# Patient Record
Sex: MALE | Race: WHITE | NOT HISPANIC OR LATINO | Employment: FULL TIME | URBAN - METROPOLITAN AREA
[De-identification: names, ages, dates, MRNs, and addresses within clinical notes are randomized per-mention and may not be internally consistent; named-entity substitution may affect disease eponyms.]

---

## 2021-07-18 ENCOUNTER — APPOINTMENT (OUTPATIENT)
Dept: CT IMAGING | Facility: HOSPITAL | Age: 46
End: 2021-07-18

## 2021-07-18 ENCOUNTER — HOSPITAL ENCOUNTER (INPATIENT)
Facility: HOSPITAL | Age: 46
LOS: 2 days | Discharge: HOME | End: 2021-07-20
Attending: PSYCHIATRY & NEUROLOGY | Admitting: PSYCHIATRY & NEUROLOGY

## 2021-07-18 ENCOUNTER — HOSPITAL ENCOUNTER (EMERGENCY)
Facility: HOSPITAL | Age: 46
Discharge: PSYCHIATRIC OR SUBSTANCE USE FACILITY | End: 2021-07-18
Attending: EMERGENCY MEDICINE | Admitting: EMERGENCY MEDICINE

## 2021-07-18 VITALS
SYSTOLIC BLOOD PRESSURE: 154 MMHG | RESPIRATION RATE: 20 BRPM | WEIGHT: 245.59 LBS | TEMPERATURE: 100.1 F | BODY MASS INDEX: 32.55 KG/M2 | OXYGEN SATURATION: 98 % | HEIGHT: 73 IN | DIASTOLIC BLOOD PRESSURE: 98 MMHG | HEART RATE: 103 BPM

## 2021-07-18 DIAGNOSIS — F29 PSYCHOSIS, UNSPECIFIED PSYCHOSIS TYPE (HCC): Primary | ICD-10-CM

## 2021-07-18 LAB
ALBUMIN SERPL-MCNC: 4.7 G/DL (ref 3.5–5.2)
ALBUMIN/GLOB SERPL: 1.4 G/DL
ALP SERPL-CCNC: 136 U/L (ref 39–117)
ALT SERPL W P-5'-P-CCNC: 97 U/L (ref 1–41)
AMPHET+METHAMPHET UR QL: NEGATIVE
ANION GAP SERPL CALCULATED.3IONS-SCNC: 17.8 MMOL/L (ref 5–15)
APAP SERPL-MCNC: <5 MCG/ML (ref 0–30)
AST SERPL-CCNC: 177 U/L (ref 1–40)
BARBITURATES UR QL SCN: NEGATIVE
BASOPHILS # BLD AUTO: 0.04 10*3/MM3 (ref 0–0.2)
BASOPHILS NFR BLD AUTO: 0.7 % (ref 0–1.5)
BENZODIAZ UR QL SCN: NEGATIVE
BILIRUB SERPL-MCNC: 0.8 MG/DL (ref 0–1.2)
BILIRUB UR QL STRIP: NEGATIVE
BUN SERPL-MCNC: 6 MG/DL (ref 6–20)
BUN/CREAT SERPL: 5.4 (ref 7–25)
CALCIUM SPEC-SCNC: 9.8 MG/DL (ref 8.6–10.5)
CANNABINOIDS SERPL QL: NEGATIVE
CHLORIDE SERPL-SCNC: 90 MMOL/L (ref 98–107)
CLARITY UR: CLEAR
CO2 SERPL-SCNC: 22.2 MMOL/L (ref 22–29)
COCAINE UR QL: NEGATIVE
COLOR UR: YELLOW
CREAT SERPL-MCNC: 1.11 MG/DL (ref 0.76–1.27)
DEPRECATED RDW RBC AUTO: 41.9 FL (ref 37–54)
EOSINOPHIL # BLD AUTO: 0.03 10*3/MM3 (ref 0–0.4)
EOSINOPHIL NFR BLD AUTO: 0.5 % (ref 0.3–6.2)
ERYTHROCYTE [DISTWIDTH] IN BLOOD BY AUTOMATED COUNT: 12.2 % (ref 12.3–15.4)
ETHANOL BLD-MCNC: 66 MG/DL (ref 0–10)
ETHANOL UR QL: 0.07 %
GFR SERPL CREATININE-BSD FRML MDRD: 71 ML/MIN/1.73
GLOBULIN UR ELPH-MCNC: 3.4 GM/DL
GLUCOSE SERPL-MCNC: 207 MG/DL (ref 65–99)
GLUCOSE UR STRIP-MCNC: NEGATIVE MG/DL
HCT VFR BLD AUTO: 38.7 % (ref 37.5–51)
HGB BLD-MCNC: 13.6 G/DL (ref 13–17.7)
HGB UR QL STRIP.AUTO: NEGATIVE
IMM GRANULOCYTES # BLD AUTO: 0.03 10*3/MM3 (ref 0–0.05)
IMM GRANULOCYTES NFR BLD AUTO: 0.5 % (ref 0–0.5)
KETONES UR QL STRIP: NEGATIVE
LEUKOCYTE ESTERASE UR QL STRIP.AUTO: NEGATIVE
LYMPHOCYTES # BLD AUTO: 0.67 10*3/MM3 (ref 0.7–3.1)
LYMPHOCYTES NFR BLD AUTO: 12.2 % (ref 19.6–45.3)
MAGNESIUM SERPL-MCNC: 1.1 MG/DL (ref 1.6–2.6)
MCH RBC QN AUTO: 32.7 PG (ref 26.6–33)
MCHC RBC AUTO-ENTMCNC: 35.1 G/DL (ref 31.5–35.7)
MCV RBC AUTO: 93 FL (ref 79–97)
METHADONE UR QL SCN: NEGATIVE
MONOCYTES # BLD AUTO: 0.46 10*3/MM3 (ref 0.1–0.9)
MONOCYTES NFR BLD AUTO: 8.4 % (ref 5–12)
NEUTROPHILS NFR BLD AUTO: 4.25 10*3/MM3 (ref 1.7–7)
NEUTROPHILS NFR BLD AUTO: 77.7 % (ref 42.7–76)
NITRITE UR QL STRIP: NEGATIVE
NRBC BLD AUTO-RTO: 0 /100 WBC (ref 0–0.2)
OPIATES UR QL: NEGATIVE
OXYCODONE UR QL SCN: NEGATIVE
PH UR STRIP.AUTO: 6.5 [PH] (ref 5–8)
PLATELET # BLD AUTO: 148 10*3/MM3 (ref 140–450)
PMV BLD AUTO: 9.5 FL (ref 6–12)
POTASSIUM SERPL-SCNC: 3.1 MMOL/L (ref 3.5–5.2)
PROT SERPL-MCNC: 8.1 G/DL (ref 6–8.5)
PROT UR QL STRIP: NEGATIVE
RBC # BLD AUTO: 4.16 10*6/MM3 (ref 4.14–5.8)
SALICYLATES SERPL-MCNC: <0.3 MG/DL
SARS-COV-2 RNA RESP QL NAA+PROBE: NOT DETECTED
SODIUM SERPL-SCNC: 130 MMOL/L (ref 136–145)
SP GR UR STRIP: <=1.005 (ref 1–1.03)
T4 FREE SERPL-MCNC: 1.02 NG/DL (ref 0.93–1.7)
TSH SERPL DL<=0.05 MIU/L-ACNC: 46.06 UIU/ML (ref 0.27–4.2)
UROBILINOGEN UR QL STRIP: NORMAL
WBC # BLD AUTO: 5.48 10*3/MM3 (ref 3.4–10.8)

## 2021-07-18 PROCEDURE — U0003 INFECTIOUS AGENT DETECTION BY NUCLEIC ACID (DNA OR RNA); SEVERE ACUTE RESPIRATORY SYNDROME CORONAVIRUS 2 (SARS-COV-2) (CORONAVIRUS DISEASE [COVID-19]), AMPLIFIED PROBE TECHNIQUE, MAKING USE OF HIGH THROUGHPUT TECHNOLOGIES AS DESCRIBED BY CMS-2020-01-R: HCPCS | Performed by: EMERGENCY MEDICINE

## 2021-07-18 PROCEDURE — 81003 URINALYSIS AUTO W/O SCOPE: CPT | Performed by: EMERGENCY MEDICINE

## 2021-07-18 PROCEDURE — 80307 DRUG TEST PRSMV CHEM ANLYZR: CPT | Performed by: EMERGENCY MEDICINE

## 2021-07-18 PROCEDURE — 99285 EMERGENCY DEPT VISIT HI MDM: CPT

## 2021-07-18 PROCEDURE — 36415 COLL VENOUS BLD VENIPUNCTURE: CPT

## 2021-07-18 PROCEDURE — 80179 DRUG ASSAY SALICYLATE: CPT | Performed by: EMERGENCY MEDICINE

## 2021-07-18 PROCEDURE — 80053 COMPREHEN METABOLIC PANEL: CPT | Performed by: EMERGENCY MEDICINE

## 2021-07-18 PROCEDURE — 80143 DRUG ASSAY ACETAMINOPHEN: CPT | Performed by: EMERGENCY MEDICINE

## 2021-07-18 PROCEDURE — 70450 CT HEAD/BRAIN W/O DYE: CPT

## 2021-07-18 PROCEDURE — 84443 ASSAY THYROID STIM HORMONE: CPT | Performed by: EMERGENCY MEDICINE

## 2021-07-18 PROCEDURE — 82077 ASSAY SPEC XCP UR&BREATH IA: CPT | Performed by: EMERGENCY MEDICINE

## 2021-07-18 PROCEDURE — 85025 COMPLETE CBC W/AUTO DIFF WBC: CPT | Performed by: EMERGENCY MEDICINE

## 2021-07-18 PROCEDURE — 83735 ASSAY OF MAGNESIUM: CPT | Performed by: EMERGENCY MEDICINE

## 2021-07-18 PROCEDURE — 84439 ASSAY OF FREE THYROXINE: CPT | Performed by: EMERGENCY MEDICINE

## 2021-07-18 PROCEDURE — 82962 GLUCOSE BLOOD TEST: CPT

## 2021-07-18 PROCEDURE — C9803 HOPD COVID-19 SPEC COLLECT: HCPCS | Performed by: EMERGENCY MEDICINE

## 2021-07-18 PROCEDURE — 63710000001 INSULIN LISPRO (HUMAN) PER 5 UNITS: Performed by: PSYCHIATRY & NEUROLOGY

## 2021-07-18 RX ORDER — LEVOTHYROXINE SODIUM 112 UG/1
224 TABLET ORAL DAILY
COMMUNITY
Start: 2021-05-06 | End: 2022-01-23

## 2021-07-18 RX ORDER — NICOTINE POLACRILEX 4 MG
15 LOZENGE BUCCAL
Status: DISCONTINUED | OUTPATIENT
Start: 2021-07-18 | End: 2021-07-20 | Stop reason: HOSPADM

## 2021-07-18 RX ORDER — AMLODIPINE BESYLATE 5 MG/1
TABLET ORAL
COMMUNITY
Start: 2021-05-20 | End: 2022-01-24 | Stop reason: HOSPADM

## 2021-07-18 RX ORDER — TRAZODONE HYDROCHLORIDE 50 MG/1
100 TABLET ORAL NIGHTLY PRN
Status: DISCONTINUED | OUTPATIENT
Start: 2021-07-18 | End: 2021-07-20 | Stop reason: HOSPADM

## 2021-07-18 RX ORDER — LORAZEPAM 2 MG/ML
2 INJECTION INTRAMUSCULAR EVERY 4 HOURS PRN
Status: DISCONTINUED | OUTPATIENT
Start: 2021-07-18 | End: 2021-07-20 | Stop reason: HOSPADM

## 2021-07-18 RX ORDER — HYDROXYZINE HYDROCHLORIDE 25 MG/1
50 TABLET, FILM COATED ORAL EVERY 6 HOURS PRN
Status: DISCONTINUED | OUTPATIENT
Start: 2021-07-18 | End: 2021-07-20 | Stop reason: HOSPADM

## 2021-07-18 RX ORDER — ESCITALOPRAM OXALATE 10 MG/1
TABLET ORAL
COMMUNITY
Start: 2021-05-10 | End: 2021-07-20 | Stop reason: HOSPADM

## 2021-07-18 RX ORDER — METOPROLOL TARTRATE 50 MG/1
50 TABLET, FILM COATED ORAL 2 TIMES DAILY
Status: DISCONTINUED | OUTPATIENT
Start: 2021-07-18 | End: 2021-07-20 | Stop reason: HOSPADM

## 2021-07-18 RX ORDER — HALOPERIDOL 5 MG/1
5 TABLET ORAL EVERY 4 HOURS PRN
Status: DISCONTINUED | OUTPATIENT
Start: 2021-07-18 | End: 2021-07-20 | Stop reason: HOSPADM

## 2021-07-18 RX ORDER — HALOPERIDOL 5 MG/ML
5 INJECTION INTRAMUSCULAR EVERY 4 HOURS PRN
Status: DISCONTINUED | OUTPATIENT
Start: 2021-07-18 | End: 2021-07-20 | Stop reason: HOSPADM

## 2021-07-18 RX ORDER — LORAZEPAM 2 MG/1
2 TABLET ORAL EVERY 4 HOURS PRN
Status: DISCONTINUED | OUTPATIENT
Start: 2021-07-18 | End: 2021-07-20 | Stop reason: HOSPADM

## 2021-07-18 RX ORDER — ALUMINA, MAGNESIA, AND SIMETHICONE 2400; 2400; 240 MG/30ML; MG/30ML; MG/30ML
15 SUSPENSION ORAL EVERY 6 HOURS PRN
Status: DISCONTINUED | OUTPATIENT
Start: 2021-07-18 | End: 2021-07-20 | Stop reason: HOSPADM

## 2021-07-18 RX ORDER — DEXTROSE MONOHYDRATE 100 MG/ML
25 INJECTION, SOLUTION INTRAVENOUS
Status: DISCONTINUED | OUTPATIENT
Start: 2021-07-18 | End: 2021-07-20 | Stop reason: HOSPADM

## 2021-07-18 RX ORDER — DIPHENHYDRAMINE HCL 50 MG
50 CAPSULE ORAL EVERY 4 HOURS PRN
Status: DISCONTINUED | OUTPATIENT
Start: 2021-07-18 | End: 2021-07-20 | Stop reason: HOSPADM

## 2021-07-18 RX ORDER — LORATADINE AND PSEUDOEPHEDRINE SULFATE 5; 120 MG/1; MG/1
TABLET, EXTENDED RELEASE ORAL
COMMUNITY
Start: 2021-05-20 | End: 2022-01-23

## 2021-07-18 RX ORDER — METOPROLOL TARTRATE 50 MG/1
TABLET, FILM COATED ORAL
COMMUNITY
Start: 2021-05-20 | End: 2022-01-24 | Stop reason: HOSPADM

## 2021-07-18 RX ORDER — ATORVASTATIN CALCIUM 40 MG/1
TABLET, FILM COATED ORAL
COMMUNITY
Start: 2021-05-17 | End: 2022-07-15 | Stop reason: SDUPTHER

## 2021-07-18 RX ORDER — GLYCOPYRROLATE 2 MG/1
TABLET ORAL
COMMUNITY
Start: 2021-05-17 | End: 2021-07-20 | Stop reason: HOSPADM

## 2021-07-18 RX ORDER — NICOTINE 21 MG/24HR
1 PATCH, TRANSDERMAL 24 HOURS TRANSDERMAL
Status: DISCONTINUED | OUTPATIENT
Start: 2021-07-18 | End: 2021-07-20 | Stop reason: HOSPADM

## 2021-07-18 RX ORDER — RISPERIDONE 1 MG/1
1 TABLET ORAL 2 TIMES DAILY
Status: DISCONTINUED | OUTPATIENT
Start: 2021-07-18 | End: 2021-07-19

## 2021-07-18 RX ORDER — DIPHENHYDRAMINE HYDROCHLORIDE 50 MG/ML
50 INJECTION INTRAMUSCULAR; INTRAVENOUS EVERY 4 HOURS PRN
Status: DISCONTINUED | OUTPATIENT
Start: 2021-07-18 | End: 2021-07-20 | Stop reason: HOSPADM

## 2021-07-18 RX ORDER — ACETAMINOPHEN 325 MG/1
650 TABLET ORAL EVERY 6 HOURS PRN
Status: DISCONTINUED | OUTPATIENT
Start: 2021-07-18 | End: 2021-07-20 | Stop reason: HOSPADM

## 2021-07-18 RX ORDER — AMLODIPINE BESYLATE 5 MG/1
5 TABLET ORAL
Status: DISCONTINUED | OUTPATIENT
Start: 2021-07-19 | End: 2021-07-20 | Stop reason: HOSPADM

## 2021-07-18 RX ORDER — INSULIN GLARGINE 300 U/ML
18 INJECTION, SOLUTION SUBCUTANEOUS DAILY
COMMUNITY
Start: 2021-06-22 | End: 2022-01-23

## 2021-07-18 RX ADMIN — METOPROLOL TARTRATE 50 MG: 50 TABLET, FILM COATED ORAL at 21:13

## 2021-07-18 RX ADMIN — RISPERIDONE 1 MG: 1 TABLET, FILM COATED ORAL at 21:15

## 2021-07-18 RX ADMIN — INSULIN LISPRO 6 UNITS: 100 INJECTION, SOLUTION INTRAVENOUS; SUBCUTANEOUS at 16:47

## 2021-07-18 RX ADMIN — TRAZODONE HYDROCHLORIDE 100 MG: 50 TABLET ORAL at 23:21
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
## 2021-07-19 PROBLEM — K21.9 GERD (GASTROESOPHAGEAL REFLUX DISEASE): Status: ACTIVE | Noted: 2021-07-19

## 2021-07-19 PROBLEM — C73 PAPILLARY THYROID CARCINOMA: Status: ACTIVE | Noted: 2020-04-01

## 2021-07-19 PROBLEM — E89.0 POSTOPERATIVE HYPOTHYROIDISM: Status: ACTIVE | Noted: 2021-07-19

## 2021-07-19 PROBLEM — I10 ESSENTIAL HYPERTENSION: Status: ACTIVE | Noted: 2021-07-19

## 2021-07-19 PROBLEM — E78.5 HYPERLIPIDEMIA: Status: ACTIVE | Noted: 2018-11-14

## 2021-07-19 PROBLEM — E11.9 DM (DIABETES MELLITUS): Status: ACTIVE | Noted: 2018-11-14

## 2021-07-19 LAB
GLUCOSE BLDC GLUCOMTR-MCNC: 161 MG/DL (ref 70–130)
GLUCOSE BLDC GLUCOMTR-MCNC: 249 MG/DL (ref 70–130)
GLUCOSE BLDC GLUCOMTR-MCNC: 262 MG/DL (ref 70–130)
GLUCOSE BLDC GLUCOMTR-MCNC: 265 MG/DL (ref 70–130)
GLUCOSE BLDC GLUCOMTR-MCNC: 318 MG/DL (ref 70–130)
GLUCOSE BLDC GLUCOMTR-MCNC: 92 MG/DL (ref 70–130)

## 2021-07-19 PROCEDURE — 82962 GLUCOSE BLOOD TEST: CPT

## 2021-07-19 PROCEDURE — 63710000001 INSULIN LISPRO (HUMAN) PER 5 UNITS: Performed by: PSYCHIATRY & NEUROLOGY

## 2021-07-19 RX ORDER — ATORVASTATIN CALCIUM 40 MG/1
40 TABLET, FILM COATED ORAL NIGHTLY
Status: DISCONTINUED | OUTPATIENT
Start: 2021-07-19 | End: 2021-07-20 | Stop reason: HOSPADM

## 2021-07-19 RX ORDER — LEVOTHYROXINE SODIUM 112 UG/1
224 TABLET ORAL DAILY
Status: DISCONTINUED | OUTPATIENT
Start: 2021-07-19 | End: 2021-07-20 | Stop reason: HOSPADM

## 2021-07-19 RX ORDER — AMLODIPINE BESYLATE 5 MG/1
5 TABLET ORAL DAILY
Status: DISCONTINUED | OUTPATIENT
Start: 2021-07-19 | End: 2021-07-19

## 2021-07-19 RX ORDER — RISPERIDONE 2 MG/1
2 TABLET ORAL 2 TIMES DAILY
Status: DISCONTINUED | OUTPATIENT
Start: 2021-07-19 | End: 2021-07-20 | Stop reason: HOSPADM

## 2021-07-19 RX ADMIN — ATORVASTATIN CALCIUM 40 MG: 40 TABLET, FILM COATED ORAL at 21:17

## 2021-07-19 RX ADMIN — METOPROLOL TARTRATE 50 MG: 50 TABLET, FILM COATED ORAL at 21:18

## 2021-07-19 RX ADMIN — LEVOTHYROXINE SODIUM 224 MCG: 112 TABLET ORAL at 11:14

## 2021-07-19 RX ADMIN — TRAZODONE HYDROCHLORIDE 100 MG: 50 TABLET ORAL at 22:45

## 2021-07-19 RX ADMIN — METOPROLOL TARTRATE 50 MG: 50 TABLET, FILM COATED ORAL at 08:14

## 2021-07-19 RX ADMIN — RISPERIDONE 2 MG: 2 TABLET, FILM COATED ORAL at 21:18

## 2021-07-19 RX ADMIN — INSULIN LISPRO 6 UNITS: 100 INJECTION, SOLUTION INTRAVENOUS; SUBCUTANEOUS at 16:42

## 2021-07-19 RX ADMIN — INSULIN LISPRO 7 UNITS: 100 INJECTION, SOLUTION INTRAVENOUS; SUBCUTANEOUS at 21:23

## 2021-07-19 RX ADMIN — RISPERIDONE 1 MG: 1 TABLET, FILM COATED ORAL at 08:15

## 2021-07-19 RX ADMIN — AMLODIPINE BESYLATE 5 MG: 5 TABLET ORAL at 08:15

## 2021-07-19 RX ADMIN — INSULIN LISPRO 2 UNITS: 100 INJECTION, SOLUTION INTRAVENOUS; SUBCUTANEOUS at 07:42

## 2021-07-19 RX ADMIN — INSULIN LISPRO 4 UNITS: 100 INJECTION, SOLUTION INTRAVENOUS; SUBCUTANEOUS at 11:37
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
## 2021-07-19 NOTE — PAYOR COMM NOTE
"ADMITTED 7/18  REF # [id number]  [facility]  NPI [id number] [address] [place]  [id number]  DX F29  DR SHAE HASSAN NPI [id number]  [facility]  [phone]  [phone]        Wayne Guan (45 y.o. Male)     Date of Birth Social Security Number Address Home Phone MRN    1975  [address] [place] [place] [address] [phone] [id number]    Mormon Marital Status          None Single       Admission Date Admission Type Admitting Provider Attending Provider Department, Room/Bed    7/18/21 Urgent Suad Bob MD Lydon, Eric, MD [facility], 272/2    Discharge Date Discharge Disposition Discharge Destination                       Attending Provider: Shae Hassan MD    Allergies: No Known Allergies    Isolation: None   Infection: None   Code Status: CPR    Ht: 185.4 cm (73\")   Wt: 111 kg (245 lb 9.5 oz)    Admission Cmt: None   Principal Problem: None                Active Insurance as of 7/18/2021     Primary Coverage     Payor Plan Insurance Group Employer/Plan Group    HUMANA HUMANA [id number]     Payor Plan Address Payor Plan Phone Number Payor Plan Fax Number Effective Dates    [address] [address] [phone]  1/1/2021 - None Entered    [facility] [id number]       Subscriber Name Subscriber Birth Date Member ID       WAYNE GUAN 1975 [id number]                 Emergency Contacts      (Rel.) Home Phone Work Phone Mobile Phone    ARNOLDO THOMAS (Father) [phone] -- [phone]    MARTINE DYE (Significant Other) [phone] -- [phone]              "

## 2021-07-19 NOTE — PLAN OF CARE
Goal Outcome Evaluation:  Plan of Care Reviewed With: patient  Patient Agreement with Plan of Care: agrees      Pt. Has been calm and cooperative today, reporting he was feeling better and hoped to be released soon. Pt. Denied any si/hi/avh and contracted for safety. Pt. Is making his needs, will con't to monitor and provide a safe environment.

## 2021-07-19 NOTE — CONSULTS
Nutrition Services    Patient Name:  Wayne Guan  YOB: 1975  MRN: [id number]  Admit Date:  7/18/2021    No nutrition intervention indicated at this time. Will continue to monitor intake and follow per protocol.    Electronically signed by:  Joann Cedeño RD  07/19/21 10:10 EDT

## 2021-07-19 NOTE — PLAN OF CARE
"Goal Outcome Evaluation:  Plan of Care Reviewed With: patient  Patient Agreement with Plan of Care: agrees     Progress: improving  Pt has been up in the dayroom this afternoon. Pt describes his mood as \"good\". Denies suicidal, homicidal ideations and denies hallucinations.Pt did state he feels like his mind is racing related to currently in the middle of buying a house and trying to sell his girlfriends house. When ask reason for admission, Pt stated \"dirty \"continued to state \"they falsified reports\".Will continue to monitor. J.W/R.N  "

## 2021-07-20 VITALS
SYSTOLIC BLOOD PRESSURE: 112 MMHG | WEIGHT: 245.59 LBS | TEMPERATURE: 97.3 F | OXYGEN SATURATION: 97 % | BODY MASS INDEX: 32.55 KG/M2 | HEART RATE: 118 BPM | DIASTOLIC BLOOD PRESSURE: 79 MMHG | RESPIRATION RATE: 16 BRPM | HEIGHT: 73 IN

## 2021-07-20 PROBLEM — F31.81 BIPOLAR 2 DISORDER: Status: ACTIVE | Noted: 2021-07-20

## 2021-07-20 LAB
GLUCOSE BLDC GLUCOMTR-MCNC: 226 MG/DL (ref 70–130)
GLUCOSE BLDC GLUCOMTR-MCNC: 269 MG/DL (ref 70–130)

## 2021-07-20 PROCEDURE — 63710000001 INSULIN LISPRO (HUMAN) PER 5 UNITS: Performed by: PSYCHIATRY & NEUROLOGY

## 2021-07-20 PROCEDURE — 63710000001 INSULIN DETEMIR PER 5 UNITS: Performed by: PSYCHIATRY & NEUROLOGY

## 2021-07-20 PROCEDURE — 82962 GLUCOSE BLOOD TEST: CPT

## 2021-07-20 RX ORDER — RISPERIDONE 2 MG/1
2 TABLET ORAL 2 TIMES DAILY
Qty: 30 TABLET | Refills: 2 | Status: SHIPPED | OUTPATIENT
Start: 2021-07-20 | End: 2022-01-23

## 2021-07-20 RX ORDER — TRAZODONE HYDROCHLORIDE 100 MG/1
100 TABLET ORAL NIGHTLY PRN
Qty: 30 TABLET | Refills: 2 | Status: SHIPPED | OUTPATIENT
Start: 2021-07-20 | End: 2022-01-23

## 2021-07-20 RX ADMIN — INSULIN LISPRO 6 UNITS: 100 INJECTION, SOLUTION INTRAVENOUS; SUBCUTANEOUS at 07:08

## 2021-07-20 RX ADMIN — LEVOTHYROXINE SODIUM 224 MCG: 112 TABLET ORAL at 08:57

## 2021-07-20 RX ADMIN — METOPROLOL TARTRATE 50 MG: 50 TABLET, FILM COATED ORAL at 08:57

## 2021-07-20 RX ADMIN — AMLODIPINE BESYLATE 5 MG: 5 TABLET ORAL at 08:55

## 2021-07-20 RX ADMIN — INSULIN DETEMIR 15 UNITS: 100 INJECTION, SOLUTION SUBCUTANEOUS at 08:56

## 2021-07-20 RX ADMIN — INSULIN LISPRO 4 UNITS: 100 INJECTION, SOLUTION INTRAVENOUS; SUBCUTANEOUS at 11:21

## 2021-07-20 RX ADMIN — RISPERIDONE 2 MG: 2 TABLET, FILM COATED ORAL at 08:57

## 2021-07-20 NOTE — PLAN OF CARE
Goal Outcome Evaluation:  Plan of Care Reviewed With: patient  Patient Agreement with Plan of Care: agrees     Progress: improving  Patient smiles upon approach from staff. Denies s/I,h/I and a/v hallucinations.States he hopes to go  home tomorrow.Pt verbalized having difficulty falling to sleep and received trazodone  as ordered for sleep. Will continue to monitor and provide a safe environment.

## 2021-07-20 NOTE — PLAN OF CARE
Goal Outcome Evaluation:  Plan of Care Reviewed With: patient  Patient Agreement with Plan of Care: agrees     Progress: improving   Pt continues to be pleasant and does not appear to be reacting to any internal stimuli. Pt interacts well with peers and staff, and expresses the desire to go home today.   Pt denies SI/HI and A/V hallucinations and also denies feeling depressed, anxious, or hopeless. Pt remains compliant with medication regimen at this time.   Safe environment maintained. Q15 minute checks to be continued until pt discharge.

## 2021-07-20 NOTE — DISCHARGE SUMMARY
[facility]         DISCHARGE SUMMARY    Patient Name: Wayne Guan  : 1975  MRN: [id number]    Date of Admission: 2021  Date of Discharge: 2021  Primary Care Physician: Myron Jimenez MD    Consults     Date and Time Order Name Status Description    2021 11:11 AM Inpatient Psychiatrist Consult Completed           Presenting Problem:   Psychosis (CMS/HCC) [F29]    Active and Resolved Hospital Problems:  Active Hospital Problems    Diagnosis POA   • Bipolar 2 disorder (CMS/HCC) [F31.81] Yes   • Essential hypertension [I10] Yes   • Postoperative hypothyroidism [E89.0] Yes   • GERD (gastroesophageal reflux disease) [K21.9] Yes   • Psychosis (CMS/HCC) [F29] Yes   • DM (diabetes mellitus) (CMS/HCC) [E11.9] Yes   • Hyperlipidemia [E78.5] Yes      Resolved Hospital Problems   No resolved problems to display.         Hospital Course     Hospital Course:  Wayne Guan is a 45 y.o. male was admitted on a 72-hour hold after police brought him to the hospital for bizarre behavior with psychosis.  Patient called police to his home thinking someone had broken in and there is no evidence of any intruders.  Patient was noted to be somewhat agitated.  He did not require any extra medications for acute agitation.  Patient reportedly had not been sleeping well at home and has some sleep deprivation.  He also reported some manic type symptoms with push speech, but attempting to do multiple tasks, and may have had some spending issues.  However no acute kristen identified and no history of kristen.  Did have some racing thoughts with psychosis and bizarre behavior.    Patient was started on a regimen of Risperdal 2 mg twice daily for her psychosis.  This medication worked well and the patient was calm and cooperative throughout his stay.  His psychosis abated and he had no further reported hallucinations during his admission.  Patient was also started on trazodone and his sleep was  significantly improved.  Patient reports that his sleep was probably one of the biggest factors of him coming into the hospital.  Trazodone is been very effective and he sleeping much better and is slept through the night comfortably for 2 nights.     Initially on presentation the patient was calm and cooperative and quite engaging.  He remained this way throughout his stay.  He is not had any agitation or combativeness.  He is never had any restlessness or overt anxiety.  He has been calm and engaging.  Patient is very pleasant.  Staff did talk to the patient's girlfriend he reports that his lack of sleep and recent medication changes may have contributed to his bizarre behavior.  She is talked to him on telephone reports that he sounds much better.  She also describes some poor sleep and some bizarre behavior which could be construed as kristen such as wanting to adopt a child, which they are not going to do, being less focused recently.  Patient does have numerous stressors but they are nothing out of the ordinary.  Patient this point is calm and cooperative shows significant improvement.  Patient is requesting discharge home today.  This point there is no criteria for involuntary hold and 72-hour hold will be dropped and the patient will be discharged home        DISCHARGE Follow Up Recommendations for labs and diagnostics: Lipid and glucose monitoring      Day of Discharge     Vital Signs:  Temp:  [97.3 °F (36.3 °C)-97.7 °F (36.5 °C)] 97.3 °F (36.3 °C)  Heart Rate:  [118] 118  Resp:  [16] 16  BP: (112-129)/(79-94) 112/79      Pertinent  and/or Most Recent Results     LAB RESULTS:      Lab 07/18/21  0652   WBC 5.48   HEMOGLOBIN 13.6   HEMATOCRIT 38.7   PLATELETS 148   NEUTROS ABS 4.25   IMMATURE GRANS (ABS) 0.03   LYMPHS ABS 0.67*   MONOS ABS 0.46   EOS ABS 0.03   MCV 93.0         Lab 07/18/21  0652   SODIUM 130*   POTASSIUM 3.1*   CHLORIDE 90*   CO2 22.2   ANION GAP 17.8*   BUN 6   CREATININE 1.11   GLUCOSE 207*    CALCIUM 9.8   MAGNESIUM 1.1*   TSH 46.060*         Lab 07/18/21  0652   TOTAL PROTEIN 8.1   ALBUMIN 4.70   GLOBULIN 3.4   ALT (SGPT) 97*   AST (SGOT) 177*   BILIRUBIN 0.8   ALK PHOS 136*                     Brief Urine Lab Results  (Last result in the past 365 days)      Color   Clarity   Blood   Leuk Est   Nitrite   Protein   CREAT   Urine HCG        07/18/21 1026 Yellow Clear Negative Negative Negative Negative             Microbiology Results (last 10 days)     Procedure Component Value - Date/Time    COVID PRE-OP / PRE-PROCEDURE SCREENING ORDER (NO ISOLATION) - Swab, Nasopharynx [[id number]]  (Normal) Collected: 07/18/21 0653    Lab Status: Final result Specimen: Swab from Nasopharynx Updated: 07/18/21 1053    Narrative:      The following orders were created for panel order COVID PRE-OP / PRE-PROCEDURE SCREENING ORDER (NO ISOLATION) - Swab, Nasopharynx.  Procedure                               Abnormality         Status                     ---------                               -----------         ------                     COVID-19,CEPHEID,COR/DONTE...[[id number]]  Normal              Final result                 Please view results for these tests on the individual orders.    COVID-19,CEPHEID,COR/DONTE/PAD/TYE IN-HOUSE(OR EMERGENT/ADD-ON),NP SWAB IN TRANSPORT MEDIA 3-4 HR TAT, RT-PCR - Swab, Nasopharynx [[id number]]  (Normal) Collected: 07/18/21 0653    Lab Status: Final result Specimen: Swab from Nasopharynx Updated: 07/18/21 1053     COVID19 Not Detected    Narrative:      Fact sheet for providers: https://www.fda.gov/media/320937/download     Fact sheet for patients: https://www.fda.gov/media/864150/download  Fact sheet for providers: https://www.fda.gov/media/557491/download     Fact sheet for patients: https://www.fda.gov/media/511330/download                           Imaging Results (Last 7 Days)     ** No results found for the last 168 hours. **           Labs Pending at Discharge:        Discharge  Details        Discharge Medications      New Medications      Instructions Start Date   risperiDONE 2 MG tablet  Commonly known as: risperDAL   2 mg, Oral, 2 Times Daily      traZODone 100 MG tablet  Commonly known as: DESYREL   100 mg, Oral, Nightly PRN         Continue These Medications      Instructions Start Date   amLODIPine 5 MG tablet  Commonly known as: NORVASC   TAKE ONE TABLET BY MOUTH DAILY      atorvastatin 40 MG tablet  Commonly known as: LIPITOR   TAKE 1 TABLET EVERY NIGHT      Claritin-D 12 Hour 5-120 MG per 12 hr tablet  Generic drug: loratadine-pseudoephedrine   TAKE ONE TABLET BY MOUTH DAILY      metoprolol tartrate 50 MG tablet  Commonly known as: LOPRESSOR   TAKE ONE TABLET BY MOUTH TWICE A DAY      Synthroid 112 MCG tablet  Generic drug: levothyroxine   224 mcg, Oral, Daily      Toujeo SoloStar 300 UNIT/ML solution pen-injector injection  Generic drug: Insulin Glargine (1 Unit Dial)   18 Units, Subcutaneous, Daily         Stop These Medications    escitalopram 10 MG tablet  Commonly known as: LEXAPRO     glycopyrrolate 2 MG tablet  Commonly known as: ROBINUL            No Known Allergies      Discharge Disposition:  Home or Self Care    Diet:  Hospital:  Diet Order   Procedures   • Diet Regular; Consistent Carbohydrate         Discharge Activity:   Activity Instructions     Activity as Tolerated              CODE STATUS:  Code Status and Medical Interventions:   Ordered at: 07/19/21 1008     Code Status:    CPR     Medical Interventions (Level of Support Prior to Arrest):    Full         No future appointments.    Additional Instructions for the Follow-ups that You Need to Schedule     Discharge Follow-up with PCP   As directed       Currently Documented PCP:    Myron Jimenez MD    PCP Phone Number:    [phone]     Follow Up Details: PCP PRN         Discharge Follow-up with Specified Provider: Provider in network, per SW note; 2 Weeks   As directed      To: Provider in network, per  SW note    Follow Up: 2 Weeks               Time spent on Discharge including face to face service: 30 minutes    Electronically signed by Timo Hassan MD, 07/20/21, 10:56 AM EDT.

## 2021-07-20 NOTE — PAYOR COMM NOTE
"EXPEDITED APPEALS  REF # [id number]  CLINICALS FAXED 7/19 @ 4900   [phone] HAVE CONFIRMATION  CASE DENIED DUE TO NO CLINICALS RECEIVED?         Wayne Guan (45 y.o. Male)     Date of Birth Social Security Number Address Home Phone MRN    1975  [address] [place] [address] [phone] [id number]    Hoahaoism Marital Status          None Single       Admission Date Admission Type Admitting Provider Attending Provider Department, Room/Bed    7/18/21 Urgent Suad Bob MD Lydon, Eric, MD [facility], 272/2    Discharge Date Discharge Disposition Discharge Destination         Home or Self Care              Attending Provider: Timo Hassan MD    Allergies: No Known Allergies    Isolation: None   Infection: None   Code Status: CPR    Ht: 185.4 cm (73\")   Wt: 111 kg (245 lb 9.5 oz)    Admission Cmt: None   Principal Problem: None                Active Insurance as of 7/18/2021     Primary Coverage     Payor Plan Insurance Group Employer/Plan Group    HUMANA HUMANA [id number]     Payor Plan Address Payor Plan Phone Number Payor Plan Fax Number Effective Dates    [address] [address] [phone]  1/1/2021 - None Entered    [facility] [address]       Subscriber Name Subscriber Birth Date Member ID       WAYNE GUAN 1975 [id number]                 Emergency Contacts      (Rel.) Home Phone Work Phone Mobile Phone    WILLIAMARNOLDO (Father) [phone] -- [phone]    MARTINE DYE (Significant Other) [phone] -- [phone]              "
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
## 2021-09-27 ENCOUNTER — INPATIENT HOSPITAL (OUTPATIENT)
Dept: URBAN - METROPOLITAN AREA HOSPITAL 107 | Facility: HOSPITAL | Age: 46
End: 2021-09-27

## 2021-09-27 DIAGNOSIS — F10.10 ALCOHOL ABUSE, UNCOMPLICATED: ICD-10-CM

## 2021-09-27 DIAGNOSIS — R13.10 DYSPHAGIA, UNSPECIFIED: ICD-10-CM

## 2021-09-27 DIAGNOSIS — R07.89 OTHER CHEST PAIN: ICD-10-CM

## 2021-09-27 DIAGNOSIS — R12 HEARTBURN: ICD-10-CM

## 2021-09-27 PROCEDURE — 99253 IP/OBS CNSLTJ NEW/EST LOW 45: CPT | Performed by: INTERNAL MEDICINE

## 2021-09-28 ENCOUNTER — INPATIENT HOSPITAL (OUTPATIENT)
Dept: URBAN - METROPOLITAN AREA HOSPITAL 107 | Facility: HOSPITAL | Age: 46
End: 2021-09-28

## 2021-09-28 DIAGNOSIS — K21.9 GASTRO-ESOPHAGEAL REFLUX DISEASE WITHOUT ESOPHAGITIS: ICD-10-CM

## 2021-09-28 DIAGNOSIS — K76.0 FATTY (CHANGE OF) LIVER, NOT ELSEWHERE CLASSIFIED: ICD-10-CM

## 2021-09-28 DIAGNOSIS — F10.10 ALCOHOL ABUSE, UNCOMPLICATED: ICD-10-CM

## 2021-09-28 DIAGNOSIS — Z86.010 PERSONAL HISTORY OF COLONIC POLYPS: ICD-10-CM

## 2021-09-28 DIAGNOSIS — R13.10 DYSPHAGIA, UNSPECIFIED: ICD-10-CM

## 2021-09-28 PROCEDURE — 99232 SBSQ HOSP IP/OBS MODERATE 35: CPT | Performed by: PHYSICIAN ASSISTANT

## 2021-09-29 ENCOUNTER — INPATIENT HOSPITAL (OUTPATIENT)
Dept: URBAN - METROPOLITAN AREA HOSPITAL 107 | Facility: HOSPITAL | Age: 46
End: 2021-09-29

## 2021-09-29 DIAGNOSIS — K21.00 GASTRO-ESOPHAGEAL REFLUX DISEASE WITH ESOPHAGITIS, WITHOUT B: ICD-10-CM

## 2021-09-29 DIAGNOSIS — K76.0 FATTY (CHANGE OF) LIVER, NOT ELSEWHERE CLASSIFIED: ICD-10-CM

## 2021-09-29 DIAGNOSIS — R13.10 DYSPHAGIA, UNSPECIFIED: ICD-10-CM

## 2021-09-29 DIAGNOSIS — F10.10 ALCOHOL ABUSE, UNCOMPLICATED: ICD-10-CM

## 2021-09-29 DIAGNOSIS — Z86.010 PERSONAL HISTORY OF COLONIC POLYPS: ICD-10-CM

## 2021-09-29 PROCEDURE — 99231 SBSQ HOSP IP/OBS SF/LOW 25: CPT | Performed by: PHYSICIAN ASSISTANT

## 2022-01-08 ENCOUNTER — APPOINTMENT (OUTPATIENT)
Dept: CT IMAGING | Facility: HOSPITAL | Age: 47
End: 2022-01-08

## 2022-01-08 ENCOUNTER — HOSPITAL ENCOUNTER (EMERGENCY)
Facility: HOSPITAL | Age: 47
Discharge: HOME | End: 2022-01-08
Attending: EMERGENCY MEDICINE | Admitting: EMERGENCY MEDICINE

## 2022-01-08 ENCOUNTER — APPOINTMENT (OUTPATIENT)
Dept: GENERAL RADIOLOGY | Facility: HOSPITAL | Age: 47
End: 2022-01-08

## 2022-01-08 VITALS
HEART RATE: 130 BPM | SYSTOLIC BLOOD PRESSURE: 135 MMHG | DIASTOLIC BLOOD PRESSURE: 101 MMHG | OXYGEN SATURATION: 97 % | HEIGHT: 73 IN | TEMPERATURE: 97.3 F | BODY MASS INDEX: 30.15 KG/M2 | RESPIRATION RATE: 22 BRPM | WEIGHT: 227.51 LBS

## 2022-01-08 DIAGNOSIS — R00.0 SINUS TACHYCARDIA: ICD-10-CM

## 2022-01-08 DIAGNOSIS — R11.14 BILIOUS VOMITING WITH NAUSEA: Primary | ICD-10-CM

## 2022-01-08 DIAGNOSIS — E11.65 TYPE 2 DIABETES MELLITUS WITH HYPERGLYCEMIA, UNSPECIFIED WHETHER LONG TERM INSULIN USE: ICD-10-CM

## 2022-01-08 LAB
ACETONE BLD QL: NEGATIVE
ALBUMIN SERPL-MCNC: 5 G/DL (ref 3.5–5.2)
ALBUMIN/GLOB SERPL: 1.4 G/DL
ALP SERPL-CCNC: 152 U/L (ref 39–117)
ALT SERPL W P-5'-P-CCNC: 33 U/L (ref 1–41)
AMPHET+METHAMPHET UR QL: NEGATIVE
ANION GAP SERPL CALCULATED.3IONS-SCNC: 19.5 MMOL/L (ref 5–15)
AST SERPL-CCNC: 44 U/L (ref 1–40)
BACTERIA UR QL AUTO: ABNORMAL /HPF
BARBITURATES UR QL SCN: NEGATIVE
BASOPHILS # BLD AUTO: 0.04 10*3/MM3 (ref 0–0.2)
BASOPHILS NFR BLD AUTO: 0.3 % (ref 0–1.5)
BENZODIAZ UR QL SCN: NEGATIVE
BILIRUB SERPL-MCNC: 1.2 MG/DL (ref 0–1.2)
BILIRUB UR QL STRIP: NEGATIVE
BUN SERPL-MCNC: 13 MG/DL (ref 6–20)
BUN/CREAT SERPL: 13.5 (ref 7–25)
CALCIUM SPEC-SCNC: 9.9 MG/DL (ref 8.6–10.5)
CANNABINOIDS SERPL QL: NEGATIVE
CHLORIDE SERPL-SCNC: 91 MMOL/L (ref 98–107)
CLARITY UR: CLEAR
CO2 SERPL-SCNC: 22.5 MMOL/L (ref 22–29)
COCAINE UR QL: NEGATIVE
COLOR UR: YELLOW
CREAT SERPL-MCNC: 0.96 MG/DL (ref 0.76–1.27)
DEPRECATED RDW RBC AUTO: 38.5 FL (ref 37–54)
EOSINOPHIL # BLD AUTO: 0.03 10*3/MM3 (ref 0–0.4)
EOSINOPHIL NFR BLD AUTO: 0.2 % (ref 0.3–6.2)
ERYTHROCYTE [DISTWIDTH] IN BLOOD BY AUTOMATED COUNT: 12.1 % (ref 12.3–15.4)
ETHANOL BLD-MCNC: <10 MG/DL (ref 0–10)
ETHANOL UR QL: <0.01 %
GFR SERPL CREATININE-BSD FRML MDRD: 84 ML/MIN/1.73
GLOBULIN UR ELPH-MCNC: 3.6 GM/DL
GLUCOSE BLDC GLUCOMTR-MCNC: 178 MG/DL (ref 70–99)
GLUCOSE BLDC GLUCOMTR-MCNC: 313 MG/DL (ref 70–99)
GLUCOSE SERPL-MCNC: 327 MG/DL (ref 65–99)
GLUCOSE UR STRIP-MCNC: ABNORMAL MG/DL
HCT VFR BLD AUTO: 43.1 % (ref 37.5–51)
HGB BLD-MCNC: 15.9 G/DL (ref 13–17.7)
HGB UR QL STRIP.AUTO: NEGATIVE
HOLD SPECIMEN: NORMAL
HOLD SPECIMEN: NORMAL
HYALINE CASTS UR QL AUTO: ABNORMAL /LPF
IMM GRANULOCYTES # BLD AUTO: 0.03 10*3/MM3 (ref 0–0.05)
IMM GRANULOCYTES NFR BLD AUTO: 0.2 % (ref 0–0.5)
KETONES UR QL STRIP: ABNORMAL
LEUKOCYTE ESTERASE UR QL STRIP.AUTO: NEGATIVE
LIPASE SERPL-CCNC: 19 U/L (ref 13–60)
LYMPHOCYTES # BLD AUTO: 1.25 10*3/MM3 (ref 0.7–3.1)
LYMPHOCYTES NFR BLD AUTO: 10.3 % (ref 19.6–45.3)
MAGNESIUM SERPL-MCNC: 1.5 MG/DL (ref 1.6–2.6)
MCH RBC QN AUTO: 32.2 PG (ref 26.6–33)
MCHC RBC AUTO-ENTMCNC: 36.9 G/DL (ref 31.5–35.7)
MCV RBC AUTO: 87.2 FL (ref 79–97)
METHADONE UR QL SCN: NEGATIVE
MONOCYTES # BLD AUTO: 0.81 10*3/MM3 (ref 0.1–0.9)
MONOCYTES NFR BLD AUTO: 6.7 % (ref 5–12)
NEUTROPHILS NFR BLD AUTO: 10.02 10*3/MM3 (ref 1.7–7)
NEUTROPHILS NFR BLD AUTO: 82.3 % (ref 42.7–76)
NITRITE UR QL STRIP: NEGATIVE
NRBC BLD AUTO-RTO: 0 /100 WBC (ref 0–0.2)
OPIATES UR QL: NEGATIVE
OXYCODONE UR QL SCN: NEGATIVE
PH BLDV: 7.4 PH UNITS (ref 7.31–7.41)
PH UR STRIP.AUTO: <=5 [PH] (ref 5–8)
PLATELET # BLD AUTO: 334 10*3/MM3 (ref 140–450)
PMV BLD AUTO: 8.7 FL (ref 6–12)
POTASSIUM SERPL-SCNC: 4.1 MMOL/L (ref 3.5–5.2)
PROT SERPL-MCNC: 8.6 G/DL (ref 6–8.5)
PROT UR QL STRIP: ABNORMAL
QT INTERVAL: 304 MS
RBC # BLD AUTO: 4.94 10*6/MM3 (ref 4.14–5.8)
RBC # UR STRIP: ABNORMAL /HPF
REF LAB TEST METHOD: ABNORMAL
SODIUM SERPL-SCNC: 133 MMOL/L (ref 136–145)
SP GR UR STRIP: >1.03 (ref 1–1.03)
SQUAMOUS #/AREA URNS HPF: ABNORMAL /HPF
T4 FREE SERPL-MCNC: 2.24 NG/DL (ref 0.93–1.7)
TROPONIN T SERPL-MCNC: <0.01 NG/ML (ref 0–0.03)
TSH SERPL DL<=0.05 MIU/L-ACNC: 0.09 UIU/ML (ref 0.27–4.2)
UROBILINOGEN UR QL STRIP: ABNORMAL
WBC # UR STRIP: ABNORMAL /HPF
WBC NRBC COR # BLD: 12.18 10*3/MM3 (ref 3.4–10.8)
WHOLE BLOOD HOLD SPECIMEN: NORMAL
WHOLE BLOOD HOLD SPECIMEN: NORMAL

## 2022-01-08 PROCEDURE — 80307 DRUG TEST PRSMV CHEM ANLYZR: CPT | Performed by: NURSE PRACTITIONER

## 2022-01-08 PROCEDURE — 25010000002 ONDANSETRON PER 1 MG: Performed by: EMERGENCY MEDICINE

## 2022-01-08 PROCEDURE — 82962 GLUCOSE BLOOD TEST: CPT

## 2022-01-08 PROCEDURE — 93010 ELECTROCARDIOGRAM REPORT: CPT | Performed by: INTERNAL MEDICINE

## 2022-01-08 PROCEDURE — 93005 ELECTROCARDIOGRAM TRACING: CPT | Performed by: EMERGENCY MEDICINE

## 2022-01-08 PROCEDURE — 99284 EMERGENCY DEPT VISIT MOD MDM: CPT

## 2022-01-08 PROCEDURE — 74177 CT ABD & PELVIS W/CONTRAST: CPT

## 2022-01-08 PROCEDURE — 96374 THER/PROPH/DIAG INJ IV PUSH: CPT

## 2022-01-08 PROCEDURE — 85025 COMPLETE CBC W/AUTO DIFF WBC: CPT | Performed by: EMERGENCY MEDICINE

## 2022-01-08 PROCEDURE — 80053 COMPREHEN METABOLIC PANEL: CPT | Performed by: EMERGENCY MEDICINE

## 2022-01-08 PROCEDURE — 84439 ASSAY OF FREE THYROXINE: CPT | Performed by: NURSE PRACTITIONER

## 2022-01-08 PROCEDURE — 84484 ASSAY OF TROPONIN QUANT: CPT | Performed by: EMERGENCY MEDICINE

## 2022-01-08 PROCEDURE — 25010000002 METOCLOPRAMIDE PER 10 MG: Performed by: NURSE PRACTITIONER

## 2022-01-08 PROCEDURE — 81001 URINALYSIS AUTO W/SCOPE: CPT | Performed by: EMERGENCY MEDICINE

## 2022-01-08 PROCEDURE — 96375 TX/PRO/DX INJ NEW DRUG ADDON: CPT

## 2022-01-08 PROCEDURE — 25010000002 DIPHENHYDRAMINE PER 50 MG: Performed by: NURSE PRACTITIONER

## 2022-01-08 PROCEDURE — 83735 ASSAY OF MAGNESIUM: CPT | Performed by: EMERGENCY MEDICINE

## 2022-01-08 PROCEDURE — 82009 KETONE BODYS QUAL: CPT | Performed by: NURSE PRACTITIONER

## 2022-01-08 PROCEDURE — 0 IOPAMIDOL PER 1 ML: Performed by: EMERGENCY MEDICINE

## 2022-01-08 PROCEDURE — 84443 ASSAY THYROID STIM HORMONE: CPT | Performed by: NURSE PRACTITIONER

## 2022-01-08 PROCEDURE — 83690 ASSAY OF LIPASE: CPT | Performed by: NURSE PRACTITIONER

## 2022-01-08 PROCEDURE — 82077 ASSAY SPEC XCP UR&BREATH IA: CPT | Performed by: NURSE PRACTITIONER

## 2022-01-08 PROCEDURE — 82800 BLOOD PH: CPT | Performed by: NURSE PRACTITIONER

## 2022-01-08 PROCEDURE — 71045 X-RAY EXAM CHEST 1 VIEW: CPT

## 2022-01-08 PROCEDURE — 25010000002 LORAZEPAM PER 2 MG: Performed by: EMERGENCY MEDICINE

## 2022-01-08 PROCEDURE — 36415 COLL VENOUS BLD VENIPUNCTURE: CPT | Performed by: EMERGENCY MEDICINE

## 2022-01-08 RX ORDER — METOCLOPRAMIDE HYDROCHLORIDE 5 MG/ML
10 INJECTION INTRAMUSCULAR; INTRAVENOUS ONCE
Status: DISCONTINUED | OUTPATIENT
Start: 2022-01-08 | End: 2022-01-08 | Stop reason: HOSPADM

## 2022-01-08 RX ORDER — METOCLOPRAMIDE HYDROCHLORIDE 5 MG/ML
10 INJECTION INTRAMUSCULAR; INTRAVENOUS ONCE
Status: COMPLETED | OUTPATIENT
Start: 2022-01-08 | End: 2022-01-08

## 2022-01-08 RX ORDER — DIPHENHYDRAMINE HYDROCHLORIDE 50 MG/ML
25 INJECTION INTRAMUSCULAR; INTRAVENOUS ONCE
Status: COMPLETED | OUTPATIENT
Start: 2022-01-08 | End: 2022-01-08

## 2022-01-08 RX ORDER — ONDANSETRON 4 MG/1
4 TABLET, ORALLY DISINTEGRATING ORAL EVERY 8 HOURS PRN
Qty: 15 TABLET | Refills: 0 | Status: SHIPPED | OUTPATIENT
Start: 2022-01-08 | End: 2022-01-23

## 2022-01-08 RX ORDER — ONDANSETRON 2 MG/ML
4 INJECTION INTRAMUSCULAR; INTRAVENOUS ONCE
Status: COMPLETED | OUTPATIENT
Start: 2022-01-08 | End: 2022-01-08

## 2022-01-08 RX ORDER — DIPHENHYDRAMINE HYDROCHLORIDE 50 MG/ML
25 INJECTION INTRAMUSCULAR; INTRAVENOUS ONCE
Status: DISCONTINUED | OUTPATIENT
Start: 2022-01-08 | End: 2022-01-08 | Stop reason: HOSPADM

## 2022-01-08 RX ORDER — LORAZEPAM 2 MG/ML
1 INJECTION INTRAMUSCULAR ONCE
Status: COMPLETED | OUTPATIENT
Start: 2022-01-08 | End: 2022-01-08

## 2022-01-08 RX ORDER — SODIUM CHLORIDE 0.9 % (FLUSH) 0.9 %
10 SYRINGE (ML) INJECTION AS NEEDED
Status: DISCONTINUED | OUTPATIENT
Start: 2022-01-08 | End: 2022-01-08 | Stop reason: HOSPADM

## 2022-01-08 RX ORDER — DICYCLOMINE HCL 20 MG
20 TABLET ORAL EVERY 6 HOURS
Qty: 20 TABLET | Refills: 0 | Status: SHIPPED | OUTPATIENT
Start: 2022-01-08 | End: 2022-01-23

## 2022-01-08 RX ADMIN — DIPHENHYDRAMINE HYDROCHLORIDE 25 MG: 50 INJECTION, SOLUTION INTRAMUSCULAR; INTRAVENOUS at 14:17

## 2022-01-08 RX ADMIN — LORAZEPAM 1 MG: 2 INJECTION INTRAMUSCULAR; INTRAVENOUS at 11:18

## 2022-01-08 RX ADMIN — ONDANSETRON 4 MG: 2 INJECTION INTRAMUSCULAR; INTRAVENOUS at 11:16

## 2022-01-08 RX ADMIN — SODIUM CHLORIDE 1000 ML: 9 INJECTION, SOLUTION INTRAVENOUS at 12:41

## 2022-01-08 RX ADMIN — SODIUM CHLORIDE 1000 ML: 9 INJECTION, SOLUTION INTRAVENOUS at 11:15

## 2022-01-08 RX ADMIN — METOCLOPRAMIDE HYDROCHLORIDE 10 MG: 5 INJECTION INTRAMUSCULAR; INTRAVENOUS at 14:18

## 2022-01-08 RX ADMIN — IOPAMIDOL 100 ML: 755 INJECTION, SOLUTION INTRAVENOUS at 11:42

## 2022-01-08 NOTE — ED PROVIDER NOTES
"Subjective   Patient states that he \"drank a little too much over the holidays\" and since then he has had some midsternal to abdominal discomfort with nausea and vomiting worsening yesterday. Patient dates today he has not been able to tolerate p.o. fluids without vomiting. Patient states he does have a history of alcohol and substance abuse. However, he has been doing well for some time with the exception of drinking a little too much over the holiday. Patient denies any additional symptoms and/or concerns at this time.      History provided by:  Patient and spouse   used: No    Vomiting  The primary symptoms include abdominal pain, nausea and vomiting. Primary symptoms do not include fever, weight loss, fatigue, diarrhea, melena, hematemesis, jaundice, hematochezia, dysuria, myalgias, arthralgias or rash. The illness began 2 days ago. The onset was gradual. The problem has been gradually worsening.   Nausea began 2 days ago. The nausea is associated with eating.   The illness does not include chills, anorexia, dysphagia, odynophagia, bloating, constipation, tenesmus or back pain. Significant associated medical issues include alcohol abuse.   Nausea  The primary symptoms include abdominal pain, nausea and vomiting. Primary symptoms do not include fever, weight loss, fatigue, diarrhea, melena, hematemesis, jaundice, hematochezia, dysuria, myalgias, arthralgias or rash.   The illness does not include chills, anorexia, dysphagia, odynophagia, bloating, constipation, tenesmus or back pain. Significant associated medical issues include alcohol abuse.   Palpitations  Associated symptoms: nausea and vomiting    Associated symptoms: no back pain, no chest pain, no cough and no shortness of breath    Pancreatitis  Associated symptoms: abdominal pain, nausea and vomiting    Associated symptoms: no chest pain, no congestion, no cough, no diarrhea, no ear pain, no fatigue, no fever, no headaches, no " myalgias, no rash, no shortness of breath and no sore throat        Review of Systems   Constitutional: Negative for chills, fatigue, fever and weight loss.   HENT: Negative for congestion, ear pain and sore throat.    Eyes: Negative for pain.   Respiratory: Negative for cough, chest tightness and shortness of breath.    Cardiovascular: Positive for palpitations. Negative for chest pain.   Gastrointestinal: Positive for abdominal pain, nausea and vomiting. Negative for anorexia, bloating, constipation, diarrhea, dysphagia, hematemesis, hematochezia, jaundice and melena.   Genitourinary: Negative for dysuria, flank pain and hematuria.   Musculoskeletal: Negative for arthralgias, back pain, joint swelling and myalgias.   Skin: Negative for pallor and rash.   Neurological: Negative for seizures and headaches.   Psychiatric/Behavioral: Positive for agitation.   All other systems reviewed and are negative.      History reviewed. No pertinent past medical history.    No Known Allergies    History reviewed. No pertinent surgical history.    History reviewed. No pertinent family history.    Social History     Socioeconomic History   • Marital status: Single           Objective   Physical Exam  Vitals and nursing note reviewed.   Constitutional:       General: He is not in acute distress.     Appearance: Normal appearance. He is not ill-appearing, toxic-appearing or diaphoretic.   HENT:      Head: Normocephalic and atraumatic.      Mouth/Throat:      Mouth: Mucous membranes are moist.   Eyes:      General: No scleral icterus.  Cardiovascular:      Rate and Rhythm: Normal rate and regular rhythm.      Pulses: Normal pulses.      Heart sounds: Normal heart sounds.   Pulmonary:      Effort: Pulmonary effort is normal. No respiratory distress.      Breath sounds: Normal breath sounds.   Abdominal:      General: Abdomen is flat. There is no distension.      Palpations: Abdomen is soft. There is no mass.      Tenderness: There is  no abdominal tenderness. There is no right CVA tenderness or left CVA tenderness.   Musculoskeletal:         General: Normal range of motion.      Cervical back: Normal range of motion and neck supple.   Skin:     General: Skin is warm and dry.   Neurological:      Mental Status: He is alert and oriented to person, place, and time. Mental status is at baseline.   Psychiatric:         Mood and Affect: Mood normal.         Behavior: Behavior normal.         Thought Content: Thought content normal.         Judgment: Judgment normal.         Procedures           ED Course                                             Patient states feels much improved and ready to go home at this point.  Patient states that he does have chronic sinus tachycardia.  Has been placed on a beta-blocker to try to help compensated.  Patient is also diagnosed hypothyroidism.  Discussed CT findings with patient, patient states he does have a referral to a gastroenterologist that has been confirmed.  Patient dates he will follow-up with gastroenterology.  Also, patient will follow-up with his PCP for reevaluation of thyroid and glucose.  Patient will return the emergency department if symptoms return.    MDM  Number of Diagnoses or Management Options  Bilious vomiting with nausea  Sinus tachycardia  Type 2 diabetes mellitus with hyperglycemia, unspecified whether long term insulin use (HCC)  Diagnosis management comments: The patient is resting comfortably and feels better, is alert and in no distress. Repeat examination is unremarkable and benign; in particular, there's no discomfort at McBurney's point and there is no pulsatile mass. The history, exam, diagnostic testing, and current condition does not suggest acute appendicitis, bowel obstruction, acute cholecystitis, bowel perforation, major gastrointestinal bleeding, severe diverticulitis, abdominal aortic aneurysm, mesenteric ischemia, volvulus, sepsis, or other significant pathology that  warrants further testing, continued ED treatment, admission, for surgical evaluation at this point. The vital signs have been stable. The patient does not have uncontrollable pain, intractable vomiting, or other significant symptoms. The patient's condition is stable and appropriate for discharge from the emergency department.       Amount and/or Complexity of Data Reviewed  Clinical lab tests: reviewed  Tests in the radiology section of CPT®: reviewed  Tests in the medicine section of CPT®: reviewed        Final diagnoses:   Bilious vomiting with nausea   Sinus tachycardia   Type 2 diabetes mellitus with hyperglycemia, unspecified whether long term insulin use (HCC)       ED Disposition  ED Disposition     ED Disposition Condition Comment    Discharge Stable           Provider, No Known  [facility] [id number]    In 3 days           Medication List      New Prescriptions    dicyclomine 20 MG tablet  Commonly known as: BENTYL  Take 1 tablet by mouth Every 6 (Six) Hours.     ondansetron ODT 4 MG disintegrating tablet  Commonly known as: ZOFRAN-ODT  Place 1 tablet on the tongue Every 8 (Eight) Hours As Needed for Nausea or Vomiting.           Where to Get Your Medications      [facility] [address] [address] - [phone]  - [phone] [address] [address]    Phone: [phone]   · dicyclomine 20 MG tablet  · ondansetron ODT 4 MG disintegrating tablet          Jin Linton, DELL  01/08/22 3735

## 2022-01-08 NOTE — DISCHARGE INSTRUCTIONS
Follow-up with as scheduled with your gastroenterologist for chronic pancreatitis and reevaluation of CAT scan.    Follow-up with your PCP.  Call for next available appointment.  Also, have them reevaluate your hyperthyroidism and potential need for medication adjustment.

## 2022-01-11 LAB — QT INTERVAL: 335 MS

## 2022-01-23 ENCOUNTER — APPOINTMENT (OUTPATIENT)
Dept: CT IMAGING | Facility: HOSPITAL | Age: 47
End: 2022-01-23

## 2022-01-23 ENCOUNTER — APPOINTMENT (OUTPATIENT)
Dept: GENERAL RADIOLOGY | Facility: HOSPITAL | Age: 47
End: 2022-01-23

## 2022-01-23 ENCOUNTER — APPOINTMENT (OUTPATIENT)
Dept: CARDIOLOGY | Facility: HOSPITAL | Age: 47
End: 2022-01-23

## 2022-01-23 ENCOUNTER — HOSPITAL ENCOUNTER (OUTPATIENT)
Facility: HOSPITAL | Age: 47
Discharge: HOME | End: 2022-01-24
Attending: EMERGENCY MEDICINE | Admitting: INTERNAL MEDICINE

## 2022-01-23 DIAGNOSIS — I47.1 SVT (SUPRAVENTRICULAR TACHYCARDIA): Primary | ICD-10-CM

## 2022-01-23 DIAGNOSIS — R07.9 CHEST PAIN, UNSPECIFIED TYPE: ICD-10-CM

## 2022-01-23 DIAGNOSIS — R00.0 SINUS TACHYCARDIA: ICD-10-CM

## 2022-01-23 DIAGNOSIS — I71.20 THORACIC AORTIC ANEURYSM WITHOUT RUPTURE: ICD-10-CM

## 2022-01-23 PROBLEM — I47.10 SVT (SUPRAVENTRICULAR TACHYCARDIA): Status: ACTIVE | Noted: 2022-01-23

## 2022-01-23 LAB
ALBUMIN SERPL-MCNC: 4.8 G/DL (ref 3.5–5.2)
ALBUMIN/GLOB SERPL: 1.5 G/DL
ALP SERPL-CCNC: 129 U/L (ref 39–117)
ALT SERPL W P-5'-P-CCNC: 24 U/L (ref 1–41)
ANION GAP SERPL CALCULATED.3IONS-SCNC: 23.1 MMOL/L (ref 5–15)
ANION GAP SERPL CALCULATED.3IONS-SCNC: 31 MMOL/L (ref 5–15)
AST SERPL-CCNC: 29 U/L (ref 1–40)
BASOPHILS # BLD AUTO: 0.03 10*3/MM3 (ref 0–0.2)
BASOPHILS # BLD AUTO: 0.06 10*3/MM3 (ref 0–0.2)
BASOPHILS NFR BLD AUTO: 0.2 % (ref 0–1.5)
BASOPHILS NFR BLD AUTO: 0.6 % (ref 0–1.5)
BH CV ECHO MEAS - AO ROOT DIAM: 3.7 CM
BH CV ECHO MEAS - EDV(MOD-SP2): 73 ML
BH CV ECHO MEAS - EDV(MOD-SP4): 73 ML
BH CV ECHO MEAS - EF(MOD-BP): 63 %
BH CV ECHO MEAS - ESV(MOD-SP2): 29 ML
BH CV ECHO MEAS - ESV(MOD-SP4): 27 ML
BH CV ECHO MEAS - IVSD: 1 CM
BH CV ECHO MEAS - LA DIMENSION(2D): 3.2 CM
BH CV ECHO MEAS - LVIDD: 4.4 CM
BH CV ECHO MEAS - LVIDS: 3 CM
BH CV ECHO MEAS - LVOT DIAM: 2 CM
BH CV ECHO MEAS - LVPWD: 1 CM
BH CV ECHO MEAS - MV A MAX VEL: 75 CM/SEC
BH CV ECHO MEAS - MV DEC TIME: 151 MSEC
BH CV ECHO MEAS - MV E MAX VEL: 80 CM/SEC
BH CV ECHO MEAS - MV E/A: 1.1
BH CV ECHO MEAS - RVDD: 2.5 CM
BH CV ECHO MEAS - TR MAX PG: 20 MMHG
BILIRUB SERPL-MCNC: 0.7 MG/DL (ref 0–1.2)
BUN SERPL-MCNC: 11 MG/DL (ref 6–20)
BUN SERPL-MCNC: 13 MG/DL (ref 6–20)
BUN/CREAT SERPL: 13.4 (ref 7–25)
BUN/CREAT SERPL: 9.7 (ref 7–25)
CALCIUM SPEC-SCNC: 9.5 MG/DL (ref 8.6–10.5)
CALCIUM SPEC-SCNC: 9.7 MG/DL (ref 8.6–10.5)
CHLORIDE SERPL-SCNC: 91 MMOL/L (ref 98–107)
CHLORIDE SERPL-SCNC: 94 MMOL/L (ref 98–107)
CO2 SERPL-SCNC: 15 MMOL/L (ref 22–29)
CO2 SERPL-SCNC: 16.9 MMOL/L (ref 22–29)
CREAT SERPL-MCNC: 0.97 MG/DL (ref 0.76–1.27)
CREAT SERPL-MCNC: 1.13 MG/DL (ref 0.76–1.27)
DEPRECATED RDW RBC AUTO: 40.1 FL (ref 37–54)
DEPRECATED RDW RBC AUTO: 40.9 FL (ref 37–54)
EOSINOPHIL # BLD AUTO: 0 10*3/MM3 (ref 0–0.4)
EOSINOPHIL # BLD AUTO: 0 10*3/MM3 (ref 0–0.4)
EOSINOPHIL NFR BLD AUTO: 0 % (ref 0.3–6.2)
EOSINOPHIL NFR BLD AUTO: 0 % (ref 0.3–6.2)
ERYTHROCYTE [DISTWIDTH] IN BLOOD BY AUTOMATED COUNT: 12.6 % (ref 12.3–15.4)
ERYTHROCYTE [DISTWIDTH] IN BLOOD BY AUTOMATED COUNT: 12.6 % (ref 12.3–15.4)
GFR SERPL CREATININE-BSD FRML MDRD: 69 ML/MIN/1.73
GFR SERPL CREATININE-BSD FRML MDRD: 83 ML/MIN/1.73
GLOBULIN UR ELPH-MCNC: 3.2 GM/DL
GLUCOSE BLDC GLUCOMTR-MCNC: 224 MG/DL (ref 70–99)
GLUCOSE BLDC GLUCOMTR-MCNC: 259 MG/DL (ref 70–99)
GLUCOSE SERPL-MCNC: 194 MG/DL (ref 65–99)
GLUCOSE SERPL-MCNC: 203 MG/DL (ref 65–99)
HCT VFR BLD AUTO: 39.5 % (ref 37.5–51)
HCT VFR BLD AUTO: 42.5 % (ref 37.5–51)
HGB BLD-MCNC: 14.3 G/DL (ref 13–17.7)
HGB BLD-MCNC: 15.6 G/DL (ref 13–17.7)
HOLD SPECIMEN: NORMAL
HOLD SPECIMEN: NORMAL
IMM GRANULOCYTES # BLD AUTO: 0.03 10*3/MM3 (ref 0–0.05)
IMM GRANULOCYTES # BLD AUTO: 0.04 10*3/MM3 (ref 0–0.05)
IMM GRANULOCYTES NFR BLD AUTO: 0.2 % (ref 0–0.5)
IMM GRANULOCYTES NFR BLD AUTO: 0.4 % (ref 0–0.5)
INR PPP: 1 (ref 2–3)
IVRT: 66 MSEC
LEFT ATRIUM VOLUME INDEX: 10.9 ML/M2
LYMPHOCYTES # BLD AUTO: 0.89 10*3/MM3 (ref 0.7–3.1)
LYMPHOCYTES # BLD AUTO: 1.51 10*3/MM3 (ref 0.7–3.1)
LYMPHOCYTES NFR BLD AUTO: 14.9 % (ref 19.6–45.3)
LYMPHOCYTES NFR BLD AUTO: 6.3 % (ref 19.6–45.3)
MAGNESIUM SERPL-MCNC: 1.5 MG/DL (ref 1.6–2.6)
MAXIMAL PREDICTED HEART RATE: 172 BPM
MCH RBC QN AUTO: 31.8 PG (ref 26.6–33)
MCH RBC QN AUTO: 32 PG (ref 26.6–33)
MCHC RBC AUTO-ENTMCNC: 36.2 G/DL (ref 31.5–35.7)
MCHC RBC AUTO-ENTMCNC: 36.7 G/DL (ref 31.5–35.7)
MCV RBC AUTO: 87.3 FL (ref 79–97)
MCV RBC AUTO: 87.8 FL (ref 79–97)
MONOCYTES # BLD AUTO: 0.45 10*3/MM3 (ref 0.1–0.9)
MONOCYTES # BLD AUTO: 0.76 10*3/MM3 (ref 0.1–0.9)
MONOCYTES NFR BLD AUTO: 4.4 % (ref 5–12)
MONOCYTES NFR BLD AUTO: 5.4 % (ref 5–12)
NEUTROPHILS NFR BLD AUTO: 12.36 10*3/MM3 (ref 1.7–7)
NEUTROPHILS NFR BLD AUTO: 79.7 % (ref 42.7–76)
NEUTROPHILS NFR BLD AUTO: 8.07 10*3/MM3 (ref 1.7–7)
NEUTROPHILS NFR BLD AUTO: 87.9 % (ref 42.7–76)
NRBC BLD AUTO-RTO: 0 /100 WBC (ref 0–0.2)
NRBC BLD AUTO-RTO: 0 /100 WBC (ref 0–0.2)
NT-PROBNP SERPL-MCNC: 26.5 PG/ML (ref 0–450)
PHOSPHATE SERPL-MCNC: 3.7 MG/DL (ref 2.5–4.5)
PLATELET # BLD AUTO: 425 10*3/MM3 (ref 140–450)
PLATELET # BLD AUTO: 475 10*3/MM3 (ref 140–450)
PMV BLD AUTO: 8 FL (ref 6–12)
PMV BLD AUTO: 8.2 FL (ref 6–12)
POTASSIUM SERPL-SCNC: 3.8 MMOL/L (ref 3.5–5.2)
POTASSIUM SERPL-SCNC: 4.3 MMOL/L (ref 3.5–5.2)
PROT SERPL-MCNC: 8 G/DL (ref 6–8.5)
PROTHROMBIN TIME: 10.9 SECONDS (ref 9.4–12)
QT INTERVAL: 252 MS
QT INTERVAL: 300 MS
RBC # BLD AUTO: 4.5 10*6/MM3 (ref 4.14–5.8)
RBC # BLD AUTO: 4.87 10*6/MM3 (ref 4.14–5.8)
SODIUM SERPL-SCNC: 134 MMOL/L (ref 136–145)
SODIUM SERPL-SCNC: 137 MMOL/L (ref 136–145)
STRESS TARGET HR: 146 BPM
TROPONIN T SERPL-MCNC: <0.01 NG/ML (ref 0–0.03)
TROPONIN T SERPL-MCNC: <0.01 NG/ML (ref 0–0.03)
TSH SERPL DL<=0.05 MIU/L-ACNC: 1.14 UIU/ML (ref 0.27–4.2)
WBC NRBC COR # BLD: 10.13 10*3/MM3 (ref 3.4–10.8)
WBC NRBC COR # BLD: 14.07 10*3/MM3 (ref 3.4–10.8)
WHOLE BLOOD HOLD SPECIMEN: NORMAL
WHOLE BLOOD HOLD SPECIMEN: NORMAL

## 2022-01-23 PROCEDURE — 80053 COMPREHEN METABOLIC PANEL: CPT | Performed by: EMERGENCY MEDICINE

## 2022-01-23 PROCEDURE — 25010000002 HYDROMORPHONE 1 MG/ML SOLUTION: Performed by: EMERGENCY MEDICINE

## 2022-01-23 PROCEDURE — 25010000002 ENOXAPARIN PER 10 MG: Performed by: HOSPITALIST

## 2022-01-23 PROCEDURE — 25010000002 ADENOSINE PER 6 MG: Performed by: EMERGENCY MEDICINE

## 2022-01-23 PROCEDURE — 63710000001 INSULIN LISPRO (HUMAN) PER 5 UNITS: Performed by: HOSPITALIST

## 2022-01-23 PROCEDURE — 82962 GLUCOSE BLOOD TEST: CPT

## 2022-01-23 PROCEDURE — 85610 PROTHROMBIN TIME: CPT | Performed by: HOSPITALIST

## 2022-01-23 PROCEDURE — 0 IOPAMIDOL PER 1 ML: Performed by: EMERGENCY MEDICINE

## 2022-01-23 PROCEDURE — 83735 ASSAY OF MAGNESIUM: CPT | Performed by: HOSPITALIST

## 2022-01-23 PROCEDURE — 25010000002 ONDANSETRON PER 1 MG: Performed by: EMERGENCY MEDICINE

## 2022-01-23 PROCEDURE — 93321 DOPPLER ECHO F-UP/LMTD STD: CPT

## 2022-01-23 PROCEDURE — 25010000002 MAGNESIUM SULFATE IN D5W 1G/100ML (PREMIX) 1-5 GM/100ML-% SOLUTION: Performed by: INTERNAL MEDICINE

## 2022-01-23 PROCEDURE — 96365 THER/PROPH/DIAG IV INF INIT: CPT

## 2022-01-23 PROCEDURE — 25010000002 LORAZEPAM PER 2 MG: Performed by: INTERNAL MEDICINE

## 2022-01-23 PROCEDURE — 96366 THER/PROPH/DIAG IV INF ADDON: CPT

## 2022-01-23 PROCEDURE — 96375 TX/PRO/DX INJ NEW DRUG ADDON: CPT

## 2022-01-23 PROCEDURE — 93005 ELECTROCARDIOGRAM TRACING: CPT | Performed by: EMERGENCY MEDICINE

## 2022-01-23 PROCEDURE — U0004 COV-19 TEST NON-CDC HGH THRU: HCPCS | Performed by: INTERNAL MEDICINE

## 2022-01-23 PROCEDURE — 25010000002 KETOROLAC TROMETHAMINE PER 15 MG: Performed by: EMERGENCY MEDICINE

## 2022-01-23 PROCEDURE — 84443 ASSAY THYROID STIM HORMONE: CPT | Performed by: INTERNAL MEDICINE

## 2022-01-23 PROCEDURE — 85025 COMPLETE CBC W/AUTO DIFF WBC: CPT | Performed by: HOSPITALIST

## 2022-01-23 PROCEDURE — 25010000002 ADENOSINE PER 6 MG

## 2022-01-23 PROCEDURE — 25010000002 THIAMINE PER 100 MG: Performed by: INTERNAL MEDICINE

## 2022-01-23 PROCEDURE — G0378 HOSPITAL OBSERVATION PER HR: HCPCS

## 2022-01-23 PROCEDURE — 96361 HYDRATE IV INFUSION ADD-ON: CPT

## 2022-01-23 PROCEDURE — 93325 DOPPLER ECHO COLOR FLOW MAPG: CPT | Performed by: INTERNAL MEDICINE

## 2022-01-23 PROCEDURE — 96367 TX/PROPH/DG ADDL SEQ IV INF: CPT

## 2022-01-23 PROCEDURE — 93321 DOPPLER ECHO F-UP/LMTD STD: CPT | Performed by: INTERNAL MEDICINE

## 2022-01-23 PROCEDURE — 93325 DOPPLER ECHO COLOR FLOW MAPG: CPT

## 2022-01-23 PROCEDURE — 85025 COMPLETE CBC W/AUTO DIFF WBC: CPT | Performed by: EMERGENCY MEDICINE

## 2022-01-23 PROCEDURE — 71275 CT ANGIOGRAPHY CHEST: CPT

## 2022-01-23 PROCEDURE — 96372 THER/PROPH/DIAG INJ SC/IM: CPT

## 2022-01-23 PROCEDURE — 99254 IP/OBS CNSLTJ NEW/EST MOD 60: CPT | Performed by: INTERNAL MEDICINE

## 2022-01-23 PROCEDURE — 83880 ASSAY OF NATRIURETIC PEPTIDE: CPT | Performed by: EMERGENCY MEDICINE

## 2022-01-23 PROCEDURE — 96376 TX/PRO/DX INJ SAME DRUG ADON: CPT

## 2022-01-23 PROCEDURE — 93308 TTE F-UP OR LMTD: CPT

## 2022-01-23 PROCEDURE — 93005 ELECTROCARDIOGRAM TRACING: CPT

## 2022-01-23 PROCEDURE — 25010000002 PROCHLORPERAZINE 10 MG/2ML SOLUTION: Performed by: INTERNAL MEDICINE

## 2022-01-23 PROCEDURE — 71045 X-RAY EXAM CHEST 1 VIEW: CPT

## 2022-01-23 PROCEDURE — 93308 TTE F-UP OR LMTD: CPT | Performed by: INTERNAL MEDICINE

## 2022-01-23 PROCEDURE — 99285 EMERGENCY DEPT VISIT HI MDM: CPT

## 2022-01-23 PROCEDURE — 99223 1ST HOSP IP/OBS HIGH 75: CPT | Performed by: INTERNAL MEDICINE

## 2022-01-23 PROCEDURE — 84484 ASSAY OF TROPONIN QUANT: CPT | Performed by: HOSPITALIST

## 2022-01-23 PROCEDURE — 84100 ASSAY OF PHOSPHORUS: CPT | Performed by: HOSPITALIST

## 2022-01-23 PROCEDURE — 25010000002 MORPHINE PER 10 MG: Performed by: INTERNAL MEDICINE

## 2022-01-23 PROCEDURE — 84484 ASSAY OF TROPONIN QUANT: CPT | Performed by: EMERGENCY MEDICINE

## 2022-01-23 RX ORDER — METHION/INOS/CHOL BT/B COM/LIV 110MG-86MG
100 CAPSULE ORAL ONCE
Status: DISCONTINUED | OUTPATIENT
Start: 2022-01-23 | End: 2022-01-23 | Stop reason: SDUPTHER

## 2022-01-23 RX ORDER — GLYCOPYRROLATE 2 MG/1
2 TABLET ORAL DAILY
COMMUNITY
Start: 2021-08-27 | End: 2022-07-15 | Stop reason: SDUPTHER

## 2022-01-23 RX ORDER — SODIUM CHLORIDE 0.9 % (FLUSH) 0.9 %
10 SYRINGE (ML) INJECTION AS NEEDED
Status: DISCONTINUED | OUTPATIENT
Start: 2022-01-23 | End: 2022-01-24 | Stop reason: HOSPADM

## 2022-01-23 RX ORDER — LORAZEPAM 2 MG/1
2 TABLET ORAL
Status: DISCONTINUED | OUTPATIENT
Start: 2022-01-23 | End: 2022-01-24 | Stop reason: HOSPADM

## 2022-01-23 RX ORDER — METOPROLOL SUCCINATE 50 MG/1
50 TABLET, EXTENDED RELEASE ORAL
Status: DISCONTINUED | OUTPATIENT
Start: 2022-01-23 | End: 2022-01-24 | Stop reason: HOSPADM

## 2022-01-23 RX ORDER — ASPIRIN 81 MG/1
81 TABLET ORAL DAILY
Status: DISCONTINUED | OUTPATIENT
Start: 2022-01-23 | End: 2022-01-24 | Stop reason: HOSPADM

## 2022-01-23 RX ORDER — NICOTINE POLACRILEX 4 MG
15 LOZENGE BUCCAL
Status: DISCONTINUED | OUTPATIENT
Start: 2022-01-23 | End: 2022-01-24 | Stop reason: HOSPADM

## 2022-01-23 RX ORDER — AMOXICILLIN 250 MG
2 CAPSULE ORAL 2 TIMES DAILY
Status: DISCONTINUED | OUTPATIENT
Start: 2022-01-23 | End: 2022-01-24 | Stop reason: HOSPADM

## 2022-01-23 RX ORDER — DILTIAZEM HCL IN NACL,ISO-OSM 125 MG/125
5-15 PLASTIC BAG, INJECTION (ML) INTRAVENOUS
Status: DISCONTINUED | OUTPATIENT
Start: 2022-01-23 | End: 2022-01-23

## 2022-01-23 RX ORDER — BISACODYL 10 MG
10 SUPPOSITORY, RECTAL RECTAL DAILY PRN
Status: DISCONTINUED | OUTPATIENT
Start: 2022-01-23 | End: 2022-01-24 | Stop reason: HOSPADM

## 2022-01-23 RX ORDER — FOLIC ACID 1 MG/1
1 TABLET ORAL DAILY
Status: DISCONTINUED | OUTPATIENT
Start: 2022-01-24 | End: 2022-01-24 | Stop reason: HOSPADM

## 2022-01-23 RX ORDER — LORAZEPAM 2 MG/ML
2 INJECTION INTRAMUSCULAR
Status: DISCONTINUED | OUTPATIENT
Start: 2022-01-23 | End: 2022-01-24 | Stop reason: HOSPADM

## 2022-01-23 RX ORDER — MORPHINE SULFATE 2 MG/ML
2 INJECTION, SOLUTION INTRAMUSCULAR; INTRAVENOUS
Status: DISCONTINUED | OUTPATIENT
Start: 2022-01-23 | End: 2022-01-24 | Stop reason: HOSPADM

## 2022-01-23 RX ORDER — LORAZEPAM 0.5 MG/1
1 TABLET ORAL
Status: DISCONTINUED | OUTPATIENT
Start: 2022-01-23 | End: 2022-01-24 | Stop reason: HOSPADM

## 2022-01-23 RX ORDER — SODIUM CHLORIDE 9 MG/ML
150 INJECTION, SOLUTION INTRAVENOUS CONTINUOUS
Status: DISCONTINUED | OUTPATIENT
Start: 2022-01-23 | End: 2022-01-24 | Stop reason: HOSPADM

## 2022-01-23 RX ORDER — DILTIAZEM HYDROCHLORIDE 120 MG/1
120 CAPSULE, COATED, EXTENDED RELEASE ORAL
Status: DISCONTINUED | OUTPATIENT
Start: 2022-01-23 | End: 2022-01-24 | Stop reason: HOSPADM

## 2022-01-23 RX ORDER — SODIUM CHLORIDE 0.9 % (FLUSH) 0.9 %
10 SYRINGE (ML) INJECTION EVERY 12 HOURS SCHEDULED
Status: DISCONTINUED | OUTPATIENT
Start: 2022-01-23 | End: 2022-01-24 | Stop reason: HOSPADM

## 2022-01-23 RX ORDER — MAGNESIUM SULFATE 1 G/100ML
1 INJECTION INTRAVENOUS
Status: DISPENSED | OUTPATIENT
Start: 2022-01-23 | End: 2022-01-23

## 2022-01-23 RX ORDER — THIAMINE HYDROCHLORIDE 100 MG/ML
100 INJECTION, SOLUTION INTRAMUSCULAR; INTRAVENOUS ONCE
Status: COMPLETED | OUTPATIENT
Start: 2022-01-23 | End: 2022-01-23

## 2022-01-23 RX ORDER — ESCITALOPRAM OXALATE 10 MG/1
10 TABLET ORAL DAILY
COMMUNITY
Start: 2021-12-30 | End: 2022-07-15 | Stop reason: SDUPTHER

## 2022-01-23 RX ORDER — ACETAMINOPHEN 650 MG/1
650 SUPPOSITORY RECTAL EVERY 4 HOURS PRN
Status: DISCONTINUED | OUTPATIENT
Start: 2022-01-23 | End: 2022-01-23

## 2022-01-23 RX ORDER — MULTIPLE VITAMINS W/ MINERALS TAB 9MG-400MCG
1 TAB ORAL DAILY
Status: DISCONTINUED | OUTPATIENT
Start: 2022-01-24 | End: 2022-01-24 | Stop reason: HOSPADM

## 2022-01-23 RX ORDER — FAMOTIDINE 10 MG/ML
20 INJECTION, SOLUTION INTRAVENOUS ONCE
Status: COMPLETED | OUTPATIENT
Start: 2022-01-23 | End: 2022-01-23

## 2022-01-23 RX ORDER — DILTIAZEM HYDROCHLORIDE 5 MG/ML
INJECTION INTRAVENOUS
Status: COMPLETED
Start: 2022-01-23 | End: 2022-01-23

## 2022-01-23 RX ORDER — DILTIAZEM HYDROCHLORIDE 5 MG/ML
30 INJECTION INTRAVENOUS ONCE
Status: COMPLETED | OUTPATIENT
Start: 2022-01-23 | End: 2022-01-23

## 2022-01-23 RX ORDER — ONDANSETRON 4 MG/1
4 TABLET, FILM COATED ORAL EVERY 6 HOURS PRN
Status: DISCONTINUED | OUTPATIENT
Start: 2022-01-23 | End: 2022-01-24 | Stop reason: HOSPADM

## 2022-01-23 RX ORDER — KETOROLAC TROMETHAMINE 30 MG/ML
30 INJECTION, SOLUTION INTRAMUSCULAR; INTRAVENOUS ONCE
Status: COMPLETED | OUTPATIENT
Start: 2022-01-23 | End: 2022-01-23

## 2022-01-23 RX ORDER — CHOLECALCIFEROL (VITAMIN D3) 125 MCG
5 CAPSULE ORAL NIGHTLY PRN
Status: DISCONTINUED | OUTPATIENT
Start: 2022-01-23 | End: 2022-01-24 | Stop reason: HOSPADM

## 2022-01-23 RX ORDER — ONDANSETRON 2 MG/ML
4 INJECTION INTRAMUSCULAR; INTRAVENOUS ONCE
Status: COMPLETED | OUTPATIENT
Start: 2022-01-23 | End: 2022-01-23

## 2022-01-23 RX ORDER — ATORVASTATIN CALCIUM 40 MG/1
40 TABLET, FILM COATED ORAL NIGHTLY
Status: DISCONTINUED | OUTPATIENT
Start: 2022-01-23 | End: 2022-01-24 | Stop reason: HOSPADM

## 2022-01-23 RX ORDER — POLYETHYLENE GLYCOL 3350 17 G/17G
17 POWDER, FOR SOLUTION ORAL DAILY PRN
Status: DISCONTINUED | OUTPATIENT
Start: 2022-01-23 | End: 2022-01-24 | Stop reason: HOSPADM

## 2022-01-23 RX ORDER — ADENOSINE 3 MG/ML
INJECTION, SOLUTION INTRAVENOUS
Status: COMPLETED
Start: 2022-01-23 | End: 2022-01-23

## 2022-01-23 RX ORDER — ACETAMINOPHEN 160 MG/5ML
650 SOLUTION ORAL EVERY 4 HOURS PRN
Status: DISCONTINUED | OUTPATIENT
Start: 2022-01-23 | End: 2022-01-23

## 2022-01-23 RX ORDER — ACETAMINOPHEN 325 MG/1
650 TABLET ORAL EVERY 4 HOURS PRN
Status: DISCONTINUED | OUTPATIENT
Start: 2022-01-23 | End: 2022-01-24 | Stop reason: HOSPADM

## 2022-01-23 RX ORDER — NITROGLYCERIN 0.4 MG/1
0.4 TABLET SUBLINGUAL
Status: DISCONTINUED | OUTPATIENT
Start: 2022-01-23 | End: 2022-01-24 | Stop reason: HOSPADM

## 2022-01-23 RX ORDER — ONDANSETRON 2 MG/ML
4 INJECTION INTRAMUSCULAR; INTRAVENOUS EVERY 6 HOURS PRN
Status: DISCONTINUED | OUTPATIENT
Start: 2022-01-23 | End: 2022-01-24 | Stop reason: HOSPADM

## 2022-01-23 RX ORDER — LEVOTHYROXINE SODIUM 125 UG/1
250 CAPSULE ORAL DAILY
COMMUNITY
Start: 2021-12-29 | End: 2022-03-29

## 2022-01-23 RX ORDER — ADENOSINE 3 MG/ML
6 INJECTION, SOLUTION INTRAVENOUS ONCE
Status: COMPLETED | OUTPATIENT
Start: 2022-01-23 | End: 2022-01-23

## 2022-01-23 RX ORDER — LOSARTAN POTASSIUM 100 MG/1
100 TABLET ORAL DAILY
COMMUNITY
End: 2022-03-29

## 2022-01-23 RX ORDER — ADENOSINE 3 MG/ML
INJECTION, SOLUTION INTRAVENOUS
Status: DISPENSED
Start: 2022-01-23 | End: 2022-01-23

## 2022-01-23 RX ORDER — MAGNESIUM SULFATE HEPTAHYDRATE 500 MG/ML
2 INJECTION, SOLUTION INTRAMUSCULAR; INTRAVENOUS ONCE
Status: DISCONTINUED | OUTPATIENT
Start: 2022-01-23 | End: 2022-01-23

## 2022-01-23 RX ORDER — LORAZEPAM 2 MG/ML
1 INJECTION INTRAMUSCULAR
Status: DISCONTINUED | OUTPATIENT
Start: 2022-01-23 | End: 2022-01-24 | Stop reason: HOSPADM

## 2022-01-23 RX ORDER — BISACODYL 5 MG/1
5 TABLET, DELAYED RELEASE ORAL DAILY PRN
Status: DISCONTINUED | OUTPATIENT
Start: 2022-01-23 | End: 2022-01-24 | Stop reason: HOSPADM

## 2022-01-23 RX ORDER — PROCHLORPERAZINE EDISYLATE 5 MG/ML
5 INJECTION INTRAMUSCULAR; INTRAVENOUS EVERY 6 HOURS PRN
Status: DISCONTINUED | OUTPATIENT
Start: 2022-01-23 | End: 2022-01-24 | Stop reason: HOSPADM

## 2022-01-23 RX ORDER — DEXTROSE MONOHYDRATE 100 MG/ML
25 INJECTION, SOLUTION INTRAVENOUS
Status: DISCONTINUED | OUTPATIENT
Start: 2022-01-23 | End: 2022-01-24 | Stop reason: HOSPADM

## 2022-01-23 RX ADMIN — ATORVASTATIN CALCIUM 40 MG: 40 TABLET, FILM COATED ORAL at 21:25

## 2022-01-23 RX ADMIN — Medication 5 MG/HR: at 05:37

## 2022-01-23 RX ADMIN — ENOXAPARIN SODIUM 40 MG: 40 INJECTION SUBCUTANEOUS at 09:05

## 2022-01-23 RX ADMIN — SODIUM CHLORIDE, PRESERVATIVE FREE 10 ML: 5 INJECTION INTRAVENOUS at 08:03

## 2022-01-23 RX ADMIN — PROCHLORPERAZINE EDISYLATE 5 MG: 5 INJECTION INTRAMUSCULAR; INTRAVENOUS at 08:23

## 2022-01-23 RX ADMIN — DILTIAZEM HYDROCHLORIDE 30 MG: 5 INJECTION INTRAVENOUS at 05:41

## 2022-01-23 RX ADMIN — ONDANSETRON 4 MG: 2 INJECTION INTRAMUSCULAR; INTRAVENOUS at 06:26

## 2022-01-23 RX ADMIN — MAGNESIUM SULFATE 1 G: 1 INJECTION INTRAVENOUS at 10:29

## 2022-01-23 RX ADMIN — SODIUM CHLORIDE 1000 ML: 9 INJECTION, SOLUTION INTRAVENOUS at 02:08

## 2022-01-23 RX ADMIN — HYDROMORPHONE HYDROCHLORIDE 0.5 MG: 1 INJECTION, SOLUTION INTRAMUSCULAR; INTRAVENOUS; SUBCUTANEOUS at 05:48

## 2022-01-23 RX ADMIN — THIAMINE HYDROCHLORIDE 100 MG: 100 INJECTION, SOLUTION INTRAMUSCULAR; INTRAVENOUS at 12:35

## 2022-01-23 RX ADMIN — KETOROLAC TROMETHAMINE 30 MG: 30 INJECTION, SOLUTION INTRAMUSCULAR; INTRAVENOUS at 02:11

## 2022-01-23 RX ADMIN — DILTIAZEM HYDROCHLORIDE 120 MG: 120 CAPSULE, COATED, EXTENDED RELEASE ORAL at 14:14

## 2022-01-23 RX ADMIN — Medication 15 MG/HR: at 12:37

## 2022-01-23 RX ADMIN — MORPHINE SULFATE 2 MG: 2 INJECTION, SOLUTION INTRAMUSCULAR; INTRAVENOUS at 08:23

## 2022-01-23 RX ADMIN — INSULIN LISPRO 4 UNITS: 100 INJECTION, SOLUTION INTRAVENOUS; SUBCUTANEOUS at 22:54

## 2022-01-23 RX ADMIN — ADENOSINE 12 MG: 3 INJECTION INTRAVENOUS at 05:24

## 2022-01-23 RX ADMIN — FAMOTIDINE 20 MG: 10 INJECTION INTRAVENOUS at 05:47

## 2022-01-23 RX ADMIN — SODIUM CHLORIDE 1000 ML: 9 INJECTION, SOLUTION INTRAVENOUS at 06:25

## 2022-01-23 RX ADMIN — MAGNESIUM SULFATE 1 G: 1 INJECTION INTRAVENOUS at 14:14

## 2022-01-23 RX ADMIN — SODIUM CHLORIDE, PRESERVATIVE FREE 10 ML: 5 INJECTION INTRAVENOUS at 22:22

## 2022-01-23 RX ADMIN — ONDANSETRON 4 MG: 2 INJECTION INTRAMUSCULAR; INTRAVENOUS at 02:08

## 2022-01-23 RX ADMIN — ONDANSETRON 4 MG: 2 INJECTION INTRAMUSCULAR; INTRAVENOUS at 07:56

## 2022-01-23 RX ADMIN — SODIUM CHLORIDE 150 ML/HR: 9 INJECTION, SOLUTION INTRAVENOUS at 09:06

## 2022-01-23 RX ADMIN — LORAZEPAM 2 MG: 2 INJECTION INTRAMUSCULAR; INTRAVENOUS at 10:28

## 2022-01-23 RX ADMIN — METOPROLOL SUCCINATE 50 MG: 50 TABLET, EXTENDED RELEASE ORAL at 14:14

## 2022-01-23 RX ADMIN — ASPIRIN 81 MG: 81 TABLET, COATED ORAL at 14:14

## 2022-01-23 RX ADMIN — IOPAMIDOL 65 ML: 755 INJECTION, SOLUTION INTRAVENOUS at 02:10

## 2022-01-23 RX ADMIN — SODIUM CHLORIDE 150 ML/HR: 9 INJECTION, SOLUTION INTRAVENOUS at 21:25

## 2022-01-23 RX ADMIN — ADENOSINE 6 MG: 3 INJECTION INTRAVENOUS at 05:20

## 2022-01-24 ENCOUNTER — APPOINTMENT (OUTPATIENT)
Dept: NUCLEAR MEDICINE | Facility: HOSPITAL | Age: 47
End: 2022-01-24

## 2022-01-24 ENCOUNTER — READMISSION MANAGEMENT (OUTPATIENT)
Dept: CALL CENTER | Facility: HOSPITAL | Age: 47
End: 2022-01-24

## 2022-01-24 VITALS
OXYGEN SATURATION: 99 % | WEIGHT: 229.94 LBS | HEIGHT: 73 IN | DIASTOLIC BLOOD PRESSURE: 95 MMHG | TEMPERATURE: 97.9 F | BODY MASS INDEX: 30.47 KG/M2 | SYSTOLIC BLOOD PRESSURE: 148 MMHG | HEART RATE: 76 BPM | RESPIRATION RATE: 18 BRPM

## 2022-01-24 LAB
ANION GAP SERPL CALCULATED.3IONS-SCNC: 12.6 MMOL/L (ref 5–15)
BASOPHILS # BLD AUTO: 0.05 10*3/MM3 (ref 0–0.2)
BASOPHILS NFR BLD AUTO: 0.7 % (ref 0–1.5)
BH CV IMMEDIATE POST TECH DATA BLOOD PRESSURE: NORMAL MMHG
BH CV IMMEDIATE POST TECH DATA HEART RATE: 93 BPM
BH CV IMMEDIATE POST TECH DATA OXYGEN SATS: 98 %
BH CV REST NUCLEAR ISOTOPE DOSE: 9.9 MCI
BH CV SIX MINUTE RECOVERY TECH DATA BLOOD PRESSURE: NORMAL
BH CV SIX MINUTE RECOVERY TECH DATA HEART RATE: 91 BPM
BH CV SIX MINUTE RECOVERY TECH DATA OXYGEN SATURATION: 98 %
BH CV SIX MINUTE RECOVERY TECH DATA SYMPTOMS: NORMAL
BH CV STRESS BP STAGE 1: NORMAL
BH CV STRESS COMMENTS STAGE 1: NORMAL
BH CV STRESS DOSE REGADENOSON STAGE 1: 0.4
BH CV STRESS DURATION MIN STAGE 1: 0
BH CV STRESS DURATION SEC STAGE 1: 10
BH CV STRESS HR STAGE 1: 78
BH CV STRESS NUCLEAR ISOTOPE DOSE: 35.6 MCI
BH CV STRESS O2 STAGE 1: 97
BH CV STRESS PROTOCOL 1: NORMAL
BH CV STRESS RECOVERY BP: NORMAL MMHG
BH CV STRESS RECOVERY HR: 91 BPM
BH CV STRESS RECOVERY O2: 98 %
BH CV STRESS STAGE 1: 1
BH CV THREE MINUTE POST TECH DATA BLOOD PRESSURE: NORMAL MMHG
BH CV THREE MINUTE POST TECH DATA HEART RATE: 83 BPM
BH CV THREE MINUTE POST TECH DATA OXYGEN SATURATION: 98 %
BUN SERPL-MCNC: 7 MG/DL (ref 6–20)
BUN/CREAT SERPL: 6.9 (ref 7–25)
CALCIUM SPEC-SCNC: 9.2 MG/DL (ref 8.6–10.5)
CHLORIDE SERPL-SCNC: 100 MMOL/L (ref 98–107)
CO2 SERPL-SCNC: 23.4 MMOL/L (ref 22–29)
CREAT SERPL-MCNC: 1.02 MG/DL (ref 0.76–1.27)
DEPRECATED RDW RBC AUTO: 41.2 FL (ref 37–54)
EOSINOPHIL # BLD AUTO: 0.09 10*3/MM3 (ref 0–0.4)
EOSINOPHIL NFR BLD AUTO: 1.3 % (ref 0.3–6.2)
ERYTHROCYTE [DISTWIDTH] IN BLOOD BY AUTOMATED COUNT: 12.5 % (ref 12.3–15.4)
GFR SERPL CREATININE-BSD FRML MDRD: 78 ML/MIN/1.73
GLUCOSE BLDC GLUCOMTR-MCNC: 161 MG/DL (ref 70–99)
GLUCOSE BLDC GLUCOMTR-MCNC: 162 MG/DL (ref 70–99)
GLUCOSE SERPL-MCNC: 214 MG/DL (ref 65–99)
HCT VFR BLD AUTO: 39.3 % (ref 37.5–51)
HGB BLD-MCNC: 13.9 G/DL (ref 13–17.7)
IMM GRANULOCYTES # BLD AUTO: 0.02 10*3/MM3 (ref 0–0.05)
IMM GRANULOCYTES NFR BLD AUTO: 0.3 % (ref 0–0.5)
LV EF NUC BP: 57 %
LYMPHOCYTES # BLD AUTO: 1.12 10*3/MM3 (ref 0.7–3.1)
LYMPHOCYTES NFR BLD AUTO: 15.9 % (ref 19.6–45.3)
MAGNESIUM SERPL-MCNC: 1.8 MG/DL (ref 1.6–2.6)
MAXIMAL PREDICTED HEART RATE: 172 BPM
MCH RBC QN AUTO: 31.8 PG (ref 26.6–33)
MCHC RBC AUTO-ENTMCNC: 35.4 G/DL (ref 31.5–35.7)
MCV RBC AUTO: 89.9 FL (ref 79–97)
MONOCYTES # BLD AUTO: 0.62 10*3/MM3 (ref 0.1–0.9)
MONOCYTES NFR BLD AUTO: 8.8 % (ref 5–12)
NEUTROPHILS NFR BLD AUTO: 5.15 10*3/MM3 (ref 1.7–7)
NEUTROPHILS NFR BLD AUTO: 73 % (ref 42.7–76)
NRBC BLD AUTO-RTO: 0 /100 WBC (ref 0–0.2)
PERCENT MAX PREDICTED HR: 57.56 %
PHOSPHATE SERPL-MCNC: 2 MG/DL (ref 2.5–4.5)
PLATELET # BLD AUTO: 349 10*3/MM3 (ref 140–450)
PMV BLD AUTO: 8.2 FL (ref 6–12)
POTASSIUM SERPL-SCNC: 3.7 MMOL/L (ref 3.5–5.2)
RBC # BLD AUTO: 4.37 10*6/MM3 (ref 4.14–5.8)
SARS-COV-2 RNA PNL SPEC NAA+PROBE: NOT DETECTED
SODIUM SERPL-SCNC: 136 MMOL/L (ref 136–145)
STRESS BASELINE BP: NORMAL MMHG
STRESS BASELINE HR: 83 BPM
STRESS O2 SAT REST: 97 %
STRESS PERCENT HR: 68 %
STRESS POST O2 SAT PEAK: 98 %
STRESS POST PEAK BP: NORMAL MMHG
STRESS POST PEAK HR: 99 BPM
STRESS TARGET HR: 146 BPM
WBC NRBC COR # BLD: 7.05 10*3/MM3 (ref 3.4–10.8)

## 2022-01-24 PROCEDURE — G0378 HOSPITAL OBSERVATION PER HR: HCPCS

## 2022-01-24 PROCEDURE — 93017 CV STRESS TEST TRACING ONLY: CPT

## 2022-01-24 PROCEDURE — 0 TECHNETIUM TETROFOSMIN KIT: Performed by: INTERNAL MEDICINE

## 2022-01-24 PROCEDURE — 85025 COMPLETE CBC W/AUTO DIFF WBC: CPT | Performed by: HOSPITALIST

## 2022-01-24 PROCEDURE — 25010000002 REGADENOSON 0.4 MG/5ML SOLUTION

## 2022-01-24 PROCEDURE — 93016 CV STRESS TEST SUPVJ ONLY: CPT | Performed by: NURSE PRACTITIONER

## 2022-01-24 PROCEDURE — 80048 BASIC METABOLIC PNL TOTAL CA: CPT | Performed by: HOSPITALIST

## 2022-01-24 PROCEDURE — 83735 ASSAY OF MAGNESIUM: CPT | Performed by: HOSPITALIST

## 2022-01-24 PROCEDURE — 78452 HT MUSCLE IMAGE SPECT MULT: CPT | Performed by: INTERNAL MEDICINE

## 2022-01-24 PROCEDURE — 84100 ASSAY OF PHOSPHORUS: CPT | Performed by: HOSPITALIST

## 2022-01-24 PROCEDURE — 96376 TX/PRO/DX INJ SAME DRUG ADON: CPT

## 2022-01-24 PROCEDURE — 78452 HT MUSCLE IMAGE SPECT MULT: CPT

## 2022-01-24 PROCEDURE — 99239 HOSP IP/OBS DSCHRG MGMT >30: CPT | Performed by: INTERNAL MEDICINE

## 2022-01-24 PROCEDURE — 82962 GLUCOSE BLOOD TEST: CPT

## 2022-01-24 PROCEDURE — 25010000002 LORAZEPAM PER 2 MG: Performed by: INTERNAL MEDICINE

## 2022-01-24 PROCEDURE — 25010000002 MORPHINE PER 10 MG: Performed by: INTERNAL MEDICINE

## 2022-01-24 PROCEDURE — A9502 TC99M TETROFOSMIN: HCPCS | Performed by: INTERNAL MEDICINE

## 2022-01-24 PROCEDURE — 63710000001 INSULIN LISPRO (HUMAN) PER 5 UNITS: Performed by: HOSPITALIST

## 2022-01-24 PROCEDURE — 36415 COLL VENOUS BLD VENIPUNCTURE: CPT | Performed by: HOSPITALIST

## 2022-01-24 PROCEDURE — 93018 CV STRESS TEST I&R ONLY: CPT | Performed by: INTERNAL MEDICINE

## 2022-01-24 RX ORDER — METOPROLOL SUCCINATE 50 MG/1
50 TABLET, EXTENDED RELEASE ORAL
Qty: 30 TABLET | Refills: 0 | Status: SHIPPED | OUTPATIENT
Start: 2022-01-25 | End: 2022-03-29 | Stop reason: ALTCHOICE

## 2022-01-24 RX ORDER — FLECAINIDE ACETATE 50 MG/1
50 TABLET ORAL EVERY 12 HOURS SCHEDULED
Status: DISCONTINUED | OUTPATIENT
Start: 2022-01-24 | End: 2022-01-24 | Stop reason: HOSPADM

## 2022-01-24 RX ORDER — FLECAINIDE ACETATE 50 MG/1
50 TABLET ORAL EVERY 12 HOURS SCHEDULED
Qty: 60 TABLET | Refills: 0 | Status: SHIPPED | OUTPATIENT
Start: 2022-01-24 | End: 2022-03-29 | Stop reason: ALTCHOICE

## 2022-01-24 RX ORDER — DILTIAZEM HYDROCHLORIDE 120 MG/1
120 CAPSULE, COATED, EXTENDED RELEASE ORAL
Qty: 30 CAPSULE | Refills: 0 | Status: SHIPPED | OUTPATIENT
Start: 2022-01-25 | End: 2022-02-09

## 2022-01-24 RX ADMIN — INSULIN LISPRO 2 UNITS: 100 INJECTION, SOLUTION INTRAVENOUS; SUBCUTANEOUS at 11:47

## 2022-01-24 RX ADMIN — FLECAINIDE ACETATE TABLET 50 MG: 50 TABLET ORAL at 13:28

## 2022-01-24 RX ADMIN — ASPIRIN 81 MG: 81 TABLET, COATED ORAL at 10:38

## 2022-01-24 RX ADMIN — TETROFOSMIN 1 DOSE: 1.38 INJECTION, POWDER, LYOPHILIZED, FOR SOLUTION INTRAVENOUS at 09:47

## 2022-01-24 RX ADMIN — DILTIAZEM HYDROCHLORIDE 120 MG: 120 CAPSULE, COATED, EXTENDED RELEASE ORAL at 10:34

## 2022-01-24 RX ADMIN — TETROFOSMIN 1 DOSE: 1.38 INJECTION, POWDER, LYOPHILIZED, FOR SOLUTION INTRAVENOUS at 08:39

## 2022-01-24 RX ADMIN — LORAZEPAM 1 MG: 2 INJECTION INTRAMUSCULAR; INTRAVENOUS at 00:09

## 2022-01-24 RX ADMIN — METOPROLOL SUCCINATE 50 MG: 50 TABLET, EXTENDED RELEASE ORAL at 10:34

## 2022-01-24 RX ADMIN — MORPHINE SULFATE 2 MG: 2 INJECTION, SOLUTION INTRAMUSCULAR; INTRAVENOUS at 15:31

## 2022-01-24 RX ADMIN — INSULIN LISPRO 2 UNITS: 100 INJECTION, SOLUTION INTRAVENOUS; SUBCUTANEOUS at 08:27

## 2022-01-24 RX ADMIN — SODIUM CHLORIDE 150 ML/HR: 9 INJECTION, SOLUTION INTRAVENOUS at 07:20

## 2022-01-24 RX ADMIN — REGADENOSON 0.4 MG: 0.08 INJECTION, SOLUTION INTRAVENOUS at 09:47

## 2022-01-25 ENCOUNTER — READMISSION MANAGEMENT (OUTPATIENT)
Dept: CALL CENTER | Facility: HOSPITAL | Age: 47
End: 2022-01-25

## 2022-01-25 NOTE — OUTREACH NOTE
Medical Week 1 Survey      Responses   [facility] patient discharged from? [facility]   Does the patient have one of the following disease processes/diagnoses(primary or secondary)? Other   Week 1 attempt successful? Yes   Call start time 0850   Call end time 0856   General alerts for this patient Alcohol abuse   Discharge diagnosis Paroxysmal SVT, Atypical chest pain, ACS ruled out, Hypertension, Ascending aortic aneurysm without dissection, 4 cm   Person spoke with today (if not patient) and relationship Venice  S.O.   Meds reviewed with patient/caregiver? Yes   Is the patient having any side effects they believe may be caused by any medication additions or changes? No   Does the patient have all medications ordered at discharge? Yes   Is the patient taking all medications as directed (includes completed medication regime)? No   What is preventing the patient from taking all medications as directed? Other   Nursing Interventions Nurse provided patient education   Medication comments Pharm needs new card. Will take card this AM and get meds filled   Comments regarding appointments will make the appt for Cardio . Then surgeon in [place].   Does the patient have a primary care provider?  Yes   Does the patient have an appointment with their PCP within 7 days of discharge? No   Comments regarding PCP Will make   What is preventing the patient from scheduling follow up appointments within 7 days of discharge? Haven't had time   Nursing Interventions Educated patient on importance of making appointment,  Advised patient to make appointment   Has the patient kept scheduled appointments due by today? N/A   Has home health visited the patient within 72 hours of discharge? N/A   Psychosocial issues? No   Did the patient receive a copy of their discharge instructions? Yes   Nursing interventions Reviewed instructions with patient   What is the patient's perception of their health status since discharge? Improving   Is  the patient/caregiver able to teach back signs and symptoms related to disease process for when to call PCP? Yes   Is the patient/caregiver able to teach back signs and symptoms related to disease process for when to call 911? Yes   Is the patient/caregiver able to teach back the hierarchy of who to call/visit for symptoms/problems? PCP, Specialist, Home health nurse, Urgent Care, ED, 911 Yes   If the patient is a current smoker, are they able to teach back resources for cessation? Not a smoker   Week 1 call completed? Yes   Wrap up additional comments Venice reports she will go to pharm this AM for meds. They are needing copy of new insurance card for PT.           Lorena Ricardo RN

## 2022-01-25 NOTE — OUTREACH NOTE
Prep Survey      Responses   [facility]   Is LACE score < 7 ? No   Emergency Room discharge w/ pulse ox? No   Eligibility Readm Mgmt   Discharge diagnosis Paroxysmal SVT, Atypical chest pain, ACS ruled out, Hypertension, Ascending aortic aneurysm without dissection, 4 cm   Does the patient have one of the following disease processes/diagnoses(primary or secondary)? Other   Does the patient have Home health ordered? No   Is there a DME ordered? No   General alerts for this patient Alcohol abuse   Prep survey completed? Yes          Nayely Villagran RN

## 2022-01-31 ENCOUNTER — READMISSION MANAGEMENT (OUTPATIENT)
Dept: CALL CENTER | Facility: HOSPITAL | Age: 47
End: 2022-01-31

## 2022-01-31 NOTE — OUTREACH NOTE
Medical Week 2 Survey      Responses   [facility], [place], operated by [facility] patient discharged from? [facility]   Does the patient have one of the following disease processes/diagnoses(primary or secondary)? Other   Week 2 attempt successful? No   Unsuccessful attempts Attempt 1          Leslie Perez RN

## 2022-02-01 ENCOUNTER — READMISSION MANAGEMENT (OUTPATIENT)
Dept: CALL CENTER | Facility: HOSPITAL | Age: 47
End: 2022-02-01

## 2022-02-01 NOTE — OUTREACH NOTE
Medical Week 2 Survey      Responses   [facility] patient discharged from? [facility]   Does the patient have one of the following disease processes/diagnoses(primary or secondary)? Other   Week 2 attempt successful? Yes   Call start time 1035   General alerts for this patient Alcohol abuse   Discharge diagnosis Paroxysmal SVT, Atypical chest pain, ACS ruled out, Hypertension, Ascending aortic aneurysm without dissection, 4 cm   Call end time 1037   Person spoke with today (if not patient) and relationship Venice  S.O.   Meds reviewed with patient/caregiver? Yes   Is the patient taking all medications as directed (includes completed medication regime)? Yes   Comments regarding appointments Cardio next week   Does the patient have a primary care provider?  Yes   Comments regarding PCP 2/1/22   Has the patient kept scheduled appointments due by today? Yes   Psychosocial issues? No   Did the patient receive a copy of their discharge instructions? Yes   Nursing interventions Reviewed instructions with patient   What is the patient's perception of their health status since discharge? Improving   Is the patient/caregiver able to teach back signs and symptoms related to disease process for when to call PCP? Yes   Is the patient/caregiver able to teach back signs and symptoms related to disease process for when to call 911? Yes   Is the patient/caregiver able to teach back the hierarchy of who to call/visit for symptoms/problems? PCP, Specialist, Home health nurse, Urgent Care, ED, 911 Yes   Week 2 Call Completed? Yes   Wrap up additional comments Venice states Pt is doing well. Taking all meds as ordered. Pt has all follow up appts made. No needs at this time.           Lorena Ricardo RN

## 2022-02-09 ENCOUNTER — OFFICE VISIT (OUTPATIENT)
Dept: CARDIOLOGY | Facility: CLINIC | Age: 47
End: 2022-02-09

## 2022-02-09 ENCOUNTER — READMISSION MANAGEMENT (OUTPATIENT)
Dept: CALL CENTER | Facility: HOSPITAL | Age: 47
End: 2022-02-09

## 2022-02-09 VITALS
DIASTOLIC BLOOD PRESSURE: 112 MMHG | BODY MASS INDEX: 32.76 KG/M2 | HEART RATE: 80 BPM | WEIGHT: 234 LBS | SYSTOLIC BLOOD PRESSURE: 168 MMHG | HEIGHT: 71 IN

## 2022-02-09 DIAGNOSIS — I47.1 SVT (SUPRAVENTRICULAR TACHYCARDIA): Primary | ICD-10-CM

## 2022-02-09 DIAGNOSIS — I10 ESSENTIAL HYPERTENSION: ICD-10-CM

## 2022-02-09 DIAGNOSIS — I49.3 PVC'S (PREMATURE VENTRICULAR CONTRACTIONS): ICD-10-CM

## 2022-02-09 DIAGNOSIS — I77.810 AORTIC ECTASIA, THORACIC: ICD-10-CM

## 2022-02-09 PROCEDURE — 93000 ELECTROCARDIOGRAM COMPLETE: CPT | Performed by: INTERNAL MEDICINE

## 2022-02-09 PROCEDURE — 99214 OFFICE O/P EST MOD 30 MIN: CPT | Performed by: INTERNAL MEDICINE

## 2022-02-09 RX ORDER — DILTIAZEM HYDROCHLORIDE 180 MG/1
180 CAPSULE, COATED, EXTENDED RELEASE ORAL DAILY
Qty: 90 CAPSULE | Refills: 3 | Status: SHIPPED | OUTPATIENT
Start: 2022-02-09 | End: 2022-03-29 | Stop reason: ALTCHOICE

## 2022-02-09 NOTE — PROGRESS NOTES
Chief Complaint  Hyperlipidemia and Hypertension    Subjective      Patient returns clinic to follow-up on recent hospitalization.  Past medical history is significant for hypertension, sleep anemia and diabetes.  He was recently in the hospital with palpitations and chest discomfort.  He was diagnosed with supraventricular tachycardia.  Echocardiogram and myocardial perfusion study were unremarkable.  He was discharged on flecainide in addition to diltiazem and metoprolol.  He has been doing well since hospital discharge.  He reports occasional brief palpitations lasting few seconds but denies sustained symptoms.  He has no chest discomfort, extraordinary dyspnea, dizziness, presyncope or syncope.  He reports sore throat related to previous cancer.    Past Medical History:   Diagnosis Date   • Allergies    • Aneurysm (HCC)    • Cancer (HCC)    • Depression    • Diabetes mellitus (HCC)    • GERD (gastroesophageal reflux disease)    • Hyperlipidemia    • Hypertension          Current Outpatient Medications:   •  atorvastatin (LIPITOR) 40 MG tablet, TAKE 1 TABLET EVERY NIGHT, Disp: , Rfl:   •  Diclofenac Sodium (VOLTAREN) 1 % gel gel, Apply 4 g topically to the appropriate area as directed 4 (Four) Times a Day As Needed (pain)., Disp: 350 g, Rfl: 0  •  escitalopram (LEXAPRO) 10 MG tablet, Take 10 mg by mouth Daily., Disp: , Rfl:   •  flecainide (TAMBOCOR) 50 MG tablet, Take 1 tablet by mouth Every 12 (Twelve) Hours for 30 days., Disp: 60 tablet, Rfl: 0  •  glycopyrrolate (ROBINUL) 2 MG tablet, Take 2 mg by mouth Daily., Disp: , Rfl:   •  insulin degludec (TRESIBA FLEXTOUCH) 100 UNIT/ML solution pen-injector injection, Inject 18 Units under the skin into the appropriate area as directed Daily., Disp: , Rfl:   •  levothyroxine sodium (Tirosint) 125 MCG capsule capsule, Take 250 mcg by mouth Daily., Disp: , Rfl:   •  losartan (COZAAR) 100 MG tablet, Take 100 mg by mouth Daily., Disp: , Rfl:   •  metoprolol succinate XL  "(TOPROL-XL) 50 MG 24 hr tablet, Take 1 tablet by mouth Daily for 30 days., Disp: 30 tablet, Rfl: 0  •  dilTIAZem CD (Cardizem CD) 180 MG 24 hr capsule, Take 1 capsule by mouth Daily., Disp: 90 capsule, Rfl: 3    Medications Discontinued During This Encounter   Medication Reason   • dilTIAZem CD (CARDIZEM CD) 120 MG 24 hr capsule      No Known Allergies     Social History     Tobacco Use   • Smoking status: Former Smoker     Years: 11.00     Quit date: 2008     Years since quittin.5   • Smokeless tobacco: Never Used   Vaping Use   • Vaping Use: Never used   Substance Use Topics   • Alcohol use: Yes     Comment: reports 1 drink per month   • Drug use: Never       Family History   Problem Relation Age of Onset   • Hypertension Mother    • Diabetes Father    • Alcohol abuse Father    • Hypertension Maternal Grandmother    • Cancer Maternal Grandfather    • Hypertension Paternal Grandmother    • Colon cancer Paternal Grandfather         Objective     BP (!) 168/112   Pulse 80   Ht 180.3 cm (71\")   Wt 106 kg (234 lb)   BMI 32.64 kg/m²       Physical Exam    General Appearance:   · no acute distress  · Alert and oriented x3  HENT:   · lips not cyanotic  · Atraumatic  Neck:  · No jvd   · supple  Respiratory:  · no respiratory distress  · normal breath sounds  · no rales  Cardiovascular:  · no S3, no S4   · no murmur  · no rub  Extremities  · No cyanosis  · lower extremity edema: none    Skin:   · warm, dry  · No rashes      Result Review :     proBNP   Date Value Ref Range Status   2022 26.5 0.0 - 450.0 pg/mL Final     CMP    CMP 22     0043 0802    Glucose 327 (A) 194 (A) 203 (A) 214 (A)   BUN 13 11 13 7   Creatinine 0.96 1.13 0.97 1.02   eGFR Non  Am 84 69 83 78   Sodium 133 (A) 137 134 (A) 136   Potassium 4.1 3.8 4.3 3.7   Chloride 91 (A) 91 (A) 94 (A) 100   Calcium 9.9 9.7 9.5 9.2   Albumin 5.00 4.80     Total Bilirubin 1.2 0.7     Alkaline Phosphatase 152 (A) 129 " (A)     AST (SGOT) 44 (A) 29     ALT (SGPT) 33 24     (A) Abnormal value            CBC w/diff    CBC w/Diff 1/8/22 1/23/22 1/23/22 1/24/22     0043 0802    WBC 12.18 (A) 10.13 14.07 (A) 7.05   RBC 4.94 4.87 4.50 4.37   Hemoglobin 15.9 15.6 14.3 13.9   Hematocrit 43.1 42.5 39.5 39.3   MCV 87.2 87.3 87.8 89.9   MCH 32.2 32.0 31.8 31.8   MCHC 36.9 (A) 36.7 (A) 36.2 (A) 35.4   RDW 12.1 (A) 12.6 12.6 12.5   Platelets 334 475 (A) 425 349   Neutrophil Rel % 82.3 (A) 79.7 (A) 87.9 (A) 73.0   Immature Granulocyte Rel % 0.2 0.4 0.2 0.3   Lymphocyte Rel % 10.3 (A) 14.9 (A) 6.3 (A) 15.9 (A)   Monocyte Rel % 6.7 4.4 (A) 5.4 8.8   Eosinophil Rel % 0.2 (A) 0.0 (A) 0.0 (A) 1.3   Basophil Rel % 0.3 0.6 0.2 0.7   (A) Abnormal value             Lab Results   Component Value Date    TSH 1.140 01/23/2022      Lab Results   Component Value Date    FREET4 2.24 (H) 01/08/2022      No results found for: DDIMERQUANT  Magnesium   Date Value Ref Range Status   01/24/2022 1.8 1.6 - 2.6 mg/dL Final      No results found for: DIGOXIN   Lab Results   Component Value Date    TROPONINT <0.010 01/23/2022             No results found for: POCTROP    Results for orders placed during the hospital encounter of 01/23/22    Adult Transthoracic Echo Limited W/ Cont if Necessary Per Protocol    Interpretation Summary  · Calculated left ventricular EF = 63% Estimated left ventricular EF was in agreement with the calculated left ventricular EF.  · No wall motion abnormalities.  · Left ventricular diastolic function was indeterminate due to E/a fusion. Tissue Doppler velocity was not assessed.  · No hemodynamically significant valvular pathology.  · Mild dilation of the aortic root is present measuring 3.7 cm       ECG 12 Lead    Date/Time: 2/9/2022 11:32 AM  Performed by: Yao Keen MD  Authorized by: Yao Keen MD   Comparison: compared with previous ECG from 1/23/2022  Comparison to previous ECG: Previous ECG demonstrated SVT  Rhythm: sinus  rhythm  Rate: normal  BPM: 76  QRS axis: normal    Clinical impression: normal ECG                   Diagnoses and all orders for this visit:    1. SVT (supraventricular tachycardia) ([facility]) (Primary)  -     Ambulatory Referral to Cardiac Electrophysiology  -     dilTIAZem CD (Cardizem CD) 180 MG 24 hr capsule; Take 1 capsule by mouth Daily.  Dispense: 90 capsule; Refill: 3    2. Essential hypertension  -     dilTIAZem CD (Cardizem CD) 180 MG 24 hr capsule; Take 1 capsule by mouth Daily.  Dispense: 90 capsule; Refill: 3    3. PVC's (premature ventricular contractions)    4. 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  He has occasional brief palpitations but denies sustained symptoms.  He is currently on diltiazem, metoprolol and flecainide 50 mg twice daily which will be continued.  Recent echocardiogram and myocardial perfusion study were unremarkable. He will be referred to electrophysiology for further evaluation and consideration of SVT ablation.  ECG from today was unremarkable.    -Thoracic aortic ectasia: Ascending aorta was mildly dilated measuring 4.0 cm on CT scan.  He needs better blood pressure control and regular surveillance.  Diltiazem will be increased to 180 mg daily.  Blood pressure monitoring was recommended.  He will report to us if his blood pressure remains elevated.  Will start ACE inhibitor if needed.  Will repeat echocardiogram and 12 months.    -Hypertension: His blood pressure is elevated today, partly due to sore throat.  Diltiazem dose will be increased as above.  Importance of adequate blood pressure control in the setting of dilated ascending aorta was explained to him.    -PVCs: Continue current medical therapy as discussed above.    Follow Up     Return in about 4 months (around 6/9/2022).        Patient was given instructions and counseling regarding his condition or for health maintenance advice. Please see specific  information pulled into the AVS if appropriate.

## 2022-02-09 NOTE — OUTREACH NOTE
Medical Week 3 Survey      Responses   [facility] [place] [facility]   Does the patient have one of the following disease processes/diagnoses(primary or secondary)? Other   Week 3 attempt successful? Yes   Call start time 1450   Call end time 1452   General alerts for this patient Alcohol abuse   Discharge diagnosis Paroxysmal SVT, Atypical chest pain, ACS ruled out, Hypertension, Ascending aortic aneurysm without dissection, 4 cm   Meds reviewed with patient/caregiver? Yes   Is the patient taking all medications as directed (includes completed medication regime)? Yes   Medication comments Cardio increased Diltiazem   Does the patient have a primary care provider?  Yes   Has the patient kept scheduled appointments due by today? Yes   Has home health visited the patient within 72 hours of discharge? N/A   Psychosocial issues? No   Did the patient receive a copy of their discharge instructions? Yes   What is the patient's perception of their health status since discharge? Improving   Is the patient/caregiver able to teach back signs and symptoms related to disease process for when to call PCP? Yes   Is the patient/caregiver able to teach back signs and symptoms related to disease process for when to call 911? Yes   Is the patient/caregiver able to teach back the hierarchy of who to call/visit for symptoms/problems? PCP, Specialist, Home health nurse, Urgent Care, ED, 911 Yes   Week 3 Call Completed? Yes   Wrap up additional comments Pt reports he is doing well over all. DID see cardio today and had an increase in Cardizem. No needs at this time.           Lorena Ricardo RN

## 2022-02-17 ENCOUNTER — READMISSION MANAGEMENT (OUTPATIENT)
Dept: CALL CENTER | Facility: HOSPITAL | Age: 47
End: 2022-02-17

## 2022-02-17 NOTE — OUTREACH NOTE
Medical Week 4 Survey      Responses   [facility] patient discharged from? [facility]   Does the patient have one of the following disease processes/diagnoses(primary or secondary)? Other   Week 4 attempt successful? No          Christophe Bolanos RN

## 2022-03-24 NOTE — PROGRESS NOTES
Electrophysiology Clinic Consult     Wayne Guan  1975 03/29/22    DATE OF ADMISSION: (Not on file)  [facility] CARDIOLOGY    Myron Jimenez MD  [address] [facility] [address] / [place] [id number]    Chief Complaint   Patient presents with   • SVT (supraventricular tachycardia) (HCC)     Referring: Dr. Keen    CC:  SVT    Problem List:   1. SVT   a. 1/24/22 Stress Test: No reversible myocardial ischemia noted. Isolated PVCs noted. LVEF 57%. Overall this represents a low risk study.   b. 1/23/22 Echo LVEF 63%, no hemodynamically significant valvular disease   c. 1/23/22 Hospital admission at [facility] for SVT at 214 bpm- successfully terminated with adenosine   2. HTN  3. PVC- noted on Stress test 1/24/22   4. REN  5. DM  6. Hypothyroidism   7. Aortic Aneurysm   8. Thyroid Cancer- s/p thyroidectomy   9. Surgical history  a. Cervical Spine Surgery         History of Present Illness:   Mr. Guan is a 46 year old male referred by Dr. Keen for consultation for SVT. He was recently hospitalized at [facility], [place], operated by [facility] in [place]   In January 2022 for an episode of palpitations and chest pain. This episode occurred in the setting of acute neck pain with associated GI symptoms,  Dizziness, and weakness. Upon presentation to the ER his K was 3.8 and Mag was 1.5   In the emergency department he received Adenosine and was placed on a diltiazem drip. Due to the associated chest pain  he underwent stress test and echo both of which were essentially normal. Even prior to this admission he had been taking metoprolol. During the admission flecainide and diltiazem were added to his medications (metoprolol) . He reports that he had 2 other similar episodes to this in the past.  -Outside of that he had had episodes of palpitations several times/ year for the past several  years.    Since the Er Visit he has not had further  episodes   He has not been able to get the flecainide and diltiazem refilled  and has been off those meds for the last 3 weeks.     TSH: normal: 22  ETOH: None   Caffeine/ stimulant use: 3-4 sodas/ day. No other caffiene.     No Known Allergies      Current Outpatient Medications:   •  atorvastatin (LIPITOR) 40 MG tablet, TAKE 1 TABLET EVERY NIGHT, Disp: , Rfl:   •  Diclofenac Sodium (VOLTAREN) 1 % gel gel, Apply 4 g topically to the appropriate area as directed 4 (Four) Times a Day As Needed (pain)., Disp: 350 g, Rfl: 0  •  escitalopram (LEXAPRO) 10 MG tablet, Take 10 mg by mouth Daily., Disp: , Rfl:   •  glycopyrrolate (ROBINUL) 2 MG tablet, Take 2 mg by mouth Daily., Disp: , Rfl:   •  insulin degludec (TRESIBA FLEXTOUCH) 100 UNIT/ML solution pen-injector injection, Inject 18 Units under the skin into the appropriate area as directed Daily., Disp: , Rfl:   •  levothyroxine sodium (TIROSINT) 125 MCG capsule capsule, Take 250 mcg by mouth Daily., Disp: , Rfl:   •  metoprolol tartrate (LOPRESSOR) 50 MG tablet, Take 1 tablet by mouth 2 (Two) Times a Day., Disp: , Rfl:     Social History     Socioeconomic History   • Marital status: Single   Tobacco Use   • Smoking status: Former Smoker     Years: 11.00     Quit date: 2008     Years since quittin.7   • Smokeless tobacco: Never Used   Vaping Use   • Vaping Use: Never used   Substance and Sexual Activity   • Alcohol use: Yes     Comment: reports 1 drink per month   • Drug use: Never   • Sexual activity: Defer       Family History   Problem Relation Age of Onset   • Hyperlipidemia Mother    • Hypertension Mother    • Heart attack Father    • Hyperlipidemia Father    • Hypertension Father    • Diabetes Father    • Alcohol abuse Father    • Stroke Father    • Hypertension Maternal Grandmother    • Cancer Maternal Grandfather    • Hypertension Paternal Grandmother    • Colon cancer Paternal Grandfather        REVIEW OF SYSTEMS:   CONST:  No weight loss, fever, chills, weakness or fatigue.   HEENT:  No visual loss, blurred vision, double  "vision, yellow sclerae.                   No hearing loss, congestion, sore throat.   SKIN:      No rashes, urticaria, ulcers, sores.     RESP:     No shortness of breath, hemoptysis, cough, sputum.   GI:           No anorexia, nausea, vomiting, diarrhea. No abdominal pain, melena.   :         No burning on urination, hematuria or increased frequency.  ENDO:    No diaphoresis, cold or heat intolerance. No polyuria or polydipsia.   NEURO:  No headache, dizziness, syncope, paralysis, ataxia, or parasthesias.                  No change in bowel or bladder control. No history of CVA/TIA  MUSC:    No muscle, back pain, joint pain or stiffness.   HEME:    No anemia, bleeding, bruising. No history of DVT/PE.  PSYCH:  No history of depression, anxiety    Vitals:    03/29/22 1343   BP: 126/88   BP Location: Left arm   Patient Position: Sitting   Pulse: 75   SpO2: 96%   Weight: 107 kg (236 lb)   Height: 185.4 cm (73\")       Physical Exam:  GEN: Well nourished, well-developed, no acute distress  HEENT: Normocephalic, atraumatic, PERRLA, moist mucous membranes  NECK: Supple, NO JVD, no thyromegaly, no lymphadenopathy  CARD: S1S2, RRR, no murmur, gallop, rub, PMI NL  LUNGS: Clear to auscultation, normal respiratory effort  ABDOMEN: Soft, nontender, normal bowel sounds  EXTREMITIES: No gross deformities, no clubbing, cyanosis, or edema  SKIN: Warm, dry, no lesions  NEURO: No focal deficits, alert and oriented x 3  PSYCHIATRIC: Normal affect and mood      I personally viewed and interpreted the patient's EKG/Telemetry/lab data    Lab Results   Component Value Date    GLUCOSE 214 (H) 01/24/2022    CALCIUM 9.2 01/24/2022     01/24/2022    K 3.7 01/24/2022    CO2 23.4 01/24/2022     01/24/2022    BUN 7 01/24/2022    CREATININE 1.02 01/24/2022    EGFRIFNONA 78 01/24/2022    BCR 6.9 (L) 01/24/2022    ANIONGAP 12.6 01/24/2022     Lab Results   Component Value Date    WBC 7.05 01/24/2022    HGB 13.9 01/24/2022    HCT 39.3 " 01/24/2022    MCV 89.9 01/24/2022     01/24/2022     Lab Results   Component Value Date    INR 1.00 (L) 01/23/2022    PROTIME 10.9 01/23/2022     Lab Results   Component Value Date    TSH 1.140 01/23/2022         ECG 12 Lead    Date/Time: 3/29/2022 1:54 PM  Performed by: Pita Cr APRN  Authorized by: Pita Cr APRN   Comparison: compared with previous ECG from 1/23/2022  Comparison to previous ECG: Now in NSR   Rhythm: sinus rhythm  BPM: 75                ICD-10-CM ICD-9-CM   1. SVT (supraventricular tachycardia) ([facility])  I47.1 427.89   2. Essential hypertension  I10 401.9       Assessment and Plan:   1.SVT   -recent episode during January 2021  hospitalization at [facility] with EKG showing SVT at 214 bpm   -Was Rx'd  flecainide, metoprolol and diltiazem at discharge with no recurrent but has been on metoprolol alone for the last few weeks.   -Treatment options including medical therapy vs. Catheter ablation reviewed with the patient in detail. He would like to proceed with catheter ablation. The risks, benefits, and alternatives of the procedure have been reviewed and the patient wishes to proceed. He will need to hold  metoprolol  5 days prior to the procedure. When he goes off those meds he can resume amlodipine which he has taken previously.     2. HTN acceptable control -follow with Dr. Keen     Scribed for Tai Lisa MD by DELL Milton. 3/29/2022  14:07 EDT     I, Tai Lisa MD, personally performed the services described in this documentation as scribed by the above named individual in my presence, and it is both accurate and complete.  3/29/2022  14:07 EDT

## 2022-03-29 ENCOUNTER — OFFICE VISIT (OUTPATIENT)
Dept: CARDIOLOGY | Facility: CLINIC | Age: 47
End: 2022-03-29

## 2022-03-29 VITALS
HEIGHT: 73 IN | DIASTOLIC BLOOD PRESSURE: 88 MMHG | BODY MASS INDEX: 31.28 KG/M2 | OXYGEN SATURATION: 96 % | HEART RATE: 75 BPM | SYSTOLIC BLOOD PRESSURE: 126 MMHG | WEIGHT: 236 LBS

## 2022-03-29 DIAGNOSIS — I10 ESSENTIAL HYPERTENSION: ICD-10-CM

## 2022-03-29 DIAGNOSIS — I47.1 SVT (SUPRAVENTRICULAR TACHYCARDIA): Primary | ICD-10-CM

## 2022-03-29 PROCEDURE — 93000 ELECTROCARDIOGRAM COMPLETE: CPT | Performed by: NURSE PRACTITIONER

## 2022-03-29 PROCEDURE — 99244 OFF/OP CNSLTJ NEW/EST MOD 40: CPT | Performed by: INTERNAL MEDICINE

## 2022-03-29 RX ORDER — METOPROLOL TARTRATE 50 MG/1
1 TABLET, FILM COATED ORAL 2 TIMES DAILY
COMMUNITY
Start: 2022-03-11 | End: 2022-05-12

## 2022-05-12 RX ORDER — METOPROLOL SUCCINATE 50 MG/1
50 TABLET, EXTENDED RELEASE ORAL DAILY
Qty: 90 TABLET | Refills: 3 | Status: ON HOLD | OUTPATIENT
Start: 2022-05-12 | End: 2022-09-11 | Stop reason: SDUPTHER

## 2022-05-12 RX ORDER — FLECAINIDE ACETATE 50 MG/1
50 TABLET ORAL 2 TIMES DAILY
Qty: 180 TABLET | Refills: 3 | Status: ON HOLD | OUTPATIENT
Start: 2022-05-12 | End: 2022-09-11

## 2022-06-03 ENCOUNTER — HOSPITAL ENCOUNTER (OUTPATIENT)
Facility: HOSPITAL | Age: 47
Setting detail: HOSPITAL OUTPATIENT SURGERY
End: 2022-06-03
Attending: INTERNAL MEDICINE | Admitting: INTERNAL MEDICINE

## 2022-06-03 DIAGNOSIS — I47.1 SVT (SUPRAVENTRICULAR TACHYCARDIA): ICD-10-CM

## 2022-06-10 ENCOUNTER — OFFICE VISIT (OUTPATIENT)
Dept: CARDIOLOGY | Facility: CLINIC | Age: 47
End: 2022-06-10

## 2022-06-10 VITALS
BODY MASS INDEX: 31.68 KG/M2 | SYSTOLIC BLOOD PRESSURE: 135 MMHG | WEIGHT: 239 LBS | DIASTOLIC BLOOD PRESSURE: 102 MMHG | HEART RATE: 100 BPM | HEIGHT: 73 IN

## 2022-06-10 DIAGNOSIS — I10 ESSENTIAL HYPERTENSION: Primary | ICD-10-CM

## 2022-06-10 DIAGNOSIS — I47.1 SVT (SUPRAVENTRICULAR TACHYCARDIA): ICD-10-CM

## 2022-06-10 DIAGNOSIS — I77.810 AORTIC ECTASIA, THORACIC: ICD-10-CM

## 2022-06-10 PROCEDURE — 99214 OFFICE O/P EST MOD 30 MIN: CPT | Performed by: INTERNAL MEDICINE

## 2022-06-10 RX ORDER — ERGOCALCIFEROL (VITAMIN D2) 10 MCG
400 TABLET ORAL DAILY
COMMUNITY

## 2022-06-10 RX ORDER — CETIRIZINE HYDROCHLORIDE 10 MG/1
10 TABLET ORAL DAILY
COMMUNITY

## 2022-06-10 RX ORDER — AMLODIPINE BESYLATE 5 MG/1
5 TABLET ORAL DAILY
COMMUNITY
End: 2022-06-10

## 2022-06-10 RX ORDER — OMEPRAZOLE 20 MG/1
20 CAPSULE, DELAYED RELEASE ORAL DAILY
COMMUNITY

## 2022-06-10 RX ORDER — INSULIN DEGLUDEC 100 U/ML
36 INJECTION, SOLUTION SUBCUTANEOUS
COMMUNITY

## 2022-06-10 RX ORDER — DILTIAZEM HYDROCHLORIDE 180 MG/1
180 CAPSULE, COATED, EXTENDED RELEASE ORAL DAILY
Status: ON HOLD | COMMUNITY
End: 2022-09-11

## 2022-06-10 RX ORDER — LEVOTHYROXINE SODIUM 0.12 MG/1
125 TABLET ORAL DAILY
COMMUNITY
End: 2022-07-15

## 2022-06-10 RX ORDER — FLUTICASONE PROPIONATE 50 MCG
2 SPRAY, SUSPENSION (ML) NASAL DAILY
COMMUNITY

## 2022-06-10 RX ORDER — LISINOPRIL 10 MG/1
10 TABLET ORAL DAILY
Qty: 90 TABLET | Refills: 3 | Status: SHIPPED | OUTPATIENT
Start: 2022-06-10 | End: 2023-02-13 | Stop reason: HOSPADM

## 2022-06-10 NOTE — PROGRESS NOTES
Chief Complaint  Rapid Heart Rate, Hypertension, and Hyperlipidemia    Subjective      Patient is here for follow-up on hypertension and SVT.  He has been doing well for the most part.  He has sporadic palpitations, mostly with strenuous exertion.  He was seen by EP and is planned to undergo SVT ablation in August.  Patient has no other complaints today.  He has no chest discomfort, dyspnea, dizziness, presyncope or syncope.    Past Medical History:   Diagnosis Date   • Allergies    • Aneurysm (HCC)    • Cancer (HCC)    • Depression    • Diabetes mellitus (HCC)    • GERD (gastroesophageal reflux disease)    • Hyperlipidemia    • Hypertension          Current Outpatient Medications:   •  atorvastatin (LIPITOR) 40 MG tablet, TAKE 1 TABLET EVERY NIGHT, Disp: , Rfl:   •  cetirizine (zyrTEC) 10 MG tablet, Take 10 mg by mouth Daily., Disp: , Rfl:   •  Diclofenac Sodium (VOLTAREN) 1 % gel gel, Apply 4 g topically to the appropriate area as directed 4 (Four) Times a Day As Needed (pain)., Disp: 350 g, Rfl: 0  •  dilTIAZem CD (CARDIZEM CD) 180 MG 24 hr capsule, Take 180 mg by mouth Daily., Disp: , Rfl:   •  escitalopram (LEXAPRO) 10 MG tablet, Take 10 mg by mouth Daily., Disp: , Rfl:   •  flecainide (TAMBOCOR) 50 MG tablet, Take 1 tablet by mouth 2 (Two) Times a Day., Disp: 180 tablet, Rfl: 3  •  fluticasone (FLONASE) 50 MCG/ACT nasal spray, 2 sprays into the nostril(s) as directed by provider Daily., Disp: , Rfl:   •  glycopyrrolate (ROBINUL) 2 MG tablet, Take 2 mg by mouth Daily., Disp: , Rfl:   •  metoprolol succinate XL (TOPROL-XL) 50 MG 24 hr tablet, Take 1 tablet by mouth Daily., Disp: 90 tablet, Rfl: 3  •  omeprazole (priLOSEC) 20 MG capsule, Take  by mouth Daily., Disp: , Rfl:   •  Vitamin D, Cholecalciferol, (CHOLECALCIFEROL) 10 MCG (400 UNIT) tablet, Take 400 Units by mouth Daily., Disp: , Rfl:   •  Insulin Degludec (TRESIBA SC), Inject  under the skin into the appropriate area as directed., Disp: , Rfl:   •   "levothyroxine (SYNTHROID, LEVOTHROID) 125 MCG tablet, Take 125 mcg by mouth Daily., Disp: , Rfl:   •  lisinopril (PRINIVIL,ZESTRIL) 10 MG tablet, Take 1 tablet by mouth Daily., Disp: 90 tablet, Rfl: 3    Medications Discontinued During This Encounter   Medication Reason   • amLODIPine (NORVASC) 5 MG tablet      No Known Allergies     Social History     Tobacco Use   • Smoking status: Former Smoker     Years: 11.00     Quit date: 2008     Years since quittin.9   • Smokeless tobacco: Never Used   Vaping Use   • Vaping Use: Never used   Substance Use Topics   • Alcohol use: Yes     Comment: reports 1 drink per month   • Drug use: Never       Family History   Problem Relation Age of Onset   • Hyperlipidemia Mother    • Hypertension Mother    • Heart attack Father    • Hyperlipidemia Father    • Hypertension Father    • Diabetes Father    • Alcohol abuse Father    • Stroke Father    • Hypertension Maternal Grandmother    • Cancer Maternal Grandfather    • Hypertension Paternal Grandmother    • Colon cancer Paternal Grandfather         Objective     BP (!) 135/102   Pulse 100   Ht 185.4 cm (73\")   Wt 108 kg (239 lb)   BMI 31.53 kg/m²       Physical Exam    General Appearance:   · no acute distress  · Alert and oriented x3  HENT:   · lips not cyanotic  · Atraumatic  Neck:  · No jvd   · supple  Respiratory:  · no respiratory distress  · normal breath sounds  · no rales  Cardiovascular:  · Regular rate and rhythm  · no S3, no S4   · no murmur  · no rub  Extremities  · No cyanosis  · lower extremity edema: none    Skin:   · warm, dry  · No rashes      Result Review :     proBNP   Date Value Ref Range Status   2022 26.5 0.0 - 450.0 pg/mL Final     CMP    CMP 22     0918 0918    Glucose 214 (A)      Glucose   247 (A) 102 (A)   BUN 7  9 11   Creatinine 1.02 102.0 0.84 0.94   eGFR Non African Am 78      Sodium 136  135 (A) 138   Potassium 3.7  4.3 4.3   Chloride 100  97 (A) 97 (A) "   Calcium 9.2  9.2 9.2   Albumin   4.2 4.3   Total Bilirubin   0.4 0.5   Alkaline Phosphatase   231 (A) 293 (A)   AST (SGOT)   32 149 (A)   ALT (SGPT)   39 94 (A)   (A) Abnormal value            CBC w/diff    CBC w/Diff 1/24/22 4/20/22 5/12/22   WBC 7.05 3.50 (A) 3.51 (A)   RBC 4.37 4.28 (A) 4.71   Hemoglobin 13.9 13.6 14.6   Hematocrit 39.3 42.5 46.9   MCV 89.9 99.3 99.6   MCH 31.8 31.8 31.0   MCHC 35.4 32.0 31.1   RDW 12.5 13.1 13.1   Platelets 349 327 293   Neutrophil Rel % 73.0 50.5 47.7   Immature Granulocyte Rel % 0.3 0.3 0.3   Lymphocyte Rel % 15.9 (A) 32.6 39.3   Monocyte Rel % 8.8 12.9 8.5   Eosinophil Rel % 1.3 2.3 3.1   Basophil Rel % 0.7 1.4 1.1   (A) Abnormal value             Lab Results   Component Value Date    TSH 37.000 (H) 05/12/2022      Lab Results   Component Value Date    FREET4 0.76 (L) 05/12/2022      No results found for: DDIMERQUANT  Magnesium   Date Value Ref Range Status   01/24/2022 1.8 1.6 - 2.6 mg/dL Final      No results found for: DIGOXIN   Lab Results   Component Value Date    TROPONINT <0.010 01/23/2022             No results found for: POCTROP    Results for orders placed during the hospital encounter of 01/23/22    Adult Transthoracic Echo Limited W/ Cont if Necessary Per Protocol    Interpretation Summary  · Calculated left ventricular EF = 63% Estimated left ventricular EF was in agreement with the calculated left ventricular EF.  · No wall motion abnormalities.  · Left ventricular diastolic function was indeterminate due to E/a fusion. Tissue Doppler velocity was not assessed.  · No hemodynamically significant valvular pathology.  · Mild dilation of the aortic root is present measuring 3.7 cm                 Diagnoses and all orders for this visit:    1. Essential hypertension (Primary)  -     lisinopril (PRINIVIL,ZESTRIL) 10 MG tablet; Take 1 tablet by mouth Daily.  Dispense: 90 tablet; Refill: 3  -     Basic Metabolic Panel; Future    2. Aortic ectasia, thoracic (HCC)  -      Adult Transthoracic Echo Complete W/ Cont if Necessary Per Protocol; Future    3. SVT (supraventricular tachycardia) ([facility])        Assessment:    -Essential hypertension: His blood pressure is elevated today.  He reports he has not taken his medications this morning.  I will switch amlodipine to lisinopril for him being diabetic.  Continue other blood pressure medications and blood pressure monitoring.  BMP will be checked in a week.    -Aortic ectasia: Aortic root was dilated measuring 4.0 cm by echocardiogram from January 2022.  We will repeat echocardiogram next January to assess for progression.  Continue blood pressure control.    -SVT: Has been doing well on current medications which will be continued.  He is planned to undergo ablation in August.    Follow Up     Return for Return to clinic after diagnostic testing 2/2023.        Patient was given instructions and counseling regarding his condition or for health maintenance advice. Please see specific information pulled into the AVS if appropriate.

## 2022-06-21 ENCOUNTER — TELEPHONE (OUTPATIENT)
Dept: CARDIOLOGY | Facility: CLINIC | Age: 47
End: 2022-06-21

## 2022-06-21 NOTE — TELEPHONE ENCOUNTER
Received VM from patient.    Returned call. Patient stated that he is nauseous and lethargic. Stated that his HR is really high. Asked what HR was and he stated he didn't know because his BP cuff was broken.    Asked patient if he could come into office for an EKG and he stated that he would need to find a ride.    Advised patient that if he could find a ride to come into the office for an EKG and then I would discuss with Dr. Keen.    Advised that if he can't find a ride before office closes will need to go to the ER to be evaluated.

## 2022-07-07 ENCOUNTER — TELEPHONE (OUTPATIENT)
Dept: CARDIOLOGY | Facility: CLINIC | Age: 47
End: 2022-07-07

## 2022-07-15 ENCOUNTER — OFFICE VISIT (OUTPATIENT)
Dept: INTERNAL MEDICINE | Facility: CLINIC | Age: 47
End: 2022-07-15

## 2022-07-15 VITALS
TEMPERATURE: 99.2 F | OXYGEN SATURATION: 99 % | WEIGHT: 234 LBS | SYSTOLIC BLOOD PRESSURE: 128 MMHG | DIASTOLIC BLOOD PRESSURE: 78 MMHG | RESPIRATION RATE: 20 BRPM | HEIGHT: 73 IN | BODY MASS INDEX: 31.01 KG/M2 | HEART RATE: 69 BPM

## 2022-07-15 DIAGNOSIS — E11.65 TYPE 2 DIABETES MELLITUS WITH HYPERGLYCEMIA, UNSPECIFIED WHETHER LONG TERM INSULIN USE: Primary | Chronic | ICD-10-CM

## 2022-07-15 DIAGNOSIS — Z11.59 NEED FOR HEPATITIS C SCREENING TEST: ICD-10-CM

## 2022-07-15 DIAGNOSIS — K58.8 OTHER IRRITABLE BOWEL SYNDROME: Chronic | ICD-10-CM

## 2022-07-15 DIAGNOSIS — F41.9 ANXIETY: Chronic | ICD-10-CM

## 2022-07-15 PROCEDURE — 99204 OFFICE O/P NEW MOD 45 MIN: CPT | Performed by: NURSE PRACTITIONER

## 2022-07-15 RX ORDER — AMLODIPINE BESYLATE 5 MG/1
5 TABLET ORAL DAILY
Qty: 90 TABLET | Refills: 1 | Status: ON HOLD | OUTPATIENT
Start: 2022-07-15 | End: 2022-09-09

## 2022-07-15 RX ORDER — AMLODIPINE BESYLATE 5 MG/1
1 TABLET ORAL DAILY
COMMUNITY
Start: 2022-04-07 | End: 2022-07-15 | Stop reason: SDUPTHER

## 2022-07-15 RX ORDER — GLYCOPYRROLATE 2 MG/1
2 TABLET ORAL DAILY
Qty: 90 TABLET | Refills: 1 | Status: SHIPPED | OUTPATIENT
Start: 2022-07-15 | End: 2023-01-23

## 2022-07-15 RX ORDER — ESCITALOPRAM OXALATE 10 MG/1
10 TABLET ORAL DAILY
Qty: 90 TABLET | Refills: 1 | Status: SHIPPED | OUTPATIENT
Start: 2022-07-15

## 2022-07-15 RX ORDER — ATORVASTATIN CALCIUM 40 MG/1
40 TABLET, FILM COATED ORAL NIGHTLY
Qty: 90 TABLET | Refills: 1 | Status: SHIPPED | OUTPATIENT
Start: 2022-07-15 | End: 2023-02-13 | Stop reason: HOSPADM

## 2022-07-15 RX ORDER — LEVOTHYROXINE SODIUM 125 UG/1
250 CAPSULE ORAL
COMMUNITY
Start: 2022-05-09 | End: 2023-02-13 | Stop reason: HOSPADM

## 2022-07-15 NOTE — PROGRESS NOTES
Chief Complaint  Establish Care (Previous PCP was Myron Perry Md in [place] with [facility]/Patient had lab work that previous pcp was not done and he was wanting you to follow up on.) and Med Refill (Medication for IBS)  Subjective        History of Present Illness  Wayne Guan is a 46 y.o. male who presents to [facility]:    1.  To establish primary care.  Previous care [facility].  Cardiologist is Dr. Keen. He needs a cardiac ablation and has an AAA.    2.  Follow-up of diabetes type 2,  hypothyroidism, hyperlipidemia, and elevated LFTs.  Requesting medication refills. Negative for headaches or dizziness. Endocrinology only following for thyroid cancer.    Past Medical History:   Diagnosis Date   • Allergies    • Aneurysm (HCC)    • Cancer (HCC)    • Depression    • Diabetes mellitus (HCC)    • GERD (gastroesophageal reflux disease)    • Hyperlipidemia    • Hypertension         Past Surgical History:   Procedure Laterality Date   • CERVICAL DISC SURGERY     • THYROID SURGERY          No Known Allergies       Current Outpatient Medications:   •  amLODIPine (NORVASC) 5 MG tablet, Take 1 tablet by mouth Daily., Disp: 90 tablet, Rfl: 1  •  atorvastatin (LIPITOR) 40 MG tablet, Take 1 tablet by mouth Every Night. Indications: High Amount of Fats in the Blood, Disp: 90 tablet, Rfl: 1  •  cetirizine (zyrTEC) 10 MG tablet, Take 10 mg by mouth Daily., Disp: , Rfl:   •  dilTIAZem CD (CARDIZEM CD) 180 MG 24 hr capsule, Take 180 mg by mouth Daily., Disp: , Rfl:   •  escitalopram (LEXAPRO) 10 MG tablet, Take 1 tablet by mouth Daily., Disp: 90 tablet, Rfl: 1  •  flecainide (TAMBOCOR) 50 MG tablet, Take 1 tablet by mouth 2 (Two) Times a Day., Disp: 180 tablet, Rfl: 3  •  fluticasone (FLONASE) 50 MCG/ACT nasal spray, 2 sprays into the nostril(s) as directed by provider Daily., Disp: , Rfl:   •  glycopyrrolate (ROBINUL) 2 MG tablet, Take 1 tablet by mouth  "Daily., Disp: 90 tablet, Rfl: 1  •  Insulin Degludec (TRESIBA SC), Inject  under the skin into the appropriate area as directed., Disp: , Rfl:   •  lisinopril (PRINIVIL,ZESTRIL) 10 MG tablet, Take 1 tablet by mouth Daily., Disp: 90 tablet, Rfl: 3  •  metoprolol succinate XL (TOPROL-XL) 50 MG 24 hr tablet, Take 1 tablet by mouth Daily., Disp: 90 tablet, Rfl: 3  •  omeprazole (priLOSEC) 20 MG capsule, Take  by mouth Daily., Disp: , Rfl:   •  Tirosint 125 MCG capsule capsule, Take 125 mcg by mouth Daily. Patient takes 2 tablets once a day, Disp: , Rfl:   •  Vitamin D, Cholecalciferol, (CHOLECALCIFEROL) 10 MCG (400 UNIT) tablet, Take 400 Units by mouth Daily., Disp: , Rfl:     Objective   /78 (BP Location: Right arm, Patient Position: Sitting, Cuff Size: Large Adult)   Pulse 69   Temp 99.2 °F (37.3 °C) (Temporal)   Resp 20   Ht 185.4 cm (73\")   Wt 106 kg (234 lb)   SpO2 99%   BMI 30.87 kg/m²    Estimated body mass index is 30.87 kg/m² as calculated from the following:    Height as of this encounter: 185.4 cm (73\").    Weight as of this encounter: 106 kg (234 lb).   Physical Exam  Vitals reviewed.   Constitutional:       General: He is not in acute distress.  HENT:      Head: Normocephalic and atraumatic.   Neck:      Comments: No thyroid enlargement  Cardiovascular:      Rate and Rhythm: Normal rate and regular rhythm.   Pulmonary:      Effort: Pulmonary effort is normal.      Breath sounds: Normal breath sounds. No wheezing, rhonchi or rales.   Musculoskeletal:      Right lower leg: No edema.      Left lower leg: No edema.   Lymphadenopathy:      Cervical: No cervical adenopathy.   Skin:     General: Skin is warm and dry.   Neurological:      General: No focal deficit present.      Mental Status: He is alert.   Psychiatric:         Thought Content: Thought content normal.        Result Review :{Labs  Result Review  Imaging  Med Tab  Media :23}                   Assessment and Plan    Diagnoses and all " orders for this visit:    1. Type 2 diabetes mellitus with hyperglycemia, unspecified whether long term insulin use (HCC) (Primary)  Comments:  Taking Tresiba 36 units daily; this was increased 1 month ago for HA1c 11.  Orders:  -     Comprehensive Metabolic Panel; Future  -     Hemoglobin A1c; Future    2. Anxiety  Comments:  Stable on Lexapro 10 mg daily.    3. Other irritable bowel syndrome  Comments:  Stable on Robinul 2 mg daily.    4. Need for hepatitis C screening test  -     Hepatitis C Antibody; Future    Other orders  -     escitalopram (LEXAPRO) 10 MG tablet; Take 1 tablet by mouth Daily.  Dispense: 90 tablet; Refill: 1  -     glycopyrrolate (ROBINUL) 2 MG tablet; Take 1 tablet by mouth Daily.  Dispense: 90 tablet; Refill: 1  -     amLODIPine (NORVASC) 5 MG tablet; Take 1 tablet by mouth Daily.  Dispense: 90 tablet; Refill: 1  -     atorvastatin (LIPITOR) 40 MG tablet; Take 1 tablet by mouth Every Night. Indications: High Amount of Fats in the Blood  Dispense: 90 tablet; Refill: 1      Follow Up     Patient was given instructions and counseling regarding his condition. Please see specific information pulled into the AVS if appropriate.   Return in about 3 months (around 10/15/2022) for Recheck.    DELL Morrison

## 2022-07-19 RX ORDER — LANCETS 33 GAUGE
1 EACH MISCELLANEOUS DAILY
COMMUNITY
End: 2023-02-10

## 2022-07-19 RX ORDER — BLOOD-GLUCOSE METER
1 KIT MISCELLANEOUS AS NEEDED
COMMUNITY
End: 2023-02-10

## 2022-07-19 RX ORDER — PEN NEEDLE, DIABETIC 32 GX 1/6"
1 NEEDLE, DISPOSABLE MISCELLANEOUS DAILY
COMMUNITY
End: 2022-09-20 | Stop reason: SDUPTHER

## 2022-07-28 ENCOUNTER — LAB (OUTPATIENT)
Dept: LAB | Facility: HOSPITAL | Age: 47
End: 2022-07-28

## 2022-07-28 DIAGNOSIS — I10 ESSENTIAL HYPERTENSION: ICD-10-CM

## 2022-07-28 DIAGNOSIS — E11.65 TYPE 2 DIABETES MELLITUS WITH HYPERGLYCEMIA, UNSPECIFIED WHETHER LONG TERM INSULIN USE: Chronic | ICD-10-CM

## 2022-07-28 PROCEDURE — 36415 COLL VENOUS BLD VENIPUNCTURE: CPT

## 2022-07-28 PROCEDURE — 80053 COMPREHEN METABOLIC PANEL: CPT

## 2022-07-29 LAB
ALBUMIN SERPL-MCNC: 4.1 G/DL (ref 3.5–5.2)
ALBUMIN/GLOB SERPL: 1.6 G/DL
ALP SERPL-CCNC: 155 U/L (ref 39–117)
ALT SERPL W P-5'-P-CCNC: 51 U/L (ref 1–41)
ANION GAP SERPL CALCULATED.3IONS-SCNC: 13.7 MMOL/L (ref 5–15)
AST SERPL-CCNC: 66 U/L (ref 1–40)
BILIRUB SERPL-MCNC: 0.5 MG/DL (ref 0–1.2)
BUN SERPL-MCNC: 18 MG/DL (ref 6–20)
BUN/CREAT SERPL: 15.1 (ref 7–25)
CALCIUM SPEC-SCNC: 8.9 MG/DL (ref 8.6–10.5)
CHLORIDE SERPL-SCNC: 95 MMOL/L (ref 98–107)
CO2 SERPL-SCNC: 19.3 MMOL/L (ref 22–29)
CREAT SERPL-MCNC: 1.19 MG/DL (ref 0.76–1.27)
EGFRCR SERPLBLD CKD-EPI 2021: 74.9 ML/MIN/1.73
GLOBULIN UR ELPH-MCNC: 2.6 GM/DL
GLUCOSE SERPL-MCNC: 200 MG/DL (ref 65–99)
POTASSIUM SERPL-SCNC: 4.4 MMOL/L (ref 3.5–5.2)
PROT SERPL-MCNC: 6.7 G/DL (ref 6–8.5)
SODIUM SERPL-SCNC: 128 MMOL/L (ref 136–145)

## 2022-08-11 ENCOUNTER — LAB (OUTPATIENT)
Dept: LAB | Facility: HOSPITAL | Age: 47
End: 2022-08-11

## 2022-08-11 ENCOUNTER — TRANSCRIBE ORDERS (OUTPATIENT)
Dept: LAB | Facility: HOSPITAL | Age: 47
End: 2022-08-11

## 2022-08-11 DIAGNOSIS — Z01.818 PRE-OP TESTING: ICD-10-CM

## 2022-08-11 DIAGNOSIS — Z01.818 PRE-OP TESTING: Primary | ICD-10-CM

## 2022-08-11 PROCEDURE — U0004 COV-19 TEST NON-CDC HGH THRU: HCPCS

## 2022-08-12 LAB — SARS-COV-2 RNA PNL SPEC NAA+PROBE: DETECTED

## 2022-08-31 DIAGNOSIS — I47.1 PAROXYSMAL SVT (SUPRAVENTRICULAR TACHYCARDIA): Primary | ICD-10-CM

## 2022-09-02 ENCOUNTER — PREP FOR SURGERY (OUTPATIENT)
Dept: OTHER | Facility: HOSPITAL | Age: 47
End: 2022-09-02

## 2022-09-02 DIAGNOSIS — I47.1 SVT (SUPRAVENTRICULAR TACHYCARDIA): Primary | ICD-10-CM

## 2022-09-02 RX ORDER — SODIUM CHLORIDE 0.9 % (FLUSH) 0.9 %
10 SYRINGE (ML) INJECTION AS NEEDED
Status: CANCELLED | OUTPATIENT
Start: 2022-09-02

## 2022-09-02 RX ORDER — NITROGLYCERIN 0.4 MG/1
0.4 TABLET SUBLINGUAL
Status: CANCELLED | OUTPATIENT
Start: 2022-09-02

## 2022-09-02 RX ORDER — SODIUM CHLORIDE 9 MG/ML
1 INJECTION, SOLUTION INTRAVENOUS CONTINUOUS
Status: CANCELLED | OUTPATIENT
Start: 2022-09-02 | End: 2022-09-02

## 2022-09-02 RX ORDER — ACETAMINOPHEN 325 MG/1
650 TABLET ORAL EVERY 4 HOURS PRN
Status: CANCELLED | OUTPATIENT
Start: 2022-09-02

## 2022-09-02 RX ORDER — SODIUM CHLORIDE 0.9 % (FLUSH) 0.9 %
3 SYRINGE (ML) INJECTION EVERY 12 HOURS SCHEDULED
Status: CANCELLED | OUTPATIENT
Start: 2022-09-02

## 2022-09-09 ENCOUNTER — HOSPITAL ENCOUNTER (OUTPATIENT)
Facility: HOSPITAL | Age: 47
Discharge: HOME | End: 2022-09-11
Attending: INTERNAL MEDICINE | Admitting: INTERNAL MEDICINE

## 2022-09-09 DIAGNOSIS — I47.1 PAROXYSMAL SVT (SUPRAVENTRICULAR TACHYCARDIA): ICD-10-CM

## 2022-09-09 DIAGNOSIS — I47.1 SVT (SUPRAVENTRICULAR TACHYCARDIA): ICD-10-CM

## 2022-09-09 PROBLEM — I47.10 PAROXYSMAL SVT (SUPRAVENTRICULAR TACHYCARDIA): Status: ACTIVE | Noted: 2022-09-09

## 2022-09-09 LAB
ANION GAP SERPL CALCULATED.3IONS-SCNC: 11 MMOL/L (ref 5–15)
ANION GAP SERPL CALCULATED.3IONS-SCNC: 17 MMOL/L (ref 5–15)
ANION GAP SERPL CALCULATED.3IONS-SCNC: 9 MMOL/L (ref 5–15)
BUN SERPL-MCNC: 11 MG/DL (ref 6–20)
BUN SERPL-MCNC: 12 MG/DL (ref 6–20)
BUN SERPL-MCNC: 13 MG/DL (ref 6–20)
BUN/CREAT SERPL: 8.6 (ref 7–25)
BUN/CREAT SERPL: 8.8 (ref 7–25)
BUN/CREAT SERPL: 9.2 (ref 7–25)
CALCIUM SPEC-SCNC: 7.8 MG/DL (ref 8.6–10.5)
CALCIUM SPEC-SCNC: 8.1 MG/DL (ref 8.6–10.5)
CALCIUM SPEC-SCNC: 8.4 MG/DL (ref 8.6–10.5)
CHLORIDE SERPL-SCNC: 88 MMOL/L (ref 98–107)
CHLORIDE SERPL-SCNC: 90 MMOL/L (ref 98–107)
CHLORIDE SERPL-SCNC: 92 MMOL/L (ref 98–107)
CO2 SERPL-SCNC: 19 MMOL/L (ref 22–29)
CO2 SERPL-SCNC: 24 MMOL/L (ref 22–29)
CO2 SERPL-SCNC: 24 MMOL/L (ref 22–29)
CREAT SERPL-MCNC: 1.2 MG/DL (ref 0.76–1.27)
CREAT SERPL-MCNC: 1.39 MG/DL (ref 0.76–1.27)
CREAT SERPL-MCNC: 1.47 MG/DL (ref 0.76–1.27)
DEPRECATED RDW RBC AUTO: 41.3 FL (ref 37–54)
EGFRCR SERPLBLD CKD-EPI 2021: 58.1 ML/MIN/1.73
EGFRCR SERPLBLD CKD-EPI 2021: 62.1 ML/MIN/1.73
EGFRCR SERPLBLD CKD-EPI 2021: 74.1 ML/MIN/1.73
ERYTHROCYTE [DISTWIDTH] IN BLOOD BY AUTOMATED COUNT: 12.8 % (ref 12.3–15.4)
GLUCOSE BLDC GLUCOMTR-MCNC: 231 MG/DL (ref 70–130)
GLUCOSE BLDC GLUCOMTR-MCNC: 297 MG/DL (ref 70–130)
GLUCOSE SERPL-MCNC: 165 MG/DL (ref 65–99)
GLUCOSE SERPL-MCNC: 179 MG/DL (ref 65–99)
GLUCOSE SERPL-MCNC: 325 MG/DL (ref 65–99)
HBA1C MFR BLD: 7.9 % (ref 4.8–5.6)
HCT VFR BLD AUTO: 43.8 % (ref 37.5–51)
HGB BLD-MCNC: 15.5 G/DL (ref 13–17.7)
MCH RBC QN AUTO: 31.2 PG (ref 26.6–33)
MCHC RBC AUTO-ENTMCNC: 35.4 G/DL (ref 31.5–35.7)
MCV RBC AUTO: 88.1 FL (ref 79–97)
OSMOLALITY SERPL: 323 MOSM/KG (ref 275–295)
OSMOLALITY UR: 651 MOSM/KG (ref 300–1100)
PLATELET # BLD AUTO: 258 10*3/MM3 (ref 140–450)
PMV BLD AUTO: 8.6 FL (ref 6–12)
POTASSIUM SERPL-SCNC: 4.6 MMOL/L (ref 3.5–5.2)
POTASSIUM SERPL-SCNC: 4.9 MMOL/L (ref 3.5–5.2)
POTASSIUM SERPL-SCNC: 5 MMOL/L (ref 3.5–5.2)
RBC # BLD AUTO: 4.97 10*6/MM3 (ref 4.14–5.8)
SODIUM SERPL-SCNC: 123 MMOL/L (ref 136–145)
SODIUM SERPL-SCNC: 124 MMOL/L (ref 136–145)
SODIUM SERPL-SCNC: 127 MMOL/L (ref 136–145)
SODIUM UR-SCNC: 70 MMOL/L
WBC NRBC COR # BLD: 5.71 10*3/MM3 (ref 3.4–10.8)

## 2022-09-09 PROCEDURE — 93653 COMPRE EP EVAL TX SVT: CPT | Performed by: INTERNAL MEDICINE

## 2022-09-09 PROCEDURE — 80048 BASIC METABOLIC PNL TOTAL CA: CPT | Performed by: INTERNAL MEDICINE

## 2022-09-09 PROCEDURE — C1894 INTRO/SHEATH, NON-LASER: HCPCS | Performed by: INTERNAL MEDICINE

## 2022-09-09 PROCEDURE — 99152 MOD SED SAME PHYS/QHP 5/>YRS: CPT | Performed by: INTERNAL MEDICINE

## 2022-09-09 PROCEDURE — 82962 GLUCOSE BLOOD TEST: CPT

## 2022-09-09 PROCEDURE — 99153 MOD SED SAME PHYS/QHP EA: CPT | Performed by: INTERNAL MEDICINE

## 2022-09-09 PROCEDURE — C1730 CATH, EP, 19 OR FEW ELECT: HCPCS | Performed by: INTERNAL MEDICINE

## 2022-09-09 PROCEDURE — 63710000001 INSULIN LISPRO (HUMAN) PER 5 UNITS: Performed by: PHYSICIAN ASSISTANT

## 2022-09-09 PROCEDURE — 80048 BASIC METABOLIC PNL TOTAL CA: CPT | Performed by: NURSE PRACTITIONER

## 2022-09-09 PROCEDURE — 80048 BASIC METABOLIC PNL TOTAL CA: CPT | Performed by: PHYSICIAN ASSISTANT

## 2022-09-09 PROCEDURE — 83930 ASSAY OF BLOOD OSMOLALITY: CPT | Performed by: INTERNAL MEDICINE

## 2022-09-09 PROCEDURE — 85027 COMPLETE CBC AUTOMATED: CPT | Performed by: NURSE PRACTITIONER

## 2022-09-09 PROCEDURE — 84300 ASSAY OF URINE SODIUM: CPT | Performed by: INTERNAL MEDICINE

## 2022-09-09 PROCEDURE — 83935 ASSAY OF URINE OSMOLALITY: CPT | Performed by: INTERNAL MEDICINE

## 2022-09-09 PROCEDURE — C1760 CLOSURE DEV, VASC: HCPCS | Performed by: INTERNAL MEDICINE

## 2022-09-09 PROCEDURE — 25010000002 ONDANSETRON PER 1 MG: Performed by: INTERNAL MEDICINE

## 2022-09-09 PROCEDURE — C1732 CATH, EP, DIAG/ABL, 3D/VECT: HCPCS | Performed by: INTERNAL MEDICINE

## 2022-09-09 PROCEDURE — 25010000002 FENTANYL CITRATE (PF) 50 MCG/ML SOLUTION: Performed by: INTERNAL MEDICINE

## 2022-09-09 PROCEDURE — 25010000002 ONDANSETRON PER 1 MG: Performed by: PHYSICIAN ASSISTANT

## 2022-09-09 PROCEDURE — 83036 HEMOGLOBIN GLYCOSYLATED A1C: CPT | Performed by: NURSE PRACTITIONER

## 2022-09-09 PROCEDURE — 25010000002 MIDAZOLAM PER 1 MG: Performed by: INTERNAL MEDICINE

## 2022-09-09 PROCEDURE — 93623 PRGRMD STIMJ&PACG IV RX NFS: CPT | Performed by: INTERNAL MEDICINE

## 2022-09-09 RX ORDER — ATORVASTATIN CALCIUM 40 MG/1
40 TABLET, FILM COATED ORAL NIGHTLY
Status: DISCONTINUED | OUTPATIENT
Start: 2022-09-09 | End: 2022-09-11 | Stop reason: HOSPADM

## 2022-09-09 RX ORDER — LIDOCAINE HYDROCHLORIDE 5 MG/ML
INJECTION, SOLUTION INFILTRATION; PERINEURAL AS NEEDED
Status: DISCONTINUED | OUTPATIENT
Start: 2022-09-09 | End: 2022-09-09 | Stop reason: HOSPADM

## 2022-09-09 RX ORDER — FENTANYL CITRATE 50 UG/ML
INJECTION, SOLUTION INTRAMUSCULAR; INTRAVENOUS AS NEEDED
Status: DISCONTINUED | OUTPATIENT
Start: 2022-09-09 | End: 2022-09-09 | Stop reason: HOSPADM

## 2022-09-09 RX ORDER — HYDROXYZINE HYDROCHLORIDE 25 MG/1
25 TABLET, FILM COATED ORAL 3 TIMES DAILY PRN
Status: DISCONTINUED | OUTPATIENT
Start: 2022-09-09 | End: 2022-09-11 | Stop reason: HOSPADM

## 2022-09-09 RX ORDER — LEVOTHYROXINE SODIUM 0.12 MG/1
250 TABLET ORAL
Status: DISCONTINUED | OUTPATIENT
Start: 2022-09-10 | End: 2022-09-11 | Stop reason: HOSPADM

## 2022-09-09 RX ORDER — METOPROLOL SUCCINATE 25 MG/1
50 TABLET, EXTENDED RELEASE ORAL DAILY
Status: DISCONTINUED | OUTPATIENT
Start: 2022-09-09 | End: 2022-09-09

## 2022-09-09 RX ORDER — ACETAMINOPHEN 325 MG/1
650 TABLET ORAL EVERY 4 HOURS PRN
Status: DISCONTINUED | OUTPATIENT
Start: 2022-09-09 | End: 2022-09-11 | Stop reason: HOSPADM

## 2022-09-09 RX ORDER — PANTOPRAZOLE SODIUM 40 MG/10ML
40 INJECTION, POWDER, LYOPHILIZED, FOR SOLUTION INTRAVENOUS ONCE
Status: COMPLETED | OUTPATIENT
Start: 2022-09-09 | End: 2022-09-10

## 2022-09-09 RX ORDER — INSULIN LISPRO 100 [IU]/ML
0-7 INJECTION, SOLUTION INTRAVENOUS; SUBCUTANEOUS
Status: DISCONTINUED | OUTPATIENT
Start: 2022-09-09 | End: 2022-09-11 | Stop reason: HOSPADM

## 2022-09-09 RX ORDER — NITROGLYCERIN 0.4 MG/1
0.4 TABLET SUBLINGUAL
Status: DISCONTINUED | OUTPATIENT
Start: 2022-09-09 | End: 2022-09-09 | Stop reason: HOSPADM

## 2022-09-09 RX ORDER — MIDAZOLAM HYDROCHLORIDE 1 MG/ML
INJECTION INTRAMUSCULAR; INTRAVENOUS AS NEEDED
Status: DISCONTINUED | OUTPATIENT
Start: 2022-09-09 | End: 2022-09-09 | Stop reason: HOSPADM

## 2022-09-09 RX ORDER — ACETAMINOPHEN 650 MG/1
650 SUPPOSITORY RECTAL EVERY 4 HOURS PRN
Status: DISCONTINUED | OUTPATIENT
Start: 2022-09-09 | End: 2022-09-11 | Stop reason: HOSPADM

## 2022-09-09 RX ORDER — CETIRIZINE HYDROCHLORIDE 10 MG/1
10 TABLET ORAL DAILY
Status: DISCONTINUED | OUTPATIENT
Start: 2022-09-10 | End: 2022-09-11 | Stop reason: HOSPADM

## 2022-09-09 RX ORDER — NICOTINE POLACRILEX 4 MG
15 LOZENGE BUCCAL
Status: DISCONTINUED | OUTPATIENT
Start: 2022-09-09 | End: 2022-09-11 | Stop reason: HOSPADM

## 2022-09-09 RX ORDER — FLUTICASONE PROPIONATE 50 MCG
2 SPRAY, SUSPENSION (ML) NASAL DAILY
Status: DISCONTINUED | OUTPATIENT
Start: 2022-09-10 | End: 2022-09-11 | Stop reason: HOSPADM

## 2022-09-09 RX ORDER — SODIUM CHLORIDE 0.9 % (FLUSH) 0.9 %
3 SYRINGE (ML) INJECTION EVERY 12 HOURS SCHEDULED
Status: DISCONTINUED | OUTPATIENT
Start: 2022-09-09 | End: 2022-09-09 | Stop reason: HOSPADM

## 2022-09-09 RX ORDER — DEXTROSE MONOHYDRATE 25 G/50ML
25 INJECTION, SOLUTION INTRAVENOUS
Status: DISCONTINUED | OUTPATIENT
Start: 2022-09-09 | End: 2022-09-11 | Stop reason: HOSPADM

## 2022-09-09 RX ORDER — ONDANSETRON 2 MG/ML
4 INJECTION INTRAMUSCULAR; INTRAVENOUS EVERY 6 HOURS PRN
Status: DISCONTINUED | OUTPATIENT
Start: 2022-09-09 | End: 2022-09-09 | Stop reason: SDUPTHER

## 2022-09-09 RX ORDER — SODIUM CHLORIDE 9 MG/ML
INJECTION, SOLUTION INTRAVENOUS CONTINUOUS PRN
Status: DISCONTINUED | OUTPATIENT
Start: 2022-09-09 | End: 2022-09-09 | Stop reason: HOSPADM

## 2022-09-09 RX ORDER — SODIUM CHLORIDE 0.9 % (FLUSH) 0.9 %
10 SYRINGE (ML) INJECTION AS NEEDED
Status: DISCONTINUED | OUTPATIENT
Start: 2022-09-09 | End: 2022-09-09 | Stop reason: HOSPADM

## 2022-09-09 RX ORDER — ESCITALOPRAM OXALATE 10 MG/1
10 TABLET ORAL DAILY
Status: DISCONTINUED | OUTPATIENT
Start: 2022-09-10 | End: 2022-09-11 | Stop reason: HOSPADM

## 2022-09-09 RX ORDER — GLYCOPYRROLATE 1 MG/1
2 TABLET ORAL NIGHTLY
Status: DISCONTINUED | OUTPATIENT
Start: 2022-09-09 | End: 2022-09-11 | Stop reason: HOSPADM

## 2022-09-09 RX ORDER — ONDANSETRON 2 MG/ML
4 INJECTION INTRAMUSCULAR; INTRAVENOUS EVERY 6 HOURS PRN
Status: DISCONTINUED | OUTPATIENT
Start: 2022-09-09 | End: 2022-09-11 | Stop reason: HOSPADM

## 2022-09-09 RX ORDER — ONDANSETRON 2 MG/ML
INJECTION INTRAMUSCULAR; INTRAVENOUS AS NEEDED
Status: DISCONTINUED | OUTPATIENT
Start: 2022-09-09 | End: 2022-09-09 | Stop reason: SDUPTHER

## 2022-09-09 RX ORDER — ACETAMINOPHEN 325 MG/1
650 TABLET ORAL EVERY 4 HOURS PRN
Status: DISCONTINUED | OUTPATIENT
Start: 2022-09-09 | End: 2022-09-09 | Stop reason: SDUPTHER

## 2022-09-09 RX ORDER — SODIUM CHLORIDE 9 MG/ML
1 INJECTION, SOLUTION INTRAVENOUS CONTINUOUS
Status: DISCONTINUED | OUTPATIENT
Start: 2022-09-09 | End: 2022-09-09

## 2022-09-09 RX ADMIN — ONDANSETRON 4 MG: 2 INJECTION INTRAMUSCULAR; INTRAVENOUS at 07:26

## 2022-09-09 RX ADMIN — INSULIN LISPRO 3 UNITS: 100 INJECTION, SOLUTION INTRAVENOUS; SUBCUTANEOUS at 17:45

## 2022-09-09 RX ADMIN — METOPROLOL TARTRATE 25 MG: 25 TABLET, FILM COATED ORAL at 20:16

## 2022-09-09 RX ADMIN — HYDROXYZINE HYDROCHLORIDE 25 MG: 25 TABLET, FILM COATED ORAL at 16:23

## 2022-09-09 RX ADMIN — ATORVASTATIN CALCIUM 40 MG: 40 TABLET, FILM COATED ORAL at 20:16

## 2022-09-09 RX ADMIN — METOPROLOL TARTRATE 25 MG: 25 TABLET, FILM COATED ORAL at 11:29

## 2022-09-09 RX ADMIN — GLYCOPYRROLATE 2 MG: 1 TABLET ORAL at 20:16

## 2022-09-09 RX ADMIN — ONDANSETRON 4 MG: 2 INJECTION INTRAMUSCULAR; INTRAVENOUS at 15:25

## 2022-09-09 NOTE — SIGNIFICANT NOTE
Post procedure, patient is doing well after AVNRT ablation.  However BMP rechecked after receiving normal saline during procedure, creatinine elevated at 1.39 and sodium slightly improved at 127 (from 124 earlier this morning).  Discussed with patient again today on further details regarding his lab work over the past several months.  He was noted to have hyponatremia in July 2022 with a sodium of 128.  He states that he has seen an endocrinologist who only monitors him for his history of thyroid cancer and did not mention anything about his hyponatremia, and has also seen his PCP recently who did not mention any recommendations for his hyponatremia.  He is going to be seeing a new primary care and a new endocrinologist in the next couple of months.  I also clarified with him this afternoon of how long his nausea and vomiting has been going on.  He states he has been nauseous and vomiting the last 5 days.  Prior to this the last time he was having severe nausea and vomiting was when his medications were changed in March 2022.    Will give normal saline bolus now as it does seem that he is dehydrated related to recent vomiting episodes, and recheck BMP in a couple of hours.  Also, concerning his persistent hyponatremia and concern for being lost in follow up with establishing several new physicians in the upcoming months, will consult nephrology today for further assessment evaluation and recommendations whether that be as an outpatient or any of his medications need to be changed. Discussed with pharmacy who states that Lexapro, Lisinopril, and Prilosec could be playing a role. Appreciate nephrology's input. From cardiology standpoint, could be discharged home today if nephrology concurs or stay overnight. Patient and family in agreement with this plan.     Electronically signed by NESHA Koch, 09/09/22, 1:09 PM EDT.

## 2022-09-09 NOTE — H&P
Cardiology H&P     Wayne Guan  1975  [phone]  There is no work phone number on file.    09/09/22    DATE OF ADMISSION: 9/9/2022  [facility] Lyla Culp, APRN  [address] / [place] [address]  Referring Provider: Tai Lisa MD     CC: SVT    Problem List:   1. SVT   a. 1/24/22 Stress Test: No reversible myocardial ischemia noted. Isolated PVCs noted. LVEF 57%. Overall this represents a low risk study.   b. 1/23/22 Echo LVEF 63%, no hemodynamically significant valvular disease   c. 1/23/22 Hospital admission at [facility] for SVT at 214 bpm- successfully terminated with adenosine   2. HTN  3. PVC- noted on Stress test 1/24/22   4. REN  5. DM  6. Hypothyroidism   7. Aortic Aneurysm   8. Thyroid Cancer- s/p thyroidectomy   9. Surgical history  a. Cervical Spine Surgery      History of Present Illness:   Wayne Guan is a 47 year old male who presents for EPS+/- SVT RFA today. He was hospitalized at [facility] in January 2022 for an episode of palpitations and chest pain. He was found to be in SVT and was given adenosine and placed on a diltiazem drip. He underwent stress test and echo which were both normal. Flecainide and diltiazem were added to his medications during that admission, however, he ran out of prescriptions and had been off both flecanide and diltiazem for 3 weeks prior to his office visit with us in March 2022. Unknown to us, after office visit he was placed back on flecanide and diltiazem by his Cardiologist. He did not hold either medication prior to procedure today. He did hold his metoprolol for 5 days. His symptoms consist of nausea and vomiting and overall malaise when he is having his episodes. He has been especially nauseous since stopping his metoprolol. He originally was scheduled for his ablation in August, but ended up having COVID and had to reschedule his procedure for today. He denies any other recent illness, sob, cp,  fevers, chills, open wounds or rashes.       No Known Allergies    Prior to Admission Medications     Prescriptions Last Dose Informant Patient Reported? Taking?    amLODIPine (NORVASC) 5 MG tablet   No No    Take 1 tablet by mouth Daily.    atorvastatin (LIPITOR) 40 MG tablet   No No    Take 1 tablet by mouth Every Night. Indications: High Amount of Fats in the Blood    cetirizine (zyrTEC) 10 MG tablet   Yes No    Take 10 mg by mouth Daily.    dilTIAZem CD (CARDIZEM CD) 180 MG 24 hr capsule   Yes No    Take 180 mg by mouth Daily.    escitalopram (LEXAPRO) 10 MG tablet   No No    Take 1 tablet by mouth Daily.    flecainide (TAMBOCOR) 50 MG tablet   No No    Take 1 tablet by mouth 2 (Two) Times a Day.    fluticasone (FLONASE) 50 MCG/ACT nasal spray   Yes No    2 sprays into the nostril(s) as directed by provider Daily.    glucose blood test strip   Yes No    1 each by Other route As Needed. Use as instructed    glucose monitor monitoring kit   Yes No    1 each As Needed.    glycopyrrolate (ROBINUL) 2 MG tablet   No No    Take 1 tablet by mouth Daily.    Insulin Degludec (TRESIBA SC)   Yes No    Inject  under the skin into the appropriate area as directed.    Insulin Pen Needle (NovoFine Plus Pen Needle) 32G X 4 MM misc   Yes No    1 each Daily.    Lancets (OneTouch Delica Plus [id number]) misc   Yes No    1 each Daily.    lisinopril (PRINIVIL,ZESTRIL) 10 MG tablet   No No    Take 1 tablet by mouth Daily.    metoprolol succinate XL (TOPROL-XL) 50 MG 24 hr tablet   No No    Take 1 tablet by mouth Daily.    omeprazole (priLOSEC) 20 MG capsule   Yes No    Take  by mouth Daily.    Tirosint 125 MCG capsule capsule   Yes No    Take 125 mcg by mouth Daily. Patient takes 2 tablets once a day    Vitamin D, Cholecalciferol, (CHOLECALCIFEROL) 10 MCG (400 UNIT) tablet   Yes No    Take 400 Units by mouth Daily.            Current Facility-Administered Medications:   •  acetaminophen (TYLENOL) tablet 650 mg, 650 mg, Oral, Q4H PRN,  Venice Pederson APRN  •  nitroglycerin (NITROSTAT) SL tablet 0.4 mg, 0.4 mg, Sublingual, Q5 Min PRN, Venice Pederson APRN  •  ondansetron (ZOFRAN) injection 4 mg, 4 mg, Intravenous, Q6H PRN, Kassandra Davenport PA, 4 mg at 22 0726  •  sodium chloride 0.9 % flush 10 mL, 10 mL, Intravenous, PRN, Venice Pederson APRN  •  sodium chloride 0.9 % flush 3 mL, 3 mL, Intravenous, Q12H, Venice Pederson APRN  •  sodium chloride 0.9 % infusion, 1 mL/kg/hr (Order-Specific), Intravenous, Continuous, Venice Pederson APRN, Last Rate: 106 mL/hr at 22 0800, 1 mL/kg/hr at 22 0800    Social History     Socioeconomic History   • Marital status: Single   Tobacco Use   • Smoking status: Former Smoker     Years: 11.00     Quit date: 2008     Years since quittin.1   • Smokeless tobacco: Never Used   Vaping Use   • Vaping Use: Never used   Substance and Sexual Activity   • Alcohol use: Yes     Comment: drinks 2-3 drinks 2-3 times/week.   • Drug use: Never   • Sexual activity: Defer       Family History   Problem Relation Age of Onset   • Arthritis Mother    • Diabetes Mother    • Hyperlipidemia Mother    • Hypertension Mother    • Heart attack Father    • Hyperlipidemia Father    • Hypertension Father    • Diabetes Father    • Alcohol abuse Father    • Stroke Father    • Hypertension Sister    • Heart disease Maternal Uncle    • Hypertension Maternal Uncle    • Mental illness Paternal Aunt    • Heart disease Paternal Aunt    • Diabetes Paternal Aunt    • COPD Paternal Uncle    • Stroke Maternal Grandmother    • Thyroid disease Maternal Grandmother    • COPD Maternal Grandmother    • Hypertension Maternal Grandmother    • Hypertension Maternal Grandfather    • Diabetes Maternal Grandfather    • Cancer Maternal Grandfather    • Heart disease Paternal Grandmother    • Hypertension Paternal Grandmother    • Colon cancer Paternal Grandfather        REVIEW OF SYSTEMS:   CONSTITUTIONAL:      "    No weight loss, fever, chills, weakness or fatigue. +malaise  HEENT:                            No visual loss, blurred vision, double vision, yellow sclerae.                                             No hearing loss, congestion, sore throat.   SKIN:                                No rashes, urticaria, ulcers, sores.     RESPIRATORY:               No shortness of breath, hemoptysis, cough, sputum.   GI:                                     No anorexia, +nausea, vomiting, -diarrhea. No abdominal pain, melena.   :                                   No burning on urination, hematuria or increased frequency.  ENDOCRINE:                   No diaphoresis, cold or heat intolerance. No polyuria or polydipsia.   NEURO:                            No headache, dizziness, syncope, paralysis, ataxia, or parasthesias.                                            No change in bowel or bladder control. No history of CVA/TIA  MUSCULOSKELETAL:    No muscle, back pain, joint pain or stiffness.   HEMATOLOGY:               No anemia, bleeding, bruising. No history of DVT/PE.  PSYCH:                            No history of depression, anxiety    Vitals:    09/09/22 0709 09/09/22 0710 09/09/22 0711   BP: 122/89     BP Location: Left arm     Patient Position: Lying     Pulse:   (!) 121   Resp:   18   Temp:   97.4 °F (36.3 °C)   TempSrc:   Temporal   SpO2:  97% 96%   Weight:   106 kg (234 lb)   Height:   185.4 cm (73\")         Vital Sign Min/Max for last 24 hours  Temp  Min: 97.4 °F (36.3 °C)  Max: 97.4 °F (36.3 °C)   BP  Min: 122/89  Max: 122/89   Pulse  Min: 121  Max: 121   Resp  Min: 18  Max: 18   SpO2  Min: 96 %  Max: 97 %   No data recorded    No intake or output data in the 24 hours ending 09/09/22 0848          Physical Exam:  GEN: Well nourished, Well- developed  No acute distress  HEENT: Normocephalic, Atraumatic, PERRLA, moist mucous membranes  NECK: supple, NO JVD, no thyromegaly, no lymphadenopathy  CARDIAC: S1S2  " Tachycardic, regular rhythm,  no murmur, gallop, rub  LUNGS: Clear to ausculation, normal respiratory effort  ABDOMEN: Soft, nontender, normal bowel sounds  EXTREMITIES:No gross deformities,  No clubbing, cyanosis, or edema  SKIN: Warm, dry  NEURO: No focal deficits  PSYCHIATRIC: Normal affect and mood      I personally viewed and interpreted the patient's EKG/Telemetry/lab data    Data:   Results from last 7 days   Lab Units 09/09/22  0722   WBC 10*3/mm3 5.71   HEMOGLOBIN g/dL 15.5   HEMATOCRIT % 43.8   PLATELETS 10*3/mm3 258     Results from last 7 days   Lab Units 09/09/22  0722   SODIUM mmol/L 124*   POTASSIUM mmol/L 4.6   CHLORIDE mmol/L 88*   CO2 mmol/L 19.0*   BUN mg/dL 11   CREATININE mg/dL 1.20   GLUCOSE mg/dL 179*                                  No intake or output data in the 24 hours ending 09/09/22 0848    Chest X-Ray:  Imaging Results (Last 24 Hours)     ** No results found for the last 24 hours. **          Telemetry: Sinus tachycardia        Assessment and Plan:     1.SVT   - SVT RFA today. He has held his metoprolol as instructed however has been on flecainide and diltiazem prescribed by another physician since we last saw him unknown to us and did not hold prior to procedure, he has been informed of the risks of procedure today and informed that he may have a higher chance of reoccurence of SVT due to not stopping antiarrhythmics.   -recent episode during January 2021  hospitalization at [facility] with EKG showing SVT at 214 bpm   - EKG reviewed and chart     2. HTN acceptable control -follow with Dr. Keen     3. Hyponatremia   - 124 today, last lab work shows he was also low in July 128  -Needs to follow up with PCP, not on any medications that could be causing low sodium    Electronically signed by DELL Powers, 09/09/22, 8:48 AM EDT.    I have read the above note and agree

## 2022-09-10 LAB
ANION GAP SERPL CALCULATED.3IONS-SCNC: 10 MMOL/L (ref 5–15)
BUN SERPL-MCNC: 12 MG/DL (ref 6–20)
BUN/CREAT SERPL: 10 (ref 7–25)
CALCIUM SPEC-SCNC: 8.1 MG/DL (ref 8.6–10.5)
CHLORIDE SERPL-SCNC: 93 MMOL/L (ref 98–107)
CO2 SERPL-SCNC: 27 MMOL/L (ref 22–29)
CREAT SERPL-MCNC: 1.2 MG/DL (ref 0.76–1.27)
EGFRCR SERPLBLD CKD-EPI 2021: 74.1 ML/MIN/1.73
GLUCOSE BLDC GLUCOMTR-MCNC: 128 MG/DL (ref 70–130)
GLUCOSE BLDC GLUCOMTR-MCNC: 148 MG/DL (ref 70–130)
GLUCOSE BLDC GLUCOMTR-MCNC: 173 MG/DL (ref 70–130)
GLUCOSE BLDC GLUCOMTR-MCNC: 64 MG/DL (ref 70–130)
GLUCOSE SERPL-MCNC: 63 MG/DL (ref 65–99)
POTASSIUM SERPL-SCNC: 3.6 MMOL/L (ref 3.5–5.2)
SODIUM SERPL-SCNC: 130 MMOL/L (ref 136–145)
TSH SERPL DL<=0.05 MIU/L-ACNC: 0.1 UIU/ML (ref 0.27–4.2)

## 2022-09-10 PROCEDURE — 80048 BASIC METABOLIC PNL TOTAL CA: CPT | Performed by: INTERNAL MEDICINE

## 2022-09-10 PROCEDURE — 99214 OFFICE O/P EST MOD 30 MIN: CPT | Performed by: INTERNAL MEDICINE

## 2022-09-10 PROCEDURE — 63710000001 INSULIN LISPRO (HUMAN) PER 5 UNITS: Performed by: PHYSICIAN ASSISTANT

## 2022-09-10 PROCEDURE — 82962 GLUCOSE BLOOD TEST: CPT

## 2022-09-10 PROCEDURE — 63710000001 INSULIN DETEMIR PER 5 UNITS: Performed by: INTERNAL MEDICINE

## 2022-09-10 PROCEDURE — 25010000002 ONDANSETRON PER 1 MG: Performed by: INTERNAL MEDICINE

## 2022-09-10 PROCEDURE — 84443 ASSAY THYROID STIM HORMONE: CPT | Performed by: INTERNAL MEDICINE

## 2022-09-10 RX ORDER — METOPROLOL TARTRATE 50 MG/1
50 TABLET, FILM COATED ORAL EVERY 12 HOURS SCHEDULED
Status: DISCONTINUED | OUTPATIENT
Start: 2022-09-10 | End: 2022-09-11 | Stop reason: HOSPADM

## 2022-09-10 RX ADMIN — ATORVASTATIN CALCIUM 40 MG: 40 TABLET, FILM COATED ORAL at 20:53

## 2022-09-10 RX ADMIN — INSULIN LISPRO 2 UNITS: 100 INJECTION, SOLUTION INTRAVENOUS; SUBCUTANEOUS at 13:17

## 2022-09-10 RX ADMIN — INSULIN DETEMIR 15 UNITS: 100 INJECTION, SOLUTION SUBCUTANEOUS at 09:56

## 2022-09-10 RX ADMIN — CETIRIZINE HYDROCHLORIDE 10 MG: 10 TABLET, FILM COATED ORAL at 09:47

## 2022-09-10 RX ADMIN — LEVOTHYROXINE SODIUM 250 MCG: 125 TABLET ORAL at 05:50

## 2022-09-10 RX ADMIN — ESCITALOPRAM OXALATE 10 MG: 10 TABLET ORAL at 09:48

## 2022-09-10 RX ADMIN — ACETAMINOPHEN 650 MG: 325 TABLET, FILM COATED ORAL at 16:44

## 2022-09-10 RX ADMIN — ONDANSETRON 4 MG: 2 INJECTION INTRAMUSCULAR; INTRAVENOUS at 09:47

## 2022-09-10 RX ADMIN — METOPROLOL TARTRATE 25 MG: 25 TABLET, FILM COATED ORAL at 09:47

## 2022-09-10 RX ADMIN — SODIUM CHLORIDE 1000 ML: 9 INJECTION, SOLUTION INTRAVENOUS at 13:15

## 2022-09-10 RX ADMIN — ACETAMINOPHEN 650 MG: 325 TABLET, FILM COATED ORAL at 21:01

## 2022-09-10 RX ADMIN — ONDANSETRON 4 MG: 2 INJECTION INTRAMUSCULAR; INTRAVENOUS at 04:07

## 2022-09-10 RX ADMIN — PANTOPRAZOLE SODIUM 40 MG: 40 INJECTION, POWDER, FOR SOLUTION INTRAVENOUS at 04:07

## 2022-09-10 RX ADMIN — INSULIN LISPRO 2 UNITS: 100 INJECTION, SOLUTION INTRAVENOUS; SUBCUTANEOUS at 09:52

## 2022-09-10 RX ADMIN — HYDROXYZINE HYDROCHLORIDE 25 MG: 25 TABLET, FILM COATED ORAL at 09:56

## 2022-09-10 RX ADMIN — METOPROLOL TARTRATE 50 MG: 50 TABLET, FILM COATED ORAL at 20:53

## 2022-09-10 RX ADMIN — METOPROLOL TARTRATE 25 MG: 25 TABLET, FILM COATED ORAL at 13:16

## 2022-09-10 RX ADMIN — GLYCOPYRROLATE 2 MG: 1 TABLET ORAL at 20:53

## 2022-09-10 NOTE — PROGRESS NOTES
" LOS: 0 days   Patient Care Team:  Lyla Guerrero APRN as PCP - General (Nurse Practitioner)  Myron Jimenez MD (Internal Medicine)    Chief Complaint: Hyponatremia     Subjective     Pending labs this AM. Na 123 yesterday component of dilutional hyponatremia     Subjective    History taken from: patient    Objective     Vital Sign Min/Max for last 24 hours  Temp  Min: 98 °F (36.7 °C)  Max: 98.5 °F (36.9 °C)   BP  Min: 106/80  Max: 145/110   Pulse  Min: 103  Max: 121   Resp  Min: 16  Max: 18   SpO2  Min: 92 %  Max: 97 %   No data recorded   No data recorded     Flowsheet Rows    Flowsheet Row First Filed Value   Admission Height 185.4 cm (73\") Documented at 09/09/2022 0711   Admission Weight 106 kg (234 lb) Documented at 09/09/2022 0711          No intake/output data recorded.  I/O last 3 completed shifts:  In: 950 [I.V.:450; IV Piggyback:500]  Out: -     Objective    Results Review:     I reviewed the patient's new clinical results.    WBC WBC   Date Value Ref Range Status   09/09/2022 5.71 3.40 - 10.80 10*3/mm3 Final      HGB Hemoglobin   Date Value Ref Range Status   09/09/2022 15.5 13.0 - 17.7 g/dL Final      HCT Hematocrit   Date Value Ref Range Status   09/09/2022 43.8 37.5 - 51.0 % Final      Platlets No results found for: LABPLAT   MCV MCV   Date Value Ref Range Status   09/09/2022 88.1 79.0 - 97.0 fL Final          Sodium Sodium   Date Value Ref Range Status   09/09/2022 123 (L) 136 - 145 mmol/L Final   09/09/2022 127 (L) 136 - 145 mmol/L Final   09/09/2022 124 (L) 136 - 145 mmol/L Final      Potassium Potassium   Date Value Ref Range Status   09/09/2022 5.0 3.5 - 5.2 mmol/L Final   09/09/2022 4.9 3.5 - 5.2 mmol/L Final     Comment:     Slight hemolysis detected by analyzer. Results may be affected.   09/09/2022 4.6 3.5 - 5.2 mmol/L Final     Comment:     Slight hemolysis detected by analyzer. Results may be affected.      Chloride Chloride   Date Value Ref Range Status   09/09/2022 90 (L) 98 - " 107 mmol/L Final   09/09/2022 92 (L) 98 - 107 mmol/L Final   09/09/2022 88 (L) 98 - 107 mmol/L Final      CO2 CO2   Date Value Ref Range Status   09/09/2022 24.0 22.0 - 29.0 mmol/L Final   09/09/2022 24.0 22.0 - 29.0 mmol/L Final   09/09/2022 19.0 (L) 22.0 - 29.0 mmol/L Final      BUN BUN   Date Value Ref Range Status   09/09/2022 13 6 - 20 mg/dL Final   09/09/2022 12 6 - 20 mg/dL Final   09/09/2022 11 6 - 20 mg/dL Final      Creatinine Creatinine   Date Value Ref Range Status   09/09/2022 1.47 (H) 0.76 - 1.27 mg/dL Final   09/09/2022 1.39 (H) 0.76 - 1.27 mg/dL Final   09/09/2022 1.20 0.76 - 1.27 mg/dL Final      Calcium Calcium   Date Value Ref Range Status   09/09/2022 7.8 (L) 8.6 - 10.5 mg/dL Final   09/09/2022 8.1 (L) 8.6 - 10.5 mg/dL Final   09/09/2022 8.4 (L) 8.6 - 10.5 mg/dL Final      PO4 No results found for: CAPO4   Albumin No results found for: ALBUMIN   Magnesium No results found for: MG   Uric Acid No results found for: URICACID     Medication Review: Yes    Assessment & Plan       SVT (supraventricular tachycardia) (HCC)    Paroxysmal SVT (supraventricular tachycardia) (HCC)      Assessment & Plan     SURESH: Cr 1.1-1.2mg/dl Worsening renal function cr most recent 1.47. Suspect hemodynamic injury in the setting of SVT and hyperglycemia     Hyponatremia: Hyperosmolar hypovolemia hypernatremia: Component of dilutional hyponatremia. Given hx of low sodium in the past there is a possibility of SIADH    SVT: RVR cardiology following     Recs   Suspect component of hypovolemia. Will give 1 liter bolus NS. F/u with BMP this morning.     James Bar MD  09/10/22  12:44 EDT

## 2022-09-10 NOTE — PLAN OF CARE
Goal Outcome Evaluation:  Plan of Care Reviewed With: patient        Progress: no change  Outcome Evaluation: Pt c/o chest soreness, tylenol given, right groin and right IJ sites drsg cdi, soft, sinus tach on tele, continue to monitor.

## 2022-09-10 NOTE — CONSULTS
Patient Care Team:  Lyla Guerrero APRN as PCP - General (Nurse Practitioner)  Myron Jimenez MD (Internal Medicine)    Chief complaint: Nausea  SVT    Reason for consultation: Hyponatremia     History of Present Illness: Mr Guan is a 48 yo gentleman w hx of chronic hyponatremia, SVT, DM, HTN. He is admitted with persistent nausea and vomiting found to have SVT treated with adenosine and diltezam. Nephrology has been consulted for evaluation hyponatremia. Patient mentioned previously being told about low sodium on routine blood workup with primary care physician in the  Past. He always attributed that to not drinking enough fluids. He couldn't provide specific details. However he mentioned drinking significant amount of water daily. According to him and his wife he drink almost about 1 gallon of water daily. He also takes 800 mg motrin for neck pain. Labs on this admission showed Na lowest at 124 meq/dl other labs remarkable for cr 1.1-1.3mg/dl. Hope any OTC meds. Does take armor energy drink as well.     Review of Systems   Constitutional: Negative.    HENT: Negative.    Eyes: Negative.    Respiratory: Negative.    Cardiovascular: Negative.    Gastrointestinal: Negative.    Genitourinary: Negative.    Musculoskeletal: Negative.    Neurological: Negative.    Hematological: Negative.    Psychiatric/Behavioral: Negative.         Past Medical History:   Diagnosis Date   • Allergies    • Aneurysm (HCC)    • Cancer (HCC)    • Depression    • Diabetes mellitus (HCC)    • GERD (gastroesophageal reflux disease)    • Hyperlipidemia    • Hypertension    ,   Past Surgical History:   Procedure Laterality Date   • CERVICAL DISC SURGERY     • THYROID SURGERY     ,   Family History   Problem Relation Age of Onset   • Arthritis Mother    • Diabetes Mother    • Hyperlipidemia Mother    • Hypertension Mother    • Heart attack Father    • Hyperlipidemia Father    • Hypertension Father    • Diabetes Father    • Alcohol  abuse Father    • Stroke Father    • Hypertension Sister    • Heart disease Maternal Uncle    • Hypertension Maternal Uncle    • Mental illness Paternal Aunt    • Heart disease Paternal Aunt    • Diabetes Paternal Aunt    • COPD Paternal Uncle    • Stroke Maternal Grandmother    • Thyroid disease Maternal Grandmother    • COPD Maternal Grandmother    • Hypertension Maternal Grandmother    • Hypertension Maternal Grandfather    • Diabetes Maternal Grandfather    • Cancer Maternal Grandfather    • Heart disease Paternal Grandmother    • Hypertension Paternal Grandmother    • Colon cancer Paternal Grandfather    ,   Social History     Socioeconomic History   • Marital status: Single   Tobacco Use   • Smoking status: Former Smoker     Years: 11.00     Quit date: 2008     Years since quittin.1   • Smokeless tobacco: Never Used   Vaping Use   • Vaping Use: Never used   Substance and Sexual Activity   • Alcohol use: Yes     Comment: drinks 2-3 drinks 2-3 times/week.   • Drug use: Never   • Sexual activity: Defer     E-cigarette/Vaping   • E-cigarette/Vaping Use Never User      E-cigarette/Vaping Substances   • Nicotine No    • THC No    • CBD No    • Flavoring No      E-cigarette/Vaping Devices   • Disposable No    • Pre-filled or Refillable Cartridge No    • Refillable Tank No    • Pre-filled Pod No         and   Medications Prior to Admission   Medication Sig Dispense Refill Last Dose   • atorvastatin (LIPITOR) 40 MG tablet Take 1 tablet by mouth Every Night. Indications: High Amount of Fats in the Blood 90 tablet 1 2022 at Unknown time   • cetirizine (zyrTEC) 10 MG tablet Take 10 mg by mouth Daily.   Past Week at Unknown time   • dilTIAZem CD (CARDIZEM CD) 180 MG 24 hr capsule Take 180 mg by mouth Daily.   2022 at Unknown time   • escitalopram (LEXAPRO) 10 MG tablet Take 1 tablet by mouth Daily. 90 tablet 1 2022 at Unknown time   • flecainide (TAMBOCOR) 50 MG tablet Take 1 tablet by mouth 2 (Two)  Times a Day. 180 tablet 3 9/8/2022 at Unknown time   • fluticasone (FLONASE) 50 MCG/ACT nasal spray 2 sprays into the nostril(s) as directed by provider Daily.   Past Week at Unknown time   • glycopyrrolate (ROBINUL) 2 MG tablet Take 1 tablet by mouth Daily. 90 tablet 1 9/8/2022 at Unknown time   • Insulin Degludec (TRESIBA SC) Inject 36 Units under the skin into the appropriate area as directed every night at bedtime.   9/8/2022 at Unknown time   • lisinopril (PRINIVIL,ZESTRIL) 10 MG tablet Take 1 tablet by mouth Daily. 90 tablet 3 9/8/2022 at Unknown time   • omeprazole (priLOSEC) 20 MG capsule Take  by mouth Daily.   9/8/2022 at Unknown time   • Tirosint 125 MCG capsule capsule Take 125 mcg by mouth Daily. Patient takes 2 tablets once a day   9/8/2022 at Unknown time   • Vitamin D, Cholecalciferol, (CHOLECALCIFEROL) 10 MCG (400 UNIT) tablet Take 400 Units by mouth Daily.   9/8/2022 at Unknown time   • glucose blood test strip 1 each by Other route As Needed. Use as instructed      • glucose monitor monitoring kit 1 each As Needed.      • Insulin Pen Needle (NovoFine Plus Pen Needle) 32G X 4 MM misc 1 each Daily.      • Lancets (OneTouch Delica Plus [id number]) misc 1 each Daily.      • metoprolol succinate XL (TOPROL-XL) 50 MG 24 hr tablet Take 1 tablet by mouth Daily. 90 tablet 3 9/3/2022       Objective     Vital Signs  Temp:  [97.4 °F (36.3 °C)-98.2 °F (36.8 °C)] 98.2 °F (36.8 °C)  Heart Rate:  [103-134] 108  Resp:  [11-18] 18  BP: (102-145)/() 112/81    No intake/output data recorded.  I/O last 3 completed shifts:  In: 950 [I.V.:450; IV Piggyback:500]  Out: -     Physical Exam  Constitutional:       General: He is not in acute distress.     Appearance: Normal appearance. He is not ill-appearing.   HENT:      Head: Normocephalic and atraumatic.      Nose: Nose normal.      Mouth/Throat:      Mouth: Mucous membranes are moist.      Pharynx: Oropharynx is clear.   Eyes:      Extraocular Movements:  Extraocular movements intact.      Pupils: Pupils are equal, round, and reactive to light.   Cardiovascular:      Rate and Rhythm: Normal rate and regular rhythm.      Pulses: Normal pulses.      Heart sounds: Normal heart sounds.   Pulmonary:      Effort: Pulmonary effort is normal.      Breath sounds: Normal breath sounds.   Abdominal:      General: Abdomen is flat.   Skin:     General: Skin is warm.      Findings: No lesion or rash.   Neurological:      General: No focal deficit present.      Mental Status: He is alert and oriented to person, place, and time. Mental status is at baseline.   Psychiatric:         Mood and Affect: Mood normal.         Results Review:    I reviewed the patient's new clinical results.    WBC WBC   Date Value Ref Range Status   09/09/2022 5.71 3.40 - 10.80 10*3/mm3 Final      HGB Hemoglobin   Date Value Ref Range Status   09/09/2022 15.5 13.0 - 17.7 g/dL Final      HCT Hematocrit   Date Value Ref Range Status   09/09/2022 43.8 37.5 - 51.0 % Final      Platlets No results found for: LABPLAT   MCV MCV   Date Value Ref Range Status   09/09/2022 88.1 79.0 - 97.0 fL Final          Sodium Sodium   Date Value Ref Range Status   09/09/2022 127 (L) 136 - 145 mmol/L Final   09/09/2022 124 (L) 136 - 145 mmol/L Final      Potassium Potassium   Date Value Ref Range Status   09/09/2022 4.9 3.5 - 5.2 mmol/L Final     Comment:     Slight hemolysis detected by analyzer. Results may be affected.   09/09/2022 4.6 3.5 - 5.2 mmol/L Final     Comment:     Slight hemolysis detected by analyzer. Results may be affected.      Chloride Chloride   Date Value Ref Range Status   09/09/2022 92 (L) 98 - 107 mmol/L Final   09/09/2022 88 (L) 98 - 107 mmol/L Final      CO2 CO2   Date Value Ref Range Status   09/09/2022 24.0 22.0 - 29.0 mmol/L Final   09/09/2022 19.0 (L) 22.0 - 29.0 mmol/L Final      BUN BUN   Date Value Ref Range Status   09/09/2022 12 6 - 20 mg/dL Final   09/09/2022 11 6 - 20 mg/dL Final       Creatinine Creatinine   Date Value Ref Range Status   09/09/2022 1.39 (H) 0.76 - 1.27 mg/dL Final   09/09/2022 1.20 0.76 - 1.27 mg/dL Final      Calcium Calcium   Date Value Ref Range Status   09/09/2022 8.1 (L) 8.6 - 10.5 mg/dL Final   09/09/2022 8.4 (L) 8.6 - 10.5 mg/dL Final      PO4 No results found for: CAPO4   Albumin No results found for: ALBUMIN   Magnesium No results found for: MG   Uric Acid No results found for: URICACID         Assessment & Plan       SVT (supraventricular tachycardia) (HCC)    Paroxysmal SVT (supraventricular tachycardia) (HCC)      Assessment & Plan    Hyponatremia: Etiology unspecified. Na 124 on admission. Risk factor for hyponatremia: Excessive water intake, NSAID and ACE I use both interfere with ADH effect on the kidney. Will order urine studies including urine sodium, urine osm, serum osm, TSh, Cortisol. CXR.     Recs  Workup for hyponatremia specifically SIADH. Limit FW intake to 1.2 liter/day. Further recs based upon additional studies.     I discussed the patients findings and my recommendations with patient    James Bar MD  09/09/22  20:02 EDT

## 2022-09-10 NOTE — PROGRESS NOTES
[facility] Cardiology  Inpatient Progress Note      Chief Complaint/Reason for consult:             Subjective  No acute events overnight, does have some palpitations and feeling off with getting up to go to the bathroom.  His heart rate does increase to the 130s with this.    Past medical, surgical, social and family history reviewed in the patient's electronic medical record.    Review of Systems:   Positive for palpitations  Negative for chest pain, nausea, vomiting, fevers, chills       Vital Sign Min/Max for last 24 hours  Temp  Min: 98 °F (36.7 °C)  Max: 98.5 °F (36.9 °C)   BP  Min: 102/88  Max: 145/110   Pulse  Min: 103  Max: 134   Resp  Min: 11  Max: 18   SpO2  Min: 90 %  Max: 100 %   Flow (L/min)  Min: 2  Max: 2      Intake/Output Summary (Last 24 hours) at 9/10/2022 0741  Last data filed at 9/9/2022 1335  Gross per 24 hour   Intake 950 ml   Output --   Net 950 ml           Gen: well developed, lying in bed, comfortable appearing  HEENT: MMM, sclera anicteric, conjunctiva normal  CV: Tachycardia regular rhythm, no murmurs or rubs, right femoral site soft with no tenderness or ecchymosis  Pulm: RA, normal work of breathing, no wheezes, rales, rhonchi  Abd: soft, non-tender, non-distended, +bowel sounds  Ext: normal bulk for age, normal tone, no dependent edema  Neuro: alert, oriented, face symmetrical, moving all extremities well  Psych: normal mood, appropriate affect    Tele: Sinus tachycardia overnight, rates 110s to 130s    Results Review (reviewed the patient's recent labs in the electronic medical record):     EKG: Post ablation ECG pending    Lab Results   Component Value Date    WBC 5.71 09/09/2022    HGB 15.5 09/09/2022    HCT 43.8 09/09/2022    MCV 88.1 09/09/2022     09/09/2022     Lab Results   Component Value Date    GLUCOSE 325 (H) 09/09/2022    CALCIUM 7.8 (L) 09/09/2022     (L) 09/09/2022    K 5.0 09/09/2022    CO2 24.0 09/09/2022    CL 90 (L) 09/09/2022    BUN  13 09/09/2022    CREATININE 1.47 (H) 09/09/2022    EGFRIFNONA 78 01/24/2022    BCR 8.8 09/09/2022    ANIONGAP 9.0 09/09/2022     Problem List:   1. SVT   a. 1/24/22 Stress Test: No reversible myocardial ischemia noted. Isolated PVCs noted. LVEF 57%. Overall this represents a low risk study.   b. 1/23/22 Echo LVEF 63%, no hemodynamically significant valvular disease   c. 1/23/22 Hospital admission at [facility] for SVT at 214 bpm- successfully terminated with adenosine   2. HTN  3. PVC- noted on Stress test 1/24/22   4. REN  5. DM  6. Hypothyroidism   7. Aortic Aneurysm   8. Thyroid Cancer- s/p thyroidectomy   9. Surgical history  a. Cervical Spine Surgery        Assessment     SVT   - SVT RFA 9/9 with Dr. Lisa  - He has held his metoprolol as instructed however had been on flecainide and diltiazem prescribed by another physician since we last saw him unknown to us and did not hold prior to procedure, he has been informed of the risks of procedure today and informed that he may have a higher chance of reoccurence of SVT due to not stopping antiarrhythmics.   - recent episode during January 2021  hospitalization at [facility] with EKG showing SVT at 214 bpm   -Twelve-lead ECG this morning    Sinus tachycardia   - increase dose of beta-blocker   -Last TSH with Moo July 28 0.09 with a free T4 of 1.10     HTN acceptable control -follow with Dr. Keen      Hyponatremia   Elevated creatinine -0.94 in May, 1.19 in July,1.19-> 1.47 since admission   - Nephrology consulted   - 124 today, last lab work shows he was also low in July 128   - Urine osmolality 651, serum osmolality 323    DM 2, uncontrolled with hyperglycemia  Lab Results   Component Value Date    HGBA1C 7.90 (H) 09/09/2022    -Resume long-acting insulin on formulary at lower dose   - Sliding scale insulin      CHERYL Wei MD, MS  9/10/2022  07:41 EDT

## 2022-09-11 VITALS
DIASTOLIC BLOOD PRESSURE: 100 MMHG | BODY MASS INDEX: 31.01 KG/M2 | OXYGEN SATURATION: 93 % | SYSTOLIC BLOOD PRESSURE: 131 MMHG | HEIGHT: 73 IN | HEART RATE: 97 BPM | WEIGHT: 234 LBS | TEMPERATURE: 98.2 F | RESPIRATION RATE: 16 BRPM

## 2022-09-11 LAB
ANION GAP SERPL CALCULATED.3IONS-SCNC: 10 MMOL/L (ref 5–15)
BUN SERPL-MCNC: 9 MG/DL (ref 6–20)
BUN/CREAT SERPL: 7.3 (ref 7–25)
CALCIUM SPEC-SCNC: 8.6 MG/DL (ref 8.6–10.5)
CHLORIDE SERPL-SCNC: 97 MMOL/L (ref 98–107)
CO2 SERPL-SCNC: 26 MMOL/L (ref 22–29)
CREAT SERPL-MCNC: 1.23 MG/DL (ref 0.76–1.27)
EGFRCR SERPLBLD CKD-EPI 2021: 72 ML/MIN/1.73
GLUCOSE BLDC GLUCOMTR-MCNC: 169 MG/DL (ref 70–130)
GLUCOSE BLDC GLUCOMTR-MCNC: 257 MG/DL (ref 70–130)
GLUCOSE SERPL-MCNC: 185 MG/DL (ref 65–99)
POTASSIUM SERPL-SCNC: 4 MMOL/L (ref 3.5–5.2)
SODIUM SERPL-SCNC: 133 MMOL/L (ref 136–145)

## 2022-09-11 PROCEDURE — 80048 BASIC METABOLIC PNL TOTAL CA: CPT | Performed by: INTERNAL MEDICINE

## 2022-09-11 PROCEDURE — 63710000001 INSULIN DETEMIR PER 5 UNITS: Performed by: INTERNAL MEDICINE

## 2022-09-11 PROCEDURE — 99214 OFFICE O/P EST MOD 30 MIN: CPT | Performed by: INTERNAL MEDICINE

## 2022-09-11 PROCEDURE — 63710000001 INSULIN LISPRO (HUMAN) PER 5 UNITS: Performed by: PHYSICIAN ASSISTANT

## 2022-09-11 PROCEDURE — 93010 ELECTROCARDIOGRAM REPORT: CPT | Performed by: INTERNAL MEDICINE

## 2022-09-11 PROCEDURE — 82962 GLUCOSE BLOOD TEST: CPT

## 2022-09-11 PROCEDURE — 93005 ELECTROCARDIOGRAM TRACING: CPT | Performed by: INTERNAL MEDICINE

## 2022-09-11 RX ORDER — ONDANSETRON 4 MG/1
4 TABLET, FILM COATED ORAL EVERY 8 HOURS PRN
Qty: 30 TABLET | Refills: 0 | Status: SHIPPED | OUTPATIENT
Start: 2022-09-11 | End: 2023-03-06

## 2022-09-11 RX ORDER — METOPROLOL SUCCINATE 50 MG/1
50 TABLET, EXTENDED RELEASE ORAL 2 TIMES DAILY
Qty: 60 TABLET | Refills: 5 | Status: SHIPPED | OUTPATIENT
Start: 2022-09-11 | End: 2023-02-10

## 2022-09-11 RX ADMIN — HYDROXYZINE HYDROCHLORIDE 25 MG: 25 TABLET, FILM COATED ORAL at 14:07

## 2022-09-11 RX ADMIN — ACETAMINOPHEN 650 MG: 325 TABLET, FILM COATED ORAL at 04:19

## 2022-09-11 RX ADMIN — CETIRIZINE HYDROCHLORIDE 10 MG: 10 TABLET, FILM COATED ORAL at 08:31

## 2022-09-11 RX ADMIN — FLUTICASONE PROPIONATE 2 SPRAY: 50 SPRAY, METERED NASAL at 08:34

## 2022-09-11 RX ADMIN — HYDROXYZINE HYDROCHLORIDE 25 MG: 25 TABLET, FILM COATED ORAL at 08:31

## 2022-09-11 RX ADMIN — METOPROLOL TARTRATE 50 MG: 50 TABLET, FILM COATED ORAL at 08:32

## 2022-09-11 RX ADMIN — INSULIN LISPRO 4 UNITS: 100 INJECTION, SOLUTION INTRAVENOUS; SUBCUTANEOUS at 08:31

## 2022-09-11 RX ADMIN — LEVOTHYROXINE SODIUM 250 MCG: 125 TABLET ORAL at 05:14

## 2022-09-11 RX ADMIN — ACETAMINOPHEN 650 MG: 325 TABLET, FILM COATED ORAL at 08:32

## 2022-09-11 RX ADMIN — INSULIN DETEMIR 15 UNITS: 100 INJECTION, SOLUTION SUBCUTANEOUS at 08:29

## 2022-09-11 RX ADMIN — ESCITALOPRAM OXALATE 10 MG: 10 TABLET ORAL at 08:31

## 2022-09-11 RX ADMIN — INSULIN LISPRO 2 UNITS: 100 INJECTION, SOLUTION INTRAVENOUS; SUBCUTANEOUS at 13:07

## 2022-09-11 NOTE — PROGRESS NOTES
" LOS: 0 days   Patient Care Team:  Lyla Guerrero APRN as PCP - General (Nurse Practitioner)  Myron Jimenez MD (Internal Medicine)    Chief Complaint: Hyponatremia     Subjective     Pending labs this AM. Na 123 yesterday component of dilutional hyponatremia     Subjective    History taken from: patient    Objective     Vital Sign Min/Max for last 24 hours  Temp  Min: 97.9 °F (36.6 °C)  Max: 98.2 °F (36.8 °C)   BP  Min: 127/99  Max: 133/97   Pulse  Min: 69  Max: 125   Resp  Min: 16  Max: 18   SpO2  Min: 89 %  Max: 99 %   Flow (L/min)  Min: 2  Max: 2   No data recorded     Flowsheet Rows    Flowsheet Row First Filed Value   Admission Height 185.4 cm (73\") Documented at 09/09/2022 0711   Admission Weight 106 kg (234 lb) Documented at 09/09/2022 0711          No intake/output data recorded.  No intake/output data recorded.    Objective:  Vital signs: (most recent): Blood pressure 131/100, pulse 97, temperature 98.2 °F (36.8 °C), temperature source Oral, resp. rate 16, height 185.4 cm (73\"), weight 106 kg (234 lb), SpO2 93 %.              Results Review:     I reviewed the patient's new clinical results.    WBC WBC   Date Value Ref Range Status   09/09/2022 5.71 3.40 - 10.80 10*3/mm3 Final      HGB Hemoglobin   Date Value Ref Range Status   09/09/2022 15.5 13.0 - 17.7 g/dL Final      HCT Hematocrit   Date Value Ref Range Status   09/09/2022 43.8 37.5 - 51.0 % Final      Platlets No results found for: LABPLAT   MCV MCV   Date Value Ref Range Status   09/09/2022 88.1 79.0 - 97.0 fL Final          Sodium Sodium   Date Value Ref Range Status   09/11/2022 133 (L) 136 - 145 mmol/L Final   09/10/2022 130 (L) 136 - 145 mmol/L Final   09/09/2022 123 (L) 136 - 145 mmol/L Final   09/09/2022 127 (L) 136 - 145 mmol/L Final   09/09/2022 124 (L) 136 - 145 mmol/L Final      Potassium Potassium   Date Value Ref Range Status   09/11/2022 4.0 3.5 - 5.2 mmol/L Final     Comment:     Slight hemolysis detected by analyzer. " Results may be affected.   09/10/2022 3.6 3.5 - 5.2 mmol/L Final     Comment:     Slight hemolysis detected by analyzer. Results may be affected.   09/09/2022 5.0 3.5 - 5.2 mmol/L Final   09/09/2022 4.9 3.5 - 5.2 mmol/L Final     Comment:     Slight hemolysis detected by analyzer. Results may be affected.   09/09/2022 4.6 3.5 - 5.2 mmol/L Final     Comment:     Slight hemolysis detected by analyzer. Results may be affected.      Chloride Chloride   Date Value Ref Range Status   09/11/2022 97 (L) 98 - 107 mmol/L Final   09/10/2022 93 (L) 98 - 107 mmol/L Final   09/09/2022 90 (L) 98 - 107 mmol/L Final   09/09/2022 92 (L) 98 - 107 mmol/L Final   09/09/2022 88 (L) 98 - 107 mmol/L Final      CO2 CO2   Date Value Ref Range Status   09/11/2022 26.0 22.0 - 29.0 mmol/L Final   09/10/2022 27.0 22.0 - 29.0 mmol/L Final   09/09/2022 24.0 22.0 - 29.0 mmol/L Final   09/09/2022 24.0 22.0 - 29.0 mmol/L Final   09/09/2022 19.0 (L) 22.0 - 29.0 mmol/L Final      BUN BUN   Date Value Ref Range Status   09/11/2022 9 6 - 20 mg/dL Final   09/10/2022 12 6 - 20 mg/dL Final   09/09/2022 13 6 - 20 mg/dL Final   09/09/2022 12 6 - 20 mg/dL Final   09/09/2022 11 6 - 20 mg/dL Final      Creatinine Creatinine   Date Value Ref Range Status   09/11/2022 1.23 0.76 - 1.27 mg/dL Final   09/10/2022 1.20 0.76 - 1.27 mg/dL Final   09/09/2022 1.47 (H) 0.76 - 1.27 mg/dL Final   09/09/2022 1.39 (H) 0.76 - 1.27 mg/dL Final   09/09/2022 1.20 0.76 - 1.27 mg/dL Final      Calcium Calcium   Date Value Ref Range Status   09/11/2022 8.6 8.6 - 10.5 mg/dL Final   09/10/2022 8.1 (L) 8.6 - 10.5 mg/dL Final   09/09/2022 7.8 (L) 8.6 - 10.5 mg/dL Final   09/09/2022 8.1 (L) 8.6 - 10.5 mg/dL Final   09/09/2022 8.4 (L) 8.6 - 10.5 mg/dL Final      PO4 No results found for: CAPO4   Albumin No results found for: ALBUMIN   Magnesium No results found for: MG   Uric Acid No results found for: URICACID     Medication Review: Yes    Assessment & Plan       SVT (supraventricular  tachycardia) (HCC)    Paroxysmal SVT (supraventricular tachycardia) (HCC)      Assessment & Plan     SURESH: Cr 1.1-1.2mg/dl Worsening renal function cr most recent 1.47. Suspect hemodynamic injury in the setting of SVT and hyperglycemia     Hyponatremia: Hyperosmolar hypovolemia hypernatremia: Component of dilutional hyponatremia. Given hx of low sodium in the past there is a possibility of SIADH    SVT: RVR cardiology following     Recs   Improvement in sodium level and renal function. Stable for discharge from renal standpoint with FW restriction of 1.5 liter need f/u in renal clinic in 2 weeks after discharge     James Bar MD  09/11/22  15:13 EDT

## 2022-09-11 NOTE — DISCHARGE SUMMARY
Date of Discharge:  9/11/2022    Discharge Diagnosis: Supraventricular tachycardia    Presenting Problem/History of Present Illness  Paroxysmal SVT (supraventricular tachycardia) (HCC) [I47.1]  Hyponatremia  History of thyroid cancer on thyroid supplementation    Hospital Course  Wayne Guan is a 47 y.o. male presented to [facility] for EP study and SVT ablation.  Ablation was successfully performed on September 9 with Dr. Lisa.  The patient was noted to have worsening hyponatremia and renal function compared to his baseline and nephrology was consulted.  He was given IV fluids and his ACE inhibitor was held and his labs improved prior to discharge.    Post procedure the patient was also noted to have sustained sinus tachycardia with rates in the 1 teens while lying in bed and increased up to the 130s and 40s with ambulating in the room.  TSH was rechecked which was 0.1.  Given his history of papillary thyroid carcinoma we elected to defer any changes in his thyroid medicine to his endocrinologist.  His dose of beta-blocker was increased some improvement in his tachycardia.  He will discontinue diltiazem and flecainide.    Procedures Performed  EP study and SVT ablation     Consults:   Consults       Date and Time Order Name Status Description    9/9/2022  3:19 PM Inpatient Nephrology Consult              Pertinent Test Results:   Results for orders placed during the hospital encounter of 01/23/22    Adult Transthoracic Echo Limited W/ Cont if Necessary Per Protocol    Interpretation Summary  · Calculated left ventricular EF = 63% Estimated left ventricular EF was in agreement with the calculated left ventricular EF.  · No wall motion abnormalities.  · Left ventricular diastolic function was indeterminate due to E/a fusion. Tissue Doppler velocity was not assessed.  · No hemodynamically significant valvular pathology.  · Mild dilation of the aortic root is present measuring 3.7 cm    Condition on  "Discharge:  good    Physical Exam at Discharge    Vital Signs  /100 (BP Location: Right arm, Patient Position: Lying)   Pulse 107   Temp 98.2 °F (36.8 °C) (Oral)   Resp 16   Ht 185.4 cm (73\")   Wt 106 kg (234 lb)   SpO2 95%   BMI 30.87 kg/m²       Physical Exam:  Gen: well developed, lying in bed, comfortable appearing  HEENT: MMM, sclera anicteric, conjunctiva normal  CV: Tachycardia, regular rhythm, no murmurs or rubs, normal S1, S2.  Right internal jugular and right femoral vein sites were soft with no ecchymosis or tenderness.  Pulm: RA, normal work of breathing, no wheezes, rales, rhonchi  Abd: soft, non-tender, non-distended, +bowel sounds  Ext: normal bulk for age, normal tone, no dependent edema  Neuro: alert, oriented, face symmetrical, moving all extremities well  Psych: normal mood, appropriate affect    Discharge Disposition  Home or Self Care    Discharge Medications     Discharge Medications        New Medications        Instructions Start Date   ondansetron 4 MG tablet  Commonly known as: Zofran   4 mg, Oral, Every 8 Hours PRN             Changes to Medications        Instructions Start Date   metoprolol succinate XL 50 MG 24 hr tablet  Commonly known as: TOPROL-XL  What changed: when to take this   50 mg, Oral, 2 Times Daily             Continue These Medications        Instructions Start Date   atorvastatin 40 MG tablet  Commonly known as: LIPITOR   40 mg, Oral, Nightly      cetirizine 10 MG tablet  Commonly known as: zyrTEC   10 mg, Oral, Daily      escitalopram 10 MG tablet  Commonly known as: LEXAPRO   10 mg, Oral, Daily      fluticasone 50 MCG/ACT nasal spray  Commonly known as: FLONASE   2 sprays, Nasal, Daily      glucose blood test strip   1 each, Other, As Needed, Use as instructed      glucose monitor monitoring kit   1 each, Does not apply, As Needed      glycopyrrolate 2 MG tablet  Commonly known as: ROBINUL   2 mg, Oral, Daily      lisinopril 10 MG tablet  Commonly known as: " PRINIVIL,ZESTRIL   10 mg, Oral, Daily      NovoFine Plus Pen Needle 32G X 4 MM misc  Generic drug: Insulin Pen Needle   1 each, Does not apply, Daily      omeprazole 20 MG capsule  Commonly known as: priLOSEC   Oral, Daily      OneTouch Delica Plus [id number] misc   1 each, Does not apply, Daily      Tirosint 125 MCG capsule capsule  Generic drug: levothyroxine sodium   125 mcg, Oral, Daily, Patient takes 2 tablets once a day      TRESIBA SC   36 Units, Subcutaneous, Every Night at Bedtime      Vitamin D (Cholecalciferol) 10 MCG (400 UNIT) tablet  Commonly known as: CHOLECALCIFEROL   400 Units, Oral, Daily               Discharge Diet: Cardiac    Activity at Discharge: As tolerated    Follow-up Appointments  Future Appointments   Date Time Provider Department [place]   10/19/2022 11:30 AM Lyla Guerrero APRN [facility] JAGUAR [facility]   1/10/2023 10:00 AM Tai Lisa MD E [facility] ETWN [facility]   2/10/2023  2:00 [facility]     Additional Instructions for the Follow-ups that You Need to Schedule       Discharge Follow-up with Specialty: Dr. Lisa; 3 Months   As directed      Specialty: Dr. Lisa    Follow Up: 3 Months    Follow Up Details: s/p SVT ablation [facility]                 Test Results Pending at Discharge  None    Time: I spent  34  minutes on this discharge activity which included: face-to-face encounter with the patient, reviewing the data in the system, coordination of the care with the nursing staff as well as consultants, documentation, and entering orders.       Jose Angel Wei MD  09/11/22  09:05 EDT

## 2022-09-11 NOTE — PLAN OF CARE
Goal Outcome Evaluation:           Progress: improving  Outcome Evaluation: No acute events over night, Pt c/o chest soreness; tylenol given and pain relieved. Right groin and IJ sites with dressings are clean dry and intact. Pt sinus on tele, will continue with plan of care at this time. APRIL, FIDEL, AOx4.

## 2022-09-15 LAB
QT INTERVAL: 356 MS
QTC INTERVAL: 459 MS

## 2022-09-20 ENCOUNTER — OFFICE VISIT (OUTPATIENT)
Dept: INTERNAL MEDICINE | Facility: CLINIC | Age: 47
End: 2022-09-20

## 2022-09-20 VITALS
WEIGHT: 243.2 LBS | SYSTOLIC BLOOD PRESSURE: 115 MMHG | HEART RATE: 74 BPM | BODY MASS INDEX: 32.23 KG/M2 | TEMPERATURE: 97.5 F | DIASTOLIC BLOOD PRESSURE: 80 MMHG | HEIGHT: 73 IN | OXYGEN SATURATION: 96 %

## 2022-09-20 DIAGNOSIS — Z09 HOSPITAL DISCHARGE FOLLOW-UP: Primary | ICD-10-CM

## 2022-09-20 PROBLEM — Z86.010 HISTORY OF COLONIC POLYPS: Status: ACTIVE | Noted: 2017-05-23

## 2022-09-20 PROBLEM — M47.817 LUMBOSACRAL SPONDYLOSIS WITHOUT MYELOPATHY: Status: ACTIVE | Noted: 2018-08-29

## 2022-09-20 PROBLEM — R13.10 DYSPHAGIA: Status: ACTIVE | Noted: 2020-03-13

## 2022-09-20 PROBLEM — Z86.0100 HISTORY OF COLONIC POLYPS: Status: ACTIVE | Noted: 2017-05-23

## 2022-09-20 PROBLEM — E87.29 ALCOHOLIC KETOACIDOSIS: Status: ACTIVE | Noted: 2021-09-26

## 2022-09-20 PROBLEM — M48.02 CERVICAL STENOSIS OF SPINAL CANAL: Status: ACTIVE | Noted: 2018-11-30

## 2022-09-20 PROBLEM — F10.20 ALCOHOLISM (HCC): Status: ACTIVE | Noted: 2021-01-16

## 2022-09-20 PROCEDURE — 99214 OFFICE O/P EST MOD 30 MIN: CPT | Performed by: NURSE PRACTITIONER

## 2022-09-20 PROCEDURE — 1111F DSCHRG MED/CURRENT MED MERGE: CPT | Performed by: NURSE PRACTITIONER

## 2022-09-20 RX ORDER — PEN NEEDLE, DIABETIC 32 GX 1/6"
1 NEEDLE, DISPOSABLE MISCELLANEOUS DAILY
Qty: 50 EACH | Refills: 3 | Status: SHIPPED | OUTPATIENT
Start: 2022-09-20 | End: 2023-02-10

## 2022-09-20 NOTE — PROGRESS NOTES
Transitional Care Follow Up Visit  Subjective     Wayne Guan is a 47 y.o. male who presents for a transitional care management visit.    Within 48 business hours after discharge our office contacted him via telephone to coordinate his care and needs.      I reviewed and discussed the details of that call along with the discharge summary, hospital problems, inpatient lab results, inpatient diagnostic studies, and consultation reports with Wayne.     Current outpatient and discharge medications have been reconciled for the patient.  Reviewed by: DELL Morrison      No flowsheet data found.  Risk for Readmission (LACE) Score: 5 (9/11/2022  6:00 AM)      History of Present Illness   Course During Hospital Stay: The patient presented to [facility] for EP study and SVT ablation.  Ablation was successfully performed on September 9 with Dr. Lisa.  Due to worsening hyponatremia and renal function compared to baseline nephrology was consulted.  The patient was given IV fluids and ACE inhibitor was held. His labs improved prior to discharge.     Post procedure the patient was also noted to have sustained sinus tachycardia with rates in the 110s while lying in bed and increased up to the 140s while ambulating. The beta-blocker dose was increased and he had some improvement in tachycardia. Diltiazem and flecainide was discontinued. TSH was rechecked which was 0.1.  Given his history of papillary thyroid carcinoma that was deferred to his endocrinologist for any medication adjustment.      The following portions of the patient's history were reviewed and updated as appropriate:   He  has a past medical history of Allergies, Aneurysm (HCC), Cancer (HCC), Depression, Diabetes mellitus (HCC), GERD (gastroesophageal reflux disease), Hyperlipidemia, and Hypertension.  He has DM (diabetes mellitus) (HCC) on their pertinent problem list.  He  has a past surgical history that includes Cervical disc surgery; Thyroid  surgery; and Cardiac electrophysiology procedure (N/A, 9/9/2022).  His family history includes Alcohol abuse in his father; Arthritis in his mother; COPD in his maternal grandmother and paternal uncle; Cancer in his maternal grandfather; Colon cancer in his paternal grandfather; Diabetes in his father, maternal grandfather, mother, and paternal aunt; Heart attack in his father; Heart disease in his maternal uncle, paternal aunt, and paternal grandmother; Hyperlipidemia in his father and mother; Hypertension in his father, maternal grandfather, maternal grandmother, maternal uncle, mother, paternal grandmother, and sister; Mental illness in his paternal aunt; Stroke in his father and maternal grandmother; Thyroid disease in his maternal grandmother.  He  reports that he quit smoking about 14 years ago. He quit after 11.00 years of use. He has never used smokeless tobacco. He reports current alcohol use. He reports that he does not use drugs.  Current Outpatient Medications   Medication Sig Dispense Refill   • atorvastatin (LIPITOR) 40 MG tablet Take 1 tablet by mouth Every Night. Indications: High Amount of Fats in the Blood 90 tablet 1   • cetirizine (zyrTEC) 10 MG tablet Take 10 mg by mouth Daily.     • escitalopram (LEXAPRO) 10 MG tablet Take 1 tablet by mouth Daily. 90 tablet 1   • fluticasone (FLONASE) 50 MCG/ACT nasal spray 2 sprays into the nostril(s) as directed by provider Daily.     • glucose blood test strip 1 each by Other route As Needed (as needed 3x a day). Use as instructed 100 each 3   • glucose monitor monitoring kit 1 each As Needed.     • glycopyrrolate (ROBINUL) 2 MG tablet Take 1 tablet by mouth Daily. 90 tablet 1   • Insulin Degludec (TRESIBA SC) Inject 36 Units under the skin into the appropriate area as directed every night at bedtime.     • Insulin Pen Needle (NovoFine Plus Pen Needle) 32G X 4 MM misc 1 each Daily. 50 each 3   • Lancets (OneTouch Delica Plus [id number]) misc 1 each Daily.      • lisinopril (PRINIVIL,ZESTRIL) 10 MG tablet Take 1 tablet by mouth Daily. 90 tablet 3   • metoprolol succinate XL (TOPROL-XL) 50 MG 24 hr tablet Take 1 tablet by mouth 2 (Two) Times a Day. 60 tablet 5   • omeprazole (priLOSEC) 20 MG capsule Take  by mouth Daily.     • ondansetron (Zofran) 4 MG tablet Take 1 tablet by mouth Every 8 (Eight) Hours As Needed for Nausea or Vomiting. 30 tablet 0   • Tirosint 125 MCG capsule capsule Take 125 mcg by mouth Daily. Patient takes 2 tablets once a day     • Vitamin D, Cholecalciferol, (CHOLECALCIFEROL) 10 MCG (400 UNIT) tablet Take 400 Units by mouth Daily.       No current facility-administered medications for this visit.     Current Outpatient Medications on File Prior to Visit   Medication Sig   • atorvastatin (LIPITOR) 40 MG tablet Take 1 tablet by mouth Every Night. Indications: High Amount of Fats in the Blood   • cetirizine (zyrTEC) 10 MG tablet Take 10 mg by mouth Daily.   • escitalopram (LEXAPRO) 10 MG tablet Take 1 tablet by mouth Daily.   • fluticasone (FLONASE) 50 MCG/ACT nasal spray 2 sprays into the nostril(s) as directed by provider Daily.   • glucose monitor monitoring kit 1 each As Needed.   • glycopyrrolate (ROBINUL) 2 MG tablet Take 1 tablet by mouth Daily.   • Insulin Degludec (TRESIBA SC) Inject 36 Units under the skin into the appropriate area as directed every night at bedtime.   • Lancets (OneTouch Delica Plus [id number]) misc 1 each Daily.   • lisinopril (PRINIVIL,ZESTRIL) 10 MG tablet Take 1 tablet by mouth Daily.   • metoprolol succinate XL (TOPROL-XL) 50 MG 24 hr tablet Take 1 tablet by mouth 2 (Two) Times a Day.   • omeprazole (priLOSEC) 20 MG capsule Take  by mouth Daily.   • ondansetron (Zofran) 4 MG tablet Take 1 tablet by mouth Every 8 (Eight) Hours As Needed for Nausea or Vomiting.   • Tirosint 125 MCG capsule capsule Take 125 mcg by mouth Daily. Patient takes 2 tablets once a day   • Vitamin D, Cholecalciferol, (CHOLECALCIFEROL) 10 MCG (400  UNIT) tablet Take 400 Units by mouth Daily.   • [DISCONTINUED] glucose blood test strip 1 each by Other route As Needed. Use as instructed   • [DISCONTINUED] Insulin Pen Needle (NovoFine Plus Pen Needle) 32G X 4 MM misc 1 each Daily.     No current facility-administered medications on file prior to visit.     He has No Known Allergies..    Review of Systems   Respiratory: Negative for cough and shortness of breath.    Cardiovascular: Negative for chest pain.   All other systems reviewed and are negative.      Objective   Physical Exam  Vitals reviewed.   Constitutional:       General: He is not in acute distress.  HENT:      Head: Normocephalic and atraumatic.   Cardiovascular:      Rate and Rhythm: Normal rate and regular rhythm.   Pulmonary:      Effort: Pulmonary effort is normal.      Breath sounds: Normal breath sounds. No wheezing, rhonchi or rales.   Musculoskeletal:      Right lower leg: No edema.      Left lower leg: No edema.   Lymphadenopathy:      Cervical: No cervical adenopathy.   Skin:     General: Skin is warm and dry.   Neurological:      General: No focal deficit present.      Mental Status: He is alert.   Psychiatric:         Thought Content: Thought content normal.         Assessment & Plan   Problems Addressed this Visit    None     Visit Diagnoses     Hospital discharge follow-up    -  Primary    The pt is doing well with no complaints and v.s. stable.      Diagnoses       Codes Comments    Hospital discharge follow-up    -  Primary ICD-10-CM: Z09  ICD-9-CM: V67.59 The pt is doing well with no complaints and v.s. stable.

## 2022-09-28 ENCOUNTER — TELEPHONE (OUTPATIENT)
Dept: INTERNAL MEDICINE | Facility: CLINIC | Age: 47
End: 2022-09-28

## 2022-09-28 RX ORDER — BLOOD-GLUCOSE METER
1 KIT MISCELLANEOUS AS NEEDED
Qty: 1 EACH | Refills: 0 | Status: SHIPPED | OUTPATIENT
Start: 2022-09-28 | End: 2022-12-12

## 2022-09-28 RX ORDER — LANCETS
1 EACH MISCELLANEOUS DAILY
Qty: 102 EACH | Refills: 12 | Status: SHIPPED | OUTPATIENT
Start: 2022-09-28 | End: 2023-02-10

## 2022-09-28 NOTE — TELEPHONE ENCOUNTER
Caller: Wayne Guan    Relationship to patient: Self    Best call back number: [phone]    Patient is needing: PATIENT CALLED TO REPORT THAT INSURANCE DENIED HIS RECENT BRAND OF MEDICATION.  INSURANCE IS REQUESTING THAT THE BRAND ACCU-CHECK BE USED. PATIENT NEEDS LANCETS, METER, AND TEST STRIPS.

## 2022-10-07 ENCOUNTER — TELEPHONE (OUTPATIENT)
Dept: INTERNAL MEDICINE | Facility: CLINIC | Age: 47
End: 2022-10-07

## 2022-10-07 NOTE — TELEPHONE ENCOUNTER
The patients blood sugar after having a small breakfast was around 260. And he says he is about to have lunch and he says it will be around 400 for the rest of the day. He says it never comes back down. And he has been feeling lightheaded. And he says that's with the slow acting insulin adjusted dose. This has been occurring for about the last three days at least.

## 2022-10-07 NOTE — TELEPHONE ENCOUNTER
I called the patient and let him know. He said he will do this and let us know on Monday how his sugars were running.

## 2022-10-19 ENCOUNTER — OFFICE VISIT (OUTPATIENT)
Dept: INTERNAL MEDICINE | Facility: CLINIC | Age: 47
End: 2022-10-19

## 2022-10-19 VITALS
BODY MASS INDEX: 33.9 KG/M2 | HEART RATE: 80 BPM | OXYGEN SATURATION: 97 % | TEMPERATURE: 97.2 F | WEIGHT: 255.8 LBS | HEIGHT: 73 IN | SYSTOLIC BLOOD PRESSURE: 117 MMHG | DIASTOLIC BLOOD PRESSURE: 70 MMHG

## 2022-10-19 DIAGNOSIS — I10 ESSENTIAL HYPERTENSION: Chronic | ICD-10-CM

## 2022-10-19 DIAGNOSIS — E11.65 TYPE 2 DIABETES MELLITUS WITH HYPERGLYCEMIA, UNSPECIFIED WHETHER LONG TERM INSULIN USE: Chronic | ICD-10-CM

## 2022-10-19 DIAGNOSIS — K58.8 OTHER IRRITABLE BOWEL SYNDROME: Chronic | ICD-10-CM

## 2022-10-19 DIAGNOSIS — E78.5 HYPERLIPIDEMIA, UNSPECIFIED HYPERLIPIDEMIA TYPE: Chronic | ICD-10-CM

## 2022-10-19 DIAGNOSIS — Z00.00 ANNUAL PHYSICAL EXAM: Primary | ICD-10-CM

## 2022-10-19 DIAGNOSIS — F41.9 ANXIETY: Chronic | ICD-10-CM

## 2022-10-19 DIAGNOSIS — Z23 NEED FOR PNEUMOCOCCAL VACCINE: ICD-10-CM

## 2022-10-19 PROBLEM — Z80.0 FAMILY HISTORY OF MALIGNANT NEOPLASM OF GASTROINTESTINAL TRACT: Status: ACTIVE | Noted: 2017-05-23

## 2022-10-19 PROBLEM — R00.2 PALPITATIONS: Status: ACTIVE | Noted: 2018-11-14

## 2022-10-19 PROCEDURE — 99396 PREV VISIT EST AGE 40-64: CPT | Performed by: NURSE PRACTITIONER

## 2022-10-19 PROCEDURE — 90471 IMMUNIZATION ADMIN: CPT | Performed by: NURSE PRACTITIONER

## 2022-10-19 PROCEDURE — 90677 PCV20 VACCINE IM: CPT | Performed by: NURSE PRACTITIONER

## 2022-10-19 RX ORDER — FLASH GLUCOSE SCANNING READER
1 EACH MISCELLANEOUS DAILY
Qty: 2 EACH | Refills: 5 | Status: SHIPPED | OUTPATIENT
Start: 2022-10-19 | End: 2023-02-10

## 2022-10-19 RX ORDER — DAPAGLIFLOZIN 5 MG/1
5 TABLET, FILM COATED ORAL DAILY
Qty: 90 TABLET | Refills: 0 | Status: SHIPPED | OUTPATIENT
Start: 2022-10-19 | End: 2023-02-10

## 2022-10-19 RX ORDER — FLASH GLUCOSE SENSOR
1 KIT MISCELLANEOUS DAILY
Qty: 2 EACH | Refills: 5 | Status: SHIPPED | OUTPATIENT
Start: 2022-10-19 | End: 2023-02-10

## 2022-10-19 NOTE — PROGRESS NOTES
Chief Complaint  Diabetes and Follow-up (3 month follow up. He would like to discuss diabetes. He says his blood sugar had been very high. )  Subjective        History of Present Illness  Wayne Guan presents to [facility] for:    1.   His annual physical exam.     2.   Follow-up of diabetes type 2, hypertension, hyperlipidemia, anxiety and irritable bowel syndrome.   Negative for headaches or dizziness. Endocrinology only following for thyroid cancer. Cardiologist is Dr. Keen who is monitoring AAA. Patient had cardiac ablation done.    Current Outpatient Medications:   •  Accu-Chek FastClix Lancets misc, 1 each Daily., Disp: 102 each, Rfl: 12  •  atorvastatin (LIPITOR) 40 MG tablet, Take 1 tablet by mouth Every Night. Indications: High Amount of Fats in the Blood, Disp: 90 tablet, Rfl: 1  •  cetirizine (zyrTEC) 10 MG tablet, Take 10 mg by mouth Daily., Disp: , Rfl:   •  escitalopram (LEXAPRO) 10 MG tablet, Take 1 tablet by mouth Daily., Disp: 90 tablet, Rfl: 1  •  fluticasone (FLONASE) 50 MCG/ACT nasal spray, 2 sprays into the nostril(s) as directed by provider Daily., Disp: , Rfl:   •  glucose blood test strip, 1 each by Other route As Needed (as needed 3x a day). Use as instructed, Disp: 100 each, Rfl: 3  •  glucose blood test strip, Use as instructed, Disp: 100 each, Rfl: 12  •  glucose monitor monitoring kit, 1 each As Needed., Disp: , Rfl:   •  glucose monitor monitoring kit, 1 each As Needed (check blood sugar daily)., Disp: 1 each, Rfl: 0  •  glycopyrrolate (ROBINUL) 2 MG tablet, Take 1 tablet by mouth Daily., Disp: 90 tablet, Rfl: 1  •  Insulin Degludec (TRESIBA SC), Inject 36 Units under the skin into the appropriate area as directed every night at bedtime., Disp: , Rfl:   •  Insulin Pen Needle (NovoFine Plus Pen Needle) 32G X 4 MM misc, 1 each Daily., Disp: 50 each, Rfl: 3  •  Lancets (OneTouch Delica Plus [id number]) misc, 1 each Daily., Disp: , Rfl:   •   "lisinopril (PRINIVIL,ZESTRIL) 10 MG tablet, Take 1 tablet by mouth Daily., Disp: 90 tablet, Rfl: 3  •  metoprolol succinate XL (TOPROL-XL) 50 MG 24 hr tablet, Take 1 tablet by mouth 2 (Two) Times a Day., Disp: 60 tablet, Rfl: 5  •  omeprazole (priLOSEC) 20 MG capsule, Take  by mouth Daily., Disp: , Rfl:   •  ondansetron (Zofran) 4 MG tablet, Take 1 tablet by mouth Every 8 (Eight) Hours As Needed for Nausea or Vomiting., Disp: 30 tablet, Rfl: 0  •  Tirosint 125 MCG capsule capsule, Take 125 mcg by mouth Daily. Patient takes 2 tablets once a day, Disp: , Rfl:   •  Vitamin D, Cholecalciferol, (CHOLECALCIFEROL) 10 MCG (400 UNIT) tablet, Take 400 Units by mouth Daily., Disp: , Rfl:   •  dapagliflozin (Farxiga) 5 MG tablet tablet, Take 1 tablet by mouth Daily., Disp: 90 tablet, Rfl: 0  No Known Allergies   Past Medical History:   Diagnosis Date   • Allergies    • Aneurysm (HCC)    • Cancer (HCC)    • Depression    • Diabetes mellitus (HCC)    • GERD (gastroesophageal reflux disease)    • Hyperlipidemia    • Hypertension       Past Surgical History:   Procedure Laterality Date   • CARDIAC ELECTROPHYSIOLOGY PROCEDURE N/A 9/9/2022    Procedure: Ablation SVT HOLD Metoprolol 5 days;  Surgeon: Tai Lisa MD;  Location: [facility] INVASIVE LOCATION;  Service: Cardiovascular;  Laterality: N/A;   • CERVICAL DISC SURGERY     • THYROID SURGERY        Objective   /70 (BP Location: Right arm, Patient Position: Sitting, Cuff Size: Large Adult)   Pulse 80   Temp 97.2 °F (36.2 °C) (Temporal)   Ht 185.4 cm (73\")   Wt 116 kg (255 lb 12.8 oz)   SpO2 97%   BMI 33.75 kg/m²    Estimated body mass index is 33.75 kg/m² as calculated from the following:    Height as of this encounter: 185.4 cm (73\").    Weight as of this encounter: 116 kg (255 lb 12.8 oz).   Physical Exam  Vitals reviewed.   Constitutional:       General: He is not in acute distress.     Appearance: Normal appearance.   HENT:      Head: Normocephalic and " atraumatic.      Right Ear: Tympanic membrane and ear canal normal.      Left Ear: Tympanic membrane and ear canal normal.   Eyes:      Conjunctiva/sclera: Conjunctivae normal.   Cardiovascular:      Rate and Rhythm: Normal rate and regular rhythm.      Heart sounds: Normal heart sounds. No murmur heard.  Pulmonary:      Effort: Pulmonary effort is normal.      Breath sounds: Normal breath sounds. No wheezing, rhonchi or rales.   Abdominal:      General: There is no distension.      Palpations: Abdomen is soft. There is no mass.      Tenderness: There is no abdominal tenderness.   Musculoskeletal:      Right lower leg: No edema.      Left lower leg: No edema.   Lymphadenopathy:      Cervical: No cervical adenopathy.   Skin:     General: Skin is warm and dry.      Coloration: Skin is not jaundiced or pale.   Neurological:      General: No focal deficit present.      Mental Status: He is alert.   Psychiatric:         Mood and Affect: Mood normal.         Thought Content: Thought content normal.        Result Review :   The following data was reviewed by: DELL Morrison on 10/19/2022:  CMP    CMP 9/9/22 9/9/22 9/9/22 9/10/22 9/11/22    0722 1106 1933     Glucose 179 (A) 165 (A) 325 (A) 63 (A) 185 (A)   BUN 11 12 13 12 9   Creatinine 1.20 1.39 (A) 1.47 (A) 1.20 1.23   Sodium 124 (A) 127 (A) 123 (A) 130 (A) 133 (A)   Potassium 4.6 4.9 5.0 3.6 4.0   Chloride 88 (A) 92 (A) 90 (A) 93 (A) 97 (A)   Calcium 8.4 (A) 8.1 (A) 7.8 (A) 8.1 (A) 8.6   (A) Abnormal value       Comments are available for some flowsheets but are not being displayed.           CBC w/diff    CBC w/Diff 4/20/22 5/12/22 9/9/22   WBC 3.50 (A) 3.51 (A) 5.71   RBC 4.28 (A) 4.71 4.97   Hemoglobin 13.6 14.6 15.5   Hematocrit 42.5 46.9 43.8   MCV 99.3 99.6 88.1   MCH 31.8 31.0 31.2   MCHC 32.0 31.1 35.4   RDW 13.1 13.1 12.8   Platelets 327 293 258   Neutrophil Rel % 50.5 47.7    Immature Granulocyte Rel % 0.3 0.3    Lymphocyte Rel % 32.6 39.3    Monocyte  Rel % 12.9 8.5    Eosinophil Rel % 2.3 3.1    Basophil Rel % 1.4 1.1    (A) Abnormal value                TSH    TSH 4/20/22 5/12/22 9/10/22   TSH 0.032 (A) 37.000 (A) 0.100 (A)   (A) Abnormal value            BMP    BMP 9/9/22 9/9/22 9/9/22 9/10/22 9/11/22    0722 1106 1933     BUN 11 12 13 12 9   Creatinine 1.20 1.39 (A) 1.47 (A) 1.20 1.23   Sodium 124 (A) 127 (A) 123 (A) 130 (A) 133 (A)   Potassium 4.6 4.9 5.0 3.6 4.0   Chloride 88 (A) 92 (A) 90 (A) 93 (A) 97 (A)   CO2 19.0 (A) 24.0 24.0 27.0 26.0   Calcium 8.4 (A) 8.1 (A) 7.8 (A) 8.1 (A) 8.6   (A) Abnormal value       Comments are available for some flowsheets but are not being displayed.           A1C Last 3 Results    HGBA1C Last 3 Results 4/20/22 9/9/22   Hemoglobin A1C 11.6 (A) 7.90 (A)   (A) Abnormal value            Microalbumin    Microalbumin 4/20/22   Microalbumin, Urine <1.2           PSA    PSA 4/20/22   PSA 0.46      Comments are available for some flowsheets but are not being displayed.                       Assessment and Plan    Diagnoses and all orders for this visit:    1. Annual physical exam (Primary)  Comments:  Discussed healthy diet, exercise, adequate sleep, cancer screening, immunizations, and preventative care. Annual eye exam and twice yearly dental cleaning.  Orders:  -     Comprehensive Metabolic Panel; Future  -     CBC & Differential; Future  -     Lipid Panel; Future  -     Hemoglobin A1c; Future    2. Type 2 diabetes mellitus with hyperglycemia, unspecified whether long term insulin use (HCC)  Comments:  Continue Tresiba 36 units nightly. Start Farxiga 5 mg once a day. Discussed CGM and Cooper 2 sample given.   Orders:  -     Comprehensive Metabolic Panel; Future  -     CBC & Differential; Future  -     Lipid Panel; Future  -     Hemoglobin A1c; Future  -     Ambulatory Referral to Podiatry    3. Essential hypertension  Comments:  Blood pressure well controlled on lisinopril 10 mg daily and metoprolol XL 50 mg twice daily.   Continue current treatment plan.  Orders:  -     Comprehensive Metabolic Panel; Future  -     CBC & Differential; Future  -     Lipid Panel; Future  -     Hemoglobin A1c; Future    4. Hyperlipidemia, unspecified hyperlipidemia type  Comments:  Atorvastatin 40 mg daily  Orders:  -     Comprehensive Metabolic Panel; Future  -     CBC & Differential; Future  -     Lipid Panel; Future  -     Hemoglobin A1c; Future    5. Anxiety  Comments:  Stable on Lexapro 10 mg daily to continue.    6. Other irritable bowel syndrome  Comments:  Stable on Robinul 2 mg daily.  Continue current treatment plan.    Other orders  -     dapagliflozin (Farxiga) 5 MG tablet tablet; Take 1 tablet by mouth Daily.  Dispense: 90 tablet; Refill: 0        Follow Up     Patient was given instructions and counseling regarding his condition or for health maintenance advice. Please see specific information pulled into the AVS if appropriate.   Return in about 3 months (around 1/19/2023).    DELL Morrison

## 2022-10-21 RX ORDER — DAPAGLIFLOZIN 5 MG/1
5 TABLET, FILM COATED ORAL DAILY
Qty: 90 TABLET | Refills: 0 | OUTPATIENT
Start: 2022-10-21

## 2022-11-02 ENCOUNTER — HOSPITAL ENCOUNTER (EMERGENCY)
Facility: HOSPITAL | Age: 47
Discharge: HOME | End: 2022-11-02
Attending: EMERGENCY MEDICINE | Admitting: EMERGENCY MEDICINE

## 2022-11-02 VITALS
OXYGEN SATURATION: 98 % | DIASTOLIC BLOOD PRESSURE: 90 MMHG | HEIGHT: 73 IN | WEIGHT: 259.7 LBS | TEMPERATURE: 98 F | RESPIRATION RATE: 20 BRPM | SYSTOLIC BLOOD PRESSURE: 120 MMHG | BODY MASS INDEX: 34.42 KG/M2 | HEART RATE: 64 BPM

## 2022-11-02 DIAGNOSIS — Z79.4 TYPE 2 DIABETES MELLITUS WITH HYPERGLYCEMIA, WITH LONG-TERM CURRENT USE OF INSULIN: Primary | ICD-10-CM

## 2022-11-02 DIAGNOSIS — E11.65 TYPE 2 DIABETES MELLITUS WITH HYPERGLYCEMIA, WITH LONG-TERM CURRENT USE OF INSULIN: Primary | ICD-10-CM

## 2022-11-02 LAB
ALBUMIN SERPL-MCNC: 4.3 G/DL (ref 3.5–5.2)
ALBUMIN/GLOB SERPL: 1.7 G/DL
ALP SERPL-CCNC: 84 U/L (ref 39–117)
ALT SERPL W P-5'-P-CCNC: 17 U/L (ref 1–41)
ANION GAP SERPL CALCULATED.3IONS-SCNC: 10.5 MMOL/L (ref 5–15)
AST SERPL-CCNC: 17 U/L (ref 1–40)
BASOPHILS # BLD AUTO: 0.05 10*3/MM3 (ref 0–0.2)
BASOPHILS NFR BLD AUTO: 0.8 % (ref 0–1.5)
BILIRUB SERPL-MCNC: 0.3 MG/DL (ref 0–1.2)
BILIRUB UR QL STRIP: NEGATIVE
BUN SERPL-MCNC: 12 MG/DL (ref 6–20)
BUN/CREAT SERPL: 10.3 (ref 7–25)
CALCIUM SPEC-SCNC: 9.3 MG/DL (ref 8.6–10.5)
CHLORIDE SERPL-SCNC: 95 MMOL/L (ref 98–107)
CLARITY UR: CLEAR
CO2 SERPL-SCNC: 25.5 MMOL/L (ref 22–29)
COLOR UR: YELLOW
CREAT SERPL-MCNC: 1.16 MG/DL (ref 0.76–1.27)
DEPRECATED RDW RBC AUTO: 40.2 FL (ref 37–54)
EGFRCR SERPLBLD CKD-EPI 2021: 77.2 ML/MIN/1.73
EOSINOPHIL # BLD AUTO: 0.19 10*3/MM3 (ref 0–0.4)
EOSINOPHIL NFR BLD AUTO: 3 % (ref 0.3–6.2)
ERYTHROCYTE [DISTWIDTH] IN BLOOD BY AUTOMATED COUNT: 12.3 % (ref 12.3–15.4)
GLOBULIN UR ELPH-MCNC: 2.6 GM/DL
GLUCOSE BLDC GLUCOMTR-MCNC: 177 MG/DL (ref 70–99)
GLUCOSE BLDC GLUCOMTR-MCNC: 259 MG/DL (ref 70–99)
GLUCOSE BLDC GLUCOMTR-MCNC: 285 MG/DL (ref 70–99)
GLUCOSE SERPL-MCNC: 301 MG/DL (ref 65–99)
GLUCOSE UR STRIP-MCNC: ABNORMAL MG/DL
HCT VFR BLD AUTO: 34.7 % (ref 37.5–51)
HGB BLD-MCNC: 12.3 G/DL (ref 13–17.7)
HGB UR QL STRIP.AUTO: NEGATIVE
HOLD SPECIMEN: NORMAL
HOLD SPECIMEN: NORMAL
IMM GRANULOCYTES # BLD AUTO: 0.03 10*3/MM3 (ref 0–0.05)
IMM GRANULOCYTES NFR BLD AUTO: 0.5 % (ref 0–0.5)
KETONES UR QL STRIP: NEGATIVE
LEUKOCYTE ESTERASE UR QL STRIP.AUTO: NEGATIVE
LYMPHOCYTES # BLD AUTO: 2.21 10*3/MM3 (ref 0.7–3.1)
LYMPHOCYTES NFR BLD AUTO: 35 % (ref 19.6–45.3)
MCH RBC QN AUTO: 31.9 PG (ref 26.6–33)
MCHC RBC AUTO-ENTMCNC: 35.4 G/DL (ref 31.5–35.7)
MCV RBC AUTO: 89.9 FL (ref 79–97)
MONOCYTES # BLD AUTO: 0.72 10*3/MM3 (ref 0.1–0.9)
MONOCYTES NFR BLD AUTO: 11.4 % (ref 5–12)
NEUTROPHILS NFR BLD AUTO: 3.11 10*3/MM3 (ref 1.7–7)
NEUTROPHILS NFR BLD AUTO: 49.3 % (ref 42.7–76)
NITRITE UR QL STRIP: NEGATIVE
NRBC BLD AUTO-RTO: 0 /100 WBC (ref 0–0.2)
PH UR STRIP.AUTO: <=5 [PH] (ref 5–8)
PLATELET # BLD AUTO: 278 10*3/MM3 (ref 140–450)
PMV BLD AUTO: 8.4 FL (ref 6–12)
POTASSIUM SERPL-SCNC: 4.9 MMOL/L (ref 3.5–5.2)
PROT SERPL-MCNC: 6.9 G/DL (ref 6–8.5)
PROT UR QL STRIP: NEGATIVE
RBC # BLD AUTO: 3.86 10*6/MM3 (ref 4.14–5.8)
SODIUM SERPL-SCNC: 131 MMOL/L (ref 136–145)
SP GR UR STRIP: 1.01 (ref 1–1.03)
UROBILINOGEN UR QL STRIP: ABNORMAL
WBC NRBC COR # BLD: 6.31 10*3/MM3 (ref 3.4–10.8)
WHOLE BLOOD HOLD COAG: NORMAL
WHOLE BLOOD HOLD SPECIMEN: NORMAL

## 2022-11-02 PROCEDURE — 85025 COMPLETE CBC W/AUTO DIFF WBC: CPT

## 2022-11-02 PROCEDURE — 82962 GLUCOSE BLOOD TEST: CPT

## 2022-11-02 PROCEDURE — 93005 ELECTROCARDIOGRAM TRACING: CPT | Performed by: EMERGENCY MEDICINE

## 2022-11-02 PROCEDURE — 81003 URINALYSIS AUTO W/O SCOPE: CPT | Performed by: EMERGENCY MEDICINE

## 2022-11-02 PROCEDURE — 99284 EMERGENCY DEPT VISIT MOD MDM: CPT

## 2022-11-02 PROCEDURE — 93010 ELECTROCARDIOGRAM REPORT: CPT | Performed by: SPECIALIST

## 2022-11-02 PROCEDURE — 36415 COLL VENOUS BLD VENIPUNCTURE: CPT

## 2022-11-02 PROCEDURE — 80053 COMPREHEN METABOLIC PANEL: CPT

## 2022-11-02 RX ORDER — SODIUM CHLORIDE 0.9 % (FLUSH) 0.9 %
10 SYRINGE (ML) INJECTION AS NEEDED
Status: DISCONTINUED | OUTPATIENT
Start: 2022-11-02 | End: 2022-11-02 | Stop reason: HOSPADM

## 2022-11-02 RX ORDER — GLIPIZIDE 5 MG/1
5 TABLET ORAL DAILY
Qty: 15 TABLET | Refills: 0 | Status: SHIPPED | OUTPATIENT
Start: 2022-11-02 | End: 2023-02-10

## 2022-11-02 RX ORDER — GLIPIZIDE 5 MG/1
5 TABLET ORAL ONCE
Qty: 15 TABLET | Refills: 0 | Status: SHIPPED | OUTPATIENT
Start: 2022-11-02 | End: 2022-11-02 | Stop reason: SDUPTHER

## 2022-11-02 RX ORDER — GLIPIZIDE 5 MG/1
5 TABLET ORAL ONCE
Status: COMPLETED | OUTPATIENT
Start: 2022-11-02 | End: 2022-11-02

## 2022-11-02 RX ADMIN — SODIUM CHLORIDE 1000 ML: 9 INJECTION, SOLUTION INTRAVENOUS at 12:21

## 2022-11-02 RX ADMIN — GLIPIZIDE 5 MG: 5 TABLET ORAL at 13:37

## 2022-11-02 NOTE — ED PROVIDER NOTES
"Time: 12:42 PM EDT    Chief Complaint: Hyperglycemia, vision change  History provided by: Patient  History is limited by: N/A     History of Present Illness:  Patient is a 47 y.o. year old male who presents to the emergency department with 2-month history of increasing high blood sugar.  Patient is a known type II diabetic on insulin but states since he had a cardiac ablation he has had trouble getting his blood sugar down.  In the past 2 weeks this is greatly increased, now with his home blood sugar running between 3 and 500 regularly.  He does complain of polyuria and polydipsia.  Presents to the ED today for concerns about some vision changes that he describes as his vision being \"a millisecond behind\".  He denies seeing spots or having any focal visual field deficits but states when he turns his eyes or head the vision seems to lag behind.  Patient has no chest pain.  He does have mild lightheadedness but denies any focal weakness or numbness/paresthesias.    HPI    Similar Symptoms Previously: No  Recently seen: Yes      Patient Care Team  Primary Care Provider: Lyla Guerrero APRN    Past Medical History:     No Known Allergies  Past Medical History:   Diagnosis Date   • Allergies    • Aneurysm (HCC)    • Cancer (HCC)    • Depression    • Diabetes mellitus (HCC)    • GERD (gastroesophageal reflux disease)    • Hyperlipidemia    • Hypertension      Past Surgical History:   Procedure Laterality Date   • CARDIAC ABLATION  09/09/2022   • CARDIAC ELECTROPHYSIOLOGY PROCEDURE N/A 09/09/2022    Procedure: Ablation SVT HOLD Metoprolol 5 days;  Surgeon: Tai Lisa MD;  Location: [facility] INVASIVE LOCATION;  Service: Cardiovascular;  Laterality: N/A;   • CERVICAL DISC SURGERY     • THYROID SURGERY       Family History   Problem Relation Age of Onset   • Arthritis Mother    • Diabetes Mother    • Hyperlipidemia Mother    • Hypertension Mother    • Heart attack Father    • Hyperlipidemia Father    • " Hypertension Father    • Diabetes Father    • Alcohol abuse Father    • Stroke Father    • Hypertension Sister    • Heart disease Maternal Uncle    • Hypertension Maternal Uncle    • Mental illness Paternal Aunt    • Heart disease Paternal Aunt    • Diabetes Paternal Aunt    • COPD Paternal Uncle    • Stroke Maternal Grandmother    • Thyroid disease Maternal Grandmother    • COPD Maternal Grandmother    • Hypertension Maternal Grandmother    • Hypertension Maternal Grandfather    • Diabetes Maternal Grandfather    • Cancer Maternal Grandfather    • Heart disease Paternal Grandmother    • Hypertension Paternal Grandmother    • Colon cancer Paternal Grandfather        Home Medications:  Prior to Admission medications    Medication Sig Start Date End Date Taking? Authorizing Provider   Accu-Chek FastClix Lancets misc 1 each Daily. 9/28/22   Lyla Guerrero APRN   atorvastatin (LIPITOR) 40 MG tablet Take 1 tablet by mouth Every Night. Indications: High Amount of Fats in the Blood 7/15/22   Lyla Guerrero APRN   cetirizine (zyrTEC) 10 MG tablet Take 10 mg by mouth Daily.    ProviderKishore MD   Continuous Blood Gluc  (FreeStyle Cooper 14 Day [place]) device 1 each Daily. 10/19/22   Lyla Guerrero APRN   Continuous Blood Gluc Sensor (FreeStyle Cooper 14 Day Sensor) misc 1 each Daily. 10/19/22   Lyla Guerrero APRN   dapagliflozin (Farxiga) 5 MG tablet tablet Take 1 tablet by mouth Daily. 10/19/22   Lyla Guerrero APRN   escitalopram (LEXAPRO) 10 MG tablet Take 1 tablet by mouth Daily. 7/15/22   Lyla Guerrero APRN   fluticasone (FLONASE) 50 MCG/ACT nasal spray 2 sprays into the nostril(s) as directed by provider Daily.    Provider, MD Kishore   glucose blood test strip 1 each by Other route As Needed (as needed 3x a day). Use as instructed 9/20/22   Lyla Guerrero APRN   glucose blood test strip Use as instructed 9/28/22   Lyla Guerrero APRN   glucose monitor monitoring kit 1 each As  Needed.    Kishore Galo MD   glucose monitor monitoring kit 1 each As Needed (check blood sugar daily). 22   Lyla Guerrero APRN   glycopyrrolate (ROBINUL) 2 MG tablet Take 1 tablet by mouth Daily. 7/15/22   Lyla Guerrero APRN   Insulin Degludec (TRESIBA SC) Inject 36 Units under the skin into the appropriate area as directed every night at bedtime.    Kishore Galo MD   Insulin Pen Needle (NovoFine Plus Pen Needle) 32G X 4 MM misc 1 each Daily. 22   Lyla Guerrero APRN   Lancets (OneTouch Delica Plus [id number]) misc 1 each Daily.    Kishore Galo MD   lisinopril (PRINIVIL,ZESTRIL) 10 MG tablet Take 1 tablet by mouth Daily. 6/10/22   Yao Keen MD   metoprolol succinate XL (TOPROL-XL) 50 MG 24 hr tablet Take 1 tablet by mouth 2 (Two) Times a Day. 22   Jose Angel Wei MD   omeprazole (priLOSEC) 20 MG capsule Take  by mouth Daily.    Kishore Galo MD   ondansetron (Zofran) 4 MG tablet Take 1 tablet by mouth Every 8 (Eight) Hours As Needed for Nausea or Vomiting. 22   Jose Angel Wei MD   Tirosint 125 MCG capsule capsule Take 125 mcg by mouth Daily. Patient takes 2 tablets once a day 22   Kishore Galo MD   Vitamin D, Cholecalciferol, (CHOLECALCIFEROL) 10 MCG (400 UNIT) tablet Take 400 Units by mouth Daily.    Kishore Galo MD        Social History:   Social History     Tobacco Use   • Smoking status: Former     Years: 11.00     Types: Cigarettes     Quit date: 2008     Years since quittin.2   • Smokeless tobacco: Never   Vaping Use   • Vaping Use: Never used   Substance Use Topics   • Alcohol use: Yes     Comment: drinks 2-3 drinks 2-3 times/week.   • Drug use: Never         Review of Systems:  Review of Systems   Constitutional: Negative for chills and fever.   HENT: Negative for congestion, rhinorrhea and sore throat.    Eyes: Positive for visual disturbance. Negative for pain.   Respiratory: Negative for apnea,  "cough, chest tightness and shortness of breath.    Cardiovascular: Negative for chest pain and palpitations.   Gastrointestinal: Negative for abdominal pain, diarrhea, nausea and vomiting.   Endocrine: Positive for polydipsia and polyuria.   Genitourinary: Negative for difficulty urinating and dysuria.   Musculoskeletal: Negative for joint swelling and myalgias.   Skin: Negative for color change.   Neurological: Positive for light-headedness. Negative for seizures and headaches.   Psychiatric/Behavioral: Negative.    All other systems reviewed and are negative.       Physical Exam:  /90   Pulse 64   Temp 98 °F (36.7 °C) (Oral)   Resp 20   Ht 185.4 cm (73\")   Wt 118 kg (259 lb 11.2 oz)   SpO2 98%   BMI 34.26 kg/m²     Physical Exam  Vitals and nursing note reviewed.   Constitutional:       Appearance: Normal appearance.   HENT:      Head: Normocephalic and atraumatic.      Nose: Nose normal.      Mouth/Throat:      Mouth: Mucous membranes are dry.   Eyes:      Extraocular Movements: Extraocular movements intact.      Pupils: Pupils are equal, round, and reactive to light.      Funduscopic exam:     Right eye: No hemorrhage, exudate or papilledema.         Left eye: No hemorrhage, exudate or papilledema.   Cardiovascular:      Rate and Rhythm: Normal rate and regular rhythm.      Heart sounds: Normal heart sounds.   Pulmonary:      Effort: Pulmonary effort is normal.      Breath sounds: Normal breath sounds.   Abdominal:      General: Bowel sounds are normal.      Palpations: Abdomen is soft.      Tenderness: There is no abdominal tenderness.   Musculoskeletal:         General: No swelling. Normal range of motion.      Cervical back: Normal range of motion and neck supple.   Skin:     General: Skin is warm and dry.      Coloration: Skin is not jaundiced.   Neurological:      General: No focal deficit present.      Mental Status: He is alert and oriented to person, place, and time. Mental status is at " baseline.   Psychiatric:         Mood and Affect: Mood normal.         Behavior: Behavior normal.         Judgment: Judgment normal.                Medications in the Emergency Department:  Medications   sodium chloride 0.9 % flush 10 mL (has no administration in time range)   sodium chloride 0.9 % bolus 1,000 mL (0 mL Intravenous Stopped 11/2/22 1337)   glipizide (GLUCOTROL) tablet 5 mg (5 mg Oral Given 11/2/22 1337)        Labs  Lab Results (last 24 hours)     Procedure Component Value Units Date/Time    POC Glucose Once [[id number]]  (Abnormal) Collected: 11/02/22 1002    Specimen: Blood Updated: 11/02/22 1003     Glucose 285 mg/dL      Comment: Serial Number: [id number]:  [id number]       CBC & Differential [[id number]]  (Abnormal) Collected: 11/02/22 1007    Specimen: Blood Updated: 11/02/22 1014    Narrative:      The following orders were created for panel order CBC & Differential.  Procedure                               Abnormality         Status                     ---------                               -----------         ------                     CBC Auto Differential[[id number]]        Abnormal            Final result                 Please view results for these tests on the individual orders.    Comprehensive Metabolic Panel [[id number]]  (Abnormal) Collected: 11/02/22 1007    Specimen: Blood Updated: 11/02/22 1038     Glucose 301 mg/dL      BUN 12 mg/dL      Creatinine 1.16 mg/dL      Sodium 131 mmol/L      Potassium 4.9 mmol/L      Chloride 95 mmol/L      CO2 25.5 mmol/L      Calcium 9.3 mg/dL      Total Protein 6.9 g/dL      Albumin 4.30 g/dL      ALT (SGPT) 17 U/L      AST (SGOT) 17 U/L      Alkaline Phosphatase 84 U/L      Total Bilirubin 0.3 mg/dL      Globulin 2.6 gm/dL      A/G Ratio 1.7 g/dL      BUN/Creatinine Ratio 10.3     Anion Gap 10.5 mmol/L      eGFR 77.2 mL/min/1.73      Comment: National Kidney Foundation and American Society of Nephrology (ASN) Task Force recommended calculation  based on the Chronic Kidney Disease Epidemiology Collaboration (CKD-EPI) equation refit without adjustment for race.       Narrative:      GFR Normal >60  Chronic Kidney Disease <60  Kidney Failure <15      CBC Auto Differential [[id number]]  (Abnormal) Collected: 11/02/22 1007    Specimen: Blood Updated: 11/02/22 1014     WBC 6.31 10*3/mm3      RBC 3.86 10*6/mm3      Hemoglobin 12.3 g/dL      Hematocrit 34.7 %      MCV 89.9 fL      MCH 31.9 pg      MCHC 35.4 g/dL      RDW 12.3 %      RDW-SD 40.2 fl      MPV 8.4 fL      Platelets 278 10*3/mm3      Neutrophil % 49.3 %      Lymphocyte % 35.0 %      Monocyte % 11.4 %      Eosinophil % 3.0 %      Basophil % 0.8 %      Immature Grans % 0.5 %      Neutrophils, Absolute 3.11 10*3/mm3      Lymphocytes, Absolute 2.21 10*3/mm3      Monocytes, Absolute 0.72 10*3/mm3      Eosinophils, Absolute 0.19 10*3/mm3      Basophils, Absolute 0.05 10*3/mm3      Immature Grans, Absolute 0.03 10*3/mm3      nRBC 0.0 /100 WBC     POC Glucose Once [[id number]]  (Abnormal) Collected: 11/02/22 1107    Specimen: Blood Updated: 11/02/22 1517     Glucose 259 mg/dL      Comment: Serial Number: [id number]:  [id number]       Urinalysis With Microscopic If Indicated (No Culture) - Urine, Clean Catch [[id number]]  (Abnormal) Collected: 11/02/22 1226    Specimen: Urine, Clean Catch Updated: 11/02/22 1236     Color, UA Yellow     Appearance, UA Clear     pH, UA <=5.0     Specific Gravity, UA 1.013     Glucose,  mg/dL (2+)     Ketones, UA Negative     Bilirubin, UA Negative     Blood, UA Negative     Protein, UA Negative     Leuk Esterase, UA Negative     Nitrite, UA Negative     Urobilinogen, UA 0.2 E.U./dL    Narrative:      Urine microscopic not indicated.    POC Glucose Once [[id number]]  (Abnormal) Collected: 11/02/22 1517    Specimen: Blood Updated: 11/02/22 1522     Glucose 177 mg/dL      Comment: Serial Number: [id number]:  [id number]              Imaging:  No Radiology Exams Resulted  Within Past 24 Hours    Procedures:  Procedures    Progress  ED Course as of 11/02/22 1527   Wed Nov 02, 2022   1505 EKG interpretation: Normal sinus rhythm, rate 65, normal GA and QT intervals, normal QRS duration, normal axis, normal ST segments with no acute ischemia [RP]      ED Course User Index  [RP] Zafar Jimenez MD                            Medical Decision Making:  MDM  Number of Diagnoses or Management Options     Amount and/or Complexity of Data Reviewed  Clinical lab tests: reviewed  Decide to obtain previous medical records or to obtain history from someone other than the patient: yes  Independent visualization of images, tracings, or specimens: yes    Risk of Complications, Morbidity, and/or Mortality  Presenting problems: moderate  Management options: low    Patient Progress  Patient progress: stable       Final diagnoses:   Type 2 diabetes mellitus with hyperglycemia, with long-term current use of insulin (HCC)        Disposition:  ED Disposition     ED Disposition   Discharge    Condition   Stable    Comment   --             This medical record created using voice recognition software.           Zafar Jimenez MD  11/02/22 1527

## 2022-11-02 NOTE — DISCHARGE INSTRUCTIONS
Stay well-hydrated.  Follow-up with your family doctor tomorrow.  Return to the ER as needed for worsening of symptoms.

## 2022-11-09 LAB — QT INTERVAL: 427 MS

## 2022-12-12 RX ORDER — BLOOD-GLUCOSE METER
EACH MISCELLANEOUS
Qty: 1 KIT | Refills: 1 | Status: SHIPPED | OUTPATIENT
Start: 2022-12-12 | End: 2022-12-14 | Stop reason: SDUPTHER

## 2022-12-14 RX ORDER — BLOOD-GLUCOSE METER
1 EACH MISCELLANEOUS DAILY
Qty: 1 KIT | Refills: 1 | Status: SHIPPED | OUTPATIENT
Start: 2022-12-14 | End: 2023-02-10

## 2023-01-20 ENCOUNTER — OFFICE VISIT (OUTPATIENT)
Dept: PODIATRY | Facility: CLINIC | Age: 48
End: 2023-01-20
Payer: COMMERCIAL

## 2023-01-20 VITALS
HEART RATE: 90 BPM | DIASTOLIC BLOOD PRESSURE: 84 MMHG | SYSTOLIC BLOOD PRESSURE: 129 MMHG | BODY MASS INDEX: 34.72 KG/M2 | OXYGEN SATURATION: 99 % | TEMPERATURE: 96.9 F | HEIGHT: 73 IN | WEIGHT: 262 LBS

## 2023-01-20 DIAGNOSIS — Z79.4 TYPE 2 DIABETES MELLITUS WITHOUT COMPLICATION, WITH LONG-TERM CURRENT USE OF INSULIN: Primary | ICD-10-CM

## 2023-01-20 DIAGNOSIS — E11.9 TYPE 2 DIABETES MELLITUS WITHOUT COMPLICATION, WITH LONG-TERM CURRENT USE OF INSULIN: Primary | ICD-10-CM

## 2023-01-20 DIAGNOSIS — E11.8 DIABETIC FOOT: ICD-10-CM

## 2023-01-20 PROCEDURE — 99243 OFF/OP CNSLTJ NEW/EST LOW 30: CPT | Performed by: PODIATRIST

## 2023-01-20 PROCEDURE — G8404 LOW EXTEMITY NEUR EXAM DOCUM: HCPCS | Performed by: PODIATRIST

## 2023-01-20 RX ORDER — ESCITALOPRAM OXALATE 10 MG/1
1 TABLET ORAL DAILY
COMMUNITY
Start: 2022-10-27 | End: 2023-01-25 | Stop reason: SDUPTHER

## 2023-01-20 RX ORDER — LEVOTHYROXINE SODIUM 125 UG/1
2 CAPSULE ORAL DAILY
COMMUNITY
Start: 2022-10-04 | End: 2023-02-13 | Stop reason: HOSPADM

## 2023-01-20 RX ORDER — DILTIAZEM HYDROCHLORIDE 180 MG/1
180 CAPSULE, COATED, EXTENDED RELEASE ORAL DAILY
COMMUNITY
Start: 2022-11-23 | End: 2023-02-21

## 2023-01-20 RX ORDER — PEN NEEDLE, DIABETIC 32 GX 1/4"
1 NEEDLE, DISPOSABLE MISCELLANEOUS DAILY
COMMUNITY
Start: 2022-12-12 | End: 2023-02-10

## 2023-01-20 RX ORDER — EMPAGLIFLOZIN 25 MG/1
25 TABLET, FILM COATED ORAL DAILY
COMMUNITY
Start: 2023-01-06 | End: 2023-02-13 | Stop reason: HOSPADM

## 2023-01-20 RX ORDER — FLECAINIDE ACETATE 50 MG/1
50 TABLET ORAL 2 TIMES DAILY
COMMUNITY
Start: 2022-12-11 | End: 2023-02-13 | Stop reason: HOSPADM

## 2023-01-20 NOTE — PROGRESS NOTES
[facility] - PODIATRY    Today's Date: 01/20/23    Patient Name: Wayne Guan  MRN: [id number]  CSN: [id number]  PCP: Lyla Guerrero APRN, Last PCP Visit:  12/14/2022  Referring Provider: Lyla Guerrero APRN    SUBJECTIVE     Chief Complaint   Patient presents with   • Diabetes     States his feet swell sometimes     HPI: Wayne Guan, a 47 y.o.male, presents to clinic for a diabetic foot evaluation.    New, Established, New Problem:  new    Onset: Insidious    Nature:  IDDM    Stable, worsening, improving:  improving    Patient controlling diabetes via:  insulin    Patient states there last blood glucose was:  140    Patient denies any fevers, chills, nausea, vomiting, shortness of breath, nor any other constitutional signs nor symptoms.    No other pedal complaints at this time.    Past Medical History:   Diagnosis Date   • Allergies    • Aneurysm (HCC)    • Cancer (HCC)    • Depression    • Diabetes mellitus (HCC)    • GERD (gastroesophageal reflux disease)    • Hyperlipidemia    • Hypertension      Past Surgical History:   Procedure Laterality Date   • CARDIAC ABLATION  09/09/2022   • CARDIAC ELECTROPHYSIOLOGY PROCEDURE N/A 09/09/2022    Procedure: Ablation SVT HOLD Metoprolol 5 days;  Surgeon: Tai Lisa MD;  Location: [facility] INVASIVE LOCATION;  Service: Cardiovascular;  Laterality: N/A;   • CERVICAL DISC SURGERY     • THYROID SURGERY       Family History   Problem Relation Age of Onset   • Arthritis Mother    • Diabetes Mother    • Hyperlipidemia Mother    • Hypertension Mother    • Osteoporosis Mother    • Rashes / Skin problems Mother    • Heart attack Father    • Hyperlipidemia Father    • Hypertension Father    • Diabetes Father    • Alcohol abuse Father    • Stroke Father    • Hypertension Sister    • Heart disease Maternal Uncle    • Hypertension Maternal Uncle    • Mental illness Paternal Aunt    • Heart disease Paternal Aunt    • Diabetes Paternal Aunt    • COPD Paternal  Uncle    • Stroke Maternal Grandmother    • Thyroid disease Maternal Grandmother    • COPD Maternal Grandmother    • Hypertension Maternal Grandmother    • Heart disease Maternal Grandmother    • Hypertension Maternal Grandfather    • Diabetes Maternal Grandfather    • Cancer Maternal Grandfather    • Heart disease Maternal Grandfather    • Heart disease Paternal Grandmother    • Hypertension Paternal Grandmother    • Colon cancer Paternal Grandfather    • Cancer Paternal Grandfather      Social History     Socioeconomic History   • Marital status: Single   Tobacco Use   • Smoking status: Former     Packs/day: 1.00     Years: 11.00     Pack years: 11.00     Types: Cigarettes     Quit date: 2008     Years since quittin.5   • Smokeless tobacco: Never   Vaping Use   • Vaping Use: Never used   Substance and Sexual Activity   • Alcohol use: Yes     Comment: drinks 2-3 drinks 2-3 times/week.   • Drug use: Never   • Sexual activity: Defer     No Known Allergies  Current Outpatient Medications   Medication Sig Dispense Refill   • Accu-Chek FastClix Lancets misc 1 each Daily. 102 each 12   • atorvastatin (LIPITOR) 40 MG tablet Take 1 tablet by mouth Every Night. Indications: High Amount of Fats in the Blood 90 tablet 1   • Blood Glucose Monitoring Suppl (Accu-Chek Guide Me) w/Device kit 1 each by Other route Daily. 1 kit 1   • cetirizine (zyrTEC) 10 MG tablet Take 10 mg by mouth Daily.     • Continuous Blood Gluc  (FreeStyle Cooper 14 Day [place]) device 1 each Daily. 2 each 5   • Continuous Blood Gluc Sensor (FreeStyle Cooper 14 Day Sensor) misc 1 each Daily. 2 each 5   • dapagliflozin (Farxiga) 5 MG tablet tablet Take 1 tablet by mouth Daily. 90 tablet 0   • dilTIAZem CD (CARDIZEM CD) 180 MG 24 hr capsule Take 180 mg by mouth Daily.     • empagliflozin (JARDIANCE) 25 MG tablet tablet Take 25 mg by mouth Daily.     • escitalopram (LEXAPRO) 10 MG tablet Take 1 tablet by mouth Daily. 90 tablet 1   •  escitalopram (LEXAPRO) 10 MG tablet Take 1 tablet by mouth Daily.     • flecainide (TAMBOCOR) 50 MG tablet Take 50 mg by mouth 2 (Two) Times a Day.     • fluticasone (FLONASE) 50 MCG/ACT nasal spray 2 sprays into the nostril(s) as directed by provider Daily.     • glipizide (Glucotrol) 5 MG tablet Take 1 tablet by mouth Daily. Half tablet per day for the first 3 days.  If blood sugar is still persistently greater than 200 increase the dose to 1 tablet daily thereafter.  Immediately discontinue this medication if your blood sugar is ever under 60. 15 tablet 0   • glucose blood test strip 1 each by Other route As Needed (as needed 3x a day). Use as instructed 100 each 3   • glucose blood test strip Use as instructed 100 each 12   • glucose monitor monitoring kit 1 each As Needed.     • glycopyrrolate (ROBINUL) 2 MG tablet Take 1 tablet by mouth Daily. 90 tablet 1   • Insulin Degludec (TRESIBA SC) Inject 36 Units under the skin into the appropriate area as directed every night at bedtime.     • Insulin Pen Needle (NovoFine Plus Pen Needle) 32G X 4 MM misc 1 each Daily. 50 each 3   • Jardiance 25 MG tablet tablet Take 25 mg by mouth Daily.     • Lancets (OneTouch Delica Plus [id number]) misc 1 each Daily.     • levothyroxine sodium (TIROSINT) 125 MCG capsule capsule Take 2 capsules by mouth Daily.     • lisinopril (PRINIVIL,ZESTRIL) 10 MG tablet Take 1 tablet by mouth Daily. 90 tablet 3   • metoprolol succinate XL (TOPROL-XL) 50 MG 24 hr tablet Take 1 tablet by mouth 2 (Two) Times a Day. 60 tablet 5   • Novofine Pen Needle 32G X 6 MM misc Inject 1 each as directed Daily.     • omeprazole (priLOSEC) 20 MG capsule Take  by mouth Daily.     • ondansetron (Zofran) 4 MG tablet Take 1 tablet by mouth Every 8 (Eight) Hours As Needed for Nausea or Vomiting. 30 tablet 0   • Tirosint 125 MCG capsule capsule Take 125 mcg by mouth Daily. Patient takes 2 tablets once a day     • Vitamin D, Cholecalciferol, (CHOLECALCIFEROL) 10 MCG  (400 UNIT) tablet Take 400 Units by mouth Daily.       No current facility-administered medications for this visit.     Review of Systems   Constitutional: Negative.    All other systems reviewed and are negative.      OBJECTIVE     Vitals:    01/20/23 0914   BP: 129/84   Pulse: 90   Temp: 96.9 °F (36.1 °C)   SpO2: 99%       Body mass index is 34.57 kg/m².    Lab Results   Component Value Date    HGBA1C 9.1 (H) 12/05/2022       Lab Results   Component Value Date    GLUCOSE 301 (H) 11/02/2022    CALCIUM 9.3 11/02/2022     (L) 11/02/2022    K 4.9 11/02/2022    CO2 25.5 11/02/2022    CL 95 (L) 11/02/2022    BUN 12 11/02/2022    CREATININE 1.16 11/02/2022    EGFRIFNONA 78 01/24/2022    BCR 10.3 11/02/2022    ANIONGAP 10.5 11/02/2022       Patient seen in no apparent distress.      PHYSICAL EXAM:     Foot/Ankle Exam:       General:   Diabetic Foot Exam Performed    Appearance: obesity    Orientation: AAOx3    Affect: appropriate    Gait: unimpaired    Shoe Gear:  Casual shoes    VASCULAR      Right Foot Vascularity   Normal vascular exam    Dorsalis pedis:  2+  Posterior tibial:  2+  Skin Temperature: warm    Edema Grading:  None  CFT:  < 3 seconds  Pedal Hair Growth:  Present  Varicosities: none       Left Foot Vascularity   Normal vascular exam    Dorsalis pedis:  2+  Posterior tibial:  2+  Skin Temperature: warm    Edema Grading:  None  CFT:  < 3 seconds  Pedal Hair Growth:  Present  Varicosities: none        NEUROLOGIC     Right Foot Neurologic   Normal sensation    Light touch sensation:  Normal  Vibratory sensation:  Normal  Hot/Cold sensation: normal    Protective Sensation using Paterson-Rohini Monofilament:  10     Left Foot Neurologic   Normal sensation    Light touch sensation:  Normal  Vibratory sensation:  Normal  Hot/cold sensation: normal    Protective Sensation using Paterson-Rohini Monofilament:  10     MUSCLE STRENGTH     Right Foot Muscle Strength   Foot dorsiflexion:  4  Foot plantar flexion:   4  Foot inversion:  4  Foot eversion:  4     Left Foot Muscle Strength   Foot dorsiflexion:  4  Foot plantar flexion:  4  Foot inversion:  4  Foot eversion:  4     RANGE OF MOTION      Right Foot Range of Motion   Foot and ankle ROM within normal limits       Left Foot Range of Motion   Foot and ankle ROM within normal limits       DERMATOLOGIC     Right Foot Dermatologic   Skin: skin intact    Nails: normal       Left Foot Dermatologic   Skin: skin intact    Nails: normal        Right Foot Additional Comments Hallux varus      Left Foot Additional Comments: Hallux varus      Diabetic Foot Exam Performed      ASSESSMENT/PLAN     Diagnoses and all orders for this visit:    1. Type 2 diabetes mellitus without complication, with long-term current use of insulin (HCC) (Primary)    2. Diabetic foot (HCC)        Comprehensive lower extremity examination and evaluation was performed.    Discussed findings and treatment plan including risks, benefits, and treatment options with patient in detail. Patient agreed with treatment plan.    Medications and allergies reviewed.  Reviewed available blood glucose and HgB A1C lab values along with other pertinent labs.  These were discussed with the patient as to their importance of diabetic maintenance.    Diabetic foot exam performed and documented this date, compliant with CQM required standards. Detail of findings as noted in physical exam.  Lower extremity Neurologic exam for diabetic patient performed and documented this date, compliant with PQRS required standards. Detail of findings as noted in physical exam.  Advised patient importance of good routine lower extremity hygiene. Advised patient importance of evaluating for intact skin and pain free nail borders.  Advised patient to use mirror to evaluate plantar/ soles of feet for better visualization. Advised patient monitor and phone office to be seen if any cracking to skin, open lesions, painful nail borders or if nails  become elongated prior to next visit. Advised patient importance of daily cleansing of lower extremities, followed by good skin cream to maintain normal hydration of skin. Also advised patient importance of close daily monitoring of blood sugar. Advised to regulate diet and medications to maintain control of blood sugar in optimal range. Contact primary care provider if difficulties maintaining blood sugar levels.  Advised Patient of presence of Diabetes Mellitus condition.  Advised Patient risk of progression and worsening or improvement, then return of condition.  Will monitor condition for any change in future. Treat with most appropriate treatment pending status of condition.  Counseled and advised patient extensively on nature and ramifications of diabetes. Standard instructions given to patient for good diabetic foot care and maintenance. Advised importance of careful monitoring to avoid break down and complications secondary to diabetes. Advised patient importance of strict maintenance of blood sugar control. Advised patient of possible ominous results from neglect of condition, i.e.: amputation/ loss of digits, feet and legs, or even death.  Patient states understands counseling, will monitor closely, continue good hygiene and routine diabetic foot care. Patient will contact office is questions or problems.      An After Visit Summary was printed and given to the patient at discharge, including (if requested) any available informative/educational handouts regarding diagnosis, treatment, or medications. All questions were answered to patient/family satisfaction. Should symptoms fail to improve or worsen they agree to call or return to clinic or to go to the Emergency Department. Discussed the importance of following up with any needed screening tests/labs/specialist appointments and any requested follow-up recommended by me today. Importance of maintaining follow-up discussed and patient accepts that missed  appointments can delay diagnosis and potentially lead to worsening of conditions.    Return in about 1 year (around 1/20/2024) for Podiatry Diabetic Foot Exam., or sooner if acute issues arise.    This document has been electronically signed by Stu Jena DPM on January 20, 2023 09:38 EST

## 2023-01-23 ENCOUNTER — TELEPHONE (OUTPATIENT)
Dept: INTERNAL MEDICINE | Facility: CLINIC | Age: 48
End: 2023-01-23
Payer: COMMERCIAL

## 2023-01-23 RX ORDER — GLYCOPYRROLATE 2 MG/1
2 TABLET ORAL DAILY
Qty: 90 TABLET | Refills: 0 | Status: SHIPPED | OUTPATIENT
Start: 2023-01-23 | End: 2023-02-17 | Stop reason: SDUPTHER

## 2023-01-23 RX ORDER — GLYCOPYRROLATE 2 MG/1
TABLET ORAL
Qty: 90 TABLET | Refills: 0 | Status: SHIPPED | OUTPATIENT
Start: 2023-01-23 | End: 2023-01-23 | Stop reason: SDUPTHER

## 2023-01-25 ENCOUNTER — OFFICE VISIT (OUTPATIENT)
Dept: INTERNAL MEDICINE | Facility: CLINIC | Age: 48
End: 2023-01-25
Payer: COMMERCIAL

## 2023-01-25 VITALS
HEIGHT: 73 IN | OXYGEN SATURATION: 97 % | HEART RATE: 68 BPM | DIASTOLIC BLOOD PRESSURE: 82 MMHG | TEMPERATURE: 97.8 F | BODY MASS INDEX: 34.99 KG/M2 | SYSTOLIC BLOOD PRESSURE: 122 MMHG | WEIGHT: 264 LBS

## 2023-01-25 DIAGNOSIS — I10 ESSENTIAL HYPERTENSION: ICD-10-CM

## 2023-01-25 DIAGNOSIS — E78.5 HYPERLIPIDEMIA, UNSPECIFIED HYPERLIPIDEMIA TYPE: ICD-10-CM

## 2023-01-25 DIAGNOSIS — Z23 NEED FOR INFLUENZA VACCINATION: ICD-10-CM

## 2023-01-25 DIAGNOSIS — F32.A ANXIETY AND DEPRESSION: ICD-10-CM

## 2023-01-25 DIAGNOSIS — F41.9 ANXIETY AND DEPRESSION: ICD-10-CM

## 2023-01-25 DIAGNOSIS — K58.8 OTHER IRRITABLE BOWEL SYNDROME: ICD-10-CM

## 2023-01-25 DIAGNOSIS — E11.65 TYPE 2 DIABETES MELLITUS WITH HYPERGLYCEMIA, UNSPECIFIED WHETHER LONG TERM INSULIN USE: Primary | ICD-10-CM

## 2023-01-25 PROCEDURE — 99214 OFFICE O/P EST MOD 30 MIN: CPT | Performed by: NURSE PRACTITIONER

## 2023-01-25 PROCEDURE — 90471 IMMUNIZATION ADMIN: CPT | Performed by: NURSE PRACTITIONER

## 2023-01-25 PROCEDURE — 90686 IIV4 VACC NO PRSV 0.5 ML IM: CPT | Performed by: NURSE PRACTITIONER

## 2023-01-25 NOTE — PROGRESS NOTES
Chief Complaint  Diabetes and Follow-up (3 month follow up. Patient states blood sugar is doing good. He saw endocrinologist, and they are taking care of the thyroid. )  Subjective    History of Present Illness  Wayne Guan is a 47 y.o. male with diabetes type 2, hypertension, hyperlipidemia, SVT, anxiety, irritable bowel syndrome and history of thyroid cancer presents to [facility] for follow-up. The patient is not having any medication side effects.  Negative for chest pain, heart palpitations, dizziness, headaches, shortness of air and fatigue.  Recent labs reviewed.    Patient was seen in the emergency department on 11/2/2022 and given glipizide to treat hyperglycemia.    Specialists include: Endocrinology for hypothyroidism and status post thyroidectomy due to thyroid cancer. Dr. Keen  for SVT, palpitations, status postcardiac ablation and monitoring AAA.    Past Medical History:   Diagnosis Date   • Alcoholism ([facility]) 1/16/2021   • Allergies    • Aneurysm ([facility])    • Anxiety 7/15/2022   • Bipolar 2 disorder ([facility]) 7/20/2021   • Cancer ([facility])    • Cervical stenosis of spinal canal 11/30/2018    Formatting of this note might be different from the original. Added automatically from request for surgery [id number]   • Depression    • Diabetes mellitus ([facility])    • GERD (gastroesophageal reflux disease)    • History of colonic polyps 5/23/2017    Formatting of this note might be different from the original. Added automatically from request for surgery [id number]   • Hyperlipidemia    • Hypertension    • Lumbosacral spondylosis without myelopathy 8/29/2018   • Papillary thyroid carcinoma ([facility]) 4/1/2020   • SVT (supraventricular tachycardia) ([facility]) 1/23/2022        Past Surgical History:   Procedure Laterality Date   • CARDIAC ABLATION  09/09/2022   • CARDIAC ELECTROPHYSIOLOGY PROCEDURE N/A 09/09/2022    Procedure: Ablation SVT HOLD Metoprolol 5 days;  Surgeon: Tai Lisa MD;   Location: [facility] INVASIVE LOCATION;  Service: Cardiovascular;  Laterality: N/A;   • CERVICAL DISC SURGERY     • THYROID SURGERY          No Known Allergies       Current Outpatient Medications:   •  Accu-Chek FastClix Lancets misc, 1 each Daily., Disp: 102 each, Rfl: 12  •  atorvastatin (LIPITOR) 40 MG tablet, Take 1 tablet by mouth Every Night. Indications: High Amount of Fats in the Blood, Disp: 90 tablet, Rfl: 1  •  Blood Glucose Monitoring Suppl (Accu-Chek Guide Me) w/Device kit, 1 each by Other route Daily., Disp: 1 kit, Rfl: 1  •  cetirizine (zyrTEC) 10 MG tablet, Take 10 mg by mouth Daily., Disp: , Rfl:   •  Continuous Blood Gluc  (FreeStyle Cooper 14 Day [place]) device, 1 each Daily., Disp: 2 each, Rfl: 5  •  Continuous Blood Gluc Sensor (FreeStyle Cooper 14 Day Sensor) misc, 1 each Daily., Disp: 2 each, Rfl: 5  •  dapagliflozin (Farxiga) 5 MG tablet tablet, Take 1 tablet by mouth Daily., Disp: 90 tablet, Rfl: 0  •  dilTIAZem CD (CARDIZEM CD) 180 MG 24 hr capsule, Take 180 mg by mouth Daily., Disp: , Rfl:   •  empagliflozin (JARDIANCE) 25 MG tablet tablet, Take 25 mg by mouth Daily., Disp: , Rfl:   •  escitalopram (LEXAPRO) 10 MG tablet, Take 1 tablet by mouth Daily., Disp: 90 tablet, Rfl: 1  •  flecainide (TAMBOCOR) 50 MG tablet, Take 50 mg by mouth 2 (Two) Times a Day., Disp: , Rfl:   •  fluticasone (FLONASE) 50 MCG/ACT nasal spray, 2 sprays into the nostril(s) as directed by provider Daily., Disp: , Rfl:   •  glipizide (Glucotrol) 5 MG tablet, Take 1 tablet by mouth Daily. Half tablet per day for the first 3 days.  If blood sugar is still persistently greater than 200 increase the dose to 1 tablet daily thereafter.  Immediately discontinue this medication if your blood sugar is ever under 60., Disp: 15 tablet, Rfl: 0  •  glucose blood test strip, 1 each by Other route As Needed (as needed 3x a day). Use as instructed, Disp: 100 each, Rfl: 3  •  glucose blood test strip, Use as instructed, Disp:  "100 each, Rfl: 12  •  glucose monitor monitoring kit, 1 each As Needed., Disp: , Rfl:   •  glycopyrrolate (ROBINUL) 2 MG tablet, Take 1 tablet by mouth Daily., Disp: 90 tablet, Rfl: 0  •  Insulin Degludec (TRESIBA SC), Inject 36 Units under the skin into the appropriate area as directed every night at bedtime., Disp: , Rfl:   •  Insulin Pen Needle (NovoFine Plus Pen Needle) 32G X 4 MM misc, 1 each Daily., Disp: 50 each, Rfl: 3  •  Jardiance 25 MG tablet tablet, Take 25 mg by mouth Daily., Disp: , Rfl:   •  Lancets (OneTouch Delica Plus [id number]) misc, 1 each Daily., Disp: , Rfl:   •  levothyroxine sodium (TIROSINT) 125 MCG capsule capsule, Take 2 capsules by mouth Daily., Disp: , Rfl:   •  lisinopril (PRINIVIL,ZESTRIL) 10 MG tablet, Take 1 tablet by mouth Daily., Disp: 90 tablet, Rfl: 3  •  metoprolol succinate XL (TOPROL-XL) 50 MG 24 hr tablet, Take 1 tablet by mouth 2 (Two) Times a Day., Disp: 60 tablet, Rfl: 5  •  Novofine Pen Needle 32G X 6 MM misc, Inject 1 each as directed Daily., Disp: , Rfl:   •  omeprazole (priLOSEC) 20 MG capsule, Take  by mouth Daily., Disp: , Rfl:   •  ondansetron (Zofran) 4 MG tablet, Take 1 tablet by mouth Every 8 (Eight) Hours As Needed for Nausea or Vomiting., Disp: 30 tablet, Rfl: 0  •  Tirosint 125 MCG capsule capsule, Take 125 mcg by mouth Daily. Patient takes 2 tablets once a day, Disp: , Rfl:   •  Vitamin D, Cholecalciferol, (CHOLECALCIFEROL) 10 MCG (400 UNIT) tablet, Take 400 Units by mouth Daily., Disp: , Rfl:     Objective   /82 (BP Location: Right arm, Patient Position: Sitting, Cuff Size: Large Adult)   Pulse 68   Temp 97.8 °F (36.6 °C) (Temporal)   Ht 185.4 cm (73\")   Wt 120 kg (264 lb)   SpO2 97%   BMI 34.83 kg/m²    Estimated body mass index is 34.83 kg/m² as calculated from the following:    Height as of this encounter: 185.4 cm (73\").    Weight as of this encounter: 120 kg (264 lb).   Physical Exam  Vitals reviewed.   Constitutional:       General: He is " not in acute distress.  HENT:      Head: Normocephalic and atraumatic.   Cardiovascular:      Rate and Rhythm: Normal rate and regular rhythm.   Pulmonary:      Effort: Pulmonary effort is normal.      Breath sounds: Normal breath sounds. No wheezing, rhonchi or rales.   Musculoskeletal:      Right lower leg: No edema.      Left lower leg: No edema.   Lymphadenopathy:      Cervical: No cervical adenopathy.   Skin:     General: Skin is warm and dry.   Neurological:      General: No focal deficit present.      Mental Status: He is alert.   Psychiatric:         Thought Content: Thought content normal.        Result Review :  The following data was reviewed by: DELL Morrison on 01/25/2023:  CMP    CMP 9/10/22 9/11/22 11/2/22   Glucose 63 (A) 185 (A) 301 (A)   BUN 12 9 12   Creatinine 1.20 1.23 1.16   eGFR 74.1 72.0 77.2   Sodium 130 (A) 133 (A) 131 (A)   Potassium 3.6 4.0 4.9   Chloride 93 (A) 97 (A) 95 (A)   Calcium 8.1 (A) 8.6 9.3   Total Protein   6.9   Albumin   4.30   Globulin   2.6   Total Bilirubin   0.3   Alkaline Phosphatase   84   AST (SGOT)   17   ALT (SGPT)   17   Albumin/Globulin Ratio   1.7   BUN/Creatinine Ratio 10.0 7.3 10.3   Anion Gap 10.0 10.0 10.5   (A) Abnormal value       Comments are available for some flowsheets but are not being displayed.           CBC w/diff    CBC w/Diff 5/12/22 9/9/22 11/2/22   WBC 3.51 (A) 5.71 6.31   RBC 4.71 4.97 3.86 (A)   Hemoglobin 14.6 15.5 12.3 (A)   Hematocrit 46.9 43.8 34.7 (A)   MCV 99.6 88.1 89.9   MCH 31.0 31.2 31.9   MCHC 31.1 35.4 35.4   RDW 13.1 12.8 12.3   Platelets 293 258 278   Neutrophil Rel % 47.7  49.3   Immature Granulocyte Rel % 0.3  0.5   Lymphocyte Rel % 39.3  35.0   Monocyte Rel % 8.5  11.4   Eosinophil Rel % 3.1  3.0   Basophil Rel % 1.1  0.8   (A) Abnormal value                TSH    TSH 4/20/22 5/12/22 9/10/22   TSH 0.032 (A) 37.000 (A) 0.100 (A)   (A) Abnormal value            A1C Last 3 Results    HGBA1C Last 3 Results 4/20/22 9/9/22  12/5/22   Hemoglobin A1C 11.6 (A) 7.90 (A) 9.1 (A)   (A) Abnormal value       Comments are available for some flowsheets but are not being displayed.           Microalbumin    Microalbumin 4/20/22   Microalbumin, Urine <1.2           PSA    PSA 4/20/22   PSA 0.46      Comments are available for some flowsheets but are not being displayed.                       Assessment and Plan   Diagnoses and all orders for this visit:    1. Type 2 diabetes mellitus with hyperglycemia, unspecified whether long term insulin use (HCC) (Primary)  -     Microalbumin / Creatinine Urine Ratio - Urine, Clean Catch; Future  -     Comprehensive Metabolic Panel; Future  -     CBC & Differential; Future  -     Hemoglobin A1c; Future  -     Miscellaneous DME    2. Essential hypertension    3. Hyperlipidemia, unspecified hyperlipidemia type  -     Lipid Panel; Future    4. Anxiety and depression    5. Other irritable bowel syndrome    6. Need for influenza vaccination  -     FluLaval/Fluzone >6 mos ([id number])      1. Type 2 diabetes mellitus: Worsening.  HA1c 9.1 on Tresiba 50 units daily.  Farxiga was not approved so he has not been taking that.  He has just started taking Jardiance 25 mg once a day since HA1C was drawn about 1 week ago.  He reports blood sugars are now  in the mornings.  We discussed closely monitoring his blood sugar and if he starts getting low readings to decrease his Tresiba by 2 a day to 10 units/week.  He is to call if he is having difficulty regulating his blood sugars.  The patient agrees to this plan.     2. Hypertension: Blood pressure well controlled on lisinopril 10 mg daily and metoprolol XL 50 mg twice daily.  Continue current treatment plan.    3. Hyperlipidemia: Continue atorvastatin 40 mg daily.     4 Anxiety and depression: Stable on Lexapro 10 mg daily to continue.     5. Irritable bowel syndrome:  Stable on Robinul 2 mg daily.  Continue current treatment plan.     Patient was given instructions  and counseling regarding his condition or for health maintenance advice. Please see specific information pulled into the AVS if appropriate.     Follow Up   Return in about 3 months (around 4/25/2023) for Recheck.    DELL Morrison

## 2023-02-10 ENCOUNTER — APPOINTMENT (OUTPATIENT)
Dept: CT IMAGING | Facility: HOSPITAL | Age: 48
DRG: 441 | End: 2023-02-10
Payer: COMMERCIAL

## 2023-02-10 ENCOUNTER — HOSPITAL ENCOUNTER (INPATIENT)
Facility: HOSPITAL | Age: 48
LOS: 3 days | Discharge: HOME | DRG: 441 | End: 2023-02-13
Attending: EMERGENCY MEDICINE | Admitting: INTERNAL MEDICINE
Payer: COMMERCIAL

## 2023-02-10 ENCOUNTER — APPOINTMENT (OUTPATIENT)
Dept: ULTRASOUND IMAGING | Facility: HOSPITAL | Age: 48
DRG: 441 | End: 2023-02-10
Payer: COMMERCIAL

## 2023-02-10 ENCOUNTER — APPOINTMENT (OUTPATIENT)
Dept: GENERAL RADIOLOGY | Facility: HOSPITAL | Age: 48
DRG: 441 | End: 2023-02-10
Payer: COMMERCIAL

## 2023-02-10 DIAGNOSIS — N17.9 AKI (ACUTE KIDNEY INJURY): ICD-10-CM

## 2023-02-10 DIAGNOSIS — R07.9 CHEST PAIN, UNSPECIFIED TYPE: Primary | ICD-10-CM

## 2023-02-10 DIAGNOSIS — K75.9 HEPATITIS: ICD-10-CM

## 2023-02-10 DIAGNOSIS — R74.01 TRANSAMINITIS: ICD-10-CM

## 2023-02-10 LAB
ALBUMIN SERPL-MCNC: 4.1 G/DL (ref 3.5–5.2)
ALBUMIN/GLOB SERPL: 1.6 G/DL
ALP SERPL-CCNC: 494 U/L (ref 39–117)
ALT SERPL W P-5'-P-CCNC: 1589 U/L (ref 1–41)
ANION GAP SERPL CALCULATED.3IONS-SCNC: 27.4 MMOL/L (ref 5–15)
APAP SERPL-MCNC: <5 MCG/ML (ref 0–30)
AST SERPL-CCNC: >7000 U/L (ref 1–40)
BASOPHILS # BLD AUTO: 0.02 10*3/MM3 (ref 0–0.2)
BASOPHILS NFR BLD AUTO: 0.2 % (ref 0–1.5)
BILIRUB SERPL-MCNC: 2.4 MG/DL (ref 0–1.2)
BUN SERPL-MCNC: 18 MG/DL (ref 6–20)
BUN/CREAT SERPL: 10.3 (ref 7–25)
CALCIUM SPEC-SCNC: 8.4 MG/DL (ref 8.6–10.5)
CHLORIDE SERPL-SCNC: 89 MMOL/L (ref 98–107)
CK SERPL-CCNC: 150 U/L (ref 20–200)
CO2 SERPL-SCNC: 12.6 MMOL/L (ref 22–29)
CREAT SERPL-MCNC: 1.75 MG/DL (ref 0.76–1.27)
DEPRECATED RDW RBC AUTO: 42.2 FL (ref 37–54)
EGFRCR SERPLBLD CKD-EPI 2021: 47.1 ML/MIN/1.73
EOSINOPHIL # BLD AUTO: 0.01 10*3/MM3 (ref 0–0.4)
EOSINOPHIL NFR BLD AUTO: 0.1 % (ref 0.3–6.2)
ERYTHROCYTE [DISTWIDTH] IN BLOOD BY AUTOMATED COUNT: 12.9 % (ref 12.3–15.4)
ETHANOL BLD-MCNC: 11 MG/DL (ref 0–10)
ETHANOL UR QL: 0.01 %
FERRITIN SERPL-MCNC: ABNORMAL NG/ML (ref 30–400)
GEN 5 2HR TROPONIN T REFLEX: 19 NG/L
GLOBULIN UR ELPH-MCNC: 2.5 GM/DL
GLUCOSE BLDC GLUCOMTR-MCNC: 119 MG/DL (ref 70–99)
GLUCOSE BLDC GLUCOMTR-MCNC: 150 MG/DL (ref 70–99)
GLUCOSE SERPL-MCNC: 110 MG/DL (ref 65–99)
HCT VFR BLD AUTO: 38.8 % (ref 37.5–51)
HGB BLD-MCNC: 14.1 G/DL (ref 13–17.7)
HOLD SPECIMEN: NORMAL
HOLD SPECIMEN: NORMAL
IMM GRANULOCYTES # BLD AUTO: 0.02 10*3/MM3 (ref 0–0.05)
IMM GRANULOCYTES NFR BLD AUTO: 0.2 % (ref 0–0.5)
INR PPP: 1.21 (ref 0.86–1.15)
INR PPP: 1.23 (ref 0.86–1.15)
IRON 24H UR-MRATE: 305 MCG/DL (ref 59–158)
IRON SATN MFR SERPL: 97 % (ref 20–50)
LIPASE SERPL-CCNC: 15 U/L (ref 13–60)
LYMPHOCYTES # BLD AUTO: 0.61 10*3/MM3 (ref 0.7–3.1)
LYMPHOCYTES NFR BLD AUTO: 6 % (ref 19.6–45.3)
MAGNESIUM SERPL-MCNC: 2 MG/DL (ref 1.6–2.6)
MCH RBC QN AUTO: 32.9 PG (ref 26.6–33)
MCHC RBC AUTO-ENTMCNC: 36.3 G/DL (ref 31.5–35.7)
MCV RBC AUTO: 90.4 FL (ref 79–97)
MONOCYTES # BLD AUTO: 0.15 10*3/MM3 (ref 0.1–0.9)
MONOCYTES NFR BLD AUTO: 1.5 % (ref 5–12)
NEUTROPHILS NFR BLD AUTO: 9.37 10*3/MM3 (ref 1.7–7)
NEUTROPHILS NFR BLD AUTO: 92 % (ref 42.7–76)
NRBC BLD AUTO-RTO: 0.7 /100 WBC (ref 0–0.2)
NT-PROBNP SERPL-MCNC: 111.8 PG/ML (ref 0–450)
PLATELET # BLD AUTO: 247 10*3/MM3 (ref 140–450)
PMV BLD AUTO: 8.8 FL (ref 6–12)
POTASSIUM SERPL-SCNC: 4.8 MMOL/L (ref 3.5–5.2)
PROT SERPL-MCNC: 6.6 G/DL (ref 6–8.5)
PROTHROMBIN TIME: 15.5 SECONDS (ref 11.8–14.9)
PROTHROMBIN TIME: 15.7 SECONDS (ref 11.8–14.9)
RBC # BLD AUTO: 4.29 10*6/MM3 (ref 4.14–5.8)
SODIUM SERPL-SCNC: 129 MMOL/L (ref 136–145)
TIBC SERPL-MCNC: 316 MCG/DL (ref 298–536)
TRANSFERRIN SERPL-MCNC: 212 MG/DL (ref 200–360)
TROPONIN T DELTA: 0 NG/L
TROPONIN T SERPL HS-MCNC: 19 NG/L
WBC NRBC COR # BLD: 10.18 10*3/MM3 (ref 3.4–10.8)
WHOLE BLOOD HOLD COAG: NORMAL
WHOLE BLOOD HOLD SPECIMEN: NORMAL

## 2023-02-10 PROCEDURE — 94799 UNLISTED PULMONARY SVC/PX: CPT

## 2023-02-10 PROCEDURE — 71260 CT THORAX DX C+: CPT

## 2023-02-10 PROCEDURE — 99285 EMERGENCY DEPT VISIT HI MDM: CPT

## 2023-02-10 PROCEDURE — 76705 ECHO EXAM OF ABDOMEN: CPT

## 2023-02-10 PROCEDURE — 83540 ASSAY OF IRON: CPT | Performed by: PHYSICIAN ASSISTANT

## 2023-02-10 PROCEDURE — 82550 ASSAY OF CK (CPK): CPT | Performed by: EMERGENCY MEDICINE

## 2023-02-10 PROCEDURE — 71045 X-RAY EXAM CHEST 1 VIEW: CPT

## 2023-02-10 PROCEDURE — 83735 ASSAY OF MAGNESIUM: CPT | Performed by: EMERGENCY MEDICINE

## 2023-02-10 PROCEDURE — 80143 DRUG ASSAY ACETAMINOPHEN: CPT | Performed by: INTERNAL MEDICINE

## 2023-02-10 PROCEDURE — 83690 ASSAY OF LIPASE: CPT | Performed by: EMERGENCY MEDICINE

## 2023-02-10 PROCEDURE — 84466 ASSAY OF TRANSFERRIN: CPT | Performed by: PHYSICIAN ASSISTANT

## 2023-02-10 PROCEDURE — 84484 ASSAY OF TROPONIN QUANT: CPT | Performed by: EMERGENCY MEDICINE

## 2023-02-10 PROCEDURE — 99223 1ST HOSP IP/OBS HIGH 75: CPT | Performed by: INTERNAL MEDICINE

## 2023-02-10 PROCEDURE — 93005 ELECTROCARDIOGRAM TRACING: CPT | Performed by: EMERGENCY MEDICINE

## 2023-02-10 PROCEDURE — 93010 ELECTROCARDIOGRAM REPORT: CPT | Performed by: INTERNAL MEDICINE

## 2023-02-10 PROCEDURE — 85025 COMPLETE CBC W/AUTO DIFF WBC: CPT | Performed by: EMERGENCY MEDICINE

## 2023-02-10 PROCEDURE — 36415 COLL VENOUS BLD VENIPUNCTURE: CPT | Performed by: INTERNAL MEDICINE

## 2023-02-10 PROCEDURE — 99222 1ST HOSP IP/OBS MODERATE 55: CPT | Performed by: INTERNAL MEDICINE

## 2023-02-10 PROCEDURE — 83880 ASSAY OF NATRIURETIC PEPTIDE: CPT | Performed by: EMERGENCY MEDICINE

## 2023-02-10 PROCEDURE — 25010000002 ONDANSETRON PER 1 MG: Performed by: INTERNAL MEDICINE

## 2023-02-10 PROCEDURE — 0 IOPAMIDOL PER 1 ML: Performed by: EMERGENCY MEDICINE

## 2023-02-10 PROCEDURE — 25010000002 MORPHINE PER 10 MG: Performed by: INTERNAL MEDICINE

## 2023-02-10 PROCEDURE — 85610 PROTHROMBIN TIME: CPT | Performed by: EMERGENCY MEDICINE

## 2023-02-10 PROCEDURE — 80053 COMPREHEN METABOLIC PANEL: CPT | Performed by: EMERGENCY MEDICINE

## 2023-02-10 PROCEDURE — 82728 ASSAY OF FERRITIN: CPT | Performed by: INTERNAL MEDICINE

## 2023-02-10 PROCEDURE — 82077 ASSAY SPEC XCP UR&BREATH IA: CPT | Performed by: EMERGENCY MEDICINE

## 2023-02-10 PROCEDURE — 82962 GLUCOSE BLOOD TEST: CPT

## 2023-02-10 PROCEDURE — 85610 PROTHROMBIN TIME: CPT | Performed by: INTERNAL MEDICINE

## 2023-02-10 PROCEDURE — 80074 ACUTE HEPATITIS PANEL: CPT | Performed by: EMERGENCY MEDICINE

## 2023-02-10 RX ORDER — LEVOTHYROXINE SODIUM 125 UG/1
125 CAPSULE ORAL DAILY
COMMUNITY

## 2023-02-10 RX ORDER — AMOXICILLIN 250 MG
2 CAPSULE ORAL 2 TIMES DAILY
Status: DISCONTINUED | OUTPATIENT
Start: 2023-02-10 | End: 2023-02-13 | Stop reason: HOSPADM

## 2023-02-10 RX ORDER — SODIUM CHLORIDE 0.9 % (FLUSH) 0.9 %
10 SYRINGE (ML) INJECTION AS NEEDED
Status: DISCONTINUED | OUTPATIENT
Start: 2023-02-10 | End: 2023-02-13 | Stop reason: HOSPADM

## 2023-02-10 RX ORDER — INSULIN LISPRO 100 [IU]/ML
2-9 INJECTION, SOLUTION INTRAVENOUS; SUBCUTANEOUS
Status: DISCONTINUED | OUTPATIENT
Start: 2023-02-10 | End: 2023-02-13 | Stop reason: HOSPADM

## 2023-02-10 RX ORDER — SODIUM CHLORIDE 0.9 % (FLUSH) 0.9 %
10 SYRINGE (ML) INJECTION EVERY 12 HOURS SCHEDULED
Status: DISCONTINUED | OUTPATIENT
Start: 2023-02-10 | End: 2023-02-13 | Stop reason: HOSPADM

## 2023-02-10 RX ORDER — FAMOTIDINE 20 MG/1
40 TABLET, FILM COATED ORAL DAILY
Status: DISCONTINUED | OUTPATIENT
Start: 2023-02-10 | End: 2023-02-11

## 2023-02-10 RX ORDER — BISACODYL 5 MG/1
5 TABLET, DELAYED RELEASE ORAL DAILY PRN
Status: DISCONTINUED | OUTPATIENT
Start: 2023-02-10 | End: 2023-02-13 | Stop reason: HOSPADM

## 2023-02-10 RX ORDER — SODIUM CHLORIDE 9 MG/ML
40 INJECTION, SOLUTION INTRAVENOUS AS NEEDED
Status: DISCONTINUED | OUTPATIENT
Start: 2023-02-10 | End: 2023-02-13 | Stop reason: HOSPADM

## 2023-02-10 RX ORDER — BISACODYL 10 MG
10 SUPPOSITORY, RECTAL RECTAL DAILY PRN
Status: DISCONTINUED | OUTPATIENT
Start: 2023-02-10 | End: 2023-02-13 | Stop reason: HOSPADM

## 2023-02-10 RX ORDER — NICOTINE POLACRILEX 4 MG
15 LOZENGE BUCCAL
Status: DISCONTINUED | OUTPATIENT
Start: 2023-02-10 | End: 2023-02-13 | Stop reason: HOSPADM

## 2023-02-10 RX ORDER — DEXTROSE MONOHYDRATE 25 G/50ML
25 INJECTION, SOLUTION INTRAVENOUS
Status: DISCONTINUED | OUTPATIENT
Start: 2023-02-10 | End: 2023-02-13 | Stop reason: HOSPADM

## 2023-02-10 RX ORDER — ONDANSETRON 2 MG/ML
4 INJECTION INTRAMUSCULAR; INTRAVENOUS EVERY 6 HOURS PRN
Status: DISCONTINUED | OUTPATIENT
Start: 2023-02-10 | End: 2023-02-13 | Stop reason: HOSPADM

## 2023-02-10 RX ORDER — POLYETHYLENE GLYCOL 3350 17 G/17G
17 POWDER, FOR SOLUTION ORAL DAILY PRN
Status: DISCONTINUED | OUTPATIENT
Start: 2023-02-10 | End: 2023-02-13 | Stop reason: HOSPADM

## 2023-02-10 RX ORDER — METOPROLOL SUCCINATE 50 MG/1
50 TABLET, EXTENDED RELEASE ORAL DAILY
COMMUNITY

## 2023-02-10 RX ORDER — NITROGLYCERIN 0.4 MG/1
0.4 TABLET SUBLINGUAL
Status: DISCONTINUED | OUTPATIENT
Start: 2023-02-10 | End: 2023-02-13 | Stop reason: HOSPADM

## 2023-02-10 RX ORDER — MORPHINE SULFATE 2 MG/ML
2 INJECTION, SOLUTION INTRAMUSCULAR; INTRAVENOUS EVERY 4 HOURS PRN
Status: DISCONTINUED | OUTPATIENT
Start: 2023-02-10 | End: 2023-02-13 | Stop reason: HOSPADM

## 2023-02-10 RX ORDER — CHOLECALCIFEROL (VITAMIN D3) 125 MCG
5 CAPSULE ORAL NIGHTLY PRN
Status: DISCONTINUED | OUTPATIENT
Start: 2023-02-10 | End: 2023-02-13 | Stop reason: HOSPADM

## 2023-02-10 RX ORDER — MULTIPLE VITAMINS W/ MINERALS TAB 9MG-400MCG
1 TAB ORAL DAILY
Status: DISCONTINUED | OUTPATIENT
Start: 2023-02-10 | End: 2023-02-13 | Stop reason: HOSPADM

## 2023-02-10 RX ORDER — ASPIRIN 81 MG/1
324 TABLET, CHEWABLE ORAL ONCE
Status: DISCONTINUED | OUTPATIENT
Start: 2023-02-10 | End: 2023-02-13 | Stop reason: HOSPADM

## 2023-02-10 RX ADMIN — ONDANSETRON 4 MG: 2 INJECTION INTRAMUSCULAR; INTRAVENOUS at 16:06

## 2023-02-10 RX ADMIN — MORPHINE SULFATE 2 MG: 2 INJECTION, SOLUTION INTRAMUSCULAR; INTRAVENOUS at 20:18

## 2023-02-10 RX ADMIN — IOPAMIDOL 100 ML: 755 INJECTION, SOLUTION INTRAVENOUS at 10:15

## 2023-02-10 RX ADMIN — MULTIPLE VITAMINS W/ MINERALS TAB 1 TABLET: TAB at 16:05

## 2023-02-10 RX ADMIN — FAMOTIDINE 40 MG: 20 TABLET, FILM COATED ORAL at 16:06

## 2023-02-10 RX ADMIN — Medication 10 ML: at 20:18

## 2023-02-10 RX ADMIN — Medication 1 LOZENGE: at 20:17

## 2023-02-10 RX ADMIN — SODIUM CHLORIDE 2000 ML: 9 INJECTION, SOLUTION INTRAVENOUS at 09:58

## 2023-02-10 RX ADMIN — SENNOSIDES AND DOCUSATE SODIUM 2 TABLET: 8.6; 5 TABLET ORAL at 20:17

## 2023-02-10 RX ADMIN — MORPHINE SULFATE 2 MG: 2 INJECTION, SOLUTION INTRAMUSCULAR; INTRAVENOUS at 16:05

## 2023-02-10 RX ADMIN — Medication 1 LOZENGE: at 23:20

## 2023-02-10 NOTE — ED NOTES
Moved patient to exam 24 at this time with monitor- order received from Dr Levy to give patient something to drink- Report given to Ashlee edwards

## 2023-02-10 NOTE — CONSULTS
[facility]  Initial Inpatient Consult Note    Referring Provider: ER    Reason for Consultation: elevated liver enzymes    Subjective     History of present illness:    47 y.o. male with history of HTN, HLD, SVT, papillary thyroid carcinoma, cervical stenosis s/p surgery, and EtOH abuse who presented with c/o chest pain that radiates to back.  Pt reports associated nausea vomiting.  Pain started today, severe at times.  No fever, chills.  GI consulted for elevated liver enzymes.  Pt denies abd pain.  He reports starting flecainide and a new diabetes med in the last month.  He reports episode of diarrhea yesterday.  Returned from [place] 1/14.  He denies any drug use or new tattoos.  He drinks tequila daily but is unable to quantify.  Takes 2 Tylenol nightly.  No herbal medications.  Labs on presentation with WBC 10.1, Hgb 14.1, Plt 247, AST >7000, ALT 1589, alk phos 494, TB 2.4.  RUQ US showed CBD 4 mm and small gallstones.  Previous EGD/colonoscopy in [place] without any significant findings per patient.      Past Medical History:  Past Medical History:   Diagnosis Date   • Alcoholism (HCC) 1/16/2021   • Allergies    • Aneurysm (HCC)    • Anxiety 7/15/2022   • Bipolar 2 disorder (HCC) 7/20/2021   • Cancer (HCC)    • Cervical stenosis of spinal canal 11/30/2018    Formatting of this note might be different from the original. Added automatically from request for surgery [id number]   • Depression    • Diabetes mellitus (HCC)    • GERD (gastroesophageal reflux disease)    • History of colonic polyps 5/23/2017    Formatting of this note might be different from the original. Added automatically from request for surgery [id number]   • Hyperlipidemia    • Hypertension    • Lumbosacral spondylosis without myelopathy 8/29/2018   • Papillary thyroid carcinoma (HCC) 4/1/2020   • SVT (supraventricular tachycardia) (HCC) 1/23/2022     Past Surgical History:  Past Surgical History:   Procedure Laterality Date    • CARDIAC ABLATION  2022   • CARDIAC ELECTROPHYSIOLOGY PROCEDURE N/A 2022    Procedure: Ablation SVT HOLD Metoprolol 5 days;  Surgeon: Tai Lisa MD;  Location: [facility] INVASIVE LOCATION;  Service: Cardiovascular;  Laterality: N/A;   • CERVICAL DISC SURGERY     • THYROID SURGERY        Social History:   Social History     Tobacco Use   • Smoking status: Former     Packs/day: 1.00     Years: 11.00     Pack years: 11.00     Types: Cigarettes     Quit date: 2008     Years since quittin.5   • Smokeless tobacco: Never   Substance Use Topics   • Alcohol use: Yes     Comment: drinks 2-3 drinks 2-3 times/week.      Family History:  Family History   Problem Relation Age of Onset   • Arthritis Mother    • Diabetes Mother    • Hyperlipidemia Mother    • Hypertension Mother    • Osteoporosis Mother    • Rashes / Skin problems Mother    • Heart attack Father    • Hyperlipidemia Father    • Hypertension Father    • Diabetes Father    • Alcohol abuse Father    • Stroke Father    • Hypertension Sister    • Heart disease Maternal Uncle    • Hypertension Maternal Uncle    • Mental illness Paternal Aunt    • Heart disease Paternal Aunt    • Diabetes Paternal Aunt    • COPD Paternal Uncle    • Stroke Maternal Grandmother    • Thyroid disease Maternal Grandmother    • COPD Maternal Grandmother    • Hypertension Maternal Grandmother    • Heart disease Maternal Grandmother    • Hypertension Maternal Grandfather    • Diabetes Maternal Grandfather    • Cancer Maternal Grandfather    • Heart disease Maternal Grandfather    • Heart disease Paternal Grandmother    • Hypertension Paternal Grandmother    • Colon cancer Paternal Grandfather    • Cancer Paternal Grandfather        Home Meds:  (Not in a hospital admission)    Current Meds:   aspirin, 324 mg, Oral, Once      Allergies:  No Known Allergies  Review of Systems  Pertinent items are noted in HPI, all other systems reviewed and negative     Objective      Vital Signs  Temp:  [97.7 °F (36.5 °C)] 97.7 °F (36.5 °C)  Heart Rate:  [] 109  Resp:  [20] 20  BP: ()/(44-78) 100/78  Physical Exam:  General Appearance:    Alert, cooperative, in no acute distress   Head:    Normocephalic, without obvious abnormality, atraumatic   Eyes:          conjunctivae and sclerae normal, no icterus   Throat:   no thrush, oral mucosa moist   Neck:   Supple, no adenopathy   Lungs:     No chest tenderness    Heart:    No chest tenderness    Chest Wall:    No abnormalities observed   Abdomen:     Soft, nondistended, mild diffuse TTP   Extremities:   no edema, no redness   Skin:   No bruising or rash   Psychiatric:  normal mood and insight     Results Review:   I reviewed the patient's new clinical results.    Results from last 7 days   Lab Units 02/10/23  0920   WBC 10*3/mm3 10.18   HEMOGLOBIN g/dL 14.1   HEMATOCRIT % 38.8   PLATELETS 10*3/mm3 247     Results from last 7 days   Lab Units 02/10/23  0920   SODIUM mmol/L 129*   POTASSIUM mmol/L 4.8   CHLORIDE mmol/L 89*   CO2 mmol/L 12.6*   BUN mg/dL 18   CREATININE mg/dL 1.75*   CALCIUM mg/dL 8.4*   BILIRUBIN mg/dL 2.4*   ALK PHOS U/L 494*   ALT (SGPT) U/L 1,589*   AST (SGOT) U/L >7,000*   GLUCOSE mg/dL 110*     Results from last 7 days   Lab Units 02/10/23  0920   INR  1.23*     Lab Results   Lab Value Date/Time    LIPASE 15 02/10/2023 0920    LIPASE 19 01/08/2022 1017    LIPASE 105 01/18/2021 0059    LIPASE 68 01/16/2021 1505    LIPASE 93 03/12/2020 1813       Radiology:  US Liver   Final Result       1. Pancreas is obscured by bowel gas.   2. Mild hepatomegaly.  Hepatic steatosis likely present.  The liver was decreased density on recent    CT from same date.   3. Multiple small echogenic foci in the gallbladder are nonmobile.  These likely reflect small gall    stones.  Polyps thought less likely.     4. Gallbladder wall measures 3-4 millimeters.  This could partly relate to incomplete distention.     There is a negative  sonographic Swenson sign and no pericholecystic fluid to suggest cholecystitis.     Correlate with symptoms.                    AMRIT AMBROCIO MD          Electronically Signed and Approved By: AMRIT AMBROCIO MD on 2/10/2023 at 13:05                           CT Chest With Contrast Diagnostic   Final Result      XR Chest 1 View   Final Result          Assessment & Plan   Patient Active Problem List   Diagnosis   • Psychosis (HCC)   • DM (diabetes mellitus) (HCC)   • Hyperlipidemia   • Essential hypertension   • Postoperative hypothyroidism   • GERD (gastroesophageal reflux disease)   • Papillary thyroid carcinoma (HCC)   • Bipolar 2 disorder (HCC)   • SVT (supraventricular tachycardia) (HCC)   • Chest pain   • Anxiety   • Paroxysmal SVT (supraventricular tachycardia) (HCC)   • Alcoholic ketoacidosis   • Alcoholism (HCC)   • Cervical stenosis of spinal canal   • Dysphagia   • History of colonic polyps   • Lumbosacral spondylosis without myelopathy   • Family history of malignant neoplasm of gastrointestinal tract   • Palpitations   • Hepatitis       Assessment:  1. Elevated liver enzymes    Plan:  · Primarily hepatocellular pattern  · Given gallstones noted on RUQ US, one consideration would be choledocholithiasis; however, liver enzymes elevated out of proportion to alk phos and bili.  Pt reports he cannot have MRCP b/c of metal in his C-spine.  · Viral etiology is a consideration  · Will order additional liver workup.  · Recommend stool studies if develops diarrhea.      I discussed the patients findings and my recommendations with patient, family and primary care team.    Shania Grullon MD

## 2023-02-10 NOTE — ED PROVIDER NOTES
Time: 9:15 AM EST  Date of encounter:  2/10/2023  Independent Historian/Clinical History and Information was obtained by:   Patient and EMS  Chief Complaint: chest pain    History is limited by: N/A    History of Present Illness:  Patient is a 47 y.o. year old male who presents to the emergency department via EMS for evaluation of CP which onset 3 hrs ago. Pt also reports trouble breathing throughout the night. Pt was given 2 NTG and 324 ASA  en route. Pt has PMHx SVT, afib, HT, HLD and DM. He takes a blood thinner and follows with cardiology.   Pt was seen in the ER at 0910 EST.      History provided by:  EMS personnel and patient   used: No        Patient Care Team  Primary Care Provider: Lyla Guerrero APRN    Past Medical History:     No Known Allergies  Past Medical History:   Diagnosis Date   • Alcoholism ([facility]) 1/16/2021   • Allergies    • Aneurysm ([facility])    • Anxiety 7/15/2022   • Bipolar 2 disorder ([facility]) 7/20/2021   • Cancer ([facility])    • Cervical stenosis of spinal canal 11/30/2018    Formatting of this note might be different from the original. Added automatically from request for surgery [id number]   • Depression    • Diabetes mellitus ([facility])    • GERD (gastroesophageal reflux disease)    • History of colonic polyps 5/23/2017    Formatting of this note might be different from the original. Added automatically from request for surgery [id number]   • Hyperlipidemia    • Hypertension    • Lumbosacral spondylosis without myelopathy 8/29/2018   • Papillary thyroid carcinoma ([facility]) 4/1/2020   • SVT (supraventricular tachycardia) ([facility]) 1/23/2022     Past Surgical History:   Procedure Laterality Date   • CARDIAC ABLATION  09/09/2022   • CARDIAC ELECTROPHYSIOLOGY PROCEDURE N/A 09/09/2022    Procedure: Ablation SVT HOLD Metoprolol 5 days;  Surgeon: Tai Lisa MD;  Location: [facility] INVASIVE LOCATION;  Service: Cardiovascular;  Laterality: N/A;   • CERVICAL DISC SURGERY     • THYROID SURGERY        Family History   Problem Relation Age of Onset   • Arthritis Mother    • Diabetes Mother    • Hyperlipidemia Mother    • Hypertension Mother    • Osteoporosis Mother    • Rashes / Skin problems Mother    • Heart attack Father    • Hyperlipidemia Father    • Hypertension Father    • Diabetes Father    • Alcohol abuse Father    • Stroke Father    • Hypertension Sister    • Heart disease Maternal Uncle    • Hypertension Maternal Uncle    • Mental illness Paternal Aunt    • Heart disease Paternal Aunt    • Diabetes Paternal Aunt    • COPD Paternal Uncle    • Stroke Maternal Grandmother    • Thyroid disease Maternal Grandmother    • COPD Maternal Grandmother    • Hypertension Maternal Grandmother    • Heart disease Maternal Grandmother    • Hypertension Maternal Grandfather    • Diabetes Maternal Grandfather    • Cancer Maternal Grandfather    • Heart disease Maternal Grandfather    • Heart disease Paternal Grandmother    • Hypertension Paternal Grandmother    • Colon cancer Paternal Grandfather    • Cancer Paternal Grandfather        Home Medications:  Prior to Admission medications    Medication Sig Start Date End Date Taking? Authorizing Provider   Accu-Chek FastClix Lancets misc 1 each Daily. 9/28/22   Lyla Guerrero APRN   atorvastatin (LIPITOR) 40 MG tablet Take 1 tablet by mouth Every Night. Indications: High Amount of Fats in the Blood 7/15/22   Lyla Guerrero APRN   Blood Glucose Monitoring Suppl (Accu-Chek Guide Me) w/Device kit 1 each by Other route Daily. 12/14/22   Lyla Guerrero APRN   cetirizine (zyrTEC) 10 MG tablet Take 10 mg by mouth Daily.    Provider, MD Kishore   Continuous Blood Gluc  (FreeStyle Cooper 14 Day [place]) device 1 each Daily. 10/19/22   Lyla Guerrero APRN   Continuous Blood Gluc Sensor (FreeStyle Cooper 14 Day Sensor) misc 1 each Daily. 10/19/22   Lyla Guerrero APRN   dapagliflozin (Farxiga) 5 MG tablet tablet Take 1 tablet by mouth Daily. 10/19/22    Lyla Guerrero APRN   dilTIAZem CD (CARDIZEM CD) 180 MG 24 hr capsule Take 180 mg by mouth Daily. 11/23/22   Kishore Galo MD   empagliflozin (JARDIANCE) 25 MG tablet tablet Take 25 mg by mouth Daily. 12/5/22   Kishore Galo MD   escitalopram (LEXAPRO) 10 MG tablet Take 1 tablet by mouth Daily. 7/15/22   Lyla Guerrero APRN   flecainide (TAMBOCOR) 50 MG tablet Take 50 mg by mouth 2 (Two) Times a Day. 12/11/22   Kishore Galo MD   fluticasone (FLONASE) 50 MCG/ACT nasal spray 2 sprays into the nostril(s) as directed by provider Daily.    Kishore Galo MD   glipizide (Glucotrol) 5 MG tablet Take 1 tablet by mouth Daily. Half tablet per day for the first 3 days.  If blood sugar is still persistently greater than 200 increase the dose to 1 tablet daily thereafter.  Immediately discontinue this medication if your blood sugar is ever under 60. 11/2/22   Zafar Jimenez MD   glucose blood test strip 1 each by Other route As Needed (as needed 3x a day). Use as instructed 9/20/22   Lyla Guerrero APRN   glucose blood test strip Use as instructed 9/28/22   Lyla Guerrero APRN   glucose monitor monitoring kit 1 each As Needed.    Kishore Galo MD   glycopyrrolate (ROBINUL) 2 MG tablet Take 1 tablet by mouth Daily. 1/23/23   Lyla Guerrero APRN   Insulin Degludec (TRESIBA SC) Inject 36 Units under the skin into the appropriate area as directed every night at bedtime.    Kishore Galo MD   Insulin Pen Needle (NovoFine Plus Pen Needle) 32G X 4 MM misc 1 each Daily. 9/20/22   Lyla Guerrero APRN   Jardiance 25 MG tablet tablet Take 25 mg by mouth Daily. 1/6/23   Kishore Galo MD   Lancets (OneTouch Delica Plus [id number]) misc 1 each Daily.    Kishore Galo MD   levothyroxine sodium (TIROSINT) 125 MCG capsule capsule Take 2 capsules by mouth Daily. 10/4/22   Kishore Galo MD   lisinopril (PRINIVIL,ZESTRIL) 10 MG tablet Take 1 tablet by mouth  Daily. 6/10/22   Yao Keen MD   metoprolol succinate XL (TOPROL-XL) 50 MG 24 hr tablet Take 1 tablet by mouth 2 (Two) Times a Day. 22   Jose Angel Wei MD   Novofine Pen Needle 32G X 6 MM misc Inject 1 each as directed Daily. 22   Kishore Galo MD   omeprazole (priLOSEC) 20 MG capsule Take  by mouth Daily.    Kishore Galo MD   ondansetron (Zofran) 4 MG tablet Take 1 tablet by mouth Every 8 (Eight) Hours As Needed for Nausea or Vomiting. 22   Jose Angel Wei MD   Tirosint 125 MCG capsule capsule Take 125 mcg by mouth Daily. Patient takes 2 tablets once a day 22   Kishore Galo MD   Vitamin D, Cholecalciferol, (CHOLECALCIFEROL) 10 MCG (400 UNIT) tablet Take 400 Units by mouth Daily.    Kishore Galo MD        Social History:   Social History     Tobacco Use   • Smoking status: Former     Packs/day: 1.00     Years: 11.00     Pack years: 11.00     Types: Cigarettes     Quit date: 2008     Years since quittin.5   • Smokeless tobacco: Never   Vaping Use   • Vaping Use: Never used   Substance Use Topics   • Alcohol use: Yes     Comment: drinks 2-3 drinks 2-3 times/week.   • Drug use: Never         Review of Systems:  Review of Systems   Constitutional: Negative for chills and fever.   HENT: Negative for congestion, rhinorrhea and sore throat.    Eyes: Negative for pain and visual disturbance.   Respiratory: Positive for shortness of breath. Negative for apnea, cough and chest tightness.    Cardiovascular: Positive for chest pain. Negative for palpitations.   Gastrointestinal: Negative for abdominal pain, diarrhea, nausea and vomiting.   Genitourinary: Negative for difficulty urinating and dysuria.   Musculoskeletal: Negative for joint swelling and myalgias.   Skin: Negative for color change.   Neurological: Negative for seizures and headaches.   Psychiatric/Behavioral: Negative.    All other systems reviewed and are negative.       Physical  "Exam:  /68   Pulse 115   Temp 97.7 °F (36.5 °C)   Resp 20   Ht 182.9 cm (72\")   Wt 112 kg (246 lb 0.5 oz)   SpO2 95%   BMI 33.37 kg/m²     Physical Exam  Vitals and nursing note reviewed.   Constitutional:       General: He is not in acute distress.     Appearance: Normal appearance. He is not toxic-appearing.   HENT:      Head: Normocephalic and atraumatic.      Jaw: There is normal jaw occlusion.   Eyes:      General: Lids are normal.      Extraocular Movements: Extraocular movements intact.      Conjunctiva/sclera: Conjunctivae normal.      Pupils: Pupils are equal, round, and reactive to light.   Cardiovascular:      Rate and Rhythm: Normal rate and regular rhythm.      Pulses: Normal pulses.      Heart sounds: Normal heart sounds.   Pulmonary:      Effort: Pulmonary effort is normal. No respiratory distress.      Breath sounds: Normal breath sounds. No wheezing or rhonchi.   Abdominal:      General: Abdomen is flat.      Palpations: Abdomen is soft.      Tenderness: There is no abdominal tenderness. There is no guarding or rebound.   Musculoskeletal:         General: Normal range of motion.      Cervical back: Normal range of motion and neck supple.      Right lower leg: No edema.      Left lower leg: No edema.   Skin:     General: Skin is warm and dry.   Neurological:      Mental Status: He is alert and oriented to person, place, and time. Mental status is at baseline.   Psychiatric:         Mood and Affect: Mood normal.                  Procedures:  Procedures      Medical Decision Making:      Comorbidities that affect care:    Alcoholism, Diabetes, Hypertension    External Notes reviewed:    Previous Clinic Note: Last month for maintenance of chronic medical conditions.      The following orders were placed and all results were independently analyzed by me:  Orders Placed This Encounter   Procedures   • XR Chest 1 View   • CT Chest With Contrast Diagnostic   • US Liver   • Oracle Draw   • High " Sensitivity Troponin T   • Comprehensive Metabolic Panel   • Lipase   • BNP   • Magnesium   • CBC Auto Differential   • High Sensitivity Troponin T 2Hr   • Ethanol   • Hepatitis Panel, Acute   • Protime-INR   • CK   • CBC Auto Differential   • Comprehensive Metabolic Panel   • Magnesium   • Phosphorus   • Protime-INR   • Potassium   • Magnesium   • NPO Diet NPO Type: Strict NPO   • Undress & Gown   • Continuous Pulse Oximetry   • Vital Signs   • Notify Provider (With Default Parameters)   • Intake & Output   • Weigh Patient   • Daily Weights   • Oral Care   • Place Sequential Compression Device   • Maintain Sequential Compression Device   • Telemetry - Maintain IV Access   • Continuous Cardiac Monitoring   • Telemetry - Pulse Oximetry   • Code Status and Medical Interventions:   • Gastroenterology (on-call MD unless specified)   • Inpatient Hospitalist Consult   • Inpatient Gastroenterology Consult   • Oxygen Therapy- Nasal Cannula; Titrate for SPO2: 90% - 95%   • Oxygen Therapy- Nasal Cannula; Titrate for SPO2: 90% - 95%   • POC Glucose Once   • POC Glucose TID AC   • ECG 12 Lead ED Triage Standing Order; Chest Pain   • ECG 12 Lead ED Triage Standing Order; Chest Pain   • ECG 12 Lead ED Triage Standing Order; Chest Pain   • ECG 12 Lead Chest Pain   • Insert Peripheral IV   • Insert Peripheral IV   • Inpatient Admission   • CBC & Differential   • Green Top (Gel)   • Lavender Top   • Gold Top - SST   • Light Blue Top       Medications Given in the Emergency Department:  Medications   sodium chloride 0.9 % flush 10 mL (has no administration in time range)   aspirin chewable tablet 324 mg (0 mg Oral Hold 2/10/23 4273)   sodium chloride 0.9 % flush 10 mL (has no administration in time range)   sodium chloride 0.9 % flush 10 mL (has no administration in time range)   sodium chloride 0.9 % infusion 40 mL (has no administration in time range)   famotidine (PEPCID) tablet 40 mg (has no administration in time range)    sennosides-docusate (PERICOLACE) 8.6-50 MG per tablet 2 tablet (has no administration in time range)     And   polyethylene glycol (MIRALAX) packet 17 g (has no administration in time range)     And   bisacodyl (DULCOLAX) EC tablet 5 mg (has no administration in time range)     And   bisacodyl (DULCOLAX) suppository 10 mg (has no administration in time range)   ondansetron (ZOFRAN) injection 4 mg (has no administration in time range)   multivitamin with minerals 1 tablet (has no administration in time range)   melatonin tablet 5 mg (has no administration in time range)   nitroglycerin (NITROSTAT) SL tablet 0.4 mg (has no administration in time range)   dextrose (GLUTOSE) oral gel 15 g (has no administration in time range)   dextrose (D50W) (25 g/50 mL) IV injection 25 g (has no administration in time range)   glucagon (GLUCAGEN) injection 1 mg (has no administration in time range)   Insulin Lispro (humaLOG) injection 2-9 Units (has no administration in time range)   sodium chloride 0.9 % bolus 2,000 mL (0 mL Intravenous Stopped 2/10/23 1036)   iopamidol (ISOVUE-370) 76 % injection 100 mL (100 mL Intravenous Given 2/10/23 1015)        ED Course:         Labs:    Lab Results (last 24 hours)     Procedure Component Value Units Date/Time    High Sensitivity Troponin T [[id number]]  (Abnormal) Collected: 02/10/23 0920    Specimen: Blood Updated: 02/10/23 1026     HS Troponin T 19 ng/L     Narrative:      High Sensitive Troponin T Reference Range:  <10.0 ng/L- Negative Female for AMI  <15.0 ng/L- Negative Male for AMI  >=10 - Abnormal Female indicating possible myocardial injury.  >=15 - Abnormal Male indicating possible myocardial injury.   Clinicians would have to utilize clinical acumen, EKG, Troponin, and serial changes to determine if it is an Acute Myocardial Infarction or myocardial injury due to an underlying chronic condition.         CBC & Differential [[id number]]  (Abnormal) Collected: 02/10/23 0920     Specimen: Blood Updated: 02/10/23 0926    Narrative:      The following orders were created for panel order CBC & Differential.  Procedure                               Abnormality         Status                     ---------                               -----------         ------                     CBC Auto Differential[[id number]]        Abnormal            Final result                 Please view results for these tests on the individual orders.    Comprehensive Metabolic Panel [[id number]]  (Abnormal) Collected: 02/10/23 0920    Specimen: Blood Updated: 02/10/23 1040     Glucose 110 mg/dL      BUN 18 mg/dL      Creatinine 1.75 mg/dL      Sodium 129 mmol/L      Potassium 4.8 mmol/L      Comment: Slight hemolysis detected by analyzer. Results may be affected.        Chloride 89 mmol/L      CO2 12.6 mmol/L      Calcium 8.4 mg/dL      Total Protein 6.6 g/dL      Albumin 4.1 g/dL      ALT (SGPT) 1,589 U/L      AST (SGOT) >7,000 U/L      Alkaline Phosphatase 494 U/L      Total Bilirubin 2.4 mg/dL      Globulin 2.5 gm/dL      A/G Ratio 1.6 g/dL      BUN/Creatinine Ratio 10.3     Anion Gap 27.4 mmol/L      eGFR 47.1 mL/min/1.73     Narrative:      GFR Normal >60  Chronic Kidney Disease <60  Kidney Failure <15      Lipase [[id number]]  (Normal) Collected: 02/10/23 0920    Specimen: Blood Updated: 02/10/23 0948     Lipase 15 U/L     BNP [[id number]]  (Normal) Collected: 02/10/23 0920    Specimen: Blood Updated: 02/10/23 0946     proBNP 111.8 pg/mL     Narrative:      Among patients with dyspnea, NT-proBNP is highly sensitive for the detection of acute congestive heart failure. In addition NT-proBNP of <300 pg/ml effectively rules out acute congestive heart failure with 99% negative predictive value.      Magnesium [[id number]]  (Normal) Collected: 02/10/23 0920    Specimen: Blood Updated: 02/10/23 0948     Magnesium 2.0 mg/dL     CBC Auto Differential [[id number]]  (Abnormal) Collected: 02/10/23 0920    Specimen: Blood  Updated: 02/10/23 0926     WBC 10.18 10*3/mm3      RBC 4.29 10*6/mm3      Hemoglobin 14.1 g/dL      Hematocrit 38.8 %      MCV 90.4 fL      MCH 32.9 pg      MCHC 36.3 g/dL      RDW 12.9 %      RDW-SD 42.2 fl      MPV 8.8 fL      Platelets 247 10*3/mm3      Neutrophil % 92.0 %      Lymphocyte % 6.0 %      Monocyte % 1.5 %      Eosinophil % 0.1 %      Basophil % 0.2 %      Immature Grans % 0.2 %      Neutrophils, Absolute 9.37 10*3/mm3      Lymphocytes, Absolute 0.61 10*3/mm3      Monocytes, Absolute 0.15 10*3/mm3      Eosinophils, Absolute 0.01 10*3/mm3      Basophils, Absolute 0.02 10*3/mm3      Immature Grans, Absolute 0.02 10*3/mm3      nRBC 0.7 /100 WBC     Ethanol [[id number]]  (Abnormal) Collected: 02/10/23 0920    Specimen: Blood Updated: 02/10/23 1112     Ethanol 11 mg/dL      Ethanol % 0.011 %     Narrative:      Ethanol (Plasma)  <10 Essentially Negative    Toxic Concentrations           mg/dL    Flushing, slowing of reflexes    Impaired visual activity         Depression of CNS              >100  Possible Coma                  >300       Protime-INR [[id number]]  (Abnormal) Collected: 02/10/23 0920    Specimen: Blood Updated: 02/10/23 1242     Protime 15.7 Seconds      INR 1.23    Narrative:      Suggested Therapeutic Ranges For Oral Anticoagulant Therapy:  Level of Therapy                      INR Target Range  Standard Dose                            2.0-3.0  High Dose                                2.5-3.5  Patients not receiving anticoagulant  Therapy Normal Range                     0.86-1.15    POC Glucose Once [[id number]]  (Abnormal) Collected: 02/10/23 0925    Specimen: Blood Updated: 02/10/23 0927     Glucose 119 mg/dL      Comment: Serial Number: [id number]:  [id number]       High Sensitivity Troponin T 2Hr [[id number]]  (Abnormal) Collected: 02/10/23 1113    Specimen: Blood Updated: 02/10/23 1155     HS Troponin T 19 ng/L      Troponin T Delta 0 ng/L     Narrative:      High  Sensitive Troponin T Reference Range:  <10.0 ng/L- Negative Female for AMI  <15.0 ng/L- Negative Male for AMI  >=10 - Abnormal Female indicating possible myocardial injury.  >=15 - Abnormal Male indicating possible myocardial injury.   Clinicians would have to utilize clinical acumen, EKG, Troponin, and serial changes to determine if it is an Acute Myocardial Infarction or myocardial injury due to an underlying chronic condition.         CK [[id number]]  (Normal) Collected: 02/10/23 1113    Specimen: Blood Updated: 02/10/23 1237     Creatine Kinase 150 U/L     Hepatitis Panel, Acute [[id number]] Collected: 02/10/23 1242    Specimen: Blood Updated: 02/10/23 1315           Imaging:    CT Chest With Contrast Diagnostic    Result Date: 2/10/2023  PROCEDURE: CT CHEST W CONTRAST DIAGNOSTIC  COMPARISON:  [facility], CT, CT CHEST PULMONARY EMBOLISM, 1/23/2022, 2:03. INDICATIONS: PASS OUT, CHEST PAIN, WHEEZING, WEAKNESS, AND SHORTNESS OF BREATH X TODAY  PROTOCOL:   Pulmonary embolism imaging protocol performed    RADIATION:   DLP: 620.6 mGy*cm   Automated exposure control was utilized to minimize radiation dose. CONTRAST: 100 cc Isovue 370 I.V.  TECHNIQUE: Axial images of the chest with intravenous contrast.  FINDINGS: No pulmonary emboli are identified.  The thoracic aorta has a normal caliber.  Coronary artery calcification is present.  No evidence of pneumothorax.  No evidence of pleural or pericardial effusion.  Prior thyroidectomy.  No suspicious pulmonary masses or acute parenchymal consolidations are identified.  Degenerative changes are present in the thoracic spine.  There is fatty infiltration of the visualized liver.  Stable mild pancreatic calcifications suggesting chronic pancreatitis.  There is no mediastinal, axillary or hilar adenopathy.  There is a small hiatal hernia.  IMPRESSION:  No acute findings.  No pulmonary emboli are identified.  CASIMIRO CRAWFORD MD       Electronically Signed and  Approved By: CASIMIRO CRAWFORD MD on 2/10/2023 at 10:33             US Liver    Result Date: 2/10/2023  PROCEDURE: US LIVER  COMPARISON: [facility], CT, CT CHEST W CONTRAST DIAGNOSTIC, 2/10/2023, 10:09.  INDICATIONS: severe transaminitis  FINDINGS:  The pancreas is obscured by bowel gas.  The liver measures 20.6 centimeters in length.  Increased echogenicity of the right renal cortex relative to right renal cortex.  The gallbladder demonstrates multiple echogenic foci that are nonmobile..  Common duct measures up to about 4 millimeters in diameter.  Right kidney is normal measuring 11.9 centimeters in maximal length.  There is no gallbladder wall thickening.  Negative sonographic Swenson sign.  Gallbladder not fully distended.  The portal venous flow is normal and hepatic veins are patent.        1. Pancreas is obscured by bowel gas. 2. Mild hepatomegaly.  Hepatic steatosis likely present.  The liver was decreased density on recent CT from same date. 3. Multiple small echogenic foci in the gallbladder are nonmobile.  These likely reflect small gall stones.  Polyps thought less likely.  4. Gallbladder wall measures 3-4 millimeters.  This could partly relate to incomplete distention.  There is a negative sonographic Swenson sign and no pericholecystic fluid to suggest cholecystitis.  Correlate with symptoms.      AMRIT AMBROCIO MD       Electronically Signed and Approved By: AMRIT AMBROCIO MD on 2/10/2023 at 13:05             XR Chest 1 View    Result Date: 2/10/2023  PROCEDURE: XR CHEST 1 VW  COMPARISON: [facility], CR, XR CHEST 1 VW, 1/23/2022, 0:48.  INDICATIONS: Chest Pain  FINDINGS:   The lungs are well-expanded. The heart and pulmonary vasculature are within normal limits. No pleural effusions are identified. There are no active appearing infiltrates.  No evidence of pneumothorax.  Prior ACDF in the lower cervical spine.  IMPRESSION: No active disease.  CASIMIRO CRAWFORD MD        Electronically Signed and Approved By: CASIMIRO CRAWFORD MD on 2/10/2023 at 9:33                 Differential Diagnosis and Discussion:    Chest Pain:  Based on the patient's signs and symptoms, I considered aortic dissection, myocardial infaction, pulmonary embolism, cardiac tamponade, pericarditis, pneumothorax, musculoskeletal chest pain and other differential diagnosis as an etiology of the patient's chest pain.     All labs were reviewed and interpreted by me.  All X-rays were independently reviewed by me.  EKG was interpreted by me.    MDM  Number of Diagnoses or Management Options  Chest pain, unspecified type  Transaminitis  Diagnosis management comments: In summary this is a 47-year-old male who presents emerged department with complaints of chest pain.  EMS administered aspirin and several sublingual nitroglycerin.  Upon arrival patient is hypotensive.  Troponin equivocal.  EKG shows sinus rhythm, no acute ischemia.  CBC independently reviewed by me and shows no critical abnormalities.  CMP reveals SURESH with severe transaminitis.  Patient has no abdominal pain examination.  I discussed the case with gastroenterology on-call who states that according to the pattern of elevation this is probably viral hepatitis and requests a hepatitis panel to be ordered.  Patient will be admitted to the hospital for further treatment and delineation of his issues.  Patient case has been discussed with the hospitalist team who will admit to the hospital for further evaluation and continuation of treatment.       Amount and/or Complexity of Data Reviewed  Clinical lab tests: reviewed  Tests in the radiology section of CPT®: reviewed  Tests in the medicine section of CPT®: reviewed    Patient Progress  Patient progress: stable (11:15 EST Updated patient on results and plan for admission. His liver enzymes are elevated. Pt states he has a history of elevated liver enzymes which usually go down with medication and diet change. Pt  also has hx of pancreatitis. He admits to drinking a lot of alcohol the day before yesterday which was unusual for him. Pt still has his gallbladder. He does not see a GI specialist.  Patient expressed understanding and agreement. All questions answered at this time. )           Patient Care Considerations:    MRI: I considered ordering an MRI however Patient with no abdominal pain therefore MRCP not indicated at this time.      Consultants/Shared Management Plan:    Hospitalist: I have discussed the case with Dr. Ontiveros who agrees to accept the patient for admission.  Consultant: I have discussed the case with Dr. Grullon who agrees to consult on the patient.    Social Determinants of Health:    Patient is independent, reliable, and has access to care.       Disposition and Care Coordination:    Admit:   Through independent evaluation of the patient's history, physical, and imperical data, the patient meets criteria for observation/admission to the hospital.        Final diagnoses:   Chest pain, unspecified type   Transaminitis        ED Disposition     ED Disposition   Decision to Admit    Condition   --    Comment   Level of Care: Telemetry [5]   Diagnosis: Hepatitis [735043]   Admitting Physician: FLORIAN ONTIVEROS [[id number]]   Attending Physician: FLORIAN ONTIVEROS [[id number]]   Certification: I Certify That Inpatient Hospital Services Are Medically Necessary For Greater Than 2 Midnights               This medical record created using voice recognition software.    Documentation assistance provided by Vaishnavi De La O acting as scribe for Florian Ontiveros MD. Information recorded by the scribe was done at my direction and has been verified and validated by me.          Vaishnavi De La O  02/10/23 0921       Vaishnavi De La O  02/10/23 1121       Jose Elias Lou MD  02/10/23 4480

## 2023-02-10 NOTE — DISCHARGE SUMMARY
[facility]  Date: 2/10/2023   Patient Name: Wayne Guan  : 1975  MRN: [id number]  Primary Care Physician:  Lyla Guerrero APRN  Date of admission: 2/10/2023    Subjective   Subjective     Chief Complaint: Pain    HPI:    Wayne Guan is a 47 y.o. male with a past medical history significant for alcoholism, bipolar disorder, anxiety depression, diabetes, GERD presents to the hospital for evaluation of chest pain which started 3 hours ago, patient reported shortness of breath, patient was given nitroglycerin in route and aspirin.  Patient presents to the hospital where he was found to have intractable nausea and vomiting, patient's LFTs were markedly elevated in a hepatocellular pattern, patient underwent EKG which was negative for ischemia.  Patient's high-sensitivity troponin was equivocal.  Patient was given 2 L of IV fluid and the hospital service was asked to admit for further work-up and management.      Personal History     Past Medical History:  Past Medical History:   Diagnosis Date   • Alcoholism (HCC) 2021   • Allergies    • Aneurysm (HCC)    • Anxiety 7/15/2022   • Bipolar 2 disorder (HCC) 2021   • Cancer (HCC)    • Cervical stenosis of spinal canal 2018   • Depression    • Diabetes mellitus (HCC)    • GERD (gastroesophageal reflux disease)    • History of colonic polyps 2017   • Hyperlipidemia    • Hypertension    • Lumbosacral spondylosis without myelopathy 2018   • Papillary thyroid carcinoma (HCC) 2020   • SVT (supraventricular tachycardia) (HCC) 2022       Past Surgical History:  Past Surgical History:   Procedure Laterality Date   • CARDIAC ABLATION  2022   • CARDIAC ELECTROPHYSIOLOGY PROCEDURE N/A 2022    Procedure: Ablation SVT HOLD Metoprolol 5 days;  Surgeon: Tai Lisa MD;  Location: [facility] INVASIVE LOC[facility];  Service: Cardiovascular;  Laterality: N/A;   • CERVICAL DISC SURGERY     •  THYROID SURGERY         Family History:   family history includes Alcohol abuse in his father; Arthritis in his mother; COPD in his maternal grandmother and paternal uncle; Cancer in his maternal grandfather and paternal grandfather; Colon cancer in his paternal grandfather; Diabetes in his father, maternal grandfather, mother, and paternal aunt; Heart attack in his father; Heart disease in his maternal grandfather, maternal grandmother, maternal uncle, paternal aunt, and paternal grandmother; Hyperlipidemia in his father and mother; Hypertension in his father, maternal grandfather, maternal grandmother, maternal uncle, mother, paternal grandmother, and sister; Mental illness in his paternal aunt; Osteoporosis in his mother; Rashes / Skin problems in his mother; Stroke in his father and maternal grandmother; Thyroid disease in his maternal grandmother.    Social History:    reports that he quit smoking about 14 years ago. His smoking use included cigarettes. He has a 11.00 pack-year smoking history. He has never used smokeless tobacco. He reports current alcohol use. He reports that he does not use drugs.    Home Medications:  Insulin Degludec, Vitamin D (Cholecalciferol), atorvastatin, cetirizine, dilTIAZem CD, empagliflozin, escitalopram, flecainide, fluticasone, glycopyrrolate, levothyroxine sodium, lisinopril, metoprolol succinate XL, omeprazole, and ondansetron    Allergies:  No Known Allergies    Review of Systems:  All systems reviewed and negative other than stated in HPI    Objective   Objective     Vitals:   Temp:  [97.7 °F (36.5 °C)] 97.7 °F (36.5 °C)  Heart Rate:  [] 115  Resp:  [20] 20  BP: ()/(44-78) 103/68    Physical Exam    Constitutional: Awake, alert, no acute distress   Eyes: Pupils equal, sclerae anicteric, no conjunctival injection   HENT: NCAT, mucous membranes moist   Neck: Supple, no thyromegaly, no lymphadenopathy, trachea midline   Respiratory: Clear to auscultation  bilaterally, nonlabored respirations    Cardiovascular: RRR, no murmurs, rubs, or gallops   Gastrointestinal: Positive bowel sounds, soft, nontender, nondistended   Musculoskeletal: No bilateral ankle edema, no clubbing or cyanosis to extremities   Psychiatric: Appropriate affect, cooperative   Neurologic: Oriented x 3, strength symmetric in all extremities, Cranial Nerves grossly intact to confrontation, speech clear   Skin: No rashes     Result Review:  I have personally reviewed the results from the time of this admission to 2/10/2023 15:49 EST and agree with these findings:  [x]  Laboratory  CMP    CMP 9/11/22 11/2/22 2/10/23   Glucose 185 (A) 301 (A) 110 (A)   BUN 9 12 18   Creatinine 1.23 1.16 1.75 (A)   eGFR 72.0 77.2 47.1 (A)   Sodium 133 (A) 131 (A) 129 (A)   Potassium 4.0 4.9 4.8   Chloride 97 (A) 95 (A) 89 (A)   Calcium 8.6 9.3 8.4 (A)   Total Protein  6.9 6.6   Albumin  4.30 4.1   Globulin  2.6 2.5   Total Bilirubin  0.3 2.4 (A)   Alkaline Phosphatase  84 494 (A)   AST (SGOT)  17 >7,000 (A)   ALT (SGPT)  17 1,589 (A)   Albumin/Globulin Ratio  1.7 1.6   BUN/Creatinine Ratio 7.3 10.3 10.3   Anion Gap 10.0 10.5 27.4 (A)   (A) Abnormal value       Comments are available for some flowsheets but are not being displayed.           CBC    CBC 9/9/22 11/2/22 2/10/23   WBC 5.71 6.31 10.18   RBC 4.97 3.86 (A) 4.29   Hemoglobin 15.5 12.3 (A) 14.1   Hematocrit 43.8 34.7 (A) 38.8   MCV 88.1 89.9 90.4   MCH 31.2 31.9 32.9   MCHC 35.4 35.4 36.3 (A)   RDW 12.8 12.3 12.9   Platelets 258 278 247   (A) Abnormal value            []  Microbiology  []  Radiology  []  EKG/Telemetry   []  Cardiology/Vascular   []  Pathology  []  Old records  []  Other:      Assessment & Plan   Assessment / Plan     Assessment:   Hepatitis  Intractable nausea and vomiting  History of alcohol use  Bipolar disorder  Cervical stenosis of the spine  Diabetes mellitus  GERD  Hyperlipidemia on statin    Plan:  • Patient admitted to the hospital for  further work-up and management of above  • Ultrasound of liver ordered and pending  • Continue as needed Zofran  • Continue as needed IV morphine for pain control  • Hepatitis panel sent  • Check INR  • Gastroenterology consulted, appreciate their recommendations  • Sliding scale insulin  • CBC, CMP reviewed  • Follow-up CBC, CMP, mag and Phos in a.m.  • Clinical course will dictate further management    Reviewed patients labs and imaging, and discussed with patient and nurse at bedside, discussed with Dr. Grullon    CODE STATUS:    Code Status (Patient has no pulse and is not breathing): CPR (Attempt to Resuscitate)  Medical Interventions (Patient has pulse or is breathing): Full Support      Admission Status:  I believe this patient meets inpatient status.      Electronically signed by Camilo Smith MD, 02/10/23, 3:49 PM EST.

## 2023-02-10 NOTE — PLAN OF CARE
Problem: Adult Inpatient Plan of Care  Goal: Plan of Care Review  Outcome: Ongoing, Progressing  Flowsheets (Taken 2/10/2023 1710)  Progress: no change  Plan of Care Reviewed With: patient  Outcome Evaluation: Patient oriented to unit, bed in lowest position,  call light in reach. vss, NPO at the moment. Will continue to monitor.  Goal: Patient-Specific Goal (Individualized)  Outcome: Ongoing, Progressing  Goal: Absence of Hospital-Acquired Illness or Injury  Outcome: Ongoing, Progressing  Intervention: Prevent and Manage VTE (Venous Thromboembolism) Risk  Recent Flowsheet Documentation  Taken 2/10/2023 1600 by Rakel Almanza RN  Range of Motion: active ROM (range of motion) encouraged  Goal: Optimal Comfort and Wellbeing  Outcome: Ongoing, Progressing  Intervention: Monitor Pain and Promote Comfort  Recent Flowsheet Documentation  Taken 2/10/2023 1600 by Rakel Almanza RN  Pain Management Interventions:   pillow support provided   position adjusted   see MAR  Intervention: Provide Person-Centered Care  Recent Flowsheet Documentation  Taken 2/10/2023 1600 by Rakel Almanza RN  Trust Relationship/Rapport:   care explained   choices provided  Goal: Readiness for Transition of Care  Outcome: Ongoing, Progressing  Intervention: Mutually Develop Transition Plan  Recent Flowsheet Documentation  Taken 2/10/2023 1558 by Rakel Almanza RN  Transportation Anticipated: family or friend will provide  Patient/Family Anticipated Services at Transition: none  Patient/Family Anticipates Transition to: home with family  Taken 2/10/2023 1555 by Rakel Almanza RN  Equipment Currently Used at Home: glucometer     Problem: Diabetes Comorbidity  Goal: Blood Glucose Level Within Targeted Range  Outcome: Ongoing, Progressing     Problem: Hypertension Comorbidity  Goal: Blood Pressure in Desired Range  Outcome: Ongoing, Progressing     Problem: Skin Injury Risk Increased  Goal: Skin Health and Integrity  Outcome: Ongoing,  Progressing   Goal Outcome Evaluation:  Plan of Care Reviewed With: patient        Progress: no change  Outcome Evaluation: Patient oriented to unit, bed in lowest position,  call light in reach. vss, NPO at the moment. Will continue to monitor.

## 2023-02-11 LAB
ALBUMIN SERPL-MCNC: 3.5 G/DL (ref 3.5–5.2)
ALBUMIN/GLOB SERPL: 1.3 G/DL
ALP SERPL-CCNC: 479 U/L (ref 39–117)
ALPHA1 GLOB MFR UR ELPH: 137 MG/DL (ref 90–200)
ALT SERPL W P-5'-P-CCNC: 1115 U/L (ref 1–41)
ANION GAP SERPL CALCULATED.3IONS-SCNC: 14.7 MMOL/L (ref 5–15)
ANION GAP SERPL CALCULATED.3IONS-SCNC: 14.8 MMOL/L (ref 5–15)
AST SERPL-CCNC: 6780 U/L (ref 1–40)
BACTERIA UR QL AUTO: ABNORMAL /HPF
BASOPHILS # BLD AUTO: 0.02 10*3/MM3 (ref 0–0.2)
BASOPHILS NFR BLD AUTO: 0.3 % (ref 0–1.5)
BILIRUB SERPL-MCNC: 4.6 MG/DL (ref 0–1.2)
BILIRUB UR QL STRIP: ABNORMAL
BUN SERPL-MCNC: 22 MG/DL (ref 6–20)
BUN SERPL-MCNC: 23 MG/DL (ref 6–20)
BUN/CREAT SERPL: 6.3 (ref 7–25)
BUN/CREAT SERPL: 7.3 (ref 7–25)
CALCIUM SPEC-SCNC: 8.2 MG/DL (ref 8.6–10.5)
CALCIUM SPEC-SCNC: 8.4 MG/DL (ref 8.6–10.5)
CERULOPLASMIN SERPL-MCNC: 18 MG/DL (ref 16–31)
CHLORIDE SERPL-SCNC: 88 MMOL/L (ref 98–107)
CHLORIDE SERPL-SCNC: 89 MMOL/L (ref 98–107)
CLARITY UR: ABNORMAL
CO2 SERPL-SCNC: 16.3 MMOL/L (ref 22–29)
CO2 SERPL-SCNC: 18.2 MMOL/L (ref 22–29)
COLOR UR: ABNORMAL
CREAT SERPL-MCNC: 3.16 MG/DL (ref 0.76–1.27)
CREAT SERPL-MCNC: 3.51 MG/DL (ref 0.76–1.27)
DEPRECATED RDW RBC AUTO: 41.1 FL (ref 37–54)
EGFRCR SERPLBLD CKD-EPI 2021: 20.4 ML/MIN/1.73
EGFRCR SERPLBLD CKD-EPI 2021: 23.2 ML/MIN/1.73
EOSINOPHIL # BLD AUTO: 0 10*3/MM3 (ref 0–0.4)
EOSINOPHIL NFR BLD AUTO: 0 % (ref 0.3–6.2)
ERYTHROCYTE [DISTWIDTH] IN BLOOD BY AUTOMATED COUNT: 12.9 % (ref 12.3–15.4)
FERRITIN SERPL-MCNC: ABNORMAL NG/ML (ref 30–400)
FINE GRAN CASTS URNS QL MICRO: ABNORMAL /LPF
GLOBULIN UR ELPH-MCNC: 2.7 GM/DL
GLUCOSE BLDC GLUCOMTR-MCNC: 170 MG/DL (ref 70–99)
GLUCOSE BLDC GLUCOMTR-MCNC: 194 MG/DL (ref 70–99)
GLUCOSE BLDC GLUCOMTR-MCNC: 194 MG/DL (ref 70–99)
GLUCOSE SERPL-MCNC: 167 MG/DL (ref 65–99)
GLUCOSE SERPL-MCNC: 174 MG/DL (ref 65–99)
GLUCOSE UR STRIP-MCNC: ABNORMAL MG/DL
HAV IGM SERPL QL IA: NORMAL
HBV CORE IGM SERPL QL IA: NORMAL
HBV SURFACE AG SERPL QL IA: NORMAL
HCT VFR BLD AUTO: 38.7 % (ref 37.5–51)
HCV AB SER DONR QL: NORMAL
HGB BLD-MCNC: 14 G/DL (ref 13–17.7)
HGB UR QL STRIP.AUTO: ABNORMAL
HYALINE CASTS UR QL AUTO: ABNORMAL /LPF
IMM GRANULOCYTES # BLD AUTO: 0.02 10*3/MM3 (ref 0–0.05)
IMM GRANULOCYTES NFR BLD AUTO: 0.3 % (ref 0–0.5)
KETONES UR QL STRIP: ABNORMAL
LEUKOCYTE ESTERASE UR QL STRIP.AUTO: NEGATIVE
LYMPHOCYTES # BLD AUTO: 0.28 10*3/MM3 (ref 0.7–3.1)
LYMPHOCYTES NFR BLD AUTO: 3.8 % (ref 19.6–45.3)
MAGNESIUM SERPL-MCNC: 1.9 MG/DL (ref 1.6–2.6)
MCH RBC QN AUTO: 31.8 PG (ref 26.6–33)
MCHC RBC AUTO-ENTMCNC: 36.2 G/DL (ref 31.5–35.7)
MCV RBC AUTO: 88 FL (ref 79–97)
MONOCYTES # BLD AUTO: 0.09 10*3/MM3 (ref 0.1–0.9)
MONOCYTES NFR BLD AUTO: 1.2 % (ref 5–12)
NEUTROPHILS NFR BLD AUTO: 7.02 10*3/MM3 (ref 1.7–7)
NEUTROPHILS NFR BLD AUTO: 94.4 % (ref 42.7–76)
NITRITE UR QL STRIP: NEGATIVE
NRBC BLD AUTO-RTO: 0.3 /100 WBC (ref 0–0.2)
PH UR STRIP.AUTO: 5.5 [PH] (ref 5–8)
PHOSPHATE SERPL-MCNC: 4.1 MG/DL (ref 2.5–4.5)
PLATELET # BLD AUTO: 183 10*3/MM3 (ref 140–450)
PMV BLD AUTO: 8.8 FL (ref 6–12)
POTASSIUM SERPL-SCNC: 3.8 MMOL/L (ref 3.5–5.2)
POTASSIUM SERPL-SCNC: 4.4 MMOL/L (ref 3.5–5.2)
PROT SERPL-MCNC: 6.2 G/DL (ref 6–8.5)
PROT UR QL STRIP: ABNORMAL
RBC # BLD AUTO: 4.4 10*6/MM3 (ref 4.14–5.8)
RBC # UR STRIP: ABNORMAL /HPF
REF LAB TEST METHOD: ABNORMAL
SODIUM SERPL-SCNC: 120 MMOL/L (ref 136–145)
SODIUM SERPL-SCNC: 121 MMOL/L (ref 136–145)
SP GR UR STRIP: >=1.03 (ref 1–1.03)
SQUAMOUS #/AREA URNS HPF: ABNORMAL /HPF
TSH SERPL DL<=0.05 MIU/L-ACNC: 0.52 UIU/ML (ref 0.27–4.2)
UROBILINOGEN UR QL STRIP: ABNORMAL
WBC # UR STRIP: ABNORMAL /HPF
WBC NRBC COR # BLD: 7.43 10*3/MM3 (ref 3.4–10.8)

## 2023-02-11 PROCEDURE — 86664 EPSTEIN-BARR NUCLEAR ANTIGEN: CPT | Performed by: INTERNAL MEDICINE

## 2023-02-11 PROCEDURE — 86644 CMV ANTIBODY: CPT | Performed by: INTERNAL MEDICINE

## 2023-02-11 PROCEDURE — 80053 COMPREHEN METABOLIC PANEL: CPT | Performed by: INTERNAL MEDICINE

## 2023-02-11 PROCEDURE — 82962 GLUCOSE BLOOD TEST: CPT

## 2023-02-11 PROCEDURE — 86381 MITOCHONDRIAL ANTIBODY EACH: CPT | Performed by: INTERNAL MEDICINE

## 2023-02-11 PROCEDURE — 82103 ALPHA-1-ANTITRYPSIN TOTAL: CPT | Performed by: INTERNAL MEDICINE

## 2023-02-11 PROCEDURE — 25010000002 ONDANSETRON PER 1 MG: Performed by: INTERNAL MEDICINE

## 2023-02-11 PROCEDURE — 87529 HSV DNA AMP PROBE: CPT | Performed by: INTERNAL MEDICINE

## 2023-02-11 PROCEDURE — 82728 ASSAY OF FERRITIN: CPT | Performed by: INTERNAL MEDICINE

## 2023-02-11 PROCEDURE — 84100 ASSAY OF PHOSPHORUS: CPT | Performed by: INTERNAL MEDICINE

## 2023-02-11 PROCEDURE — 83735 ASSAY OF MAGNESIUM: CPT | Performed by: INTERNAL MEDICINE

## 2023-02-11 PROCEDURE — 86645 CMV ANTIBODY IGM: CPT | Performed by: INTERNAL MEDICINE

## 2023-02-11 PROCEDURE — 85025 COMPLETE CBC W/AUTO DIFF WBC: CPT | Performed by: INTERNAL MEDICINE

## 2023-02-11 PROCEDURE — 81001 URINALYSIS AUTO W/SCOPE: CPT | Performed by: INTERNAL MEDICINE

## 2023-02-11 PROCEDURE — 86225 DNA ANTIBODY NATIVE: CPT | Performed by: INTERNAL MEDICINE

## 2023-02-11 PROCEDURE — 25010000002 MORPHINE PER 10 MG: Performed by: INTERNAL MEDICINE

## 2023-02-11 PROCEDURE — 86663 EPSTEIN-BARR ANTIBODY: CPT | Performed by: INTERNAL MEDICINE

## 2023-02-11 PROCEDURE — 63710000001 INSULIN LISPRO (HUMAN) PER 5 UNITS: Performed by: INTERNAL MEDICINE

## 2023-02-11 PROCEDURE — 81256 HFE GENE: CPT | Performed by: INTERNAL MEDICINE

## 2023-02-11 PROCEDURE — 82390 ASSAY OF CERULOPLASMIN: CPT | Performed by: INTERNAL MEDICINE

## 2023-02-11 PROCEDURE — 99232 SBSQ HOSP IP/OBS MODERATE 35: CPT | Performed by: INTERNAL MEDICINE

## 2023-02-11 PROCEDURE — 86038 ANTINUCLEAR ANTIBODIES: CPT | Performed by: INTERNAL MEDICINE

## 2023-02-11 PROCEDURE — 86665 EPSTEIN-BARR CAPSID VCA: CPT | Performed by: INTERNAL MEDICINE

## 2023-02-11 PROCEDURE — 99233 SBSQ HOSP IP/OBS HIGH 50: CPT | Performed by: INTERNAL MEDICINE

## 2023-02-11 PROCEDURE — 84443 ASSAY THYROID STIM HORMONE: CPT | Performed by: PHYSICIAN ASSISTANT

## 2023-02-11 PROCEDURE — 86015 ACTIN ANTIBODY EACH: CPT | Performed by: INTERNAL MEDICINE

## 2023-02-11 RX ORDER — SODIUM CHLORIDE, SODIUM LACTATE, POTASSIUM CHLORIDE, CALCIUM CHLORIDE 600; 310; 30; 20 MG/100ML; MG/100ML; MG/100ML; MG/100ML
100 INJECTION, SOLUTION INTRAVENOUS CONTINUOUS
Status: DISCONTINUED | OUTPATIENT
Start: 2023-02-11 | End: 2023-02-13 | Stop reason: HOSPADM

## 2023-02-11 RX ORDER — FAMOTIDINE 20 MG/1
20 TABLET, FILM COATED ORAL DAILY
Status: DISCONTINUED | OUTPATIENT
Start: 2023-02-12 | End: 2023-02-13 | Stop reason: HOSPADM

## 2023-02-11 RX ORDER — CETIRIZINE HYDROCHLORIDE 10 MG/1
10 TABLET ORAL DAILY
Status: DISCONTINUED | OUTPATIENT
Start: 2023-02-11 | End: 2023-02-13 | Stop reason: HOSPADM

## 2023-02-11 RX ORDER — FLECAINIDE ACETATE 50 MG/1
50 TABLET ORAL 2 TIMES DAILY
Status: DISCONTINUED | OUTPATIENT
Start: 2023-02-11 | End: 2023-02-11

## 2023-02-11 RX ORDER — FLUTICASONE PROPIONATE 50 MCG
2 SPRAY, SUSPENSION (ML) NASAL DAILY
Status: DISCONTINUED | OUTPATIENT
Start: 2023-02-11 | End: 2023-02-13 | Stop reason: HOSPADM

## 2023-02-11 RX ADMIN — MORPHINE SULFATE 2 MG: 2 INJECTION, SOLUTION INTRAMUSCULAR; INTRAVENOUS at 12:49

## 2023-02-11 RX ADMIN — SODIUM CHLORIDE, POTASSIUM CHLORIDE, SODIUM LACTATE AND CALCIUM CHLORIDE 100 ML/HR: 600; 310; 30; 20 INJECTION, SOLUTION INTRAVENOUS at 11:10

## 2023-02-11 RX ADMIN — Medication 1 LOZENGE: at 08:36

## 2023-02-11 RX ADMIN — INSULIN LISPRO 2 UNITS: 100 INJECTION, SOLUTION INTRAVENOUS; SUBCUTANEOUS at 08:35

## 2023-02-11 RX ADMIN — ONDANSETRON 4 MG: 2 INJECTION INTRAMUSCULAR; INTRAVENOUS at 21:17

## 2023-02-11 RX ADMIN — MORPHINE SULFATE 2 MG: 2 INJECTION, SOLUTION INTRAMUSCULAR; INTRAVENOUS at 21:17

## 2023-02-11 RX ADMIN — INSULIN LISPRO 2 UNITS: 100 INJECTION, SOLUTION INTRAVENOUS; SUBCUTANEOUS at 12:49

## 2023-02-11 RX ADMIN — CETIRIZINE HYDROCHLORIDE 10 MG: 10 TABLET, FILM COATED ORAL at 12:49

## 2023-02-11 RX ADMIN — MULTIPLE VITAMINS W/ MINERALS TAB 1 TABLET: TAB at 08:34

## 2023-02-11 RX ADMIN — SENNOSIDES AND DOCUSATE SODIUM 2 TABLET: 8.6; 5 TABLET ORAL at 20:01

## 2023-02-11 RX ADMIN — INSULIN LISPRO 2 UNITS: 100 INJECTION, SOLUTION INTRAVENOUS; SUBCUTANEOUS at 17:12

## 2023-02-11 RX ADMIN — Medication 10 ML: at 20:01

## 2023-02-11 RX ADMIN — ONDANSETRON 4 MG: 2 INJECTION INTRAMUSCULAR; INTRAVENOUS at 06:33

## 2023-02-11 RX ADMIN — FLECAINIDE ACETATE 50 MG: 50 TABLET ORAL at 11:10

## 2023-02-11 RX ADMIN — SODIUM CHLORIDE, POTASSIUM CHLORIDE, SODIUM LACTATE AND CALCIUM CHLORIDE 100 ML/HR: 600; 310; 30; 20 INJECTION, SOLUTION INTRAVENOUS at 20:03

## 2023-02-11 RX ADMIN — SENNOSIDES AND DOCUSATE SODIUM 2 TABLET: 8.6; 5 TABLET ORAL at 08:35

## 2023-02-11 RX ADMIN — MORPHINE SULFATE 2 MG: 2 INJECTION, SOLUTION INTRAMUSCULAR; INTRAVENOUS at 17:12

## 2023-02-11 RX ADMIN — FAMOTIDINE 40 MG: 20 TABLET, FILM COATED ORAL at 08:34

## 2023-02-11 RX ADMIN — Medication 1 LOZENGE: at 04:14

## 2023-02-11 RX ADMIN — MORPHINE SULFATE 2 MG: 2 INJECTION, SOLUTION INTRAMUSCULAR; INTRAVENOUS at 08:35

## 2023-02-11 RX ADMIN — FLUTICASONE PROPIONATE 2 SPRAY: 50 SPRAY, METERED NASAL at 11:10

## 2023-02-11 RX ADMIN — MORPHINE SULFATE 2 MG: 2 INJECTION, SOLUTION INTRAMUSCULAR; INTRAVENOUS at 04:14

## 2023-02-11 RX ADMIN — ONDANSETRON 4 MG: 2 INJECTION INTRAMUSCULAR; INTRAVENOUS at 00:08

## 2023-02-11 RX ADMIN — MORPHINE SULFATE 2 MG: 2 INJECTION, SOLUTION INTRAMUSCULAR; INTRAVENOUS at 00:08

## 2023-02-11 RX ADMIN — Medication 10 ML: at 08:46

## 2023-02-11 RX ADMIN — Medication 1 LOZENGE: at 12:58

## 2023-02-11 NOTE — PROGRESS NOTES
[facility]  Inpatient Progress Note    Reason for Follow Up:  Elevated liver enzymes    Subjective     Interval History:   Pt reports still with chest pain.  Pt denies any association with oral intake.  No nausea, vomiting.      Current Facility-Administered Medications:   •  aspirin chewable tablet 324 mg, 324 mg, Oral, Once, Jose Elias Lou MD  •  benzocaine-menthol (CHLORASEPTIC) lozenge 1 lozenge, 1 lozenge, Mouth/Throat, Q2H PRN, Karen Rizvi PA, 1 lozenge at 02/11/23 0836  •  sennosides-docusate (PERICOLACE) 8.6-50 MG per tablet 2 tablet, 2 tablet, Oral, BID, 2 tablet at 02/11/23 0835 **AND** polyethylene glycol (MIRALAX) packet 17 g, 17 g, Oral, Daily PRN **AND** bisacodyl (DULCOLAX) EC tablet 5 mg, 5 mg, Oral, Daily PRN **AND** bisacodyl (DULCOLAX) suppository 10 mg, 10 mg, Rectal, Daily PRN, Camilo Smith MD  •  dextrose (D50W) (25 g/50 mL) IV injection 25 g, 25 g, Intravenous, Q15 Min PRN, Camilo Smith MD  •  dextrose (GLUTOSE) oral gel 15 g, 15 g, Oral, Q15 Min PRN, Camilo Smith MD  •  famotidine (PEPCID) tablet 40 mg, 40 mg, Oral, Daily, Camilo Smith MD, 40 mg at 02/11/23 0834  •  glucagon (GLUCAGEN) injection 1 mg, 1 mg, Intramuscular, Q15 Min PRN, Camilo Smith MD  •  Insulin Lispro (humaLOG) injection 2-9 Units, 2-9 Units, Subcutaneous, TID With Meals, Camilo Smith MD, 2 Units at 02/11/23 0835  •  melatonin tablet 5 mg, 5 mg, Oral, Nightly PRN, Camilo Smith MD  •  morphine injection 2 mg, 2 mg, Intravenous, Q4H PRN, Camilo Smith MD, 2 mg at 02/11/23 0835  •  multivitamin with minerals 1 tablet, 1 tablet, Oral, Daily, Camilo Smith MD, 1 tablet at 02/11/23 0834  •  nitroglycerin (NITROSTAT) SL tablet 0.4 mg, 0.4 mg, Sublingual, Q5 Min PRN, Camilo Smith MD  •  ondansetron (ZOFRAN) injection 4 mg, 4 mg, Intravenous, Q6H PRN, Camilo Smith MD, 4 mg at 02/11/23 0633  •  sodium chloride 0.9 % flush 10 mL, 10 mL,  Intravenous, PRN, Jose Elias Lou MD  •  sodium chloride 0.9 % flush 10 mL, 10 mL, Intravenous, PRN, Camilo Smith MD  •  sodium chloride 0.9 % flush 10 mL, 10 mL, Intravenous, Q12H, Camilo Smith MD, 10 mL at 02/11/23 0846  •  sodium chloride 0.9 % infusion 40 mL, 40 mL, Intravenous, Luis LOCK Jeffrey K, MD  Review of Systems:    The following systems were reviewed and negative;  constitution, respiratory and cardiovascular    Objective     Vital Signs  Temp:  [97.3 °F (36.3 °C)-98.6 °F (37 °C)] 98.2 °F (36.8 °C)  Heart Rate:  [] 114  Resp:  [20] 20  BP: ()/(44-96) 132/96  Body mass index is 33.43 kg/m².    Intake/Output Summary (Last 24 hours) at 2/11/2023 0916  Last data filed at 2/10/2023 2018  Gross per 24 hour   Intake 2010 ml   Output --   Net 2010 ml     No intake/output data recorded.     Physical Exam:   General: awake, alert and in no acute distress   Eyes: eyes move symmetrical in all directions, no scleral icterus   Neck: supple, trachea is midline   Skin: warm and dry, not jaundiced   Cardiovascular: no chest tenderness   Pulm: breathing unlabored   Abdomen: soft, nontender, nondistended   Rectal: deferred   Extremities: no rash or edema   Psychiatric: mental status within normal limits      Results Review:     I reviewed the patient's new clinical results.    Results from last 7 days   Lab Units 02/11/23  0626 02/10/23  0920   WBC 10*3/mm3 7.43 10.18   HEMOGLOBIN g/dL 14.0 14.1   HEMATOCRIT % 38.7 38.8   PLATELETS 10*3/mm3 183 247     Results from last 7 days   Lab Units 02/11/23  0626 02/10/23  0920   SODIUM mmol/L 121* 129*   POTASSIUM mmol/L 4.4 4.8   CHLORIDE mmol/L 88* 89*   CO2 mmol/L 18.2* 12.6*   BUN mg/dL 23* 18   CREATININE mg/dL 3.16* 1.75*   CALCIUM mg/dL 8.4* 8.4*   BILIRUBIN mg/dL 4.6* 2.4*   ALK PHOS U/L 479* 494*   ALT (SGPT) U/L 1,115* 1,589*   AST (SGOT) U/L 6,780* >7,000*   GLUCOSE mg/dL 174* 110*     Results from last 7 days   Lab Units 02/10/23  7936  02/10/23  0920   INR  1.21* 1.23*     Lab Results   Lab Value Date/Time    LIPASE 15 02/10/2023 0920    LIPASE 19 01/08/2022 1017    LIPASE 105 01/18/2021 0059    LIPASE 68 01/16/2021 1505    LIPASE 93 03/12/2020 1813       Radiology:  [unfilled]      Assessment & Plan   Assessment:     Elevated liver enzymes  Elevated ferritin, Tsat  SURESH    Plan:     • Primarily hepatocellular pattern  • Given gallstones noted on RUQ US, one consideration would be choledocholithiasis; however, liver enzymes elevated out of proportion to alk phos and bili.  CBD normal on US.  Pt reports he cannot have MRCP b/c of metal in his C-spine.  • Viral etiology is a consideration; acute hep panel negative.  Will order additional viral studies.  • Another consideration would be shock liver given hypotension on presentation and degree of liver enzyme elevation  • HFE gene pending  • Will advance diet      I discussed the patients findings and my recommendations with patient, family and primary care team.         Shania Grullon M.D.  [place] [id number] [place]  [address]  Office: [phone]

## 2023-02-11 NOTE — PLAN OF CARE
Goal Outcome Evaluation:      Pt A&o x4, VSS. Patient requested and given pain meds q4 hours this shift. No n/v noted. Patient encouraged hourly trips to the toilet to attempt urination. Output 500 mL so far this shift.

## 2023-02-11 NOTE — PROGRESS NOTES
[facility]   Hospitalist Progress Note       Patient Name: Wayne Guan  : 1975  MRN: [id number]  Primary Care Physician: Lyla Guerrero APRN  Date of admission: 2/10/2023  Today's Date: 2023  Room / Bed:   [facility]/  Subjective   Chief Complaint: Nausea, vomiting, syncope, acute hepatitis    Summary:  Wayne Guan is a 47 y.o. male with a past medical history significant for alcoholism, bipolar disorder, anxiety depression, diabetes, GERD presents to the hospital for evaluation of chest pain which started 3 hours ago, patient reported shortness of breath, patient was given nitroglycerin in route and aspirin.  Patient presents to the hospital where he was found to have intractable nausea and vomiting, patient's LFTs were markedly elevated in a hepatocellular pattern, patient underwent EKG which was negative for ischemia.  Patient's high-sensitivity troponin was equivocal.  Patient was given 2 L of IV fluid and the hospital service was asked to admit for further work-up and management.    Interval Followup: 2023    • Patient feeling much improved today.  Less nausea.  No vomiting.  Alert and oriented and conversational.  He is asking about allergy medicine for his allergic rhinitis.  His wife is at bedside.  • Monitor with sinus rhythm 90 - 110 bpm range  • Normotensive  • Room air  • Afebrile      REVIEW OF SYSTEMS: All other systems reviewed and are negative.   • GEN: No fevers. No chills. No weight gain. No weight loss.     • HEENT:   No dysphagia/odynophagia. No visual disturbance.    • GI:    + N/V.  No abd pain. No diarrhea. No constipation.  No bloody/black tarry stools. No hematemesis.   • CV: No chest discomfort.  + Palps.  + Lightheadedness.  + Syncope. No orthopnea/PND. No edema.   • RESP:    No cough. No wheeze.  No increased sputum. No hemoptysis. No ESTRELLA.  • :   No dysuria or suprapubic discomfort. No frequency.No urgency. No hesitancy. No incontinence. No hematuria. No flank pain.     • MS:   No joint stiffness and pain. No myalgias. No muscle weakness.    • SKIN:   No painful or pruritic rashes.  No skin discoloration.  • NEURO:  No focal numbness or weakness.  No headaches.  No ataxia. No slurred speech. No receptive/expressive aphasia.      • PSYCH:   No anxiety. No depression.  • ENDO:  No tremor, hair loss, heat or cold intolerance.  Objective   Temp:  [97.3 °F (36.3 °C)-98.6 °F (37 °C)] 98.6 °F (37 °C)  Heart Rate:  [101-123] 101  Resp:  [20] 20  BP: (103-132)/(67-96) 113/82  PHYSICAL EXAM   • CON: WN. WD. NAD.  Overweight.  Alert and conversational.  • EYES:  Sclera anicteric. EOMI. Normal conjunctiva.   • ENT:  Oropharyngeal mucosa without ulcers or thrush.    • NECK:  No thyromegaly. No stridor. Trachea midline.  • RESP:  CTA. No wheezes. No crackles.  No work of breathing or tachypnea.   • CV:  Rhythm regular. Rate  bpm. No murmur noted.  No edema.  • GI:  Soft and nontender. Nondistended.  Bowel sounds present.   • EXT: Peripheral pulses intact.  No joint deformities or cyanosis.  • LYMPH:  No lymphedema noted.  No cervical lymphadenopathy.  • PSYCH:  Alert. Oriented. Normal affect and mood.  • NEURO:  CNII-XII grossly intact. No dysarthria or aphasia. No unilateral weakness or paresthesia.  • SKIN: No chronic venous stasis changes or varicosities.  No cellulitis    Results from last 7 days   Lab Units 02/11/23  0626 02/10/23  0920   WBC 10*3/mm3 7.43 10.18   HEMOGLOBIN g/dL 14.0 14.1   HEMATOCRIT % 38.7 38.8   PLATELETS 10*3/mm3 183 247     Results from last 7 days   Lab Units 02/11/23  0626 02/10/23  0920   SODIUM mmol/L 121* 129*   POTASSIUM mmol/L 4.4 4.8   CO2 mmol/L 18.2* 12.6*   CHLORIDE mmol/L 88* 89*   ANION GAP mmol/L 14.8 27.4*   BUN mg/dL 23* 18   CREATININE mg/dL 3.16* 1.75*   GLUCOSE mg/dL 174* 110*     Results from last 7 days   Lab Units 02/10/23  1634 02/10/23  0920   INR  1.21* 1.23*       RESULTS REVIEWED:  I have personally reviewed the results from the time  of this admission to 2/11/2023 14:20 EST and agree with these findings:  []  Laboratory  []  Microbiology  []  Radiology  []  EKG/Telemetry   []  Cardiology/Vascular   []  Pathology  []  Old records  []  Other:  Assessment / Plan   Assessment:    · Acute hepatitis  · SURESH/ATN (likely from hypotension and contrast exposure)  · Intractable nausea and vomiting  · History of alcohol use  · Bipolar disorder  · Cervical stenosis of the spine  · Diabetes mellitus  · GERD  · History of SVT with ablation 9/2022  • Hyperlipidemia (on statin)  • Hyponatremia  • Obesity with BMI > 33  • History of thyroid cancer/thyroidectomy  • Hypotension  • History of hepatic steatosis       Plan:    • Repeat BMP this afternoon, closely monitor Cr and Na  • Hold home antihypertensives.  Give IV fluids.  Avoid hypotension.  • DC flecainide in the setting of hepatic issues  • Gastroenterology input appreciated / Thank you Dr. Grullon  • RUQ ultrasound .... Does not suggest acute cholecystitis  • CT chest contrasted study ..... No pulmonary embolism.  No acute findings.  • Hepatitis A/B/C and HIV ..... Negative  • CMV/EBV/HSV.... Pending  • GLENNA/anti-smooth/mitochondrial antibodies .... Pending  • Iron studies ..... Very elevated  • TSH .... Pending  • Avoid nephrotoxins.  Renally dose meds.  • Avoid hepatotoxins.  Hepatic dosing of meds.  • Additional recommendations pending clinical course            Discussed plan with RN.  DVT prophylaxis:  Mechanical DVT prophylaxis orders are present.  CODE STATUS:      Code Status (Patient has no pulse and is not breathing): CPR (Attempt to Resuscitate)  Medical Interventions (Patient has pulse or is breathing): Full Support       Electronically signed by NESHA Pop, 02/11/23, 2:20 PM EST.    Patient independently seen and evaluated, agree with assessment and plan, above documentation reflects plan put forth during bedside rounds.  More than 51% of the time of this patient encounter was performed  by me.    Interval history:  Nausea and vomiting improved, LFTs improved, acute hep panel negative    Exam:  General appearance: NAD, conversant   Eyes: anicteric sclerae, moist conjunctivae; no lid-lag; PERRLA  HENT: Atraumatic; oropharynx clear with moist mucous membranes and no mucosal ulcerations; normal hard and soft palate  Neck: Trachea midline; FROM, supple, no thyromegaly or lymphadenopathy  Lungs: CTA, with normal respiratory effort and no intercostal retractions  CV: Regular Rate and Rhythm, no Murmurs, Rubs, or Gallops   Abdomen: Soft, non-tender; no masses or Heptosplenomegally  Extremities: No peripheral edema or extremity lymphadenopathy  Skin: Normal temperature, turgor and texture; no rash, ulcers or subcutaneous nodules  Psych: Appropriate affect, alert and oriented to person, place and time  Neuro: CN II - XII intact no motor deficits, no sensory defecits    Plan:  Agree with assessment plan as above  Start gentle IV hydration  Bladder scan  Follow-up creatinine, this is worsened  Avoid nephrotoxins  LFTs downtrending  EBV, CMP ordered and pending  HFE ordered and pending  Iron noted to be grossly elevated, ferritin grossly elevated, concern for possible hemochromatosis  Hold flecainide in the setting of liver dysfunction  INR reviewed  Check CBC, CMP, mag and Phos in a.m.  Discussed with gastroenterology  Clinical course will dictate further management      Electronically signed by Camilo Smith MD, 02/11/23, 2:58 PM EST.

## 2023-02-11 NOTE — H&P
[facility]  [facility]: 2[facility]/2023   Patient Name: Wayne Guan  : 1975  MRN: [id number]  Primary Care Physician:  Lyla Guerrero APRN  Date of admission: 2/10/2023    Subjective   Subjective     Chief Complaint: Pain    HPI:    Wayne Guan is a 47 y.o. male with a past medical history significant for alcoholism, bipolar disorder, anxiety depression, diabetes, GERD presents to the hospital for evaluation of chest pain which started 3 hours ago, patient reported shortness of breath, patient was given nitroglycerin in route and aspirin.  Patient presents to the hospital where he was found to have intractable nausea and vomiting, patient's LFTs were markedly elevated in a hepatocellular pattern, patient underwent EKG which was negative for ischemia.  Patient's high-sensitivity troponin was equivocal.  Patient was given 2 L of IV fluid and the hospital service was asked to admit for further work-up and management.      Personal History     Past Medical History:  Past Medical History:   Diagnosis Date   • Alcoholism (HCC) 2021   • Allergies    • Aneurysm (HCC)    • Anxiety 7/15/2022   • Bipolar 2 disorder (HCC) 2021   • Cancer (HCC)    • Cervical stenosis of spinal canal 2018   • Depression    • Diabetes mellitus (HCC)    • GERD (gastroesophageal reflux disease)    • History of colonic polyps 2017   • Hyperlipidemia    • Hypertension    • Lumbosacral spondylosis without myelopathy 2018   • Papillary thyroid carcinoma (HCC) 2020   • SVT (supraventricular tachycardia) (HCC) 2022       Past Surgical History:  Past Surgical History:   Procedure Laterality Date   • CARDIAC ABLATION  2022   • CARDIAC ELECTROPHYSIOLOGY PROCEDURE N/A 2022    Procedure: Ablation SVT HOLD Metoprolol 5 days;  Surgeon: Tai Lisa MD;  Location: [facility] INVAS[facility] LOC[facility];  Service: Cardiovascular;  Laterality: N/A;   • CERVICAL DISC SURGERY     •  THYROID SURGERY         Family History:   family history includes Alcohol abuse in his father; Arthritis in his mother; COPD in his maternal grandmother and paternal uncle; Cancer in his maternal grandfather and paternal grandfather; Colon cancer in his paternal grandfather; Diabetes in his father, maternal grandfather, mother, and paternal aunt; Heart attack in his father; Heart disease in his maternal grandfather, maternal grandmother, maternal uncle, paternal aunt, and paternal grandmother; Hyperlipidemia in his father and mother; Hypertension in his father, maternal grandfather, maternal grandmother, maternal uncle, mother, paternal grandmother, and sister; Mental illness in his paternal aunt; Osteoporosis in his mother; Rashes / Skin problems in his mother; Stroke in his father and maternal grandmother; Thyroid disease in his maternal grandmother.    Social History:    reports that he quit smoking about 14 years ago. His smoking use included cigarettes. He has a 11.00 pack-year smoking history. He has never used smokeless tobacco. He reports current alcohol use. He reports that he does not use drugs.    Home Medications:  Insulin Degludec, Vitamin D (Cholecalciferol), atorvastatin, cetirizine, dilTIAZem CD, empagliflozin, escitalopram, flecainide, fluticasone, glycopyrrolate, levothyroxine sodium, lisinopril, metoprolol succinate XL, omeprazole, and ondansetron    Allergies:  No Known Allergies    Review of Systems:  All systems reviewed and negative other than stated in HPI    Objective   Objective     Vitals:   Temp:  [97.7 °F (36.5 °C)] 97.7 °F (36.5 °C)  Heart Rate:  [] 115  Resp:  [20] 20  BP: ()/(44-78) 103/68    Physical Exam    Constitutional: Awake, alert, no acute distress   Eyes: Pupils equal, sclerae anicteric, no conjunctival injection   HENT: NCAT, mucous membranes moist   Neck: Supple, no thyromegaly, no lymphadenopathy, trachea midline   Respiratory: Clear to auscultation  bilaterally, nonlabored respirations    Cardiovascular: RRR, no murmurs, rubs, or gallops   Gastrointestinal: Positive bowel sounds, soft, nontender, nondistended   Musculoskeletal: No bilateral ankle edema, no clubbing or cyanosis to extremities   Psychiatric: Appropriate affect, cooperative   Neurologic: Oriented x 3, strength symmetric in all extremities, Cranial Nerves grossly intact to confrontation, speech clear   Skin: No rashes     Result Review:  I have personally reviewed the results from the time of this admission to 2/10/2023 15:49 EST and agree with these findings:  [x]  Laboratory  CMP    CMP 9/11/22 11/2/22 2/10/23   Glucose 185 (A) 301 (A) 110 (A)   BUN 9 12 18   Creatinine 1.23 1.16 1.75 (A)   eGFR 72.0 77.2 47.1 (A)   Sodium 133 (A) 131 (A) 129 (A)   Potassium 4.0 4.9 4.8   Chloride 97 (A) 95 (A) 89 (A)   Calcium 8.6 9.3 8.4 (A)   Total Protein  6.9 6.6   Albumin  4.30 4.1   Globulin  2.6 2.5   Total Bilirubin  0.3 2.4 (A)   Alkaline Phosphatase  84 494 (A)   AST (SGOT)  17 >7,000 (A)   ALT (SGPT)  17 1,589 (A)   Albumin/Globulin Ratio  1.7 1.6   BUN/Creatinine Ratio 7.3 10.3 10.3   Anion Gap 10.0 10.5 27.4 (A)   (A) Abnormal value       Comments are available for some flowsheets but are not being displayed.           CBC    CBC 9/9/22 11/2/22 2/10/23   WBC 5.71 6.31 10.18   RBC 4.97 3.86 (A) 4.29   Hemoglobin 15.5 12.3 (A) 14.1   Hematocrit 43.8 34.7 (A) 38.8   MCV 88.1 89.9 90.4   MCH 31.2 31.9 32.9   MCHC 35.4 35.4 36.3 (A)   RDW 12.8 12.3 12.9   Platelets 258 278 247   (A) Abnormal value            []  Microbiology  []  Radiology  []  EKG/Telemetry   []  Cardiology/Vascular   []  Pathology  []  Old records  []  Other:      Assessment & Plan   Assessment / Plan     Assessment:   Hepatitis  Intractable nausea and vomiting  History of alcohol use  Bipolar disorder  Cervical stenosis of the spine  Diabetes mellitus  GERD  Hyperlipidemia on statin    Plan:  Patient admitted to the hospital for  further work-up and management of above  Ultrasound of liver ordered and pending  Continue as needed Zofran  Continue as needed IV morphine for pain control  Hepatitis panel sent  Check INR  Gastroenterology consulted, appreciate their recommendations  Sliding scale insulin  CBC, CMP reviewed  Follow-up CBC, CMP, mag and Phos in a.m.  Clinical course will dictate further management    Reviewed patients labs and imaging, and discussed with patient and nurse at bedside, discussed with Dr. Grullon    CODE STATUS:    Code Status (Patient has no pulse and is not breathing): CPR (Attempt to Resuscitate)  Medical Interventions (Patient has pulse or is breathing): Full Support      Admission Status:  I believe this patient meets inpatient status.      Electronically signed by Camilo Smith MD, 02/10/23, 3:49 PM EST.

## 2023-02-11 NOTE — PLAN OF CARE
Problem: Adult Inpatient Plan of Care  Goal: Optimal Comfort and Wellbeing  Outcome: Ongoing, Progressing  Intervention: Monitor Pain and Promote Comfort  Recent Flowsheet Documentation  Taken 2/11/2023 0414 by Char Blank, RN  Pain Management Interventions:   see MAR   unnecessary movement minimized  Taken 2/11/2023 0008 by Char Blank, RN  Pain Management Interventions: see MAR  Taken 2/10/2023 2018 by Char Blank, RN  Pain Management Interventions:   see MAR   unnecessary movement minimized  Intervention: Provide Person-Centered Care  Recent Flowsheet Documentation  Taken 2/10/2023 2018 by Char Blank, RN  Trust Relationship/Rapport:   care explained   choices provided   questions answered   Goal Outcome Evaluation:               Pt a&o x4. Given zofran per MAR. Morphine given every 4 hours tonight, Pt reports an aching/sharp pain felt throughout abdomen/epigastric area. Clear liquid diet.

## 2023-02-12 ENCOUNTER — APPOINTMENT (OUTPATIENT)
Dept: CARDIOLOGY | Facility: HOSPITAL | Age: 48
DRG: 441 | End: 2023-02-12
Payer: COMMERCIAL

## 2023-02-12 PROBLEM — N17.9 AKI (ACUTE KIDNEY INJURY): Status: ACTIVE | Noted: 2023-02-12

## 2023-02-12 LAB
ALBUMIN SERPL-MCNC: 3.6 G/DL (ref 3.5–5.2)
ALBUMIN/GLOB SERPL: 1.4 G/DL
ALP SERPL-CCNC: 454 U/L (ref 39–117)
ALT SERPL W P-5'-P-CCNC: 552 U/L (ref 1–41)
ANION GAP SERPL CALCULATED.3IONS-SCNC: 13.6 MMOL/L (ref 5–15)
AST SERPL-CCNC: 1440 U/L (ref 1–40)
BASOPHILS # BLD AUTO: 0.02 10*3/MM3 (ref 0–0.2)
BASOPHILS NFR BLD AUTO: 0.5 % (ref 0–1.5)
BH CV ECHO MEAS - AO ROOT DIAM: 3.7 CM
BH CV ECHO MEAS - EDV(MOD-SP2): 52 ML
BH CV ECHO MEAS - EDV(MOD-SP4): 53 ML
BH CV ECHO MEAS - ESV(MOD-SP2): 23 ML
BH CV ECHO MEAS - ESV(MOD-SP4): 26 ML
BH CV ECHO MEAS - IVSD: 1.1 CM
BH CV ECHO MEAS - LA DIMENSION: 3.7 CM
BH CV ECHO MEAS - LAT PEAK E' VEL: 9.4 CM/SEC
BH CV ECHO MEAS - LVIDD: 5 CM
BH CV ECHO MEAS - LVIDS: 3.7 CM
BH CV ECHO MEAS - LVOT DIAM: 2 CM
BH CV ECHO MEAS - LVPWD: 1.1 CM
BH CV ECHO MEAS - MED PEAK E' VEL: 5.87 CM/SEC
BH CV ECHO MEAS - MV A MAX VEL: 89 CM/SEC
BH CV ECHO MEAS - MV DEC TIME: 168 MSEC
BH CV ECHO MEAS - MV E MAX VEL: 52 CM/SEC
BH CV ECHO MEAS - MV E/A: 0.6
BH CV ECHO MEAS - RVDD: 2.6 CM
BH CV ECHO MEAS - TAPSE (>1.6): 1.97 CM
BH CV ECHO MEASUREMENTS AVERAGE E/E' RATIO: 6.81
BILIRUB SERPL-MCNC: 4 MG/DL (ref 0–1.2)
BUN SERPL-MCNC: 22 MG/DL (ref 6–20)
BUN/CREAT SERPL: 6.2 (ref 7–25)
CALCIUM SPEC-SCNC: 8.8 MG/DL (ref 8.6–10.5)
CHLORIDE SERPL-SCNC: 92 MMOL/L (ref 98–107)
CO2 SERPL-SCNC: 20.4 MMOL/L (ref 22–29)
CREAT SERPL-MCNC: 3.57 MG/DL (ref 0.76–1.27)
DEPRECATED RDW RBC AUTO: 40.7 FL (ref 37–54)
EGFRCR SERPLBLD CKD-EPI 2021: 20 ML/MIN/1.73
EOSINOPHIL # BLD AUTO: 0.05 10*3/MM3 (ref 0–0.4)
EOSINOPHIL NFR BLD AUTO: 1.1 % (ref 0.3–6.2)
ERYTHROCYTE [DISTWIDTH] IN BLOOD BY AUTOMATED COUNT: 12.7 % (ref 12.3–15.4)
GLOBULIN UR ELPH-MCNC: 2.6 GM/DL
GLUCOSE BLDC GLUCOMTR-MCNC: 128 MG/DL (ref 70–99)
GLUCOSE BLDC GLUCOMTR-MCNC: 150 MG/DL (ref 70–99)
GLUCOSE BLDC GLUCOMTR-MCNC: 190 MG/DL (ref 70–99)
GLUCOSE SERPL-MCNC: 146 MG/DL (ref 65–99)
HCT VFR BLD AUTO: 34.7 % (ref 37.5–51)
HGB BLD-MCNC: 12.8 G/DL (ref 13–17.7)
IMM GRANULOCYTES # BLD AUTO: 0.03 10*3/MM3 (ref 0–0.05)
IMM GRANULOCYTES NFR BLD AUTO: 0.7 % (ref 0–0.5)
IVRT: 58 MSEC
LYMPHOCYTES # BLD AUTO: 0.33 10*3/MM3 (ref 0.7–3.1)
LYMPHOCYTES NFR BLD AUTO: 7.6 % (ref 19.6–45.3)
MAGNESIUM SERPL-MCNC: 1.9 MG/DL (ref 1.6–2.6)
MAXIMAL PREDICTED HEART RATE: 171 BPM
MCH RBC QN AUTO: 32.3 PG (ref 26.6–33)
MCHC RBC AUTO-ENTMCNC: 36.9 G/DL (ref 31.5–35.7)
MCV RBC AUTO: 87.6 FL (ref 79–97)
MONOCYTES # BLD AUTO: 0.17 10*3/MM3 (ref 0.1–0.9)
MONOCYTES NFR BLD AUTO: 3.9 % (ref 5–12)
NEUTROPHILS NFR BLD AUTO: 3.77 10*3/MM3 (ref 1.7–7)
NEUTROPHILS NFR BLD AUTO: 86.2 % (ref 42.7–76)
NRBC BLD AUTO-RTO: 0 /100 WBC (ref 0–0.2)
PHOSPHATE SERPL-MCNC: 2.8 MG/DL (ref 2.5–4.5)
PLATELET # BLD AUTO: 142 10*3/MM3 (ref 140–450)
PMV BLD AUTO: 8.9 FL (ref 6–12)
POTASSIUM SERPL-SCNC: 4.4 MMOL/L (ref 3.5–5.2)
PROT SERPL-MCNC: 6.2 G/DL (ref 6–8.5)
RBC # BLD AUTO: 3.96 10*6/MM3 (ref 4.14–5.8)
SODIUM SERPL-SCNC: 126 MMOL/L (ref 136–145)
STRESS TARGET HR: 145 BPM
WBC NRBC COR # BLD: 4.37 10*3/MM3 (ref 3.4–10.8)

## 2023-02-12 PROCEDURE — 25010000002 MORPHINE PER 10 MG: Performed by: INTERNAL MEDICINE

## 2023-02-12 PROCEDURE — 93306 TTE W/DOPPLER COMPLETE: CPT | Performed by: INTERNAL MEDICINE

## 2023-02-12 PROCEDURE — 84100 ASSAY OF PHOSPHORUS: CPT | Performed by: INTERNAL MEDICINE

## 2023-02-12 PROCEDURE — 80053 COMPREHEN METABOLIC PANEL: CPT | Performed by: PHYSICIAN ASSISTANT

## 2023-02-12 PROCEDURE — 85025 COMPLETE CBC W/AUTO DIFF WBC: CPT | Performed by: PHYSICIAN ASSISTANT

## 2023-02-12 PROCEDURE — 63710000001 INSULIN LISPRO (HUMAN) PER 5 UNITS: Performed by: INTERNAL MEDICINE

## 2023-02-12 PROCEDURE — 99254 IP/OBS CNSLTJ NEW/EST MOD 60: CPT | Performed by: INTERNAL MEDICINE

## 2023-02-12 PROCEDURE — 83735 ASSAY OF MAGNESIUM: CPT | Performed by: PHYSICIAN ASSISTANT

## 2023-02-12 PROCEDURE — 82962 GLUCOSE BLOOD TEST: CPT

## 2023-02-12 PROCEDURE — 25010000002 CEFTRIAXONE PER 250 MG: Performed by: INTERNAL MEDICINE

## 2023-02-12 PROCEDURE — 99233 SBSQ HOSP IP/OBS HIGH 50: CPT | Performed by: INTERNAL MEDICINE

## 2023-02-12 PROCEDURE — 93306 TTE W/DOPPLER COMPLETE: CPT

## 2023-02-12 RX ORDER — METOPROLOL SUCCINATE 25 MG/1
25 TABLET, EXTENDED RELEASE ORAL EVERY 12 HOURS SCHEDULED
Status: DISCONTINUED | OUTPATIENT
Start: 2023-02-12 | End: 2023-02-13 | Stop reason: HOSPADM

## 2023-02-12 RX ORDER — LEVOTHYROXINE SODIUM 0.12 MG/1
125 TABLET ORAL
Status: DISCONTINUED | OUTPATIENT
Start: 2023-02-12 | End: 2023-02-13 | Stop reason: HOSPADM

## 2023-02-12 RX ORDER — CEFTRIAXONE SODIUM 1 G/50ML
1 INJECTION, SOLUTION INTRAVENOUS EVERY 24 HOURS
Status: DISCONTINUED | OUTPATIENT
Start: 2023-02-12 | End: 2023-02-13

## 2023-02-12 RX ORDER — OXYCODONE HYDROCHLORIDE 5 MG/1
5 TABLET ORAL EVERY 4 HOURS PRN
Status: DISCONTINUED | OUTPATIENT
Start: 2023-02-12 | End: 2023-02-13 | Stop reason: HOSPADM

## 2023-02-12 RX ADMIN — OXYCODONE HYDROCHLORIDE 5 MG: 5 TABLET ORAL at 09:00

## 2023-02-12 RX ADMIN — INSULIN LISPRO 2 UNITS: 100 INJECTION, SOLUTION INTRAVENOUS; SUBCUTANEOUS at 17:16

## 2023-02-12 RX ADMIN — SODIUM CHLORIDE, POTASSIUM CHLORIDE, SODIUM LACTATE AND CALCIUM CHLORIDE 100 ML/HR: 600; 310; 30; 20 INJECTION, SOLUTION INTRAVENOUS at 04:23

## 2023-02-12 RX ADMIN — Medication 1 LOZENGE: at 11:42

## 2023-02-12 RX ADMIN — MULTIPLE VITAMINS W/ MINERALS TAB 1 TABLET: TAB at 09:00

## 2023-02-12 RX ADMIN — Medication 10 ML: at 09:00

## 2023-02-12 RX ADMIN — INSULIN LISPRO 2 UNITS: 100 INJECTION, SOLUTION INTRAVENOUS; SUBCUTANEOUS at 11:42

## 2023-02-12 RX ADMIN — FLUTICASONE PROPIONATE 2 SPRAY: 50 SPRAY, METERED NASAL at 09:58

## 2023-02-12 RX ADMIN — LEVOTHYROXINE SODIUM 125 MCG: 125 TABLET ORAL at 11:42

## 2023-02-12 RX ADMIN — METOPROLOL SUCCINATE 25 MG: 25 TABLET, EXTENDED RELEASE ORAL at 21:18

## 2023-02-12 RX ADMIN — Medication 10 ML: at 21:21

## 2023-02-12 RX ADMIN — MORPHINE SULFATE 2 MG: 2 INJECTION, SOLUTION INTRAMUSCULAR; INTRAVENOUS at 04:23

## 2023-02-12 RX ADMIN — SENNOSIDES AND DOCUSATE SODIUM 2 TABLET: 8.6; 5 TABLET ORAL at 09:00

## 2023-02-12 RX ADMIN — CEFTRIAXONE SODIUM 1 G: 1 INJECTION, SOLUTION INTRAVENOUS at 09:00

## 2023-02-12 RX ADMIN — OXYCODONE HYDROCHLORIDE 5 MG: 5 TABLET ORAL at 21:18

## 2023-02-12 RX ADMIN — METOPROLOL SUCCINATE 25 MG: 25 TABLET, EXTENDED RELEASE ORAL at 11:42

## 2023-02-12 RX ADMIN — SENNOSIDES AND DOCUSATE SODIUM 2 TABLET: 8.6; 5 TABLET ORAL at 21:18

## 2023-02-12 RX ADMIN — OXYCODONE HYDROCHLORIDE 5 MG: 5 TABLET ORAL at 17:18

## 2023-02-12 RX ADMIN — Medication 1 LOZENGE: at 04:23

## 2023-02-12 RX ADMIN — CETIRIZINE HYDROCHLORIDE 10 MG: 10 TABLET, FILM COATED ORAL at 09:00

## 2023-02-12 RX ADMIN — FAMOTIDINE 20 MG: 20 TABLET, FILM COATED ORAL at 09:00

## 2023-02-12 NOTE — PROGRESS NOTES
[facility]   Hospitalist Progress Note       Patient Name: Wayne Guan  : 1975  MRN: [id number]  Primary Care Physician: Lyla Guerrero APRN  Date of admission: 2/10/2023  Today's Date: 2023  Room / Bed:   [facility]/1  Subjective   Chief Complaint: Nausea, vomiting, syncope, acute hepatitis    Summary:  Wayne Guan is a 47 y.o. male with a past medical history significant for alcoholism, bipolar disorder, anxiety depression, diabetes, GERD presents to the hospital for evaluation of chest pain which started 3 hours ago, patient reported shortness of breath, patient was given nitroglycerin in route and aspirin.  Patient presents to the hospital where he was found to have intractable nausea and vomiting, patient's LFTs were markedly elevated in a hepatocellular pattern, patient underwent EKG which was negative for ischemia.  Patient's high-sensitivity troponin was equivocal.  Patient was given 2 L of IV fluid and the hospital service was asked to admit for further work-up and management.    Interval Followup: 2023    • Continues to feel better on a daily basis   • Less nausea. Appetite improving/eating better  • Voiding better  • Alert and conversational.  Wife is at bedside.  • Telemetry shows NSR 90 bpm range   • He remains normotensive, on room air, and afebrile  • AST trend: 7000+ -->--> 1440  • ALT trend: 1589  -->--> 552  • Cr trend: 1.7 --> 3.5 --> 3.5; appears to have plateaued      REVIEW OF SYSTEMS: All other systems reviewed and are negative.   • GEN: No fevers. No chills. No weight gain. No weight loss.     • HEENT:   No dysphagia/odynophagia. No visual disturbance.    • GI:    + N/V.  No abd pain. No diarrhea. No constipation.  No bloody/black tarry stools. No hematemesis.   • CV: No chest discomfort.  + Palps.  + Lightheadedness.  + Syncope. No orthopnea/PND. No edema.   • RESP:    No cough. No wheeze.  No increased sputum. No hemoptysis. No ESTRELLA.  • :   No dysuria or suprapubic  discomfort. No frequency.No urgency. No hesitancy. No incontinence. No hematuria. No flank pain.    • MS:   No joint stiffness and pain. No myalgias. No muscle weakness.    • SKIN:   No painful or pruritic rashes.  No skin discoloration.  • NEURO:  No focal numbness or weakness.  No headaches.  No ataxia. No slurred speech. No receptive/expressive aphasia.      • PSYCH:   No anxiety. No depression.  • ENDO:  No tremor, hair loss, heat or cold intolerance.  Objective   Temp:  [97.9 °F (36.6 °C)-98.8 °F (37.1 °C)] 98.6 °F (37 °C)  Heart Rate:  [] 101  Resp:  [18-20] 18  BP: (125-137)/(78-91) 125/88  PHYSICAL EXAM   • CON: WN. WD. NAD.  Overweight.  Alert and conversational.  • EYES:  Sclera anicteric. EOMI. Normal conjunctiva.   • ENT:  Oropharyngeal mucosa without ulcers or thrush.    • NECK:  No thyromegaly. No stridor. Trachea midline.  • RESP:  CTA. No wheezes. No crackles.  No work of breathing or tachypnea.   • CV:  Rhythm regular. Rate  bpm. No murmur noted.  No edema.  • GI:  Soft and nontender. Nondistended.  Bowel sounds present.   • EXT: Peripheral pulses intact.  No joint deformities or cyanosis.  • LYMPH:  No lymphedema noted.  No cervical lymphadenopathy.  • PSYCH:  Alert. Oriented. Normal affect and mood.  • NEURO:  CNII-XII grossly intact. No dysarthria or aphasia. No unilateral weakness or paresthesia.  • SKIN: No chronic venous stasis changes or varicosities.  No cellulitis    Results from last 7 days   Lab Units 02/12/23  0412 02/11/23  0626 02/10/23  0920   WBC 10*3/mm3 4.37 7.43 10.18   HEMOGLOBIN g/dL 12.8* 14.0 14.1   HEMATOCRIT % 34.7* 38.7 38.8   PLATELETS 10*3/mm3 142 183 247     Results from last 7 days   Lab Units 02/12/23  0412 02/11/23  1805 02/11/23  0626 02/10/23  0920   SODIUM mmol/L 126* 120* 121* 129*   POTASSIUM mmol/L 4.4 3.8 4.4 4.8   CO2 mmol/L 20.4* 16.3* 18.2* 12.6*   CHLORIDE mmol/L 92* 89* 88* 89*   ANION GAP mmol/L 13.6 14.7 14.8 27.4*   BUN mg/dL 22* 22* 23* 18    CREATININE mg/dL 3.57* 3.51* 3.16* 1.75*   GLUCOSE mg/dL 146* 167* 174* 110*     Results from last 7 days   Lab Units 02/10/23  1634 02/10/23  0920   INR  1.21* 1.23*       RESULTS REVIEWED:  I have personally reviewed the results from the time of this admission to 2/12/2023 13:21 EST and agree with these findings:  []  Laboratory  []  Microbiology  []  Radiology  []  EKG/Telemetry   []  Cardiology/Vascular   []  Pathology  []  Old records  []  Other:  Assessment / Plan   Assessment:    · Acute hepatitis  · SURESH/ATN (likely from hypotension and contrast exposure)  · Intractable nausea and vomiting  · History of alcohol use  · Bipolar disorder  · Cervical stenosis of the spine  · Diabetes mellitus  · GERD  · History of SVT with ablation 9/2022  • Hyperlipidemia (on statin)  • Hyponatremia  • Obesity with BMI > 33  • History of thyroid cancer/thyroidectomy  • Hypotension  • History of hepatic steatosis       Plan:    • Cardiology consulted/Dr. Simmons  - Resumed home metoprolol at lower dose initially  • Nephrology consulted/Dr. Ricardo  • Avoid hypotension   • Continue IV fluids   • Flecainide on hold.  Lisinopril on hold.  Diltiazem on hold.  • Gastroenterology input appreciated / Dr. Grullon  • RUQ ultrasound .... Does not suggest acute cholecystitis  • CT chest contrasted study ..... No pulmonary embolism.  No acute findings.  • Hepatitis A/B/C and HIV ..... Negative  • CMV/EBV/HSV.... Pending  • GLENNA/anti-smooth/mitochondrial antibodies .... Pending  • Iron studies ..... Very elevated  • TSH .... WNL  • Avoid nephrotoxins.  Renally dose meds.  • Avoid hepatotoxins.  Hepatic dosing of meds.  • Additional recommendations pending clinical course            Discussed plan with RN.  DVT prophylaxis:  Mechanical DVT prophylaxis orders are present.  CODE STATUS:      Code Status (Patient has no pulse and is not breathing): CPR (Attempt to Resuscitate)  Medical Interventions (Patient has pulse or is breathing): Full  Support         Patient independently seen and evaluated, agree with assessment and plan, above documentation reflects plan put forth during bedside rounds.  More than 51% of the time of this patient encounter was performed by me.     Interval history:  No acute events overnight, LFTs improving, nausea resolved     Exam:  General appearance: NAD, conversant   Eyes: anicteric sclerae, moist conjunctivae; no lid-lag; PERRLA  HENT: Atraumatic; oropharynx clear with moist mucous membranes and no mucosal ulcerations; normal hard and soft palate  Neck: Trachea midline; FROM, supple, no thyromegaly or lymphadenopathy  Lungs: CTA, with normal respiratory effort and no intercostal retractions  CV: Regular Rate and Rhythm, no Murmurs, Rubs, or Gallops   Abdomen: Soft, non-tender; no masses or Heptosplenomegally  Extremities: No peripheral edema or extremity lymphadenopathy  Skin: Normal temperature, turgor and texture; no rash, ulcers or subcutaneous nodules  Psych: Appropriate affect, alert and oriented to person, place and time  Neuro: CN II - XII intact no motor deficits, no sensory defecits     Plan:  Agree with assessment plan as above  Continue  Bladder scan again today  Follow-up creatinine, this is worsened  Avoid nephrotoxins  LFTs downtrending  EBV, CMP ordered and pending  HFE ordered and pending  Iron noted to be grossly elevated, ferritin grossly elevated, concern for possible hemochromatosis  Hold flecainide in the setting of liver dysfunction  Nephrology consulted, appreciate the recommendations  INR reviewed  Check CBC, CMP, mag and Phos in a.m.  Discussed with gastroenterology  Clinical course will dictate further management      Electronically signed by Camilo Smith MD, 02/12/23, 1:50 PM EST.

## 2023-02-12 NOTE — CONSULTS
[facility]   Cardiology Consult Note    Patient Name: Wayne Guan  : 1975  MRN: [id number]  Primary Care Physician:  Lyla Guerrero APRN   Primary cardiologist : Yao Keen MD   Referring Physician: Camilo Smith MD  Date of admission: 2/10/2023    Subjective   Subjective     Reason for Consultation : Tachycardia, hypotension, recent ablation for SVT    Chief Complaint : Intractable nausea/vomiting, chest pain, abdominal pain, dizziness    HPI:  Wayne Guan is a 47 y.o. male with bipolar disorder, diabetes, GERD, previous alcoholism, SVT, status post ablation in 2022.  He presented to the hospital on 2/10/2023 with 1 day history of significant nausea and vomiting, shortness of breath abdominal pain chest pain and dizziness.  On admission he was noted to have significant elevation of liver enzymes and acute kidney injury.  Blood pressure was on the lower side.  He had recently concluded vacation in [place] and admitted excessive drinking.  He was started on IV fluid hydration and other symptomatic measures.  Home Cardizem, flecainide and metoprolol were held due to low blood pressure.  Symptoms gradually improved and he has not had any vomiting for the past 18 hours.  Mild abdomen discomfort is persisting.  Chest pain resolved.  He is currently tachycardic.  Cardiology was consulted to assist with further care due to underlying cardiac problems.     This morning, patient appears comfortable.  Denies any chest pain or shortness of breath.  He reports some palpitations.    Review of Systems   All systems were reviewed and negative except for chest pain, shortness of breath, nausea, vomiting, abdominal pain, syncope/presyncope.  Negative for palpitations    Personal History     Past Medical History:   Diagnosis Date   • Alcoholism (HCC) 2021   • Allergies    • Aneurysm (HCC)    • Anxiety 7/15/2022   • Bipolar 2 disorder (HCC) 2021   • Cancer (HCC)    • Cervical stenosis of  spinal canal 11/30/2018    Formatting of this note might be different from the original. Added automatically from request for surgery [id number]   • Depression    • Diabetes mellitus (HCC)    • GERD (gastroesophageal reflux disease)    • History of colonic polyps 5/23/2017    Formatting of this note might be different from the original. Added automatically from request for surgery [id number]   • Hyperlipidemia    • Hypertension    • Lumbosacral spondylosis without myelopathy 8/29/2018   • Papillary thyroid carcinoma (HCC) 4/1/2020   • SVT (supraventricular tachycardia) (HCC) 1/23/2022        Family History: family history includes Alcohol abuse in his father; Arthritis in his mother; COPD in his maternal grandmother and paternal uncle; Cancer in his maternal grandfather and paternal grandfather; Colon cancer in his paternal grandfather; Diabetes in his father, maternal grandfather, mother, and paternal aunt; Heart attack in his father; Heart disease in his maternal grandfather, maternal grandmother, maternal uncle, paternal aunt, and paternal grandmother; Hyperlipidemia in his father and mother; Hypertension in his father, maternal grandfather, maternal grandmother, maternal uncle, mother, paternal grandmother, and sister; Mental illness in his paternal aunt; Osteoporosis in his mother; Rashes / Skin problems in his mother; Stroke in his father and maternal grandmother; Thyroid disease in his maternal grandmother. Otherwise pertinent FHx was reviewed and not pertinent to current issue.    Social History:  reports that he quit smoking about 14 years ago. His smoking use included cigarettes. He has a 11.00 pack-year smoking history. He has never used smokeless tobacco. He reports current alcohol use. He reports that he does not use drugs.    Home Medications:  Insulin Degludec, Vitamin D (Cholecalciferol), atorvastatin, cetirizine, dilTIAZem CD, empagliflozin, escitalopram, flecainide, fluticasone, glycopyrrolate,  levothyroxine sodium, lisinopril, metoprolol succinate XL, omeprazole, and ondansetron    Allergies:  No Known Allergies    Objective    Objective     Vitals:   Temp:  [97.9 °F (36.6 °C)-98.8 °F (37.1 °C)] 98.8 °F (37.1 °C)  Heart Rate:  [] 99  Resp:  [18-20] 18  BP: (113-137)/(78-91) 132/86      Physical Exam:   Constitutional: Awake, alert, No acute distress    Eyes: PERRLA, scleral icterus present, no conjunctival injection   HENT: NCAT, mucous membranes moist   Neck: Supple, no thyromegaly, no lymphadenopathy, trachea midline   Respiratory: Clear to auscultation bilaterally, nonlabored respirations    Cardiovascular: Tachycardic, regular, no murmurs, rubs, or gallops, palpable pedal pulses bilaterally   Gastrointestinal: Positive bowel sounds, soft, nontender, nondistended   Musculoskeletal: No bilateral ankle edema, no clubbing or cyanosis to extremities   Psychiatric: Appropriate affect, cooperative   Neurologic: Oriented x 3, strength symmetric in all extremities, speech clear   Skin: No rashes     Result Review    Result Review:  I have personally reviewed the results from the time of this admission to 2/12/2023 10:41 EST and agree with these findings:  [x]  Laboratory  []  Microbiology  [x]  Radiology  [x]  EKG/Telemetry   [x]  Cardiology/Vascular   []  Pathology  [x]  Old records  []  Other:    Most notable findings include:     CMP    CMP 2/10/23 2/11/23 2/11/23 2/12/23     0626 1805    Glucose 110 (A) 174 (A) 167 (A) 146 (A)   BUN 18 23 (A) 22 (A) 22 (A)   Creatinine 1.75 (A) 3.16 (A) 3.51 (A) 3.57 (A)   eGFR 47.1 (A) 23.2 (A) 20.4 (A) 20.0 (A)   Sodium 129 (A) 121 (A) 120 (A) 126 (A)   Potassium 4.8 4.4 3.8 4.4   Chloride 89 (A) 88 (A) 89 (A) 92 (A)   Calcium 8.4 (A) 8.4 (A) 8.2 (A) 8.8   Total Protein 6.6 6.2  6.2   Albumin 4.1 3.5  3.6   Globulin 2.5 2.7  2.6   Total Bilirubin 2.4 (A) 4.6 (A)  4.0 (A)   Alkaline Phosphatase 494 (A) 479 (A)  454 (A)   AST (SGOT) >7,000 (A) 6,780 (A)  1,440 (A)    ALT (SGPT) 1,589 (A) 1,115 (A)  552 (A)   Albumin/Globulin Ratio 1.6 1.3  1.4   BUN/Creatinine Ratio 10.3 7.3 6.3 (A) 6.2 (A)   Anion Gap 27.4 (A) 14.8 14.7 13.6   (A) Abnormal value       Comments are available for some flowsheets but are not being displayed.            CBC    CBC 2/10/23 2/11/23 2/12/23   WBC 10.18 7.43 4.37   RBC 4.29 4.40 3.96 (A)   Hemoglobin 14.1 14.0 12.8 (A)   Hematocrit 38.8 38.7 34.7 (A)   MCV 90.4 88.0 87.6   MCH 32.9 31.8 32.3   MCHC 36.3 (A) 36.2 (A) 36.9 (A)   RDW 12.9 12.9 12.7   Platelets 247 183 142   (A) Abnormal value             Lab Results   Component Value Date    TROPONINT 19 (H) 02/10/2023      Latest Reference Range & Units 02/10/23 09:20 02/10/23 11:13   Creatine Kinase 20 - 200 U/L  150   HS Troponin T <15 ng/L 19 (H) 19 (H)   Troponin T Delta >=-4 - <+4 ng/L  0   proBNP 0.0 - 450.0 pg/mL 111.8        EKG at the time of admission showed sinus rhythm and borderline prolonged corrected QT interval.       Assessment & Plan   Assessment / Plan     Brief Patient Summary:  Wayne Guan is a 47 y.o. male with bipolar disorder, diabetes, GERD, previous alcoholism, SVT, status post ablation in November 2022, who was admitted with intractable nausea/vomiting, chest pain, shortness of breath, along with recurrent syncopal/presyncopal episodes.  He was noted to have acute liver injury along with acute kidney injury.    Active Hospital Problems:  Active Hospital Problems    Diagnosis    • **Hepatitis    • SURESH (acute kidney injury) (HCC)    • Bipolar 2 disorder (HCC)    • Essential hypertension    • GERD (gastroesophageal reflux disease)    • Postoperative hypothyroidism    • Alcoholism (HCC)    • DM (diabetes mellitus) (HCC)      Paroxysmal supraventricular tachycardia : He is status post ablation in September 2021 since then, has not had any major palpitations.  Cardizem, Toprol and flecainide on hold since admission and currently in sinus tachycardia.    Syncope/presyncope : In the  setting of acute kidney injury, liver failure, intractable nausea and vomiting with hypotension.  Also reported chest pain, but cardiac markers are unremarkable.  CT chest did not show pulmonary embolism.  Primary cardiac etiology is unlikely here.    Plan:     We will restart home metoprolol at lower dose of metoprolol succinate 25 mg twice daily, will titrate the dose up as tolerated  Continue to hold flecainide, Cardizem CD and lisinopril    Agree with IV fluids, nephrology consultation  We will proceed with an echocardiogram to reevaluate the LV function after ablation.    We will follow intermittently.    Electronically signed by Alan Simmons MD, 02/12/23, 10:25 AM EST.

## 2023-02-12 NOTE — PLAN OF CARE
Goal Outcome Evaluation:      Pt continues to have good urine output. Up adlib. LR a 100mL/hour. Will continue with plan of care.

## 2023-02-12 NOTE — CONSULTS
[facility]   Consult Note    Patient Name: Wayne Guan  : 1975  MRN: [id number]  Primary Care Physician:  Lyla Guerrero APRN  Referring Physician: No Known Provider  Date of admission: 2/10/2023    Subjective   Subjective     Reason for Consult/ Chief Complaint: SURESH    HPI:  Wayne Guan is a 47 y.o. male with PMG of IDDM, HTN, anxiety/depression, HLD, Gerd, bipolar disorder CKD stage III with baseline creatinine of 0.9-1.2 who initilaly presented with chest pain that had been on going for 3 hrs prior to presentation.  He states that he had ongoing shortness of breath overnight and had trouble sleeping.  He states that he had few drinks from his recently concluded vacation at [facility].  He otherwise normally does not drink consistently.    Upon presentation in the ER patient had creatinine rise from baseline to 1.75 and subsequently anephric rise to 3.16 in the setting of receiving contrast and severe hypotension during evaluation for PE.  Patient also noted to have severe transaminitis with AST ALT greater than 7000.  His bicarb is also low to 10.  He subsequently had an echo and EKG were equivocal and troponins were unremarkable.  Urine analysis consistent with many granular casts.  Nephrology has been consulted for management of SURESH    Review of Systems  Constitutional:        Weakness tiredness fatigue  Eyes:                       No blurry vision, eye discharge, eye irritation, eye pain  HEENT:                   No acute hair loss, earache and discharge, nasal congestion or discharge, sore throat, postnasal drip  Respiratory:           No shortness of breath coughing sputum production wheezing hemoptysis pleuritic chest pain  Cardiovascular:     No chest pain, orthopnea, PND, dizziness, palpitation, lower extremity edema  Gastrointestinal:   No nausea vomiting diarrhea abdominal pain constipation  Genitourinary:       No urinary incontinence, hesitancy, frequency, urgency, dysuria  Neurological:         No confusion, headache, focal weakness, numbness, dysphasia  Hematologic:         No bruising, bleeding, pallor, lymphadenopathy  Endocrine:            No coldness, hot flashes, polyuria, abnormal hair growth  Musculoskeletal:  No body pains, aches, arthritic pains, muscle pain ,muscle wasting  Psychiatric:          No low or high mood, anxiety, hallucinations, delusions  Skin.                      No rash, ulcers, bruising, itching    Personal History     Past Medical History:   Diagnosis Date   • Alcoholism ([facility]) 1/16/2021   • Allergies    • Aneurysm ([facility])    • Anxiety 7/15/2022   • Bipolar 2 disorder ([facility]) 7/20/2021   • Cancer ([facility])    • Cervical stenosis of spinal canal 11/30/2018    Formatting of this note might be different from the original. Added automatically from request for surgery [id number]   • Depression    • Diabetes mellitus ([facility])    • GERD (gastroesophageal reflux disease)    • History of colonic polyps 5/23/2017    Formatting of this note might be different from the original. Added automatically from request for surgery [id number]   • Hyperlipidemia    • Hypertension    • Lumbosacral spondylosis without myelopathy 8/29/2018   • Papillary thyroid carcinoma ([facility]) 4/1/2020   • SVT (supraventricular tachycardia) ([facility]) 1/23/2022       Past Surgical History:   Procedure Laterality Date   • CARDIAC ABLATION  09/09/2022   • CARDIAC ELECTROPHYSIOLOGY PROCEDURE N/A 09/09/2022    Procedure: Ablation SVT HOLD Metoprolol 5 days;  Surgeon: Tai Lisa MD;  Location: [facility] INVASIVE LOCATION;  Service: Cardiovascular;  Laterality: N/A;   • CERVICAL DISC SURGERY     • THYROID SURGERY         Family History: family history includes Alcohol abuse in his father; Arthritis in his mother; COPD in his maternal grandmother and paternal uncle; Cancer in his maternal grandfather and paternal grandfather; Colon cancer in his paternal grandfather; Diabetes in his father, maternal grandfather, mother, and paternal  aunt; Heart attack in his father; Heart disease in his maternal grandfather, maternal grandmother, maternal uncle, paternal aunt, and paternal grandmother; Hyperlipidemia in his father and mother; Hypertension in his father, maternal grandfather, maternal grandmother, maternal uncle, mother, paternal grandmother, and sister; Mental illness in his paternal aunt; Osteoporosis in his mother; Rashes / Skin problems in his mother; Stroke in his father and maternal grandmother; Thyroid disease in his maternal grandmother. Otherwise pertinent FHx was reviewed and not pertinent to current issue.    Social History:  reports that he quit smoking about 14 years ago. His smoking use included cigarettes. He has a 11.00 pack-year smoking history. He has never used smokeless tobacco. He reports current alcohol use. He reports that he does not use drugs.    Home Medications:  Insulin Degludec, Vitamin D (Cholecalciferol), atorvastatin, cetirizine, dilTIAZem CD, empagliflozin, escitalopram, flecainide, fluticasone, glycopyrrolate, levothyroxine sodium, lisinopril, metoprolol succinate XL, omeprazole, and ondansetron    Allergies:  No Known Allergies    Objective    Objective     Vitals:   Temp:  [97.9 °F (36.6 °C)-98.8 °F (37.1 °C)] 98.8 °F (37.1 °C)  Heart Rate:  [] 99  Resp:  [18-20] 18  BP: (113-137)/(78-91) 132/86    Physical Exam:             Constitutional:         Awake, alert responsive, conversant, no obvious distress   Eyes:                       PERRLA, sclerae anicteric, no conjunctival injection   HEENT:                   Moist mucous membranes, no nasal or eye discharge, no throat congestion   Neck:                      Supple, no thyromegaly, no lymphadenopathy, trachea midline, no elevated JVD   Respiratory:           Clear to auscultation bilaterally, nonlabored respirations    Cardiovascular:     RRR, no murmurs, rubs, or gallops, palpable pedal pulses bilaterally, No bilateral ankle edema   Gastrointestinal:    Positive bowel sounds, soft, nontender, non-distended, no organomegaly   Musculoskeletal:  No clubbing or cyanosis to extremities, muscle wasting, joint swelling, muscle weakness   Psychiatric:              Appropriate affect, cooperative   Neurologic:            Awake alert, oriented x 3, strength symmetric in all extremities, Cranial Nerves grossly intact to confrontation, speech clear   Skin:                      No rashes, bruising, skin ulcers, petechiae or ecchymosis    Result Review    Result Review:  I have personally reviewed the results from the time of this admission to 2/12/2023 10:26 EST and agree with these findings:  [x]  Laboratory  [x]  Microbiology  [x]  Radiology  [x]  EKG/Telemetry   [x]  Cardiology/Vascular   [x]  Pathology  []  Old records  []  Other:      Assessment & Plan   Assessment / Plan     Active Hospital Problems:  Active Hospital Problems    Diagnosis    • **Hepatitis    • SURESH (acute kidney injury) (HCC)    • Bipolar 2 disorder (HCC)    • Essential hypertension    • GERD (gastroesophageal reflux disease)    • Postoperative hypothyroidism    • Alcoholism (HCC)    • DM (diabetes mellitus) (HCC)      47 y.o. male with PMG of IDDM, HTN, anxiety/depression, HLD, Gerd, bipolar disorder CKD stage III with baseline creatinine of 0.9-1.2 who initilaly presented with chest pain and shortness of breath with negative troponins EKG with CT angiogram showing no PE and severe hypotension associated with transaminitis with A ST greater than 7000 and ALT of 1400 in the setting of recent alcohol intake from his recently concluded vacation.  Urine analysis consistent with many granular casts and ATN likely in the setting of hypotension and contrast.  Also associated with metabolic acidosis that has improved and hyponatremia also improving with IV fluids.  Imaging of the liver unremarkable for any acute pathology    Plan:   Agree with continuing LR at 100 cc/h for the next 24 hours  Agree with holding  lisinopril and Jardiance at this time  We will repeat urine analysis tomorrow as the previous one showed many RBCs  GI on board for acute hepatitis    Thank you for involving me in the care of the patient.  We will continue to follow along    Electronically signed by Kehinde Schuster MD, 02/12/23, 9:01 AM EST.

## 2023-02-13 ENCOUNTER — READMISSION MANAGEMENT (OUTPATIENT)
Dept: CALL CENTER | Facility: HOSPITAL | Age: 48
End: 2023-02-13
Payer: COMMERCIAL

## 2023-02-13 VITALS
TEMPERATURE: 97.3 F | OXYGEN SATURATION: 100 % | HEIGHT: 72 IN | SYSTOLIC BLOOD PRESSURE: 152 MMHG | WEIGHT: 252.65 LBS | DIASTOLIC BLOOD PRESSURE: 101 MMHG | HEART RATE: 88 BPM | RESPIRATION RATE: 18 BRPM | BODY MASS INDEX: 34.22 KG/M2

## 2023-02-13 LAB
ALBUMIN SERPL-MCNC: 3.1 G/DL (ref 3.5–5.2)
ALBUMIN/GLOB SERPL: 1 G/DL
ALP SERPL-CCNC: 411 U/L (ref 39–117)
ALT SERPL W P-5'-P-CCNC: 329 U/L (ref 1–41)
ANION GAP SERPL CALCULATED.3IONS-SCNC: 12.8 MMOL/L (ref 5–15)
AST SERPL-CCNC: 484 U/L (ref 1–40)
BASOPHILS # BLD AUTO: 0.03 10*3/MM3 (ref 0–0.2)
BASOPHILS NFR BLD AUTO: 0.7 % (ref 0–1.5)
BILIRUB SERPL-MCNC: 1.5 MG/DL (ref 0–1.2)
BUN SERPL-MCNC: 20 MG/DL (ref 6–20)
BUN/CREAT SERPL: 7.4 (ref 7–25)
CALCIUM SPEC-SCNC: 8.8 MG/DL (ref 8.6–10.5)
CHLORIDE SERPL-SCNC: 102 MMOL/L (ref 98–107)
CO2 SERPL-SCNC: 19.2 MMOL/L (ref 22–29)
CREAT SERPL-MCNC: 2.69 MG/DL (ref 0.76–1.27)
DEPRECATED RDW RBC AUTO: 44.8 FL (ref 37–54)
EGFRCR SERPLBLD CKD-EPI 2021: 28.1 ML/MIN/1.73
EOSINOPHIL # BLD AUTO: 0.07 10*3/MM3 (ref 0–0.4)
EOSINOPHIL NFR BLD AUTO: 1.7 % (ref 0.3–6.2)
ERYTHROCYTE [DISTWIDTH] IN BLOOD BY AUTOMATED COUNT: 13.9 % (ref 12.3–15.4)
GLOBULIN UR ELPH-MCNC: 3 GM/DL
GLUCOSE BLDC GLUCOMTR-MCNC: 140 MG/DL (ref 70–99)
GLUCOSE SERPL-MCNC: 162 MG/DL (ref 65–99)
HCT VFR BLD AUTO: 34.7 % (ref 37.5–51)
HGB BLD-MCNC: 12.4 G/DL (ref 13–17.7)
IMM GRANULOCYTES # BLD AUTO: 0.04 10*3/MM3 (ref 0–0.05)
IMM GRANULOCYTES NFR BLD AUTO: 1 % (ref 0–0.5)
LYMPHOCYTES # BLD AUTO: 0.69 10*3/MM3 (ref 0.7–3.1)
LYMPHOCYTES NFR BLD AUTO: 16.6 % (ref 19.6–45.3)
MAGNESIUM SERPL-MCNC: 1.7 MG/DL (ref 1.6–2.6)
MCH RBC QN AUTO: 31.6 PG (ref 26.6–33)
MCHC RBC AUTO-ENTMCNC: 35.7 G/DL (ref 31.5–35.7)
MCV RBC AUTO: 88.3 FL (ref 79–97)
MONOCYTES # BLD AUTO: 0.42 10*3/MM3 (ref 0.1–0.9)
MONOCYTES NFR BLD AUTO: 10.1 % (ref 5–12)
NEUTROPHILS NFR BLD AUTO: 2.91 10*3/MM3 (ref 1.7–7)
NEUTROPHILS NFR BLD AUTO: 69.9 % (ref 42.7–76)
NRBC BLD AUTO-RTO: 0 /100 WBC (ref 0–0.2)
PLATELET # BLD AUTO: 124 10*3/MM3 (ref 140–450)
PMV BLD AUTO: 9.4 FL (ref 6–12)
POTASSIUM SERPL-SCNC: 4 MMOL/L (ref 3.5–5.2)
PROT SERPL-MCNC: 6.1 G/DL (ref 6–8.5)
RBC # BLD AUTO: 3.93 10*6/MM3 (ref 4.14–5.8)
SODIUM SERPL-SCNC: 134 MMOL/L (ref 136–145)
WBC NRBC COR # BLD: 4.16 10*3/MM3 (ref 3.4–10.8)

## 2023-02-13 PROCEDURE — 83735 ASSAY OF MAGNESIUM: CPT | Performed by: PHYSICIAN ASSISTANT

## 2023-02-13 PROCEDURE — 82962 GLUCOSE BLOOD TEST: CPT

## 2023-02-13 PROCEDURE — 25010000002 CEFTRIAXONE PER 250 MG: Performed by: INTERNAL MEDICINE

## 2023-02-13 PROCEDURE — 80053 COMPREHEN METABOLIC PANEL: CPT | Performed by: PHYSICIAN ASSISTANT

## 2023-02-13 PROCEDURE — 99239 HOSP IP/OBS DSCHRG MGMT >30: CPT | Performed by: INTERNAL MEDICINE

## 2023-02-13 PROCEDURE — 85025 COMPLETE CBC W/AUTO DIFF WBC: CPT | Performed by: PHYSICIAN ASSISTANT

## 2023-02-13 RX ORDER — CEFDINIR 300 MG/1
300 CAPSULE ORAL 2 TIMES DAILY
Status: DISCONTINUED | OUTPATIENT
Start: 2023-02-13 | End: 2023-02-13

## 2023-02-13 RX ORDER — FUROSEMIDE 10 MG/ML
40 INJECTION INTRAMUSCULAR; INTRAVENOUS ONCE
Status: DISCONTINUED | OUTPATIENT
Start: 2023-02-13 | End: 2023-02-13

## 2023-02-13 RX ADMIN — OXYCODONE HYDROCHLORIDE 5 MG: 5 TABLET ORAL at 03:26

## 2023-02-13 RX ADMIN — METOPROLOL SUCCINATE 25 MG: 25 TABLET, EXTENDED RELEASE ORAL at 09:22

## 2023-02-13 RX ADMIN — LEVOTHYROXINE SODIUM 125 MCG: 125 TABLET ORAL at 05:59

## 2023-02-13 RX ADMIN — MULTIPLE VITAMINS W/ MINERALS TAB 1 TABLET: TAB at 09:22

## 2023-02-13 RX ADMIN — FLUTICASONE PROPIONATE 2 SPRAY: 50 SPRAY, METERED NASAL at 09:24

## 2023-02-13 RX ADMIN — CETIRIZINE HYDROCHLORIDE 10 MG: 10 TABLET, FILM COATED ORAL at 09:22

## 2023-02-13 RX ADMIN — CEFTRIAXONE SODIUM 1 G: 1 INJECTION, SOLUTION INTRAVENOUS at 10:14

## 2023-02-13 RX ADMIN — SODIUM CHLORIDE, POTASSIUM CHLORIDE, SODIUM LACTATE AND CALCIUM CHLORIDE 100 ML/HR: 600; 310; 30; 20 INJECTION, SOLUTION INTRAVENOUS at 03:24

## 2023-02-13 NOTE — PLAN OF CARE
Goal Outcome Evaluation:  Plan of Care Reviewed With: patient        Progress: improving  Outcome Evaluation: Pt A/Ox4. Ad casandra in room. Good UP this shift. CO abd pain, medicated with PRN x2. Passes gas, still no BM. Will continue with POC.

## 2023-02-13 NOTE — PLAN OF CARE
Goal Outcome Evaluation:            Patient being dc after receiving morning dose of antibiotics. VSS.

## 2023-02-13 NOTE — PROGRESS NOTES
[facility]     Progress Note    Patient Name: Wayne Guan  : 1975  MRN: [id number]  Primary Care Physician:  Lyla Guerrero APRN  Date of admission: 2/10/2023    Subjective   Patient is doing great and has no new issues AST ALT creatinine slowly and gradually coming down  Review of Systems  Constitutional:        Weakness tiredness fatigue  Eyes:                       No blurry vision, eye discharge, eye irritation, eye pain  HEENT:                   No acute hair loss, earache and discharge, nasal congestion or discharge, sore throat, postnasal drip  Respiratory:           No shortness of breath coughing sputum production wheezing hemoptysis pleuritic chest pain  Cardiovascular:     No chest pain, orthopnea, PND, dizziness, palpitation, lower extremity edema  Gastrointestinal:   No nausea vomiting diarrhea abdominal pain constipation  Genitourinary:       No urinary incontinence, hesitancy, frequency, urgency, dysuria  Hematologic:         No bruising, bleeding, pallor, lymphadenopathy  Endocrine:            No coldness, hot flashes, polyuria, abnormal hair growth  Musculoskeletal:    No body pains, aches, arthritic pains, muscle pain ,muscle wasting  Psychiatric:          No low or high mood, anxiety, hallucinations, delusions  Skin.                      No rash, ulcers, bruising, itching  Neurological:        No confusion, headache, focal weakness, numbness, dysphasia    Objective   Objective     Vitals:   Temp:  [97.3 °F (36.3 °C)-99.1 °F (37.3 °C)] 97.3 °F (36.3 °C)  Heart Rate:  [] 88  Resp:  [18] 18  BP: (126-152)/() 152/101  Physical Exam    Constitutional: Awake, alert responsive, conversant, no obvious distress              Psychiatric:  Appropriate affect, cooperative   Neurologic:  Awake alert ,oriented x 3, strength symmetric in all extremities, Cranial Nerves grossly intact to confrontation, speech clear   Eyes:   PERRLA, sclerae anicteric, no conjunctival  injection   HEENT:  Moist mucous membranes, no nasal or eye discharge, no throat congestion   Neck:   Supple, no thyromegaly, no lymphadenopathy, trachea midline, no elevated JVD   Respiratory:  Clear to auscultation bilaterally, nonlabored respirations    Cardiovascular: RRR, no murmurs, rubs, or gallops, palpable pedal pulses bilaterally, No bilateral ankle edema   Gastrointestinal: Positive bowel sounds, soft, nontender, nondistended, no organomegaly   Musculoskeletal:  No clubbing or cyanosis to extremities,muscle wasting, joint swelling, muscle weakness             Skin:                      No rashes, bruising, skin ulcers, petechiae or ecchymosis    Result Review    Result Review:  I have personally reviewed the results from the time of this admission to 2/13/2023 15:35 EST and agree with these findings:  []  Laboratory  []  Microbiology  []  Radiology  []  EKG/Telemetry   []  Cardiology/Vascular   []  Pathology  []  Old records  []  Other:    Assessment & Plan   Assessment / Plan       Active Hospital Problems:    Active Hospital Problems    Diagnosis  POA   • **Hepatitis [K75.9]  Yes   • SURESH (acute kidney injury) (HCC) [N17.9]  Unknown   • Bipolar 2 disorder (HCC) [F31.81]  Yes   • Essential hypertension [I10]  Yes   • GERD (gastroesophageal reflux disease) [K21.9]  Yes   • Postoperative hypothyroidism [E89.0]  Yes   • Alcoholism (HCC) [F10.20]  Yes   • DM (diabetes mellitus) (HCC) [E11.9]  Yes       Plan:   Patient need to be well-hydrated and once his blood pressure is stabilized I expect creatinine to come back to its baseline and liver enzymes are already improving       Electronically signed by Henrry Duckworth MD, 02/13/23, 3:35 PM EST.

## 2023-02-13 NOTE — DISCHARGE SUMMARY
[facility]         HOSPITALIST  DISCHARGE SUMMARY    Patient Name: Wayne Guan  : 1975  MRN: [id number]    Date of Admission: 2/10/2023  Date of Discharge:  2023  Primary Care Physician: Lyla Guerrero APRN    Consults     Date and Time Order Name Status Description    2023  8:03 AM Inpatient Cardiology Consult Completed     2023  8:01 AM Inpatient Nephrology Consult Completed     2/10/2023 11:24 AM Gastroenterology (on-call MD unless specified) Completed           Active and Resolved Hospital Problems:  - Acute hepatitis  - SURESH/ATN (likely from hypotension and contrast exposure)  - Intractable nausea and vomiting  - History of alcohol use  - Bipolar disorder  - Cervical stenosis of the spine  - Diabetes mellitus  - GERD  - History of SVT with ablation 2022  • Hyperlipidemia (on statin)  • Hyponatremia  • Obesity with BMI > 33  • History of thyroid cancer/thyroidectomy  • Hypotension  • History of hepatic steatosis    Hospital Course     Hospital Course:  Wayne Guan is a 47 y.o. male with a past medical history significant for alcoholism, bipolar disorder, anxiety depression, diabetes, GERD presents to the hospital for evaluation of chest pain which started 3 hours ago, patient reported shortness of breath, patient was given nitroglycerin in route and aspirin.  Patient presents to the hospital where he was found to have intractable nausea and vomiting, patient's LFTs were markedly elevated in a hepatocellular pattern, patient underwent EKG which was negative for ischemia.  Patient's high-sensitivity troponin was equivocal.  Patient was given 2 L of IV fluid and the hospital service was asked to admit for further work-up and management.  Patient underwent work-up for his transaminitis, hepatitis panel was negative, patient did have abnormal iron studies however given his acute inflammation gastroenterology is recommending that we repeat this as an outpatient.  Patient  did have issues with SURESH, his renal function was improving on day of discharge, he did have an issue with urinary retention x1, he had no further issues.  Patient's medication list was optimized, he was evaluated by cardiology.  Patient's blood pressure stabilized, he had no further issues with nausea or vomiting and he is discharged home today in stable condition.  Patient to follow-up with gastroenterology for repeat iron studies and follow-up of pending labs at discharge.  Patient should follow-up with nephrology with repeat BMP to ensure continued improvement of renal function.  Patient should follow-up with her PCP in 3 7 days of discharge.  Patient is discharged home in stable condition       Day of Discharge     Vital Signs:  Temp:  [97.3 °F (36.3 °C)-99 °F (37.2 °C)] 97.3 °F (36.3 °C)  Heart Rate:  [] 88  Resp:  [18] 18  BP: (126-152)/() 152/101    Physical Exam:   General appearance: NAD, conversant   Eyes: anicteric sclerae, moist conjunctivae; no lid-lag; PERRLA  HENT: Atraumatic; oropharynx clear with moist mucous membranes and no mucosal ulcerations; normal hard and soft palate  Neck: Trachea midline; FROM, supple, no thyromegaly or lymphadenopathy  Lungs: CTA, with normal respiratory effort and no intercostal retractions  CV: Regular Rate and Rhythm, no Murmurs, Rubs, or Gallops   Abdomen: Soft, non-tender; no masses or Heptosplenomegally  Extremities: No peripheral edema or extremity lymphadenopathy  Skin: Normal temperature, turgor and texture; no rash, ulcers or subcutaneous nodules  Psych: Appropriate affect, alert and oriented to person, place and time  Neuro: CN II - XII intact no motor deficits, no sensory defecits      Discharge Details        Discharge Medications      Continue These Medications      Instructions Start Date   cetirizine 10 MG tablet  Commonly known as: zyrTEC   10 mg, Oral, 2 Times Daily      dilTIAZem  MG 24 hr capsule  Commonly known as: CARDIZEM CD   180  mg, Oral, Daily      escitalopram 10 MG tablet  Commonly known as: LEXAPRO   10 mg, Oral, Daily      fluticasone 50 MCG/ACT nasal spray  Commonly known as: FLONASE   2 sprays, Nasal, Daily      glycopyrrolate 2 MG tablet  Commonly known as: ROBINUL   2 mg, Oral, Daily      Insulin Degludec 100 UNIT/ML solution injection  Commonly known as: TRESIBA   36 Units, Subcutaneous, Every Night at Bedtime      levothyroxine sodium 125 MCG capsule capsule  Commonly known as: TIROSINT   125 mcg, Oral, Weekly, Saturday      metoprolol succinate XL 50 MG 24 hr tablet  Commonly known as: TOPROL-XL   50 mg, Oral, Daily      omeprazole 20 MG capsule  Commonly known as: priLOSEC   20 mg, Oral, Daily      ondansetron 4 MG tablet  Commonly known as: Zofran   4 mg, Oral, Every 8 Hours PRN      Vitamin D (Cholecalciferol) 10 MCG (400 UNIT) tablet  Commonly known as: CHOLECALCIFEROL   400 Units, Oral, Daily         Stop These Medications    atorvastatin 40 MG tablet  Commonly known as: LIPITOR     flecainide 50 MG tablet  Commonly known as: TAMBOCOR     Jardiance 25 MG tablet tablet  Generic drug: empagliflozin     lisinopril 10 MG tablet  Commonly known as: PRINIVIL,ZESTRIL            No Known Allergies    Discharge Disposition:  Home or Self Care    Diet:  Hospital:No active diet order      Discharge Activity:       CODE STATUS:  Code Status and Medical Interventions:   Ordered at: 02/10/23 1229     Code Status (Patient has no pulse and is not breathing):    CPR (Attempt to Resuscitate)     Medical Interventions (Patient has pulse or is breathing):    Full Support       Future Appointments   Date Time Provider Department Center   2/17/2023  2:00 PM Lyla Guerrero APRN [facility]   2/28/2023 10:30 AM Gonzalse Chen DO [facility]   4/25/2023  4:15 PM Lyla Guerrero APRN [facility]   1/19/2024  9:30 AM Stu Jean DPM [facility]       Additional Instructions for the Follow-ups that You Need to  Schedule     Discharge Follow-up with PCP   As directed       Currently Documented PCP:    Lyla Guerrero APRN    PCP Phone Number:    [phone]     Follow Up Details: 1-2 weeks         Discharge Follow-up with Specified Provider: Dr. Grullon; 2 Weeks   As directed      To: Dr. Grullon    Follow Up: 2 Weeks         Discharge Follow-up with Specified Provider: Dr. Schuster 3-4 weeks; 1 Month   As directed      To: Dr. Schuster 3-4 weeks    Follow Up: 1 Month         Discharge Follow-up with Specified Provider: Dr. Simmons 2-3 weeks   As directed      To: Dr. Simmons 2-3 weeks         Comprehensive Metabolic Panel    Feb 20, 2023 (Approximate)      Need to route results to PCP, Dr. Grullon, Dr. Schuster, and Dr. Simmons    Order Comments: Need to route results to PCP, Dr. Grullon, Dr. Schuster, and Dr. Simmons     Release to patient: Routine Release               Pertinent  and/or Most Recent Results     IMAGING:  Adult Transthoracic Echo Complete w/ Color, Spectral and Contrast if necessary per protocol    Result Date: 2/12/2023  •  Left ventricular systolic function is normal. Left ventricular ejection fraction appears to be 56 - 60%. •  Left ventricular diastolic function is consistent with (grade I) impaired relaxation. •  There is borderline dilation of aortic root measuring 3.7 cm in diameter. •  There are no significant valvular abnormalities. •  Estimated right ventricular systolic pressure from tricuspid regurgitation is normal (<35 mmHg).     CT Chest With Contrast Diagnostic    Result Date: 2/10/2023  PROCEDURE: CT CHEST W CONTRAST DIAGNOSTIC  COMPARISON:  [facility], CT, CT CHEST PULMONARY EMBOLISM, 1/23/2022, 2:03. INDICATIONS: PASS OUT, CHEST PAIN, WHEEZING, WEAKNESS, AND SHORTNESS OF BREATH X TODAY  PROTOCOL:   Pulmonary embolism imaging protocol performed    RADIATION:   DLP: 620.6 mGy*cm   Automated exposure control was utilized to minimize radiation dose. CONTRAST: 100 cc  Isovue 370 I.V.  TECHNIQUE: Axial images of the chest with intravenous contrast.  FINDINGS: No pulmonary emboli are identified.  The thoracic aorta has a normal caliber.  Coronary artery calcification is present.  No evidence of pneumothorax.  No evidence of pleural or pericardial effusion.  Prior thyroidectomy.  No suspicious pulmonary masses or acute parenchymal consolidations are identified.  Degenerative changes are present in the thoracic spine.  There is fatty infiltration of the visualized liver.  Stable mild pancreatic calcifications suggesting chronic pancreatitis.  There is no mediastinal, axillary or hilar adenopathy.  There is a small hiatal hernia.  IMPRESSION:  No acute findings.  No pulmonary emboli are identified.  CASIMIRO CRAWFORD MD       Electronically Signed and Approved By: CASIMIRO CRAWFORD MD on 2/10/2023 at 10:33             US Liver    Result Date: 2/10/2023  PROCEDURE: US LIVER  COMPARISON: [facility], CT, CT CHEST W CONTRAST DIAGNOSTIC, 2/10/2023, 10:09.  INDICATIONS: severe transaminitis  FINDINGS:  The pancreas is obscured by bowel gas.  The liver measures 20.6 centimeters in length.  Increased echogenicity of the right renal cortex relative to right renal cortex.  The gallbladder demonstrates multiple echogenic foci that are nonmobile..  Common duct measures up to about 4 millimeters in diameter.  Right kidney is normal measuring 11.9 centimeters in maximal length.  There is no gallbladder wall thickening.  Negative sonographic Swenson sign.  Gallbladder not fully distended.  The portal venous flow is normal and hepatic veins are patent.        1. Pancreas is obscured by bowel gas. 2. Mild hepatomegaly.  Hepatic steatosis likely present.  The liver was decreased density on recent CT from same date. 3. Multiple small echogenic foci in the gallbladder are nonmobile.  These likely reflect small gall stones.  Polyps thought less likely.  4. Gallbladder wall measures 3-4  millimeters.  This could partly relate to incomplete distention.  There is a negative sonographic Swenson sign and no pericholecystic fluid to suggest cholecystitis.  Correlate with symptoms.      AMRIT AMBROCIO MD       Electronically Signed and Approved By: AMRIT AMBROCIO MD on 2/10/2023 at 13:05             XR Chest 1 View    Result Date: 2/10/2023  PROCEDURE: XR CHEST 1 VW  COMPARISON: [facility], CR, XR CHEST 1 VW, 1/23/2022, 0:48.  INDICATIONS: Chest Pain  FINDINGS:   The lungs are well-expanded. The heart and pulmonary vasculature are within normal limits. No pleural effusions are identified. There are no active appearing infiltrates.  No evidence of pneumothorax.  Prior ACDF in the lower cervical spine.  IMPRESSION: No active disease.  CASIMIRO CRAWFORD MD       Electronically Signed and Approved By: CASIMIRO CRAWFORD MD on 2/10/2023 at 9:33               LAB RESULTS:      Lab 02/13/23 0425 02/12/23 0412 02/11/23  0626 02/10/23  1634 02/10/23  0920   WBC 4.16 4.37 7.43  --  10.18   HEMOGLOBIN 12.4* 12.8* 14.0  --  14.1   HEMATOCRIT 34.7* 34.7* 38.7  --  38.8   PLATELETS 124* 142 183  --  247   NEUTROS ABS 2.91 3.77 7.02*  --  9.37*   IMMATURE GRANS (ABS) 0.04 0.03 0.02  --  0.02   LYMPHS ABS 0.69* 0.33* 0.28*  --  0.61*   MONOS ABS 0.42 0.17 0.09*  --  0.15   EOS ABS 0.07 0.05 0.00  --  0.01   MCV 88.3 87.6 88.0  --  90.4   PROTIME  --   --   --  15.5* 15.7*         Lab 02/13/23  0425 02/12/23 0412 02/11/23  1805 02/11/23  0626 02/10/23  0920   SODIUM 134* 126* 120* 121* 129*   POTASSIUM 4.0 4.4 3.8 4.4 4.8   CHLORIDE 102 92* 89* 88* 89*   CO2 19.2* 20.4* 16.3* 18.2* 12.6*   ANION GAP 12.8 13.6 14.7 14.8 27.4*   BUN 20 22* 22* 23* 18   CREATININE 2.69* 3.57* 3.51* 3.16* 1.75*   EGFR 28.1* 20.0* 20.4* 23.2* 47.1*   GLUCOSE 162* 146* 167* 174* 110*   CALCIUM 8.8 8.8 8.2* 8.4* 8.4*   MAGNESIUM 1.7 1.9  --  1.9 2.0   PHOSPHORUS  --  2.8  --  4.1  --    TSH  --   --   --  0.524  --           Lab 02/13/23  0425 02/12/23  0412 02/11/23  0626 02/10/23  0920   TOTAL PROTEIN 6.1 6.2 6.2 6.6   ALBUMIN 3.1* 3.6 3.5 4.1   GLOBULIN 3.0 2.6 2.7 2.5   ALT (SGPT) 329* 552* 1,115* 1,589*   AST (SGOT) 484* 1,440* 6,780* >7,000*   BILIRUBIN 1.5* 4.0* 4.6* 2.4*   ALK PHOS 411* 454* 479* 494*   LIPASE  --   --   --  15         Lab 02/10/23  1634 02/10/23  1113 02/10/23  0920   PROBNP  --   --  111.8   HSTROP T  --  19* 19*   PROTIME 15.5*  --  15.7*   INR 1.21*  --  1.23*             Lab 02/11/23  0626 02/10/23  1113   IRON  --  305*   IRON SATURATION  --  97*   TIBC  --  316   TRANSFERRIN  --  212   FERRITIN 75,971.00* 70,129.00*         Brief Urine Lab Results  (Last result in the past 365 days)      Color   Clarity   Blood   Leuk Est   Nitrite   Protein   CREAT   Urine HCG        02/11/23 1231 Dark Yellow   Turbid   Moderate (2+)   Negative   Negative   100 mg/dL (2+)               Microbiology Results (last 10 days)     ** No results found for the last 240 hours. **            Results for orders placed during the hospital encounter of 02/10/23    Adult Transthoracic Echo Complete w/ Color, Spectral and Contrast if necessary per protocol    Interpretation Summary  •  Left ventricular systolic function is normal. Left ventricular ejection fraction appears to be 56 - 60%.  •  Left ventricular diastolic function is consistent with (grade I) impaired relaxation.  •  There is borderline dilation of aortic root measuring 3.7 cm in diameter.  •  There are no significant valvular abnormalities.  •  Estimated right ventricular systolic pressure from tricuspid regurgitation is normal (<35 mmHg).      Time spent on Discharge including face to face service: Greater than 30 minutes      Electronically signed by Camilo Smith MD, 02/13/23, 4:29 PM EST.

## 2023-02-14 ENCOUNTER — TRANSITIONAL CARE MANAGEMENT TELEPHONE ENCOUNTER (OUTPATIENT)
Dept: CALL CENTER | Facility: HOSPITAL | Age: 48
End: 2023-02-14
Payer: COMMERCIAL

## 2023-02-14 LAB
CMV IGG SERPL IA-ACNC: <0.6 U/ML (ref 0–0.59)
CMV IGM SERPL IA-ACNC: <30 AU/ML (ref 0–29.9)
DSDNA IGG SERPL IA-ACNC: NEGATIVE [IU]/ML
EBV EA-D IGG SER-ACNC: NORMAL U/ML
EBV NA IGG SER IA-ACNC: NORMAL U/ML
EBV VCA IGG SER IA-ACNC: NORMAL U/ML
EBV VCA IGM SER IA-ACNC: NORMAL U/ML
MITOCHONDRIA M2 IGG SER-ACNC: <20 UNITS (ref 0–20)
NUCLEAR IGG SER IA-RTO: NEGATIVE
SMA IGG SER-ACNC: 6 UNITS (ref 0–19)

## 2023-02-14 NOTE — OUTREACH NOTE
Call Center TCM Note    Flowsheet Row Responses   [facility] patient discharged from? [facility]   Does the patient have one of the following disease processes/diagnoses(primary or secondary)? Other   TCM attempt successful? Yes  [[place], EVI,  Mother]   Call start time 0904   Call end time 0911   Discharge diagnosis Hepatitis   Person spoke with today (if not patient) and relationship EVI Millard   Meds reviewed with patient/caregiver? Yes   Is the patient having any side effects they believe may be caused by any medication additions or changes? No   Does the patient have all medications ordered at discharge? N/A   Prescription comments Stop taking Lipitor, Flecainide, Jardiance, lisinopril   Is the patient taking all medications as directed (includes completed medication regime)? Yes   Comments Advised to make fu appts with Cardiology, Nephrology, GI (phone numbers given to EVI Millard)   Does the patient have an appointment with their PCP within 7 days of discharge? Yes  [2/17/23 at 2:00 PM]   Psychosocial issues? No   Did the patient receive a copy of their discharge instructions? Yes   Nursing interventions Reviewed instructions with patient   What is the patient's perception of their health status since discharge? Improving   Is the patient/caregiver able to teach back signs and symptoms related to disease process for when to call PCP? Yes   Is the patient/caregiver able to teach back signs and symptoms related to disease process for when to call 911? Yes   Is the patient/caregiver able to teach back the hierarchy of who to call/visit for symptoms/problems? PCP, Specialist, Home health nurse, Urgent Care, ED, 911 Yes   If the patient is a current smoker, are they able to teach back resources for cessation? Not a smoker   TCM call completed? Yes   Wrap up additional comments EVI Millard, states pt was still sleeping at time of call. Reviewed AVS/medication with Mohsen Millard verified PCP hospital fu appt on  2/17/23, and given numbers to call for Cardiologist, GI, Nephrologist to make fu appts as no phone numbers were listed on AVS. [place] verbalized understanding.   Call end time 0911   Would this patient benefit from a Referral to [facility] Social Work? No   Is the patient interested in additional calls from an ambulatory ?  NOTE:  applies to high risk patients requiring additional follow-up. No          Marcella Kang RN    2/14/2023, 09:19 EST

## 2023-02-14 NOTE — OUTREACH NOTE
Prep Survey    Flowsheet Row Responses   [facility] facility patient discharged from? [facility]   Is LACE score < 7 ? No   Eligibility [facility]   Hospital [facility]   Date of Admission 02/10/23   Date of Discharge 02/13/23   Discharge Disposition Home or Self Care   Discharge diagnosis Hepatitis   Does the patient have one of the following disease processes/diagnoses(primary or secondary)? Other   Does the patient have Home health ordered? No   Is there a DME ordered? No   Prep survey completed? Yes          GRETA CLEMENTS - Registered Nurse

## 2023-02-15 ENCOUNTER — TELEPHONE (OUTPATIENT)
Dept: GASTROENTEROLOGY | Facility: CLINIC | Age: 48
End: 2023-02-15
Payer: COMMERCIAL

## 2023-02-15 NOTE — TELEPHONE ENCOUNTER
----- Message from Shania Grullon MD sent at 2/14/2023  9:18 PM EST -----  No signs of autoimmune liver disease.    Please arrange CMP in 1 week to f/u liver enzyme elevation. thanks

## 2023-02-16 LAB
HSV1 DNA SPEC QL NAA+PROBE: NEGATIVE
HSV2 DNA SPEC QL NAA+PROBE: NEGATIVE

## 2023-02-17 ENCOUNTER — OFFICE VISIT (OUTPATIENT)
Dept: INTERNAL MEDICINE | Facility: CLINIC | Age: 48
End: 2023-02-17
Payer: COMMERCIAL

## 2023-02-17 VITALS
HEIGHT: 72 IN | WEIGHT: 260.6 LBS | SYSTOLIC BLOOD PRESSURE: 150 MMHG | DIASTOLIC BLOOD PRESSURE: 100 MMHG | TEMPERATURE: 98.1 F | BODY MASS INDEX: 35.3 KG/M2

## 2023-02-17 DIAGNOSIS — F41.9 ANXIETY AND DEPRESSION: Chronic | ICD-10-CM

## 2023-02-17 DIAGNOSIS — F32.A ANXIETY AND DEPRESSION: Chronic | ICD-10-CM

## 2023-02-17 DIAGNOSIS — Z09 HOSPITAL DISCHARGE FOLLOW-UP: Primary | ICD-10-CM

## 2023-02-17 DIAGNOSIS — J30.9 ALLERGIC RHINITIS, UNSPECIFIED SEASONALITY, UNSPECIFIED TRIGGER: ICD-10-CM

## 2023-02-17 DIAGNOSIS — E11.65 TYPE 2 DIABETES MELLITUS WITH HYPERGLYCEMIA, UNSPECIFIED WHETHER LONG TERM INSULIN USE: Chronic | ICD-10-CM

## 2023-02-17 LAB — HFE GENE MUT ANL BLD/T: NORMAL

## 2023-02-17 PROCEDURE — 99495 TRANSJ CARE MGMT MOD F2F 14D: CPT | Performed by: NURSE PRACTITIONER

## 2023-02-17 RX ORDER — DILTIAZEM HYDROCHLORIDE 180 MG/1
CAPSULE, COATED, EXTENDED RELEASE ORAL
Qty: 90 CAPSULE | Refills: 3 | OUTPATIENT
Start: 2023-02-17

## 2023-02-17 RX ORDER — GLYCOPYRROLATE 2 MG/1
2 TABLET ORAL DAILY
Qty: 90 TABLET | Refills: 1 | Status: SHIPPED | OUTPATIENT
Start: 2023-02-17

## 2023-02-17 RX ORDER — MONTELUKAST SODIUM 10 MG/1
10 TABLET ORAL NIGHTLY
Qty: 90 TABLET | Refills: 3 | Status: SHIPPED | OUTPATIENT
Start: 2023-02-17

## 2023-02-17 NOTE — PROGRESS NOTES
Transitional Care Follow Up Visit  Subjective     Wayne Guan is a 47 y.o. male who presents for a transitional care management visit.    Within 48 business hours after discharge our office contacted him via telephone to coordinate his care and needs.      I reviewed and discussed the details of that call along with the discharge summary, hospital problems, inpatient lab results, inpatient diagnostic studies, and consultation reports with Wayne.     Current outpatient and discharge medications have been reconciled for the patient.  Reviewed by: DELL Morrison      Date of TCM Phone Call 2/13/2023   Hospital [facility]   Date of Admission 2/10/2023   Date of Discharge 2/13/2023   Discharge Disposition Home or Self Care     Risk for Readmission (LACE) Score: 12 (2/13/2023  6:00 AM)      History of Present Illness   Course During Hospital Stay:     Patient with history of alcoholism, bipolar disorder, anxiety depression, diabetes, GERD presented to the emergency department for evaluation of chest pain on 2/10/2023 and was found to have intractable nausea and vomiting, elevated LFTs in a hepatocellular pattern.  EKG which was negative for ischemia.  Patient's high-sensitivity troponin was equivocal.  Patient was given 2 L of IV fluid and the hospital service was asked to admit for further work-up and management.  Patient underwent work-up for his transaminitis, hepatitis panel was negative, patient had abnormal iron studies however given his acute inflammation gastroenterology it was recommended to be repeated as an outpatient.   Patient did have issues with SURESH, his renal function was improving on day of discharge, he did have an issue with urinary retention x1, he had no further issues. Patient's medication list was optimized, he was evaluated by cardiology.  Patient's blood pressure stabilized, he had no further issues with nausea or vomiting and discharged on 2/13/2023. Patient to follow-up with gastroenterology  for repeat iron studies and follow-up of pending labs at discharge.  Patient should follow-up with nephrology with repeat BMP to ensure continued improvement of renal function.     The following portions of the patient's history were reviewed and updated as appropriate:   He  has a past medical history of Alcoholism ([facility]) (1/16/2021), Allergies, Aneurysm ([facility]), Anxiety (7/15/2022), Bipolar 2 disorder ([facility]) (7/20/2021), Cancer ([facility]), Cervical stenosis of spinal canal (11/30/2018), Depression, Diabetes mellitus ([facility]), GERD (gastroesophageal reflux disease), History of colonic polyps (5/23/2017), Hyperlipidemia, Hypertension, Lumbosacral spondylosis without myelopathy (8/29/2018), Papillary thyroid carcinoma ([facility]) (4/1/2020), and SVT (supraventricular tachycardia) ([facility]) (1/23/2022).  He has DM (diabetes mellitus) ([facility]); Hyperlipidemia; and Essential hypertension on their pertinent problem list.  He  has a past surgical history that includes Cervical disc surgery; Thyroid surgery; Cardiac electrophysiology procedure (N/A, 09/09/2022); and Cardiac Ablation (09/09/2022).  His family history includes Alcohol abuse in his father; Arthritis in his mother; COPD in his maternal grandmother and paternal uncle; Cancer in his maternal grandfather and paternal grandfather; Colon cancer in his paternal grandfather; Diabetes in his father, maternal grandfather, mother, and paternal aunt; Heart attack in his father; Heart disease in his maternal grandfather, maternal grandmother, maternal uncle, paternal aunt, and paternal grandmother; Hyperlipidemia in his father and mother; Hypertension in his father, maternal grandfather, maternal grandmother, maternal uncle, mother, paternal grandmother, and sister; Mental illness in his paternal aunt; Osteoporosis in his mother; Rashes / Skin problems in his mother; Stroke in his father and maternal grandmother; Thyroid disease in his maternal grandmother.  He  reports that he quit smoking about  14 years ago. His smoking use included cigarettes. He has a 11.00 pack-year smoking history. He has never used smokeless tobacco. He reports current alcohol use. He reports that he does not use drugs.  Current Outpatient Medications   Medication Sig Dispense Refill   • cetirizine (zyrTEC) 10 MG tablet Take 10 mg by mouth 2 (Two) Times a Day.     • dilTIAZem CD (CARDIZEM CD) 180 MG 24 hr capsule Take 180 mg by mouth Daily.     • escitalopram (LEXAPRO) 10 MG tablet Take 1 tablet by mouth Daily. 90 tablet 1   • fluticasone (FLONASE) 50 MCG/ACT nasal spray 2 sprays into the nostril(s) as directed by provider Daily.     • glycopyrrolate (ROBINUL) 2 MG tablet Take 1 tablet by mouth Daily. 90 tablet 1   • Insulin Degludec (TRESIBA) 100 UNIT/ML solution injection Inject 36 Units under the skin into the appropriate area as directed every night at bedtime.     • levothyroxine sodium (TIROSINT) 125 MCG capsule capsule Take 125 mcg by mouth 1 (One) Time Per Week. Saturday     • metoprolol succinate XL (TOPROL-XL) 50 MG 24 hr tablet Take 50 mg by mouth Daily.     • omeprazole (priLOSEC) 20 MG capsule Take 20 mg by mouth Daily.     • ondansetron (Zofran) 4 MG tablet Take 1 tablet by mouth Every 8 (Eight) Hours As Needed for Nausea or Vomiting. 30 tablet 0   • Vitamin D, Cholecalciferol, (CHOLECALCIFEROL) 10 MCG (400 UNIT) tablet Take 400 Units by mouth Daily.     • montelukast (Singulair) 10 MG tablet Take 1 tablet by mouth Every Night. 90 tablet 3     No current facility-administered medications for this visit.     Current Outpatient Medications on File Prior to Visit   Medication Sig   • cetirizine (zyrTEC) 10 MG tablet Take 10 mg by mouth 2 (Two) Times a Day.   • dilTIAZem CD (CARDIZEM CD) 180 MG 24 hr capsule Take 180 mg by mouth Daily.   • escitalopram (LEXAPRO) 10 MG tablet Take 1 tablet by mouth Daily.   • fluticasone (FLONASE) 50 MCG/ACT nasal spray 2 sprays into the nostril(s) as directed by provider Daily.   • Insulin  Degludec (TRESIBA) 100 UNIT/ML solution injection Inject 36 Units under the skin into the appropriate area as directed every night at bedtime.   • levothyroxine sodium (TIROSINT) 125 MCG capsule capsule Take 125 mcg by mouth 1 (One) Time Per Week. Saturday   • metoprolol succinate XL (TOPROL-XL) 50 MG 24 hr tablet Take 50 mg by mouth Daily.   • omeprazole (priLOSEC) 20 MG capsule Take 20 mg by mouth Daily.   • ondansetron (Zofran) 4 MG tablet Take 1 tablet by mouth Every 8 (Eight) Hours As Needed for Nausea or Vomiting.   • Vitamin D, Cholecalciferol, (CHOLECALCIFEROL) 10 MCG (400 UNIT) tablet Take 400 Units by mouth Daily.     No current facility-administered medications on file prior to visit.     He has No Known Allergies..    Review of Systems   Constitutional: Negative for chills and fever.   HENT: Positive for congestion, postnasal drip and sore throat. Negative for sinus pain.    Respiratory: Negative for cough.    Cardiovascular: Negative for chest pain and leg swelling.   Gastrointestinal: Negative for constipation.   Neurological: Negative for dizziness, weakness and headaches.   Psychiatric/Behavioral: Negative for suicidal ideas.   All other systems reviewed and are negative.      Objective   Physical Exam  Vitals reviewed.   Constitutional:       General: He is not in acute distress.  HENT:      Head: Normocephalic and atraumatic.      Nose:      Right Sinus: No maxillary sinus tenderness or frontal sinus tenderness.      Left Sinus: No maxillary sinus tenderness or frontal sinus tenderness.      Mouth/Throat:      Pharynx: Uvula midline. No pharyngeal swelling or posterior oropharyngeal erythema.      Comments: Cobblestoning.  Neck:      Comments: No thyroid enlargement  Cardiovascular:      Rate and Rhythm: Normal rate and regular rhythm.   Pulmonary:      Effort: Pulmonary effort is normal.      Breath sounds: Normal breath sounds. No wheezing, rhonchi or rales.   Musculoskeletal:      Right lower  leg: No edema.      Left lower leg: No edema.   Lymphadenopathy:      Cervical: No cervical adenopathy.   Skin:     General: Skin is warm and dry.   Neurological:      General: No focal deficit present.      Mental Status: He is alert.   Psychiatric:         Thought Content: Thought content normal.         Assessment & Plan   Problems Addressed this Visit        Endocrine and Metabolic    DM (diabetes mellitus) (HCC)   Other Visit Diagnoses     Hospital discharge follow-up    -  Primary    Doing well post discharge.  Pt has appointments scheduled with specialist.    Allergic rhinitis, unspecified seasonality, unspecified trigger        Pt has PND. Has been on Zyrtec and flonase with no improvement. Start Singulair 10 mg daily.     Anxiety and depression  (Chronic)       Stable on Lexapro 10 mg daily and to continue.      Diagnoses       Codes Comments    Hospital discharge follow-up    -  Primary ICD-10-CM: Z09  ICD-9-CM: V67.59 Doing well post discharge.  Pt has appointments scheduled with specialist.    Allergic rhinitis, unspecified seasonality, unspecified trigger     ICD-10-CM: J30.9  ICD-9-CM: 477.9 Pt has PND. Has been on Zyrtec and flonase with no improvement. Start Singulair 10 mg daily.     Type 2 diabetes mellitus with hyperglycemia, unspecified whether long term insulin use (HCC)     ICD-10-CM: E11.65  ICD-9-CM: 250.00 Check HA1C next visit.    Anxiety and depression     ICD-10-CM: F41.9, F32.A  ICD-9-CM: 300.00, 311 Stable on Lexapro 10 mg daily and to continue.

## 2023-02-19 DIAGNOSIS — R79.89 ELEVATED FERRITIN: ICD-10-CM

## 2023-02-19 DIAGNOSIS — R74.8 ELEVATED LIVER ENZYMES: Primary | ICD-10-CM

## 2023-02-20 ENCOUNTER — TELEPHONE (OUTPATIENT)
Dept: GASTROENTEROLOGY | Facility: CLINIC | Age: 48
End: 2023-02-20
Payer: COMMERCIAL

## 2023-02-20 LAB
QT INTERVAL: 386 MS
QT INTERVAL: 406 MS
QT INTERVAL: 437 MS

## 2023-02-20 NOTE — TELEPHONE ENCOUNTER
----- Message from Shania Grullon MD sent at 2/19/2023 11:37 AM EST -----  Negative workup for hereditary hemochromatosis.  Please arrange f/u with NP, first available.    Recommend repeat iron studies and liver enzymes in 2 weeks.  Orders placed.

## 2023-02-21 RX ORDER — DILTIAZEM HYDROCHLORIDE 180 MG/1
CAPSULE, COATED, EXTENDED RELEASE ORAL
Qty: 90 CAPSULE | Refills: 3 | Status: SHIPPED | OUTPATIENT
Start: 2023-02-21

## 2023-02-22 ENCOUNTER — READMISSION MANAGEMENT (OUTPATIENT)
Dept: CALL CENTER | Facility: HOSPITAL | Age: 48
End: 2023-02-22
Payer: COMMERCIAL

## 2023-02-22 NOTE — OUTREACH NOTE
Medical Week 2 Survey    Flowsheet Row Responses   [facility] patient discharged from? [facility]   Does the patient have one of the following disease processes/diagnoses(primary or secondary)? Other   Week 2 attempt successful? Yes   Call start time 1259   Discharge diagnosis Hepatitis   Call end time 1300   Meds reviewed with patient/caregiver? Yes   Is the patient taking all medications as directed (includes completed medication regime)? Yes   Does the patient have a primary care provider?  Yes   Does the patient have an appointment with their PCP within 7 days of discharge? Yes   Has the patient kept scheduled appointments due by today? Yes   Has home health visited the patient within 72 hours of discharge? N/A   Psychosocial issues? No   Did the patient receive a copy of their discharge instructions? Yes   Nursing interventions Reviewed instructions with patient   What is the patient's perception of their health status since discharge? Improving   Is the patient/caregiver able to teach back signs and symptoms related to disease process for when to call PCP? Yes   Is the patient/caregiver able to teach back signs and symptoms related to disease process for when to call 911? Yes   Is the patient/caregiver able to teach back the hierarchy of who to call/visit for symptoms/problems? PCP, Specialist, Home health nurse, Urgent Care, ED, 911 Yes   Week 2 Call Completed? Yes   Wrap up additional comments Pt reports he is doing much better. Pt is going to FU appts and is taking meds as ordered.           LIZA NIEVES - Registered Nurse

## 2023-02-28 ENCOUNTER — OFFICE VISIT (OUTPATIENT)
Dept: CARDIOLOGY | Facility: CLINIC | Age: 48
End: 2023-02-28
Payer: COMMERCIAL

## 2023-02-28 VITALS — OXYGEN SATURATION: 97 % | HEART RATE: 80 BPM | BODY MASS INDEX: 34.46 KG/M2 | HEIGHT: 73 IN | WEIGHT: 260 LBS

## 2023-02-28 DIAGNOSIS — I10 ESSENTIAL HYPERTENSION: ICD-10-CM

## 2023-02-28 DIAGNOSIS — I47.1 SVT (SUPRAVENTRICULAR TACHYCARDIA): Primary | ICD-10-CM

## 2023-02-28 PROBLEM — R13.10 DYSPHAGIA: Status: RESOLVED | Noted: 2020-03-13 | Resolved: 2023-02-28

## 2023-02-28 PROBLEM — R07.9 CHEST PAIN: Status: RESOLVED | Noted: 2022-01-23 | Resolved: 2023-02-28

## 2023-02-28 PROBLEM — N17.9 AKI (ACUTE KIDNEY INJURY): Status: RESOLVED | Noted: 2023-02-12 | Resolved: 2023-02-28

## 2023-02-28 PROBLEM — E87.29 ALCOHOLIC KETOACIDOSIS: Status: RESOLVED | Noted: 2021-09-26 | Resolved: 2023-02-28

## 2023-02-28 PROBLEM — F29 PSYCHOSIS (HCC): Status: RESOLVED | Noted: 2021-07-18 | Resolved: 2023-02-28

## 2023-02-28 PROCEDURE — 99212 OFFICE O/P EST SF 10 MIN: CPT | Performed by: INTERNAL MEDICINE

## 2023-02-28 NOTE — PROGRESS NOTES
Encounter Date:02/28/2023      Patient ID: Wayne Guan is a 47 y.o. male.    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     • Essential hypertension 07/19/2021     Priority: Low   • Hyperlipidemia 11/14/2018     Priority: Low   • Hepatitis 02/10/2023   • Anxiety 07/15/2022   • Bipolar 2 disorder (HCC) 07/20/2021   • Postoperative hypothyroidism 07/19/2021   • GERD (gastroesophageal reflux disease) 07/19/2021   • Alcoholism (HCC) 01/16/2021   • Papillary thyroid carcinoma (HCC) 04/01/2020   • Cervical stenosis of spinal canal 11/30/2018   • DM (diabetes mellitus) ([facility]) 11/14/2018   • Lumbosacral spondylosis without myelopathy 08/29/2018   • History of colonic polyps 05/23/2017   • Family history of malignant neoplasm of gastrointestinal tract 05/23/2017               History of Present Illness  Patient presents today for follow-up with a history of SVT status post RFA of AVNRT.  Since his ablation he has done very well from a cardiac standpoint.  He has had no reoccurrence of sustained tachycardia.  His blood pressure has been well managed.  States his blood pressures at home typically run between 120 to 130 mmHg systolic.  Earlier this month he was admitted to the hospital locally with low blood pressure, bradycardia, acute transaminitis and acute kidney injury.  Discharge summary and cardiology consultation were reviewed.  Flecainide was discontinued, metoprolol was held temporarily.  Lisinopril was discontinued.  He has done very well since discharge.  He is resuming normal activities.   He is compliant with current medical regimen reports no significant adverse side effects.  No Known Allergies    Current Outpatient Medications   Medication Instructions   •  "cetirizine (ZYRTEC) 10 mg, Oral, Daily   • dilTIAZem CD (CARDIZEM CD) 180 MG 24 hr capsule TAKE 1 CAPSULE BY MOUTH DAILY   • escitalopram (LEXAPRO) 10 mg, Oral, Daily   • fluticasone (FLONASE) 50 MCG/ACT nasal spray 2 sprays, Nasal, Daily   • glycopyrrolate (ROBINUL) 2 mg, Oral, Daily   • Insulin Degludec (TRESIBA) 36 Units, Subcutaneous, Every Night at Bedtime   • levothyroxine sodium (TIROSINT) 125 mcg, Oral, Weekly, Saturday   • metoprolol succinate XL (TOPROL-XL) 50 mg, Oral, Daily   • montelukast (SINGULAIR) 10 mg, Oral, Nightly   • omeprazole (PRILOSEC) 20 mg, Oral, Daily   • ondansetron (ZOFRAN) 4 mg, Oral, Every 8 Hours PRN   • Vitamin D (Cholecalciferol) (CHOLECALCIFEROL) 400 Units, Oral, Daily       .    Objective:     Pulse 80   Ht 185.4 cm (73\")   Wt 118 kg (260 lb)   SpO2 97%   BMI 34.30 kg/m²    Body mass index is 34.3 kg/m².     Vitals reviewed.   Constitutional:       Appearance: Well-developed.   Pulmonary:      Effort: Pulmonary effort is normal. No respiratory distress.      Breath sounds: Normal breath sounds. No wheezing. No rales.      Comments: Bases clear  Chest:      Chest wall: Not tender to palpatation.   Cardiovascular:      Normal rate. Regular rhythm.      Murmurs: There is no murmur.      No gallop. No click. No rub.   Pulses:     Intact distal pulses.   Musculoskeletal: Normal range of motion.       Lab Review:                 TSH    TSH 5/12/22 9/10/22 2/11/23   TSH 37.000 (A) 0.100 (A) 0.524   (A) Abnormal value                    Procedures               Assessment:      Diagnosis Plan   1. SVT (supraventricular tachycardia) (HCC)   no known recurrence.  Continue current medical regimen      2. Essential hypertension   overall well managed, continue current medical regimen        Plan:     Stable cardiac status.  Continue current medications.   in 6 months, sooner as needed.  Thank you for allowing us to participate in the care of your patient.     Electronically signed by " NESHA Serrano, 02/28/23, 11:03 AM EST.

## 2023-03-01 ENCOUNTER — READMISSION MANAGEMENT (OUTPATIENT)
Dept: CALL CENTER | Facility: HOSPITAL | Age: 48
End: 2023-03-01
Payer: COMMERCIAL

## 2023-03-01 NOTE — OUTREACH NOTE
Medical Week 3 Survey    Flowsheet Row Responses   [facility] patient discharged from? [facility]   Does the patient have one of the following disease processes/diagnoses(primary or secondary)? Other   Week 3 attempt successful? Yes   Call start time 1122   Call end time 1122   Discharge diagnosis Hepatitis   Meds reviewed with patient/caregiver? Yes   Is the patient having any side effects they believe may be caused by any medication additions or changes? No   Does the patient have all medications ordered at discharge? Yes   Is the patient taking all medications as directed (includes completed medication regime)? Yes   Does the patient have a primary care provider?  Yes   Does the patient have an appointment with their PCP within 7 days of discharge? Yes   Has the patient kept scheduled appointments due by today? Yes   Has home health visited the patient within 72 hours of discharge? N/A   What is the patient's perception of their health status since discharge? Improving   Week 3 Call Completed? Yes   Graduated Yes          Dimple Cortes Registered Nurse

## 2023-03-06 ENCOUNTER — OFFICE VISIT (OUTPATIENT)
Dept: CARDIOLOGY | Facility: CLINIC | Age: 48
End: 2023-03-06
Payer: COMMERCIAL

## 2023-03-06 VITALS
BODY MASS INDEX: 34.94 KG/M2 | HEART RATE: 71 BPM | WEIGHT: 263.6 LBS | HEIGHT: 73 IN | SYSTOLIC BLOOD PRESSURE: 120 MMHG | DIASTOLIC BLOOD PRESSURE: 83 MMHG

## 2023-03-06 DIAGNOSIS — I47.1 SVT (SUPRAVENTRICULAR TACHYCARDIA): Primary | ICD-10-CM

## 2023-03-06 DIAGNOSIS — I10 ESSENTIAL HYPERTENSION: ICD-10-CM

## 2023-03-06 DIAGNOSIS — I77.810 AORTIC ECTASIA, THORACIC: ICD-10-CM

## 2023-03-06 PROCEDURE — 99214 OFFICE O/P EST MOD 30 MIN: CPT

## 2023-03-06 NOTE — PROGRESS NOTES
Chief Complaint  Hypertension, supraventricular tachycardia, and Follow-up    Subjective        History of Present Illness  Wayne Guan presents to [facility] CARDIOLOGY for follow up. Past medical history is outlined below, remarkable for Hypertension, SVT status post ablation in September 2022.  He has been doing well for the most part overall.  He has a recent hospital intractable nausea and vomiting with hypotension, SURESH and liver failure.  He is doing well since discharge without any cardiac complaints.  His flecainide was discontinued during hospital admission in addition to diltiazem and metoprolol due to hypotension however upon discharge his diltiazem and metoprolol were resumed.  He has not since resumed his flecainide.  Today he is doing well.  He denies any chest pain or discomfort, dyspnea, orthopnea, edema, syncope or presyncope PMH  Past Medical History:   Diagnosis Date   • Alcoholism ([facility]) 01/16/2021   • Allergies    • Aneurysm ([facility])    • Anxiety 07/15/2022   • Atrial fibrillation ([facility])    • Bipolar 2 disorder ([facility]) 07/20/2021   • Cancer ([facility])    • Cervical stenosis of spinal canal 11/30/2018    Formatting of this note might be different from the original. Added automatically from request for surgery [id number]   • Depression    • Diabetes mellitus ([facility])    • GERD (gastroesophageal reflux disease)    • History of colonic polyps 05/23/2017    Formatting of this note might be different from the original. Added automatically from request for surgery [id number]   • Hyperlipidemia    • Hypertension    • Lumbosacral spondylosis without myelopathy 08/29/2018   • Papillary thyroid carcinoma ([facility]) 04/01/2020   • SVT (supraventricular tachycardia) ([facility]) 01/23/2022       ALLERGY  No Known Allergies     SURGICALHX  Past Surgical History:   Procedure Laterality Date   • ABLATION OF DYSRHYTHMIC FOCUS  9/9/2022   • CARDIAC ABLATION  09/09/2022   • CARDIAC ELECTROPHYSIOLOGY PROCEDURE N/A 09/09/2022     Procedure: Ablation SVT HOLD Metoprolol 5 days;  Surgeon: Tai Lisa MD;  Location: [facility] INVASIVE LOCATION;  Service: Cardiovascular;  Laterality: N/A;   • CERVICAL DISC SURGERY     • THYROID SURGERY          SOC  Social History     Socioeconomic History   • Marital status: Single   Tobacco Use   • Smoking status: Former     Packs/day: 1.00     Years: 11.00     Pack years: 11.00     Types: Cigarettes     Quit date: 2008     Years since quittin.6   • Smokeless tobacco: Never   Vaping Use   • Vaping Use: Never used   Substance and Sexual Activity   • Alcohol use: Not Currently     Comment: drinks 2-3 drinks 2-3 times/week.   • Drug use: Never   • Sexual activity: Yes     Partners: Female     Birth control/protection: Condom       FAMHX  Family History   Problem Relation Age of Onset   • Arthritis Mother    • Diabetes Mother    • Hyperlipidemia Mother    • Hypertension Mother    • Osteoporosis Mother    • Rashes / Skin problems Mother    • Anemia Mother    • Arrhythmia Mother    • Heart attack Father    • Hyperlipidemia Father    • Hypertension Father    • Diabetes Father    • Alcohol abuse Father    • Stroke Father    • Hypertension Sister    • Heart disease Maternal Uncle    • Hypertension Maternal Uncle    • Mental illness Paternal Aunt    • Heart disease Paternal Aunt    • Diabetes Paternal Aunt    • COPD Paternal Uncle    • Stroke Maternal Grandmother    • Thyroid disease Maternal Grandmother    • COPD Maternal Grandmother    • Hypertension Maternal Grandmother    • Heart disease Maternal Grandmother    • Hypertension Maternal Grandfather    • Diabetes Maternal Grandfather    • Cancer Maternal Grandfather    • Heart disease Maternal Grandfather    • Heart disease Paternal Grandmother    • Hypertension Paternal Grandmother    • Colon cancer Paternal Grandfather    • Cancer Paternal Grandfather         MEDSIGONLY  Current Outpatient Medications on File Prior to Visit   Medication Sig   •  "cetirizine (zyrTEC) 10 MG tablet Take 1 tablet by mouth Daily.   • dilTIAZem CD (CARDIZEM CD) 180 MG 24 hr capsule TAKE 1 CAPSULE BY MOUTH DAILY   • escitalopram (LEXAPRO) 10 MG tablet Take 1 tablet by mouth Daily.   • fluticasone (FLONASE) 50 MCG/ACT nasal spray 2 sprays into the nostril(s) as directed by provider Daily.   • glycopyrrolate (ROBINUL) 2 MG tablet Take 1 tablet by mouth Daily.   • Insulin Degludec (TRESIBA) 100 UNIT/ML solution injection Inject 36 Units under the skin into the appropriate area as directed every night at bedtime.   • levothyroxine sodium (TIROSINT) 125 MCG capsule capsule Take 1 capsule by mouth Daily. PT takes 2 tablets daily M/F one tablet on Sat. And none on Sun.   • metoprolol succinate XL (TOPROL-XL) 50 MG 24 hr tablet Take 1 tablet by mouth Daily.   • montelukast (Singulair) 10 MG tablet Take 1 tablet by mouth Every Night.   • omeprazole (priLOSEC) 20 MG capsule Take 1 capsule by mouth Daily.   • Vitamin D, Cholecalciferol, (CHOLECALCIFEROL) 10 MCG (400 UNIT) tablet Take 1 tablet by mouth Daily.   • [DISCONTINUED] ondansetron (Zofran) 4 MG tablet Take 1 tablet by mouth Every 8 (Eight) Hours As Needed for Nausea or Vomiting.     No current facility-administered medications on file prior to visit.       Objective   Vitals:    03/06/23 1541   BP: 120/83   Pulse: 71   Weight: 120 kg (263 lb 9.6 oz)   Height: 185.4 cm (73\")         Physical Exam  Constitutional:       General: He is awake. He is not in acute distress.     Appearance: Normal appearance. He is obese.   HENT:      Head: Normocephalic.      Nose: Nose normal. No congestion.   Eyes:      Extraocular Movements: Extraocular movements intact.      Conjunctiva/sclera: Conjunctivae normal.      Pupils: Pupils are equal, round, and reactive to light.   Neck:      Thyroid: No thyromegaly.      Vascular: No JVD.   Cardiovascular:      Rate and Rhythm: Normal rate and regular rhythm.      Chest Wall: PMI is not displaced.      " Pulses: Normal pulses.      Heart sounds: Normal heart sounds, S1 normal and S2 normal. No murmur heard.    No friction rub. No gallop. No S3 or S4 sounds.   Pulmonary:      Effort: Pulmonary effort is normal.      Breath sounds: Normal breath sounds. No wheezing, rhonchi or rales.   Abdominal:      General: Bowel sounds are normal.      Palpations: Abdomen is soft.      Tenderness: There is no abdominal tenderness.   Musculoskeletal:      Cervical back: No tenderness.      Right lower leg: No edema.      Left lower leg: No edema.   Lymphadenopathy:      Cervical: No cervical adenopathy.   Skin:     General: Skin is warm and dry.      Capillary Refill: Capillary refill takes less than 2 seconds.      Coloration: Skin is not cyanotic.      Findings: No petechiae or rash.      Nails: There is no clubbing.   Neurological:      Mental Status: He is alert.   Psychiatric:         Mood and Affect: Mood normal.         Behavior: Behavior is cooperative.           Result Review     The following data was reviewed by DELL Clarke on 03/06/23.    proBNP   Date Value Ref Range Status   02/10/2023 111.8 0.0 - 450.0 pg/mL Final     CMP    CMP 2/11/23 2/11/23 2/12/23 2/13/23    0626 1805     Glucose 174 (A) 167 (A) 146 (A) 162 (A)   BUN 23 (A) 22 (A) 22 (A) 20   Creatinine 3.16 (A) 3.51 (A) 3.57 (A) 2.69 (A)   eGFR 23.2 (A) 20.4 (A) 20.0 (A) 28.1 (A)   Sodium 121 (A) 120 (A) 126 (A) 134 (A)   Potassium 4.4 3.8 4.4 4.0   Chloride 88 (A) 89 (A) 92 (A) 102   Calcium 8.4 (A) 8.2 (A) 8.8 8.8   Total Protein 6.2  6.2 6.1   Albumin 3.5  3.6 3.1 (A)   Globulin 2.7  2.6 3.0   Total Bilirubin 4.6 (A)  4.0 (A) 1.5 (A)   Alkaline Phosphatase 479 (A)  454 (A) 411 (A)   AST (SGOT) 6,780 (A)  1,440 (A) 484 (A)   ALT (SGPT) 1,115 (A)  552 (A) 329 (A)   Albumin/Globulin Ratio 1.3  1.4 1.0   BUN/Creatinine Ratio 7.3 6.3 (A) 6.2 (A) 7.4   Anion Gap 14.8 14.7 13.6 12.8   (A) Abnormal value       Comments are available for some flowsheets  but are not being displayed.           CBC w/diff    CBC w/Diff 2/11/23 2/12/23 2/13/23   WBC 7.43 4.37 4.16   RBC 4.40 3.96 (A) 3.93 (A)   Hemoglobin 14.0 12.8 (A) 12.4 (A)   Hematocrit 38.7 34.7 (A) 34.7 (A)   MCV 88.0 87.6 88.3   MCH 31.8 32.3 31.6   MCHC 36.2 (A) 36.9 (A) 35.7   RDW 12.9 12.7 13.9   Platelets 183 142 124 (A)   Neutrophil Rel % 94.4 (A) 86.2 (A) 69.9   Immature Granulocyte Rel % 0.3 0.7 (A) 1.0 (A)   Lymphocyte Rel % 3.8 (A) 7.6 (A) 16.6 (A)   Monocyte Rel % 1.2 (A) 3.9 (A) 10.1   Eosinophil Rel % 0.0 (A) 1.1 1.7   Basophil Rel % 0.3 0.5 0.7   (A) Abnormal value                 Results for orders placed during the hospital encounter of 02/10/23    Adult Transthoracic Echo Complete w/ Color, Spectral and Contrast if necessary per protocol    Interpretation Summary  •  Left ventricular systolic function is normal. Left ventricular ejection fraction appears to be 56 - 60%.  •  Left ventricular diastolic function is consistent with (grade I) impaired relaxation.  •  There is borderline dilation of aortic root measuring 3.7 cm in diameter.  •  There are no significant valvular abnormalities.  •  Estimated right ventricular systolic pressure from tricuspid regurgitation is normal (<35 mmHg).    Results for orders placed during the hospital encounter of 01/23/22    Stress Test With Myocardial Perfusion One Day    Interpretation Summary  Patient received Lexiscan 0.4 mg IV infusion over 10 seconds.  At peak stress, no ischemic ST-T changes were noted.  Isolated PVCs were noted.  No sustained arrhythmias.  Diaphragmatic attenuation artifact was present.  Gated SPECT images were reviewed.  There is a small area of mild perfusion defect in the inferoapical segment which improves on stress images more likely related to attenuation artifact.  No reversible myocardial ischemia was noted.  The left ventricle was normal in size with a calculated ejection fraction of 57%.  Overall this represents a low risk  myocardial perfusion study.         Assessment and Plan   Diagnoses and all orders for this visit:    1. SVT (supraventricular tachycardia) (HCC) (Primary)    2. Essential hypertension    3. Aortic ectasia, thoracic (HCC)      1.  Status post ablation in September 2022.  Doing well without recurrence.  Currently in sinus rhythm.  Continue to hold flecainide.  Continue to follow with EP  2.  Blood pressure is stable on current dose of diltiazem.  Continue the same.  3.  Stable.      Follow Up   No follow-ups on file.    Patient was given instructions and counseling regarding his condition or for health maintenance advice. Please see specific information pulled into the AVS if appropriate.     Crissy Light, APRN  03/06/23  15:51 EST    Dictated Utilizing Dragon Dictation

## 2023-03-07 ENCOUNTER — LAB (OUTPATIENT)
Dept: LAB | Facility: HOSPITAL | Age: 48
End: 2023-03-07
Payer: COMMERCIAL

## 2023-03-07 ENCOUNTER — TRANSCRIBE ORDERS (OUTPATIENT)
Dept: LAB | Facility: HOSPITAL | Age: 48
End: 2023-03-07
Payer: COMMERCIAL

## 2023-03-07 DIAGNOSIS — Z00.00 ANNUAL PHYSICAL EXAM: ICD-10-CM

## 2023-03-07 DIAGNOSIS — E78.5 HYPERLIPIDEMIA, UNSPECIFIED HYPERLIPIDEMIA TYPE: ICD-10-CM

## 2023-03-07 DIAGNOSIS — E11.65 TYPE 2 DIABETES MELLITUS WITH HYPERGLYCEMIA, UNSPECIFIED WHETHER LONG TERM INSULIN USE: ICD-10-CM

## 2023-03-07 DIAGNOSIS — N17.9 ACUTE RENAL FAILURE, UNSPECIFIED ACUTE RENAL FAILURE TYPE: Primary | ICD-10-CM

## 2023-03-07 DIAGNOSIS — Z11.59 NEED FOR HEPATITIS C SCREENING TEST: ICD-10-CM

## 2023-03-07 DIAGNOSIS — I10 HYPERTENSION, UNSPECIFIED TYPE: ICD-10-CM

## 2023-03-07 DIAGNOSIS — I10 ESSENTIAL HYPERTENSION: ICD-10-CM

## 2023-03-07 DIAGNOSIS — R74.8 ELEVATED LIVER ENZYMES: ICD-10-CM

## 2023-03-07 DIAGNOSIS — K75.9 HEPATITIS: ICD-10-CM

## 2023-03-07 DIAGNOSIS — N17.9 ACUTE RENAL FAILURE, UNSPECIFIED ACUTE RENAL FAILURE TYPE: ICD-10-CM

## 2023-03-07 DIAGNOSIS — E11.65 TYPE 2 DIABETES MELLITUS WITH HYPERGLYCEMIA, UNSPECIFIED WHETHER LONG TERM INSULIN USE: Chronic | ICD-10-CM

## 2023-03-07 DIAGNOSIS — R79.89 ELEVATED FERRITIN: ICD-10-CM

## 2023-03-07 DIAGNOSIS — N17.9 AKI (ACUTE KIDNEY INJURY): ICD-10-CM

## 2023-03-07 LAB
ALBUMIN SERPL-MCNC: 4.5 G/DL (ref 3.5–5.2)
ALBUMIN UR-MCNC: <1.2 MG/DL
ALBUMIN/GLOB SERPL: 1.5 G/DL
ALP SERPL-CCNC: 102 U/L (ref 39–117)
ALT SERPL W P-5'-P-CCNC: 18 U/L (ref 1–41)
ANION GAP SERPL CALCULATED.3IONS-SCNC: 10.9 MMOL/L (ref 5–15)
AST SERPL-CCNC: 13 U/L (ref 1–40)
BASOPHILS # BLD AUTO: 0.04 10*3/MM3 (ref 0–0.2)
BASOPHILS NFR BLD AUTO: 0.7 % (ref 0–1.5)
BILIRUB SERPL-MCNC: 0.5 MG/DL (ref 0–1.2)
BILIRUB UR QL STRIP: NEGATIVE
BUN SERPL-MCNC: 16 MG/DL (ref 6–20)
BUN/CREAT SERPL: 13.4 (ref 7–25)
CALCIUM SPEC-SCNC: 10.1 MG/DL (ref 8.6–10.5)
CHLORIDE SERPL-SCNC: 96 MMOL/L (ref 98–107)
CHOLEST SERPL-MCNC: 270 MG/DL (ref 0–200)
CLARITY UR: CLEAR
CO2 SERPL-SCNC: 29.1 MMOL/L (ref 22–29)
COLOR UR: YELLOW
CREAT SERPL-MCNC: 1.19 MG/DL (ref 0.76–1.27)
CREAT UR-MCNC: 76.8 MG/DL
CREAT UR-MCNC: 79.1 MG/DL
DEPRECATED RDW RBC AUTO: 45.3 FL (ref 37–54)
EGFRCR SERPLBLD CKD-EPI 2021: 74.9 ML/MIN/1.73
EOSINOPHIL # BLD AUTO: 0.22 10*3/MM3 (ref 0–0.4)
EOSINOPHIL NFR BLD AUTO: 3.6 % (ref 0.3–6.2)
ERYTHROCYTE [DISTWIDTH] IN BLOOD BY AUTOMATED COUNT: 13.9 % (ref 12.3–15.4)
FERRITIN SERPL-MCNC: 339 NG/ML (ref 30–400)
GLOBULIN UR ELPH-MCNC: 3 GM/DL
GLUCOSE SERPL-MCNC: 158 MG/DL (ref 65–99)
GLUCOSE UR STRIP-MCNC: ABNORMAL MG/DL
HBA1C MFR BLD: 8.6 % (ref 4.8–5.6)
HCT VFR BLD AUTO: 38.6 % (ref 37.5–51)
HCV AB SER DONR QL: NORMAL
HDLC SERPL-MCNC: 42 MG/DL (ref 40–60)
HGB BLD-MCNC: 13.4 G/DL (ref 13–17.7)
HGB UR QL STRIP.AUTO: NEGATIVE
IMM GRANULOCYTES # BLD AUTO: 0.02 10*3/MM3 (ref 0–0.05)
IMM GRANULOCYTES NFR BLD AUTO: 0.3 % (ref 0–0.5)
IRON 24H UR-MRATE: 101 MCG/DL (ref 59–158)
IRON SATN MFR SERPL: 24 % (ref 20–50)
KETONES UR QL STRIP: NEGATIVE
LDLC SERPL CALC-MCNC: 102 MG/DL (ref 0–100)
LDLC/HDLC SERPL: 1.96 {RATIO}
LEUKOCYTE ESTERASE UR QL STRIP.AUTO: NEGATIVE
LYMPHOCYTES # BLD AUTO: 1.96 10*3/MM3 (ref 0.7–3.1)
LYMPHOCYTES NFR BLD AUTO: 32 % (ref 19.6–45.3)
MCH RBC QN AUTO: 31.4 PG (ref 26.6–33)
MCHC RBC AUTO-ENTMCNC: 34.7 G/DL (ref 31.5–35.7)
MCV RBC AUTO: 90.4 FL (ref 79–97)
MICROALBUMIN/CREAT UR: NORMAL MG/G{CREAT}
MONOCYTES # BLD AUTO: 0.81 10*3/MM3 (ref 0.1–0.9)
MONOCYTES NFR BLD AUTO: 13.2 % (ref 5–12)
NEUTROPHILS NFR BLD AUTO: 3.08 10*3/MM3 (ref 1.7–7)
NEUTROPHILS NFR BLD AUTO: 50.2 % (ref 42.7–76)
NITRITE UR QL STRIP: NEGATIVE
NRBC BLD AUTO-RTO: 0 /100 WBC (ref 0–0.2)
PH UR STRIP.AUTO: 6 [PH] (ref 5–8)
PLATELET # BLD AUTO: 313 10*3/MM3 (ref 140–450)
PMV BLD AUTO: 9.7 FL (ref 6–12)
POTASSIUM SERPL-SCNC: 4.1 MMOL/L (ref 3.5–5.2)
PROT ?TM UR-MCNC: 6 MG/DL
PROT SERPL-MCNC: 7.5 G/DL (ref 6–8.5)
PROT UR QL STRIP: NEGATIVE
PROT/CREAT UR: 0.08 MG/G{CREAT}
RBC # BLD AUTO: 4.27 10*6/MM3 (ref 4.14–5.8)
SODIUM SERPL-SCNC: 136 MMOL/L (ref 136–145)
SP GR UR STRIP: 1.02 (ref 1–1.03)
TIBC SERPL-MCNC: 414 MCG/DL (ref 298–536)
TRANSFERRIN SERPL-MCNC: 278 MG/DL (ref 200–360)
TRIGL SERPL-MCNC: 729 MG/DL (ref 0–150)
UROBILINOGEN UR QL STRIP: ABNORMAL
VLDLC SERPL-MCNC: 126 MG/DL (ref 5–40)
WBC NRBC COR # BLD: 6.13 10*3/MM3 (ref 3.4–10.8)

## 2023-03-07 PROCEDURE — 80053 COMPREHEN METABOLIC PANEL: CPT

## 2023-03-07 PROCEDURE — 84466 ASSAY OF TRANSFERRIN: CPT

## 2023-03-07 PROCEDURE — 83540 ASSAY OF IRON: CPT

## 2023-03-07 PROCEDURE — 36415 COLL VENOUS BLD VENIPUNCTURE: CPT

## 2023-03-07 PROCEDURE — 81003 URINALYSIS AUTO W/O SCOPE: CPT

## 2023-03-07 PROCEDURE — 82043 UR ALBUMIN QUANTITATIVE: CPT

## 2023-03-07 PROCEDURE — 80061 LIPID PANEL: CPT

## 2023-03-07 PROCEDURE — 82570 ASSAY OF URINE CREATININE: CPT

## 2023-03-07 PROCEDURE — 83036 HEMOGLOBIN GLYCOSYLATED A1C: CPT

## 2023-03-07 PROCEDURE — 82728 ASSAY OF FERRITIN: CPT

## 2023-03-07 PROCEDURE — 86803 HEPATITIS C AB TEST: CPT

## 2023-03-07 PROCEDURE — 84156 ASSAY OF PROTEIN URINE: CPT

## 2023-03-07 PROCEDURE — 85025 COMPLETE CBC W/AUTO DIFF WBC: CPT

## 2023-03-10 ENCOUNTER — TELEPHONE (OUTPATIENT)
Dept: GASTROENTEROLOGY | Facility: CLINIC | Age: 48
End: 2023-03-10
Payer: COMMERCIAL

## 2023-03-10 NOTE — TELEPHONE ENCOUNTER
Advised patient of results, patient states understanding. Patient states he had a screening colonoscopy in 2020.

## 2023-03-10 NOTE — TELEPHONE ENCOUNTER
----- Message from Shania Grullon MD sent at 3/9/2023  7:04 PM EST -----  Liver enzymes, ferritin, and iron saturation now within normal limits due to resolution of acute inflammatory process of the liver.  Given negative workup and resolution of liver enzyme elevation, findings most c/w shock liver (due to episode of low blood pressure on presentation to hospital).  Pt does not necessarily need to f/u in office given resolution.  Please notify patient.      Please confirm pt has had screening colonoscopy.  If not, ok to schedule.

## 2023-03-15 ENCOUNTER — TELEPHONE (OUTPATIENT)
Dept: INTERNAL MEDICINE | Facility: CLINIC | Age: 48
End: 2023-03-15
Payer: COMMERCIAL

## 2023-03-16 DIAGNOSIS — E78.5 HYPERLIPIDEMIA, UNSPECIFIED HYPERLIPIDEMIA TYPE: Primary | ICD-10-CM

## 2023-03-16 RX ORDER — FENOFIBRATE 145 MG/1
145 TABLET, COATED ORAL DAILY
Qty: 90 TABLET | Refills: 1 | Status: SHIPPED | OUTPATIENT
Start: 2023-03-16

## 2023-04-20 ENCOUNTER — OFFICE VISIT (OUTPATIENT)
Dept: GASTROENTEROLOGY | Facility: CLINIC | Age: 48
End: 2023-04-20
Payer: COMMERCIAL

## 2023-04-20 VITALS
BODY MASS INDEX: 34.85 KG/M2 | HEIGHT: 73 IN | DIASTOLIC BLOOD PRESSURE: 86 MMHG | SYSTOLIC BLOOD PRESSURE: 127 MMHG | HEART RATE: 78 BPM | WEIGHT: 263 LBS

## 2023-04-20 DIAGNOSIS — R14.3 FLATULENCE: ICD-10-CM

## 2023-04-20 DIAGNOSIS — K76.0 FATTY LIVER: Primary | ICD-10-CM

## 2023-04-20 RX ORDER — OCTISALATE, AVOBENZONE, HOMOSALATE, AND OCTOCRYLENE 29.4; 29.4; 49; 25.48 MG/ML; MG/ML; MG/ML; MG/ML
4 LOTION TOPICAL DAILY
Qty: 30 CAPSULE | Refills: 5 | Status: SHIPPED | OUTPATIENT
Start: 2023-04-20

## 2023-04-20 RX ORDER — INSULIN ASPART 100 [IU]/ML
INJECTION, SOLUTION INTRAVENOUS; SUBCUTANEOUS
COMMUNITY
Start: 2023-04-17

## 2023-04-20 RX ORDER — EMPAGLIFLOZIN 25 MG/1
1 TABLET, FILM COATED ORAL DAILY
COMMUNITY
Start: 2023-04-03

## 2023-04-20 NOTE — PROGRESS NOTES
Chief Complaint     Abnormal Lab    History of Present Illness     Wayne Guan is a 47 y.o. male who presents to [facility]ENTEROLOG[facility] for follow-up of elevated liver enzymes.      Patient presented to [facility] on 2/10/2023 with chest pain.  Liver enzymes were noted to be markedly elevated.  Lab work-up consistent with viral etiology of elevated liver enzymes.    He reports that he is feeling much better.  Denies abdominal pain.  Admits an increase in gas since hospitalization.         History      Past Medical History:   Diagnosis Date   • Alcoholism 01/16/2021   • Allergies    • Aneurysm    • Anxiety 07/15/2022   • Atrial fibrillation    • Bipolar 2 disorder 07/20/2021   • Cancer    • Cervical stenosis of spinal canal 11/30/2018    Formatting of this note might be different from the original. Added automatically from request for surgery [id number]   • Depression    • Diabetes mellitus    • GERD (gastroesophageal reflux disease)    • History of colonic polyps 05/23/2017    Formatting of this note might be different from the original. Added automatically from request for surgery [id number]   • Hyperlipidemia    • Hypertension    • Lumbosacral spondylosis without myelopathy 08/29/2018   • Papillary thyroid carcinoma 04/01/2020   • SVT (supraventricular tachycardia) 01/23/2022     Past Surgical History:   Procedure Laterality Date   • ABLATION OF DYSRHYTHMIC FOCUS  9/9/2022   • CARDIAC ABLATION  09/09/2022   • CARDIAC ELECTROPHYSIOLOGY PROCEDURE N/A 09/09/2022    Procedure: Ablation SVT HOLD Metoprolol 5 days;  Surgeon: Tai Lisa MD;  Location: [facility] INVASIVE LOCATION;  Service: Cardiovascular;  Laterality: N/A;   • CERVICAL DISC SURGERY     • COLONOSCOPY      2021 [facility]   • THYROID SURGERY       Family History   Problem Relation Age of Onset   • Arthritis Mother    • Diabetes Mother    • Hyperlipidemia Mother    • Hypertension Mother    • Osteoporosis Mother    • Rashes /  Skin problems Mother    • Anemia Mother    • Arrhythmia Mother    • Heart attack Father    • Hyperlipidemia Father    • Hypertension Father    • Diabetes Father    • Alcohol abuse Father    • Stroke Father    • Hypertension Sister    • Heart disease Maternal Uncle    • Hypertension Maternal Uncle    • Mental illness Paternal Aunt    • Heart disease Paternal Aunt    • Diabetes Paternal Aunt    • COPD Paternal Uncle    • Stroke Maternal Grandmother    • Thyroid disease Maternal Grandmother    • COPD Maternal Grandmother    • Hypertension Maternal Grandmother    • Heart disease Maternal Grandmother    • Hypertension Maternal Grandfather    • Diabetes Maternal Grandfather    • Cancer Maternal Grandfather    • Heart disease Maternal Grandfather    • Heart disease Paternal Grandmother    • Hypertension Paternal Grandmother    • Colon cancer Paternal Grandfather    • Cancer Paternal Grandfather         Current Medications       Current Outpatient Medications:   •  dilTIAZem CD (CARDIZEM CD) 180 MG 24 hr capsule, TAKE 1 CAPSULE BY MOUTH DAILY, Disp: 90 capsule, Rfl: 3  •  escitalopram (LEXAPRO) 10 MG tablet, Take 1 tablet by mouth Daily., Disp: 90 tablet, Rfl: 1  •  fenofibrate (Tricor) 145 MG tablet, Take 1 tablet by mouth Daily., Disp: 90 tablet, Rfl: 1  •  fluticasone (FLONASE) 50 MCG/ACT nasal spray, 2 sprays into the nostril(s) as directed by provider Daily., Disp: , Rfl:   •  glycopyrrolate (ROBINUL) 2 MG tablet, Take 1 tablet by mouth Daily., Disp: 90 tablet, Rfl: 1  •  Insulin Degludec (TRESIBA) 100 UNIT/ML solution injection, Inject 36 Units under the skin into the appropriate area as directed every night at bedtime., Disp: , Rfl:   •  Jardiance 25 MG tablet tablet, Take 1 tablet by mouth Daily., Disp: , Rfl:   •  levothyroxine sodium (TIROSINT) 125 MCG capsule capsule, Take 1 capsule by mouth Daily. PT takes 2 tablets daily M/F one tablet on Sat. And none on Sun., Disp: , Rfl:   •  metoprolol succinate XL  "(TOPROL-XL) 50 MG 24 hr tablet, Take 1 tablet by mouth Daily., Disp: , Rfl:   •  montelukast (Singulair) 10 MG tablet, Take 1 tablet by mouth Every Night., Disp: 90 tablet, Rfl: 3  •  NovoLOG FlexPen 100 UNIT/ML solution pen-injector sc pen, INJECT 10 UNITS SUBCUTANEOUSLY THREE TIMES A DAY BEFORE MEALS, Disp: , Rfl:   •  omeprazole (priLOSEC) 20 MG capsule, Take 1 capsule by mouth Daily., Disp: , Rfl:   •  Vitamin D, Cholecalciferol, (CHOLECALCIFEROL) 10 MCG (400 UNIT) tablet, Take 1 tablet by mouth Daily., Disp: , Rfl:   •  Probiotic Product (Align) 4 MG capsule, Take 4 mg by mouth Daily., Disp: 30 capsule, Rfl: 5     Allergies     No Known Allergies    Social History       Social History     Social History Narrative    ** Merged History Encounter **              Objective       /86 (BP Location: Left arm, Patient Position: Sitting, Cuff Size: Adult)   Pulse 78   Ht 185.4 cm (73\")   Wt 119 kg (263 lb)   BMI 34.70 kg/m²       Physical Exam    Results       Result Review :    The following data was reviewed by: DELL Urbina on 04/20/2023:    CBC w/diff        2/12/2023    04:12 2/13/2023    04:25 3/7/2023    10:07   CBC w/Diff   WBC 4.37   4.16   6.13     RBC 3.96   3.93   4.27     Hemoglobin 12.8   12.4   13.4     Hematocrit 34.7   34.7   38.6     MCV 87.6   88.3   90.4     MCH 32.3   31.6   31.4     MCHC 36.9   35.7   34.7     RDW 12.7   13.9   13.9     Platelets 142   124   313     Neutrophil Rel % 86.2   69.9   50.2     Immature Granulocyte Rel % 0.7   1.0   0.3     Lymphocyte Rel % 7.6   16.6   32.0     Monocyte Rel % 3.9   10.1   13.2     Eosinophil Rel % 1.1   1.7   3.6     Basophil Rel % 0.5   0.7   0.7       CMP        2/12/2023    04:12 2/13/2023    04:25 3/7/2023    10:07   CMP   Glucose 146   162   158     BUN 22   20   16     Creatinine 3.57   2.69   1.19     EGFR 20.0   28.1   74.9     Sodium 126   134   136     Potassium 4.4   4.0   4.1     Chloride 92   102   96     Calcium 8.8  "  8.8   10.1     Total Protein 6.2   6.1   7.5     Albumin 3.6   3.1   4.5     Globulin 2.6   3.0   3.0     Total Bilirubin 4.0   1.5   0.5     Alkaline Phosphatase 454   411   102     AST (SGOT) 1,440   484   13     ALT (SGPT) 552   329   18     Albumin/Globulin Ratio 1.4   1.0   1.5     BUN/Creatinine Ratio 6.2   7.4   13.4     Anion Gap 13.6   12.8   10.9         Liver Workup   ALPHA -1 ANTITRYPSIN   Date Value Ref Range Status   02/11/2023 137 90 - 200 mg/dL Final     dsDNA   Date Value Ref Range Status   02/11/2023 Negative Negative Final     Expanded GIRMA Screen   Date Value Ref Range Status   02/11/2023 Negative Negative Final     Smooth Muscle Ab   Date Value Ref Range Status   02/11/2023 6 0 - 19 Units Final     Comment:     Please Note: Specimen is lipemic.                   Negative                     0 - 19                   Weak positive               20 - 30                   Moderate to strong positive     >30   Actin Antibodies are found in 52-85% of patients with   autoimmune hepatitis or chronic active hepatitis and   in 22% of patients with primary biliary cirrhosis.     Ceruloplasmin   Date Value Ref Range Status   02/11/2023 18 16 - 31 mg/dL Final     Ferritin   Date Value Ref Range Status   03/07/2023 339.00 30.00 - 400.00 ng/mL Final     Iron   Date Value Ref Range Status   03/07/2023 101 59 - 158 mcg/dL Final     TIBC   Date Value Ref Range Status   03/07/2023 414 298 - 536 mcg/dL Final     Iron Saturation   Date Value Ref Range Status   03/07/2023 24 20 - 50 % Final     Transferrin   Date Value Ref Range Status   03/07/2023 278 200 - 360 mg/dL Final     Mitochondrial Ab   Date Value Ref Range Status   02/11/2023 <20.0 0.0 - 20.0 Units Final     Comment:     Please Note: Specimen is lipemic.                                  Negative    0.0 - 20.0                                  Equivocal  20.1 - 24.9                                  Positive         >24.9  Mitochondrial (M2) Antibodies are  found in 90-96% of  patients with primary biliary cirrhosis.     Protime   Date Value Ref Range Status   02/10/2023 15.5 (H) 11.8 - 14.9 Seconds Final     INR   Date Value Ref Range Status   02/10/2023 1.21 (H) 0.86 - 1.15 Final     2/10/2023 right upper quadrant ultrasound-liver measures 20.6 cm.  Multiple stones in the gallbladder.  Common bile duct 4 mm.  No gallbladder wall thickening.  Negative sonographic Swenson sign.           Assessment and Plan              Diagnoses and all orders for this visit:    1. Fatty liver (Primary)    2. Flatulence  -     Probiotic Product (Align) 4 MG capsule; Take 4 mg by mouth Daily.  Dispense: 30 capsule; Refill: 5          Follow Up     Follow Up   Return if symptoms worsen or fail to improve.  Patient was given instructions and counseling regarding his condition or for health maintenance advice. Please see specific information pulled into the AVS if appropriate.

## 2023-04-25 ENCOUNTER — OFFICE VISIT (OUTPATIENT)
Dept: INTERNAL MEDICINE | Facility: CLINIC | Age: 48
End: 2023-04-25
Payer: COMMERCIAL

## 2023-04-25 VITALS
DIASTOLIC BLOOD PRESSURE: 80 MMHG | SYSTOLIC BLOOD PRESSURE: 116 MMHG | BODY MASS INDEX: 36.13 KG/M2 | OXYGEN SATURATION: 99 % | WEIGHT: 272.6 LBS | HEART RATE: 76 BPM | TEMPERATURE: 97.7 F | HEIGHT: 73 IN

## 2023-04-25 DIAGNOSIS — I10 ESSENTIAL HYPERTENSION: Primary | ICD-10-CM

## 2023-04-25 DIAGNOSIS — Z12.5 SCREENING FOR MALIGNANT NEOPLASM OF PROSTATE: ICD-10-CM

## 2023-04-25 DIAGNOSIS — K58.8 OTHER IRRITABLE BOWEL SYNDROME: ICD-10-CM

## 2023-04-25 DIAGNOSIS — J30.9 ALLERGIC RHINITIS, UNSPECIFIED SEASONALITY, UNSPECIFIED TRIGGER: ICD-10-CM

## 2023-04-25 DIAGNOSIS — E78.5 HYPERLIPIDEMIA, UNSPECIFIED HYPERLIPIDEMIA TYPE: ICD-10-CM

## 2023-04-25 DIAGNOSIS — F41.9 ANXIETY AND DEPRESSION: ICD-10-CM

## 2023-04-25 DIAGNOSIS — E11.65 TYPE 2 DIABETES MELLITUS WITH HYPERGLYCEMIA, UNSPECIFIED WHETHER LONG TERM INSULIN USE: ICD-10-CM

## 2023-04-25 DIAGNOSIS — F32.A ANXIETY AND DEPRESSION: ICD-10-CM

## 2023-04-25 PROCEDURE — 99214 OFFICE O/P EST MOD 30 MIN: CPT | Performed by: NURSE PRACTITIONER

## 2023-04-25 RX ORDER — GLYCOPYRROLATE 2 MG/1
2 TABLET ORAL DAILY
Qty: 90 TABLET | Refills: 1 | Status: SHIPPED | OUTPATIENT
Start: 2023-04-25

## 2023-04-25 RX ORDER — ESCITALOPRAM OXALATE 10 MG/1
10 TABLET ORAL DAILY
Qty: 90 TABLET | Refills: 1 | Status: SHIPPED | OUTPATIENT
Start: 2023-04-25

## 2023-04-25 RX ORDER — BLOOD SUGAR DIAGNOSTIC
STRIP MISCELLANEOUS
COMMUNITY
Start: 2023-04-24

## 2023-04-25 NOTE — PROGRESS NOTES
Chief Complaint  Diabetes (3 month follow up.)  Subjective    History of Present Illness  Wayne Guan is a 47 y.o. male with diabetes type 2, hypertension, hyperlipidemia, SVT, anxiety, irritable bowel syndrome and history of thyroid cancer  presents to [facility] for follow-up. The patient is not having any medication side effects.  Negative for chest pain, heart palpitations, dizziness, headaches, shortness of air and fatigue.  He reports his HA1c was 11 and his endocrinologist office recently and he increased his insulin and added NovoLog with each meal.  He will be following back up there in 2 months.    Specialists include: Endocrinology for hypothyroidism and status post thyroidectomy due to thyroid cancer. Dr. Keen  for SVT, palpitations, status postcardiac ablation and monitoring AAA    Past Medical History:   Diagnosis Date   • Alcoholism 01/16/2021   • Allergies    • Aneurysm    • Anxiety 07/15/2022   • Atrial fibrillation    • Bipolar 2 disorder 07/20/2021   • Cancer    • Cervical stenosis of spinal canal 11/30/2018    Formatting of this note might be different from the original. Added automatically from request for surgery [id number]   • Depression    • Diabetes mellitus    • GERD (gastroesophageal reflux disease)    • History of colonic polyps 05/23/2017    Formatting of this note might be different from the original. Added automatically from request for surgery [id number]   • Hyperlipidemia    • Hypertension    • Lumbosacral spondylosis without myelopathy 08/29/2018   • Papillary thyroid carcinoma 04/01/2020   • SVT (supraventricular tachycardia) 01/23/2022        Past Surgical History:   Procedure Laterality Date   • ABLATION OF DYSRHYTHMIC FOCUS  9/9/2022   • CARDIAC ABLATION  09/09/2022   • CARDIAC ELECTROPHYSIOLOGY PROCEDURE N/A 09/09/2022    Procedure: Ablation SVT HOLD Metoprolol 5 days;  Surgeon: Tai Lisa MD;  Location: [facility] INVASIVE LOCATION;   Service: Cardiovascular;  Laterality: N/A;   • CERVICAL DISC SURGERY     • COLONOSCOPY      2021 [facility]   • THYROID SURGERY          No Known Allergies       Current Outpatient Medications:   •  Accu-Chek Guide test strip, , Disp: , Rfl:   •  dilTIAZem CD (CARDIZEM CD) 180 MG 24 hr capsule, TAKE 1 CAPSULE BY MOUTH DAILY, Disp: 90 capsule, Rfl: 3  •  escitalopram (LEXAPRO) 10 MG tablet, Take 1 tablet by mouth Daily., Disp: 90 tablet, Rfl: 1  •  fenofibrate (Tricor) 145 MG tablet, Take 1 tablet by mouth Daily., Disp: 90 tablet, Rfl: 1  •  fluticasone (FLONASE) 50 MCG/ACT nasal spray, 2 sprays into the nostril(s) as directed by provider Daily., Disp: , Rfl:   •  glycopyrrolate (ROBINUL) 2 MG tablet, Take 1 tablet by mouth Daily., Disp: 90 tablet, Rfl: 1  •  Insulin Degludec (TRESIBA) 100 UNIT/ML solution injection, Inject 36 Units under the skin into the appropriate area as directed every night at bedtime., Disp: , Rfl:   •  Jardiance 25 MG tablet tablet, Take 1 tablet by mouth Daily., Disp: , Rfl:   •  levothyroxine sodium (TIROSINT) 125 MCG capsule capsule, Take 1 capsule by mouth Daily. PT takes 2 tablets daily M/F one tablet on Sat. And none on Sun., Disp: , Rfl:   •  metoprolol succinate XL (TOPROL-XL) 50 MG 24 hr tablet, Take 1 tablet by mouth Daily., Disp: , Rfl:   •  montelukast (Singulair) 10 MG tablet, Take 1 tablet by mouth Every Night., Disp: 90 tablet, Rfl: 3  •  NovoLOG FlexPen 100 UNIT/ML solution pen-injector sc pen, INJECT 10 UNITS SUBCUTANEOUSLY THREE TIMES A DAY BEFORE MEALS, Disp: , Rfl:   •  omeprazole (priLOSEC) 20 MG capsule, Take 1 capsule by mouth Daily., Disp: , Rfl:   •  Probiotic Product (Align) 4 MG capsule, Take 4 mg by mouth Daily., Disp: 30 capsule, Rfl: 5  •  Vitamin D, Cholecalciferol, (CHOLECALCIFEROL) 10 MCG (400 UNIT) tablet, Take 1 tablet by mouth Daily., Disp: , Rfl:     Objective   /80 (BP Location: Right arm, Patient Position: Sitting, Cuff Size: Large Adult)   Pulse 76  "  Temp 97.7 °F (36.5 °C) (Temporal)   Ht 185.4 cm (73\")   Wt 124 kg (272 lb 9.6 oz)   SpO2 99%   BMI 35.97 kg/m²    Estimated body mass index is 35.97 kg/m² as calculated from the following:    Height as of this encounter: 185.4 cm (73\").    Weight as of this encounter: 124 kg (272 lb 9.6 oz).   Physical Exam  Vitals reviewed.   Constitutional:       General: He is not in acute distress.  HENT:      Head: Normocephalic and atraumatic.   Cardiovascular:      Rate and Rhythm: Normal rate and regular rhythm.      Heart sounds: Normal heart sounds. No murmur heard.  Pulmonary:      Effort: Pulmonary effort is normal.      Breath sounds: Normal breath sounds. No wheezing, rhonchi or rales.   Musculoskeletal:      Right lower leg: No edema.      Left lower leg: No edema.   Skin:     General: Skin is warm and dry.   Neurological:      General: No focal deficit present.      Mental Status: He is alert.   Psychiatric:         Thought Content: Thought content normal.        Result Review :  The following data was reviewed by: DELL Morrison on 04/25/2023:  CMP        2/12/2023    04:12 2/13/2023    04:25 3/7/2023    10:07   CMP   Glucose 146   162   158     BUN 22   20   16     Creatinine 3.57   2.69   1.19     EGFR 20.0   28.1   74.9     Sodium 126   134   136     Potassium 4.4   4.0   4.1     Chloride 92   102   96     Calcium 8.8   8.8   10.1     Total Protein 6.2   6.1   7.5     Albumin 3.6   3.1   4.5     Globulin 2.6   3.0   3.0     Total Bilirubin 4.0   1.5   0.5     Alkaline Phosphatase 454   411   102     AST (SGOT) 1,440   484   13     ALT (SGPT) 552   329   18     Albumin/Globulin Ratio 1.4   1.0   1.5     BUN/Creatinine Ratio 6.2   7.4   13.4     Anion Gap 13.6   12.8   10.9       CBC w/diff        2/12/2023    04:12 2/13/2023    04:25 3/7/2023    10:07   CBC w/Diff   WBC 4.37   4.16   6.13     RBC 3.96   3.93   4.27     Hemoglobin 12.8   12.4   13.4     Hematocrit 34.7   34.7   38.6     MCV 87.6   " 88.3   90.4     MCH 32.3   31.6   31.4     MCHC 36.9   35.7   34.7     RDW 12.7   13.9   13.9     Platelets 142   124   313     Neutrophil Rel % 86.2   69.9   50.2     Immature Granulocyte Rel % 0.7   1.0   0.3     Lymphocyte Rel % 7.6   16.6   32.0     Monocyte Rel % 3.9   10.1   13.2     Eosinophil Rel % 1.1   1.7   3.6     Basophil Rel % 0.5   0.7   0.7       Lipid Panel        3/7/2023    10:07   Lipid Panel   Total Cholesterol 270     Triglycerides 729     HDL Cholesterol 42     VLDL Cholesterol 126     LDL Cholesterol  102     LDL/HDL Ratio 1.96       TSH        5/12/2022    10:47 9/10/2022    14:00 2/11/2023    06:26   TSH   TSH 37.000      0.100   0.524         This result is from an external source.     A1C Last 3 Results        9/9/2022    07:22 12/5/2022    14:35 3/7/2023    10:07   HGBA1C Last 3 Results   Hemoglobin A1C 7.90   9.1      8.60         This result is from an external source.            Assessment and Plan   Diagnoses and all orders for this visit:    1. Essential hypertension (Primary)  -     CBC & Differential; Future  -     Comprehensive Metabolic Panel; Future    2. Hyperlipidemia, unspecified hyperlipidemia type  -     Lipid Panel; Future    3. Anxiety and depression    4. Other irritable bowel syndrome    5. Allergic rhinitis, unspecified seasonality, unspecified trigger    6. Type 2 diabetes mellitus with hyperglycemia, unspecified whether long term insulin use    7. Screening for malignant neoplasm of prostate  -     PSA Screen; Future    Other orders  -     glycopyrrolate (ROBINUL) 2 MG tablet; Take 1 tablet by mouth Daily.  Dispense: 90 tablet; Refill: 1  -     escitalopram (LEXAPRO) 10 MG tablet; Take 1 tablet by mouth Daily.  Dispense: 90 tablet; Refill: 1       Hypertension: Blood pressure well controlled on Cardizem  mg mg daily and metoprolol XL 50 mg daily. Continue current treatment plan.     Hyperlipidemia:  Tolerating fenofibrate 145 mg daily. Check fasting labs next  visit.      Anxiety and depression: Stable on Lexapro 10 mg daily to continue.     Irritable bowel syndrome:  Stable on Robinul 2 mg daily.  Continue current treatment plan.    Allergic rhinitis: Improved on Singulair 10 mg daily and to continue.    Type 2 diabetes mellitus:  Worsening and recent  HA1c is 11.4. Taking Tresiba 50 units daily and Jardiance 25 mg once a day. Endocrinology increase Tresiba to 70 units daily and added Novolog three times a day before meals of 10 units. He will follow up with his endocrinologist in 2 months.     Patient was given instructions and counseling regarding his condition or for health maintenance advice. Please see specific information pulled into the AVS if appropriate.     Follow Up   Return in about 6 months (around 10/25/2023) for Annual physical.    DELL Morrsion

## 2023-05-31 ENCOUNTER — TELEPHONE (OUTPATIENT)
Dept: CARDIOLOGY | Facility: CLINIC | Age: 48
End: 2023-05-31

## 2023-05-31 NOTE — TELEPHONE ENCOUNTER
[facility] received a fax that requires your attention. The document has been indexed to the patient’s chart for your review.      Reason for sending: EXTERNAL MEDICAL RECORD NOTIFICATION     Documents Description: REFILLS FOR DILTIAZEM, METOPROLOL    Name of Sender: [employer]     Date Indexed: 5.30.23 x2

## 2023-09-20 NOTE — PLAN OF CARE
Problem: Adult Inpatient Plan of Care  Goal: Plan of Care Review  Outcome: Ongoing, Progressing  Flowsheets  Taken 2/12/2023 0538 by Char Blank RN  Progress: improving  Taken 2/10/2023 1710 by Rakel Almanza RN  Plan of Care Reviewed With: patient   Goal Outcome Evaluation:           Progress: improving     A&O x4. Reports his pain has improved slightly. Has had adequate urine output this shift. Given pain medication twice and zofran once.    No cardiac contraindications. OK to use it from my standpoint

## 2023-09-26 ENCOUNTER — OFFICE VISIT (OUTPATIENT)
Dept: CARDIOLOGY | Facility: CLINIC | Age: 48
End: 2023-09-26
Payer: COMMERCIAL

## 2023-09-26 VITALS
OXYGEN SATURATION: 93 % | HEART RATE: 83 BPM | DIASTOLIC BLOOD PRESSURE: 68 MMHG | WEIGHT: 260 LBS | HEIGHT: 73 IN | BODY MASS INDEX: 34.46 KG/M2 | SYSTOLIC BLOOD PRESSURE: 100 MMHG

## 2023-09-26 DIAGNOSIS — I47.10 SVT (SUPRAVENTRICULAR TACHYCARDIA): Primary | ICD-10-CM

## 2023-09-26 DIAGNOSIS — I10 ESSENTIAL HYPERTENSION: ICD-10-CM

## 2023-09-26 NOTE — PROGRESS NOTES
Wayne GORDON Rgoe  1975  [phone]    09/26/2023    [facility] CARDIOLOGY     Referring Provider: No ref. 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       Essential hypertension 07/19/2021       Priority: Low    Hyperlipidemia 11/14/2018       Priority: Low    Hepatitis 02/10/2023    Anxiety 07/15/2022    Bipolar 2 disorder (HCC) 07/20/2021    Postoperative hypothyroidism 07/19/2021    GERD (gastroesophageal reflux disease) 07/19/2021    Alcoholism (HCC) 01/16/2021    Papillary thyroid carcinoma (HCC) 04/01/2020    Cervical stenosis of spinal canal 11/30/2018    DM (diabetes mellitus) (HCC) 11/14/2018    Lumbosacral spondylosis without myelopathy 08/29/2018    History of colonic polyps 05/23/2017    Family history of malignant neoplasm of gastrointestinal tract        Allergies  No Known Allergies    Current Medications    Current Outpatient Medications:     Accu-Chek Guide test strip, , Disp: , Rfl:     dilTIAZem CD (CARDIZEM CD) 180 MG 24 hr capsule, TAKE 1 CAPSULE BY MOUTH DAILY, Disp: 90 capsule, Rfl: 3    escitalopram (LEXAPRO) 10 MG tablet, Take 1 tablet by mouth Daily., Disp: 90 tablet, Rfl: 1    fenofibrate (Tricor) 145 MG tablet, Take 1 tablet by mouth Daily., Disp: 90 tablet, Rfl: 1    fluticasone (FLONASE) 50 MCG/ACT nasal spray, 2 sprays into the nostril(s) as directed by provider Daily., Disp: , Rfl:     glycopyrrolate (ROBINUL) 2 MG tablet, Take 1 tablet by mouth Daily., Disp: 90 tablet, Rfl: 1    Insulin Degludec (TRESIBA) 100 UNIT/ML solution injection, Inject 36 Units under the skin into the appropriate area as directed every night at bedtime., Disp: , Rfl:     Jardiance 25 MG tablet  "tablet, Take 1 tablet by mouth Daily., Disp: , Rfl:     Levocetirizine Dihydrochloride (XYZAL ALLERGY 24HR PO), , Disp: , Rfl:     levothyroxine sodium (TIROSINT) 125 MCG capsule capsule, Take 1 capsule by mouth Daily. PT takes 2 tablets daily M/F one tablet on Sat. And none on Sun., Disp: , Rfl:     metoprolol succinate XL (TOPROL-XL) 50 MG 24 hr tablet, Take 1 tablet by mouth Daily., Disp: , Rfl:     montelukast (Singulair) 10 MG tablet, Take 1 tablet by mouth Every Night., Disp: 90 tablet, Rfl: 3    NovoLOG FlexPen 100 UNIT/ML solution pen-injector sc pen, INJECT 10 UNITS SUBCUTANEOUSLY THREE TIMES A DAY BEFORE MEALS, Disp: , Rfl:     omeprazole (priLOSEC) 20 MG capsule, Take 1 capsule by mouth Daily., Disp: , Rfl:     Vitamin D, Cholecalciferol, (CHOLECALCIFEROL) 10 MCG (400 UNIT) tablet, Take 1 tablet by mouth Daily., Disp: , Rfl:     History of Present Illness     Pt presents for follow up of history of SVT status post RFA of AVNRT. Since we last saw the pt, pt denies any palpitations, SOB, CP, LH, and dizziness. Denies any hospitalizations, ER visits, or TIA/CVA symptoms. He has noted some mild foot swelling, his PCP has recommended decreasing salt intake which he is working on. BP is well controlled. He feels very well overall.    ROS:  General:  Denies fatigue, weight gain or loss  Cardiovascular:  Denies CP, PND, syncope, near syncope, edema or palpitations.  Pulmonary:  Denies ESTRELLA, cough, or wheezing      Vitals:    09/26/23 1107   BP: 100/68   BP Location: Left arm   Patient Position: Sitting   Pulse: 83   SpO2: 93%   Weight: 118 kg (260 lb)   Height: 185.4 cm (73\")     Body mass index is 34.3 kg/m².  PE:  General: NAD  Neck: no JVD, no carotid bruits, no TM  Heart RRR, NL S1, S2, S4 present, no rubs, murmurs  Lungs: CTA, no wheezes, rhonchi, or rales  Abd: soft, non-tender, NL BS  Ext: No musculoskeletal deformities, no edema, cyanosis, or clubbing  Psych: normal mood and affect    Diagnostic " Data:      Procedures        1. SVT (supraventricular tachycardia)    2. Essential hypertension        Plan:  1.SVT  -No known clinical recurrence since ablation September 2020. Doing very well.     2. Essential hypertension  -well controlled.     F/up in 6 months    Electronically signed by DELL Powers, 09/26/23, 11:46 AM EDT.

## 2023-10-05 DIAGNOSIS — E11.65 TYPE 2 DIABETES MELLITUS WITH HYPERGLYCEMIA, UNSPECIFIED WHETHER LONG TERM INSULIN USE: Primary | ICD-10-CM

## 2023-10-05 RX ORDER — METOPROLOL SUCCINATE 50 MG/1
TABLET, EXTENDED RELEASE ORAL
Qty: 90 TABLET | Refills: 2 | Status: SHIPPED | OUTPATIENT
Start: 2023-10-05

## 2023-10-05 RX ORDER — BLOOD SUGAR DIAGNOSTIC
STRIP MISCELLANEOUS
Qty: 1 EACH | Refills: 5 | Status: SHIPPED | OUTPATIENT
Start: 2023-10-05

## 2023-10-05 RX ORDER — FENOFIBRATE 145 MG/1
TABLET, COATED ORAL
Qty: 90 TABLET | Refills: 0 | Status: SHIPPED | OUTPATIENT
Start: 2023-10-05

## 2023-10-26 ENCOUNTER — LAB (OUTPATIENT)
Dept: LAB | Facility: HOSPITAL | Age: 48
End: 2023-10-26
Payer: COMMERCIAL

## 2023-10-26 DIAGNOSIS — E11.65 TYPE 2 DIABETES MELLITUS WITH HYPERGLYCEMIA, UNSPECIFIED WHETHER LONG TERM INSULIN USE: ICD-10-CM

## 2023-10-26 DIAGNOSIS — Z12.5 SCREENING FOR MALIGNANT NEOPLASM OF PROSTATE: ICD-10-CM

## 2023-10-26 DIAGNOSIS — E78.5 HYPERLIPIDEMIA, UNSPECIFIED HYPERLIPIDEMIA TYPE: ICD-10-CM

## 2023-10-26 DIAGNOSIS — I10 ESSENTIAL HYPERTENSION: ICD-10-CM

## 2023-10-26 LAB
ALBUMIN SERPL-MCNC: 4.3 G/DL (ref 3.5–5.2)
ALBUMIN UR-MCNC: <1.2 MG/DL
ALBUMIN/GLOB SERPL: 1.4 G/DL
ALP SERPL-CCNC: 49 U/L (ref 39–117)
ALT SERPL W P-5'-P-CCNC: 22 U/L (ref 1–41)
ANION GAP SERPL CALCULATED.3IONS-SCNC: 12.6 MMOL/L (ref 5–15)
AST SERPL-CCNC: 27 U/L (ref 1–40)
BASOPHILS # BLD AUTO: 0.06 10*3/MM3 (ref 0–0.2)
BASOPHILS NFR BLD AUTO: 1.1 % (ref 0–1.5)
BILIRUB SERPL-MCNC: 0.2 MG/DL (ref 0–1.2)
BUN SERPL-MCNC: 12 MG/DL (ref 6–20)
BUN/CREAT SERPL: 9.4 (ref 7–25)
CALCIUM SPEC-SCNC: 9.3 MG/DL (ref 8.6–10.5)
CHLORIDE SERPL-SCNC: 104 MMOL/L (ref 98–107)
CHOLEST SERPL-MCNC: 203 MG/DL (ref 0–200)
CO2 SERPL-SCNC: 25.4 MMOL/L (ref 22–29)
CREAT SERPL-MCNC: 1.28 MG/DL (ref 0.76–1.27)
CREAT UR-MCNC: 40.5 MG/DL
DEPRECATED RDW RBC AUTO: 42.7 FL (ref 37–54)
EGFRCR SERPLBLD CKD-EPI 2021: 68.2 ML/MIN/1.73
EOSINOPHIL # BLD AUTO: 0.22 10*3/MM3 (ref 0–0.4)
EOSINOPHIL NFR BLD AUTO: 4.1 % (ref 0.3–6.2)
ERYTHROCYTE [DISTWIDTH] IN BLOOD BY AUTOMATED COUNT: 13.3 % (ref 12.3–15.4)
GLOBULIN UR ELPH-MCNC: 3 GM/DL
GLUCOSE SERPL-MCNC: 100 MG/DL (ref 65–99)
HBA1C MFR BLD: 7.3 % (ref 4.8–5.6)
HCT VFR BLD AUTO: 43.4 % (ref 37.5–51)
HDLC SERPL-MCNC: 54 MG/DL (ref 40–60)
HGB BLD-MCNC: 14.8 G/DL (ref 13–17.7)
IMM GRANULOCYTES # BLD AUTO: 0.02 10*3/MM3 (ref 0–0.05)
IMM GRANULOCYTES NFR BLD AUTO: 0.4 % (ref 0–0.5)
LDLC SERPL CALC-MCNC: 112 MG/DL (ref 0–100)
LDLC/HDLC SERPL: 1.96 {RATIO}
LYMPHOCYTES # BLD AUTO: 2.01 10*3/MM3 (ref 0.7–3.1)
LYMPHOCYTES NFR BLD AUTO: 37.6 % (ref 19.6–45.3)
MCH RBC QN AUTO: 30.3 PG (ref 26.6–33)
MCHC RBC AUTO-ENTMCNC: 34.1 G/DL (ref 31.5–35.7)
MCV RBC AUTO: 88.9 FL (ref 79–97)
MICROALBUMIN/CREAT UR: NORMAL MG/G{CREAT}
MONOCYTES # BLD AUTO: 0.43 10*3/MM3 (ref 0.1–0.9)
MONOCYTES NFR BLD AUTO: 8.1 % (ref 5–12)
NEUTROPHILS NFR BLD AUTO: 2.6 10*3/MM3 (ref 1.7–7)
NEUTROPHILS NFR BLD AUTO: 48.7 % (ref 42.7–76)
NRBC BLD AUTO-RTO: 0 /100 WBC (ref 0–0.2)
PLATELET # BLD AUTO: 298 10*3/MM3 (ref 140–450)
PMV BLD AUTO: 8.7 FL (ref 6–12)
POTASSIUM SERPL-SCNC: 4 MMOL/L (ref 3.5–5.2)
PROT SERPL-MCNC: 7.3 G/DL (ref 6–8.5)
PSA SERPL-MCNC: 0.6 NG/ML (ref 0–4)
RBC # BLD AUTO: 4.88 10*6/MM3 (ref 4.14–5.8)
SODIUM SERPL-SCNC: 142 MMOL/L (ref 136–145)
TRIGL SERPL-MCNC: 217 MG/DL (ref 0–150)
VLDLC SERPL-MCNC: 37 MG/DL (ref 5–40)
WBC NRBC COR # BLD: 5.34 10*3/MM3 (ref 3.4–10.8)

## 2023-10-26 PROCEDURE — G0103 PSA SCREENING: HCPCS

## 2023-10-26 PROCEDURE — 83036 HEMOGLOBIN GLYCOSYLATED A1C: CPT

## 2023-10-26 PROCEDURE — 80061 LIPID PANEL: CPT

## 2023-10-26 PROCEDURE — 36415 COLL VENOUS BLD VENIPUNCTURE: CPT

## 2023-10-26 PROCEDURE — 85025 COMPLETE CBC W/AUTO DIFF WBC: CPT

## 2023-10-26 PROCEDURE — 80053 COMPREHEN METABOLIC PANEL: CPT

## 2023-10-26 PROCEDURE — 82570 ASSAY OF URINE CREATININE: CPT

## 2023-10-26 PROCEDURE — 82043 UR ALBUMIN QUANTITATIVE: CPT

## 2023-10-27 ENCOUNTER — OFFICE VISIT (OUTPATIENT)
Dept: INTERNAL MEDICINE | Facility: CLINIC | Age: 48
End: 2023-10-27
Payer: COMMERCIAL

## 2023-10-27 VITALS
SYSTOLIC BLOOD PRESSURE: 115 MMHG | DIASTOLIC BLOOD PRESSURE: 70 MMHG | TEMPERATURE: 98.1 F | OXYGEN SATURATION: 97 % | HEIGHT: 73 IN | WEIGHT: 268.2 LBS | HEART RATE: 72 BPM | BODY MASS INDEX: 35.54 KG/M2

## 2023-10-27 DIAGNOSIS — E11.65 TYPE 2 DIABETES MELLITUS WITH HYPERGLYCEMIA, UNSPECIFIED WHETHER LONG TERM INSULIN USE: Chronic | ICD-10-CM

## 2023-10-27 DIAGNOSIS — F41.9 ANXIETY AND DEPRESSION: Chronic | ICD-10-CM

## 2023-10-27 DIAGNOSIS — E78.5 HYPERLIPIDEMIA, UNSPECIFIED HYPERLIPIDEMIA TYPE: Chronic | ICD-10-CM

## 2023-10-27 DIAGNOSIS — Z23 NEED FOR SHINGLES VACCINE: ICD-10-CM

## 2023-10-27 DIAGNOSIS — Z00.00 ANNUAL PHYSICAL EXAM: Primary | ICD-10-CM

## 2023-10-27 DIAGNOSIS — K58.8 OTHER IRRITABLE BOWEL SYNDROME: Chronic | ICD-10-CM

## 2023-10-27 DIAGNOSIS — J30.9 ALLERGIC RHINITIS, UNSPECIFIED SEASONALITY, UNSPECIFIED TRIGGER: Chronic | ICD-10-CM

## 2023-10-27 DIAGNOSIS — F32.A ANXIETY AND DEPRESSION: Chronic | ICD-10-CM

## 2023-10-27 DIAGNOSIS — I10 ESSENTIAL HYPERTENSION: Chronic | ICD-10-CM

## 2023-10-27 RX ORDER — FLASH GLUCOSE SENSOR
KIT MISCELLANEOUS
COMMUNITY
Start: 2022-04-01

## 2023-10-27 RX ORDER — GLYCOPYRROLATE 2 MG/1
2 TABLET ORAL DAILY
Qty: 90 TABLET | Refills: 1 | Status: SHIPPED | OUTPATIENT
Start: 2023-10-27

## 2023-10-27 RX ORDER — GLYCOPYRROLATE 2 MG/1
2 TABLET ORAL DAILY
Qty: 90 TABLET | Refills: 1 | Status: CANCELLED | OUTPATIENT
Start: 2023-10-27

## 2023-10-27 NOTE — PROGRESS NOTES
Chief Complaint  Annual Exam (Physical, labs done. The patient would like a referral to a dermatologist and endocrinologist. )  Subjective    History of Present Illness  Wayne Guan is a 48 y.o. male who presents to [facility] for:     His annual physical exam.     2.    Follow-up hypertension, hyperlipidemia, anxiety, depression, IBS, allergies and diabetes. Negative for chest pain and shortness of air.     Specialists include: Endocrinology for hypothyroidism and status post thyroidectomy due to thyroid cancer and diabetes management. Dr. Keen  for SVT, palpitations, status postcardiac ablation and monitoring AAA       Current Outpatient Medications:     Continuous Blood Gluc Sensor (FreeStyle Cooper 14 Day Sensor) misc, , Disp: , Rfl:     dilTIAZem CD (CARDIZEM CD) 180 MG 24 hr capsule, TAKE 1 CAPSULE BY MOUTH DAILY, Disp: 90 capsule, Rfl: 3    escitalopram (LEXAPRO) 10 MG tablet, Take 1 tablet by mouth Daily., Disp: 90 tablet, Rfl: 1    fenofibrate (TRICOR) 145 MG tablet, TAKE 1 TABLET BY MOUTH EVERY DAY, Disp: 90 tablet, Rfl: 0    fluticasone (FLONASE) 50 MCG/ACT nasal spray, 2 sprays into the nostril(s) as directed by provider Daily., Disp: , Rfl:     glucose blood (Accu-Chek Guide) test strip, USE AS INSTRUCTED, Disp: 1 each, Rfl: 5    glycopyrrolate (ROBINUL) 2 MG tablet, Take 1 tablet by mouth Daily., Disp: 90 tablet, Rfl: 1    Insulin Degludec (TRESIBA) 100 UNIT/ML solution injection, Inject 36 Units under the skin into the appropriate area as directed every night at bedtime., Disp: , Rfl:     Jardiance 25 MG tablet tablet, Take 1 tablet by mouth Daily., Disp: , Rfl:     Levocetirizine Dihydrochloride (XYZAL ALLERGY 24HR PO), , Disp: , Rfl:     levothyroxine sodium (TIROSINT) 125 MCG capsule capsule, Take 1 capsule by mouth Daily. PT takes 2 tablets daily M/F one tablet on Sat. And none on Sun., Disp: , Rfl:     metoprolol succinate XL (TOPROL-XL) 50 MG 24 hr  tablet, TAKE 1 TABLET BY MOUTH EVERY DAY, Disp: 90 tablet, Rfl: 2    montelukast (Singulair) 10 MG tablet, Take 1 tablet by mouth Every Night., Disp: 90 tablet, Rfl: 3    NovoLOG FlexPen 100 UNIT/ML solution pen-injector sc pen, INJECT 10 UNITS SUBCUTANEOUSLY THREE TIMES A DAY BEFORE MEALS, Disp: , Rfl:     omeprazole (priLOSEC) 20 MG capsule, Take 1 capsule by mouth Daily., Disp: , Rfl:     Vitamin D, Cholecalciferol, (CHOLECALCIFEROL) 10 MCG (400 UNIT) tablet, Take 1 tablet by mouth Daily., Disp: , Rfl:   No Known Allergies   Past Medical History:   Diagnosis Date    Alcoholism 01/16/2021    Allergies     Aneurysm     Anxiety 07/15/2022    Atrial fibrillation     Bipolar 2 disorder 07/20/2021    Cancer     Cervical stenosis of spinal canal 11/30/2018    Formatting of this note might be different from the original. Added automatically from request for surgery [id number]    Colon polyp 11/1/2003 Benign    Depression     Diabetes mellitus     GERD (gastroesophageal reflux disease)     History of colonic polyps 05/23/2017    Formatting of this note might be different from the original. Added automatically from request for surgery [id number]    Hyperlipidemia     Hypertension     Irritable bowel syndrome 2004    Lumbosacral spondylosis without myelopathy 08/29/2018    Pancreatitis 2008    Papillary thyroid carcinoma 04/01/2020    SVT (supraventricular tachycardia) 01/23/2022      Past Surgical History:   Procedure Laterality Date    ABLATION OF DYSRHYTHMIC FOCUS  9/9/2022    APPENDECTOMY  1983    CARDIAC ABLATION  09/09/2022    CARDIAC ELECTROPHYSIOLOGY PROCEDURE N/A 09/09/2022    Procedure: Ablation SVT HOLD Metoprolol 5 days;  Surgeon: Tai Lisa MD;  Location: [facility] INVASIVE LOCATION;  Service: Cardiovascular;  Laterality: N/A;    CERVICAL DISC SURGERY      COLONOSCOPY      2021 [facility]    THYROID SURGERY          Objective   /70 (BP Location: Right arm, Patient Position: Sitting, Cuff Size: Large Adult)   " Pulse 72   Temp 98.1 °F (36.7 °C) (Temporal)   Ht 185.4 cm (73\")   Wt 122 kg (268 lb 3.2 oz)   SpO2 97%   BMI 35.38 kg/m²    Estimated body mass index is 35.38 kg/m² as calculated from the following:    Height as of this encounter: 185.4 cm (73\").    Weight as of this encounter: 122 kg (268 lb 3.2 oz).     Physical Exam  Vitals reviewed.   Constitutional:       General: He is not in acute distress.  HENT:      Head: Normocephalic and atraumatic.      Right Ear: Tympanic membrane and ear canal normal.      Left Ear: Tympanic membrane and ear canal normal.   Eyes:      Conjunctiva/sclera: Conjunctivae normal.   Cardiovascular:      Rate and Rhythm: Normal rate and regular rhythm.      Heart sounds: Normal heart sounds. No murmur heard.  Pulmonary:      Effort: Pulmonary effort is normal.      Breath sounds: Normal breath sounds. No wheezing, rhonchi or rales.   Abdominal:      General: There is no distension.      Palpations: Abdomen is soft. There is no mass.      Tenderness: There is no abdominal tenderness.   Musculoskeletal:      Right lower leg: No edema.      Left lower leg: No edema.   Lymphadenopathy:      Cervical: No cervical adenopathy.   Skin:     General: Skin is warm and dry.      Coloration: Skin is not jaundiced or pale.   Neurological:      General: No focal deficit present.      Mental Status: He is alert.   Psychiatric:         Mood and Affect: Mood normal.         Thought Content: Thought content normal.          Result Review :  The following data was reviewed by: DELL Morrison on 10/27/2023:  CMP          2/13/2023    04:25 3/7/2023    10:07 10/26/2023    08:53   CMP   Glucose 162  158  100    BUN 20  16  12    Creatinine 2.69  1.19  1.28    EGFR 28.1  74.9  68.2    Sodium 134  136  142    Potassium 4.0  4.1  4.0    Chloride 102  96  104    Calcium 8.8  10.1  9.3    Total Protein 6.1  7.5  7.3    Albumin 3.1  4.5  4.3    Globulin 3.0  3.0  3.0    Total Bilirubin 1.5  0.5  0.2  "   Alkaline Phosphatase 411  102  49    AST (SGOT) 484  13  27    ALT (SGPT) 329  18  22    Albumin/Globulin Ratio 1.0  1.5  1.4    BUN/Creatinine Ratio 7.4  13.4  9.4    Anion Gap 12.8  10.9  12.6      CBC w/diff          2/13/2023    04:25 3/7/2023    10:07 10/26/2023    08:53   CBC w/Diff   WBC 4.16  6.13  5.34    RBC 3.93  4.27  4.88    Hemoglobin 12.4  13.4  14.8    Hematocrit 34.7  38.6  43.4    MCV 88.3  90.4  88.9    MCH 31.6  31.4  30.3    MCHC 35.7  34.7  34.1    RDW 13.9  13.9  13.3    Platelets 124  313  298    Neutrophil Rel % 69.9  50.2  48.7    Immature Granulocyte Rel % 1.0  0.3  0.4    Lymphocyte Rel % 16.6  32.0  37.6    Monocyte Rel % 10.1  13.2  8.1    Eosinophil Rel % 1.7  3.6  4.1    Basophil Rel % 0.7  0.7  1.1      Lipid Panel          3/7/2023    10:07 10/26/2023    08:53   Lipid Panel   Total Cholesterol 270  203    Triglycerides 729  217    HDL Cholesterol 42  54    VLDL Cholesterol 126  37    LDL Cholesterol  102  112    LDL/HDL Ratio 1.96  1.96      TSH          2/11/2023    06:26   TSH   TSH 0.524      A1C Last 3 Results          12/5/2022    14:35 3/7/2023    10:07 10/26/2023    08:53   HGBA1C Last 3 Results   Hemoglobin A1C 9.1     8.60  7.30       Details          This result is from an external source.             Microalbumin          3/7/2023    10:07 10/26/2023    08:59   Microalbumin   Microalbumin, Urine <1.2  <1.2      PSA          10/26/2023    08:53   PSA   PSA 0.601                  Assessment and Plan   Diagnoses and all orders for this visit:    1. Annual physical exam (Primary)    2. Essential hypertension  -     Comprehensive Metabolic Panel; Future  -     CBC & Differential; Future    3. Hyperlipidemia, unspecified hyperlipidemia type  -     Lipid Panel; Future    4. Anxiety and depression    5. Other irritable bowel syndrome    6. Allergic rhinitis, unspecified seasonality, unspecified trigger    7. Type 2 diabetes mellitus with hyperglycemia, unspecified whether long  term insulin use  -     Hemoglobin A1c; Future    8. Need for shingles vaccine  -     Shingrix Vaccine    Other orders  -     glycopyrrolate (ROBINUL) 2 MG tablet; Take 1 tablet by mouth Daily.  Dispense: 90 tablet; Refill: 1      Annual exam: Discussed healthy diet, exercise, adequate sleep, cancer screening, immunizations and preventative care. Annual eye exam and twice yearly dental cleaning. Shingrix today first dose.    Hypertension: Blood pressure well controlled on Cardizem  mg mg daily and metoprolol XL 50 mg daily. Continue current treatment plan.     Hyperlipidemia: Improved. LFTs normal.  Stable on fenofibrate 145 mg daily and to continue.      Anxiety and depression: Stable on Lexapro 10 mg daily to continue.     Irritable bowel syndrome:  Stable on Robinul 2 mg daily.  Continue current treatment plan.     Allergic rhinitis: Improved on Singulair 10 mg daily and to continue.     Type 2 diabetes mellitus:   HA1C is 7.30. Taking Tresiba 40 units daily,  Jardiance 25 mg once a day and Novolog three times a day before meals of 4-6 units. Follows with endocrinologist for diabetes and s/p thyroid cancer.      Patient was given instructions and counseling regarding his condition or for health maintenance advice. Please see specific information pulled into the AVS if appropriate.     Follow Up   Return in about 6 months (around 4/27/2024) for Recheck.    Dictated Utilizing Dragon Dictation.  Please note that portions of this note were completed with a voice recognition program.  Part of this note may be an electronic transcription/translation of spoken language to printed text using the Dragon Dictation System.    DELL Morrison

## 2023-12-04 ENCOUNTER — APPOINTMENT (OUTPATIENT)
Dept: GENERAL RADIOLOGY | Facility: HOSPITAL | Age: 48
DRG: 638 | End: 2023-12-04
Payer: COMMERCIAL

## 2023-12-04 ENCOUNTER — HOSPITAL ENCOUNTER (INPATIENT)
Facility: HOSPITAL | Age: 48
LOS: 3 days | Discharge: HOME | DRG: 638 | End: 2023-12-07
Attending: EMERGENCY MEDICINE | Admitting: INTERNAL MEDICINE
Payer: COMMERCIAL

## 2023-12-04 DIAGNOSIS — E87.20 METABOLIC ACIDOSIS: Primary | ICD-10-CM

## 2023-12-04 DIAGNOSIS — E11.10 DIABETIC KETOACIDOSIS WITHOUT COMA ASSOCIATED WITH TYPE 2 DIABETES MELLITUS: ICD-10-CM

## 2023-12-04 LAB
ACETONE BLD QL: ABNORMAL
ALBUMIN SERPL-MCNC: 5.2 G/DL (ref 3.5–5.2)
ALBUMIN/GLOB SERPL: 1.4 G/DL
ALP SERPL-CCNC: 65 U/L (ref 39–117)
ALT SERPL W P-5'-P-CCNC: 35 U/L (ref 1–41)
AMPHET+METHAMPHET UR QL: NEGATIVE
ANION GAP SERPL CALCULATED.3IONS-SCNC: 32.4 MMOL/L (ref 5–15)
ARTERIAL PATENCY WRIST A: ABNORMAL
AST SERPL-CCNC: 39 U/L (ref 1–40)
BACTERIA UR QL AUTO: ABNORMAL /HPF
BARBITURATES UR QL SCN: NEGATIVE
BASE EXCESS BLDA CALC-SCNC: -18 MMOL/L (ref -2–2)
BASOPHILS # BLD AUTO: 0.04 10*3/MM3 (ref 0–0.2)
BASOPHILS NFR BLD AUTO: 0.6 % (ref 0–1.5)
BDY SITE: ABNORMAL
BENZODIAZ UR QL SCN: NEGATIVE
BILIRUB SERPL-MCNC: 0.9 MG/DL (ref 0–1.2)
BILIRUB UR QL STRIP: NEGATIVE
BUN SERPL-MCNC: 10 MG/DL (ref 6–20)
BUN/CREAT SERPL: 7.1 (ref 7–25)
CA-I BLDA-SCNC: 1.29 MMOL/L (ref 1.13–1.32)
CALCIUM SPEC-SCNC: 9.6 MG/DL (ref 8.6–10.5)
CANNABINOIDS SERPL QL: NEGATIVE
CHLORIDE BLDA-SCNC: 100 MMOL/L (ref 98–106)
CHLORIDE SERPL-SCNC: 91 MMOL/L (ref 98–107)
CLARITY UR: CLEAR
CLUMPED PLATELETS: PRESENT
CO2 SERPL-SCNC: 6.6 MMOL/L (ref 22–29)
COCAINE UR QL: NEGATIVE
COHGB MFR BLD: 0.6 % (ref 0–1.5)
COLOR UR: YELLOW
CREAT SERPL-MCNC: 1.4 MG/DL (ref 0.76–1.27)
D-LACTATE SERPL-SCNC: 1.1 MMOL/L (ref 0.5–2)
DEPRECATED RDW RBC AUTO: 43.6 FL (ref 37–54)
EGFRCR SERPLBLD CKD-EPI 2021: 61.2 ML/MIN/1.73
EOSINOPHIL # BLD AUTO: 0.01 10*3/MM3 (ref 0–0.4)
EOSINOPHIL NFR BLD AUTO: 0.2 % (ref 0.3–6.2)
ERYTHROCYTE [DISTWIDTH] IN BLOOD BY AUTOMATED COUNT: 12.9 % (ref 12.3–15.4)
ETHANOL BLD-MCNC: <10 MG/DL (ref 0–10)
ETHANOL UR QL: <0.01 %
FENTANYL UR-MCNC: NEGATIVE NG/ML
FHHB: 2.4 % (ref 0–5)
FLUAV SUBTYP SPEC NAA+PROBE: NOT DETECTED
FLUBV RNA ISLT QL NAA+PROBE: NOT DETECTED
GAS FLOW AIRWAY: ABNORMAL L/MIN
GEN 5 2HR TROPONIN T REFLEX: 16 NG/L
GLOBULIN UR ELPH-MCNC: 3.8 GM/DL
GLUCOSE BLDA-MCNC: 143 MG/DL (ref 70–99)
GLUCOSE SERPL-MCNC: 179 MG/DL (ref 65–99)
GLUCOSE UR STRIP-MCNC: ABNORMAL MG/DL
HCO3 BLDA-SCNC: 7.2 MMOL/L (ref 22–26)
HCT VFR BLD AUTO: 50.4 % (ref 37.5–51)
HGB BLD-MCNC: 16.6 G/DL (ref 13–17.7)
HGB BLDA-MCNC: 16.4 G/DL (ref 13.8–16.4)
HGB UR QL STRIP.AUTO: ABNORMAL
HOLD SPECIMEN: NORMAL
HOLD SPECIMEN: NORMAL
HYALINE CASTS UR QL AUTO: ABNORMAL /LPF
IMM GRANULOCYTES # BLD AUTO: 0.06 10*3/MM3 (ref 0–0.05)
IMM GRANULOCYTES NFR BLD AUTO: 0.9 % (ref 0–0.5)
INHALED O2 CONCENTRATION: 21 %
KETONES UR QL STRIP: ABNORMAL
LACTATE BLDA-SCNC: 1.59 MMOL/L (ref 0.5–2)
LEUKOCYTE ESTERASE UR QL STRIP.AUTO: NEGATIVE
LIPASE SERPL-CCNC: 79 U/L (ref 13–60)
LYMPHOCYTES # BLD AUTO: 0.61 10*3/MM3 (ref 0.7–3.1)
LYMPHOCYTES NFR BLD AUTO: 9.3 % (ref 19.6–45.3)
MAGNESIUM SERPL-MCNC: 2 MG/DL (ref 1.6–2.6)
MCH RBC QN AUTO: 30.4 PG (ref 26.6–33)
MCHC RBC AUTO-ENTMCNC: 32.9 G/DL (ref 31.5–35.7)
MCV RBC AUTO: 92.3 FL (ref 79–97)
METHADONE UR QL SCN: NEGATIVE
METHGB BLD QL: 0.3 % (ref 0–1.5)
MODALITY: ABNORMAL
MONOCYTES # BLD AUTO: 0.83 10*3/MM3 (ref 0.1–0.9)
MONOCYTES NFR BLD AUTO: 12.7 % (ref 5–12)
NEUTROPHILS NFR BLD AUTO: 5.01 10*3/MM3 (ref 1.7–7)
NEUTROPHILS NFR BLD AUTO: 76.3 % (ref 42.7–76)
NITRITE UR QL STRIP: NEGATIVE
NOTE: ABNORMAL
NRBC BLD AUTO-RTO: 0 /100 WBC (ref 0–0.2)
NT-PROBNP SERPL-MCNC: 91.7 PG/ML (ref 0–900)
OPIATES UR QL: NEGATIVE
OXYCODONE UR QL SCN: NEGATIVE
OXYHGB MFR BLDV: 96.7 % (ref 94–99)
PCO2 BLDA: 18.3 MM HG (ref 35–45)
PH BLDA: 7.21 PH UNITS (ref 7.35–7.45)
PH UR STRIP.AUTO: 5.5 [PH] (ref 5–8)
PLATELET # BLD AUTO: 276 10*3/MM3 (ref 140–450)
PMV BLD AUTO: 9 FL (ref 6–12)
PO2 BLD: 523 MM[HG] (ref 0–500)
PO2 BLDA: 109.8 MM HG (ref 80–100)
POTASSIUM BLDA-SCNC: 4.32 MMOL/L (ref 3.5–5)
POTASSIUM SERPL-SCNC: 4.7 MMOL/L (ref 3.5–5.2)
PROT SERPL-MCNC: 9 G/DL (ref 6–8.5)
PROT UR QL STRIP: ABNORMAL
RBC # BLD AUTO: 5.46 10*6/MM3 (ref 4.14–5.8)
RBC # UR STRIP: ABNORMAL /HPF
RBC MORPH BLD: NORMAL
REF LAB TEST METHOD: ABNORMAL
RSV RNA NPH QL NAA+NON-PROBE: NOT DETECTED
SAO2 % BLDCOA: 97.6 % (ref 95–99)
SARS-COV-2 RNA RESP QL NAA+PROBE: NOT DETECTED
SODIUM BLDA-SCNC: 138.4 MMOL/L (ref 136–146)
SODIUM SERPL-SCNC: 130 MMOL/L (ref 136–145)
SP GR UR STRIP: >1.03 (ref 1–1.03)
SQUAMOUS #/AREA URNS HPF: ABNORMAL /HPF
T4 FREE SERPL-MCNC: 1.72 NG/DL (ref 0.93–1.7)
TROPONIN T DELTA: -1 NG/L
TROPONIN T SERPL HS-MCNC: 17 NG/L
TSH SERPL DL<=0.05 MIU/L-ACNC: 0.09 UIU/ML (ref 0.27–4.2)
UROBILINOGEN UR QL STRIP: ABNORMAL
WBC # UR STRIP: ABNORMAL /HPF
WBC MORPH BLD: NORMAL
WBC NRBC COR # BLD AUTO: 6.56 10*3/MM3 (ref 3.4–10.8)
WHOLE BLOOD HOLD COAG: NORMAL
WHOLE BLOOD HOLD SPECIMEN: NORMAL

## 2023-12-04 PROCEDURE — 82009 KETONE BODYS QUAL: CPT | Performed by: EMERGENCY MEDICINE

## 2023-12-04 PROCEDURE — 25810000003 SODIUM CHLORIDE 0.9 % SOLUTION

## 2023-12-04 PROCEDURE — HZ2ZZZZ DETOXIFICATION SERVICES FOR SUBSTANCE ABUSE TREATMENT: ICD-10-PCS | Performed by: STUDENT IN AN ORGANIZED HEALTH CARE EDUCATION/TRAINING PROGRAM

## 2023-12-04 PROCEDURE — 99223 1ST HOSP IP/OBS HIGH 75: CPT | Performed by: INTERNAL MEDICINE

## 2023-12-04 PROCEDURE — 99285 EMERGENCY DEPT VISIT HI MDM: CPT

## 2023-12-04 PROCEDURE — 82948 REAGENT STRIP/BLOOD GLUCOSE: CPT

## 2023-12-04 PROCEDURE — 80307 DRUG TEST PRSMV CHEM ANLYZR: CPT | Performed by: EMERGENCY MEDICINE

## 2023-12-04 PROCEDURE — 25010000002 ONDANSETRON PER 1 MG: Performed by: EMERGENCY MEDICINE

## 2023-12-04 PROCEDURE — 84484 ASSAY OF TROPONIN QUANT: CPT

## 2023-12-04 PROCEDURE — 83880 ASSAY OF NATRIURETIC PEPTIDE: CPT

## 2023-12-04 PROCEDURE — 83690 ASSAY OF LIPASE: CPT

## 2023-12-04 PROCEDURE — 93005 ELECTROCARDIOGRAM TRACING: CPT | Performed by: EMERGENCY MEDICINE

## 2023-12-04 PROCEDURE — 25010000002 SODIUM CHLORIDE 0.9 % WITH KCL 20 MEQ 20-0.9 MEQ/L-% SOLUTION: Performed by: INTERNAL MEDICINE

## 2023-12-04 PROCEDURE — 25010000002 ENOXAPARIN PER 10 MG: Performed by: INTERNAL MEDICINE

## 2023-12-04 PROCEDURE — 81001 URINALYSIS AUTO W/SCOPE: CPT | Performed by: INTERNAL MEDICINE

## 2023-12-04 PROCEDURE — 25010000002 ONDANSETRON PER 1 MG

## 2023-12-04 PROCEDURE — 83735 ASSAY OF MAGNESIUM: CPT

## 2023-12-04 PROCEDURE — 94799 UNLISTED PULMONARY SVC/PX: CPT

## 2023-12-04 PROCEDURE — 71045 X-RAY EXAM CHEST 1 VIEW: CPT

## 2023-12-04 PROCEDURE — 83036 HEMOGLOBIN GLYCOSYLATED A1C: CPT | Performed by: INTERNAL MEDICINE

## 2023-12-04 PROCEDURE — 84439 ASSAY OF FREE THYROXINE: CPT | Performed by: EMERGENCY MEDICINE

## 2023-12-04 PROCEDURE — 85007 BL SMEAR W/DIFF WBC COUNT: CPT

## 2023-12-04 PROCEDURE — 93005 ELECTROCARDIOGRAM TRACING: CPT

## 2023-12-04 PROCEDURE — 25810000003 SODIUM CHLORIDE 0.9 % SOLUTION: Performed by: INTERNAL MEDICINE

## 2023-12-04 PROCEDURE — 87637 SARSCOV2&INF A&B&RSV AMP PRB: CPT

## 2023-12-04 PROCEDURE — 25810000003 SODIUM CHLORIDE 0.9 % SOLUTION: Performed by: EMERGENCY MEDICINE

## 2023-12-04 PROCEDURE — 84484 ASSAY OF TROPONIN QUANT: CPT | Performed by: EMERGENCY MEDICINE

## 2023-12-04 PROCEDURE — 36415 COLL VENOUS BLD VENIPUNCTURE: CPT

## 2023-12-04 PROCEDURE — 25010000002 THIAMINE HCL 200 MG/2ML SOLUTION: Performed by: EMERGENCY MEDICINE

## 2023-12-04 PROCEDURE — 82805 BLOOD GASES W/O2 SATURATION: CPT | Performed by: EMERGENCY MEDICINE

## 2023-12-04 PROCEDURE — 83605 ASSAY OF LACTIC ACID: CPT | Performed by: EMERGENCY MEDICINE

## 2023-12-04 PROCEDURE — 80050 GENERAL HEALTH PANEL: CPT

## 2023-12-04 PROCEDURE — 25010000002 LORAZEPAM PER 2 MG: Performed by: INTERNAL MEDICINE

## 2023-12-04 PROCEDURE — 82375 ASSAY CARBOXYHB QUANT: CPT | Performed by: EMERGENCY MEDICINE

## 2023-12-04 PROCEDURE — 83050 HGB METHEMOGLOBIN QUAN: CPT | Performed by: EMERGENCY MEDICINE

## 2023-12-04 PROCEDURE — 25010000002 PROCHLORPERAZINE 10 MG/2ML SOLUTION: Performed by: INTERNAL MEDICINE

## 2023-12-04 PROCEDURE — 82077 ASSAY SPEC XCP UR&BREATH IA: CPT | Performed by: EMERGENCY MEDICINE

## 2023-12-04 RX ORDER — METHION/INOS/CHOL BT/B COM/LIV 110MG-86MG
100 CAPSULE ORAL DAILY
Status: DISCONTINUED | OUTPATIENT
Start: 2023-12-10 | End: 2023-12-04

## 2023-12-04 RX ORDER — SODIUM CHLORIDE AND POTASSIUM CHLORIDE 150; 900 MG/100ML; MG/100ML
250 INJECTION, SOLUTION INTRAVENOUS CONTINUOUS PRN
Status: DISCONTINUED | OUTPATIENT
Start: 2023-12-04 | End: 2023-12-06

## 2023-12-04 RX ORDER — ONDANSETRON 2 MG/ML
4 INJECTION INTRAMUSCULAR; INTRAVENOUS ONCE
Status: COMPLETED | OUTPATIENT
Start: 2023-12-04 | End: 2023-12-04

## 2023-12-04 RX ORDER — LORAZEPAM 2 MG/ML
2 INJECTION INTRAMUSCULAR
Status: DISCONTINUED | OUTPATIENT
Start: 2023-12-04 | End: 2023-12-07 | Stop reason: HOSPADM

## 2023-12-04 RX ORDER — ENOXAPARIN SODIUM 100 MG/ML
40 INJECTION SUBCUTANEOUS DAILY
Status: DISCONTINUED | OUTPATIENT
Start: 2023-12-04 | End: 2023-12-07 | Stop reason: HOSPADM

## 2023-12-04 RX ORDER — ASPIRIN 81 MG/1
324 TABLET, CHEWABLE ORAL ONCE
Status: COMPLETED | OUTPATIENT
Start: 2023-12-04 | End: 2023-12-04

## 2023-12-04 RX ORDER — SODIUM CHLORIDE 0.9 % (FLUSH) 0.9 %
10 SYRINGE (ML) INJECTION EVERY 12 HOURS SCHEDULED
Status: DISCONTINUED | OUTPATIENT
Start: 2023-12-04 | End: 2023-12-06

## 2023-12-04 RX ORDER — SODIUM CHLORIDE 450 MG/100ML
250 INJECTION, SOLUTION INTRAVENOUS CONTINUOUS PRN
Status: DISCONTINUED | OUTPATIENT
Start: 2023-12-04 | End: 2023-12-06

## 2023-12-04 RX ORDER — LORAZEPAM 0.5 MG/1
1 TABLET ORAL
Status: DISCONTINUED | OUTPATIENT
Start: 2023-12-04 | End: 2023-12-07 | Stop reason: HOSPADM

## 2023-12-04 RX ORDER — DEXTROSE AND SODIUM CHLORIDE 5; .45 G/100ML; G/100ML
150 INJECTION, SOLUTION INTRAVENOUS CONTINUOUS PRN
Status: DISCONTINUED | OUTPATIENT
Start: 2023-12-04 | End: 2023-12-06

## 2023-12-04 RX ORDER — LORAZEPAM 2 MG/ML
1 INJECTION INTRAMUSCULAR
Status: DISCONTINUED | OUTPATIENT
Start: 2023-12-04 | End: 2023-12-07 | Stop reason: HOSPADM

## 2023-12-04 RX ORDER — DEXTROSE MONOHYDRATE, SODIUM CHLORIDE, AND POTASSIUM CHLORIDE 50; 1.49; 9 G/1000ML; G/1000ML; G/1000ML
150 INJECTION, SOLUTION INTRAVENOUS CONTINUOUS PRN
Status: DISCONTINUED | OUTPATIENT
Start: 2023-12-04 | End: 2023-12-06

## 2023-12-04 RX ORDER — DEXTROSE AND SODIUM CHLORIDE 5; .9 G/100ML; G/100ML
150 INJECTION, SOLUTION INTRAVENOUS CONTINUOUS PRN
Status: DISCONTINUED | OUTPATIENT
Start: 2023-12-04 | End: 2023-12-06

## 2023-12-04 RX ORDER — LORAZEPAM 2 MG/1
2 TABLET ORAL
Status: DISCONTINUED | OUTPATIENT
Start: 2023-12-04 | End: 2023-12-07 | Stop reason: HOSPADM

## 2023-12-04 RX ORDER — DEXTROSE MONOHYDRATE, SODIUM CHLORIDE, AND POTASSIUM CHLORIDE 50; 2.98; 9 G/1000ML; G/1000ML; G/1000ML
150 INJECTION, SOLUTION INTRAVENOUS CONTINUOUS PRN
Status: DISCONTINUED | OUTPATIENT
Start: 2023-12-04 | End: 2023-12-06

## 2023-12-04 RX ORDER — FOLIC ACID 1 MG/1
1 TABLET ORAL DAILY
Status: DISCONTINUED | OUTPATIENT
Start: 2023-12-05 | End: 2023-12-07 | Stop reason: HOSPADM

## 2023-12-04 RX ORDER — THIAMINE HYDROCHLORIDE 100 MG/ML
200 INJECTION, SOLUTION INTRAMUSCULAR; INTRAVENOUS EVERY 8 HOURS SCHEDULED
Status: DISCONTINUED | OUTPATIENT
Start: 2023-12-05 | End: 2023-12-07 | Stop reason: HOSPADM

## 2023-12-04 RX ORDER — THIAMINE HYDROCHLORIDE 100 MG/ML
200 INJECTION, SOLUTION INTRAMUSCULAR; INTRAVENOUS ONCE
Status: COMPLETED | OUTPATIENT
Start: 2023-12-04 | End: 2023-12-04

## 2023-12-04 RX ORDER — LEVOTHYROXINE SODIUM 125 UG/1
125 CAPSULE ORAL WEEKLY
COMMUNITY

## 2023-12-04 RX ORDER — LEVOTHYROXINE SODIUM 125 UG/1
250 CAPSULE ORAL TAKE AS DIRECTED
COMMUNITY

## 2023-12-04 RX ORDER — FAMOTIDINE 10 MG/ML
20 INJECTION, SOLUTION INTRAVENOUS ONCE
Status: COMPLETED | OUTPATIENT
Start: 2023-12-04 | End: 2023-12-04

## 2023-12-04 RX ORDER — NICOTINE POLACRILEX 4 MG
15 LOZENGE BUCCAL
Status: DISCONTINUED | OUTPATIENT
Start: 2023-12-04 | End: 2023-12-06

## 2023-12-04 RX ORDER — SODIUM CHLORIDE 0.9 % (FLUSH) 0.9 %
10 SYRINGE (ML) INJECTION EVERY 12 HOURS SCHEDULED
Status: DISCONTINUED | OUTPATIENT
Start: 2023-12-04 | End: 2023-12-07 | Stop reason: HOSPADM

## 2023-12-04 RX ORDER — DEXTROSE MONOHYDRATE, SODIUM CHLORIDE, AND POTASSIUM CHLORIDE 50; 2.98; 4.5 G/1000ML; G/1000ML; G/1000ML
150 INJECTION, SOLUTION INTRAVENOUS CONTINUOUS PRN
Status: DISCONTINUED | OUTPATIENT
Start: 2023-12-04 | End: 2023-12-06

## 2023-12-04 RX ORDER — SODIUM CHLORIDE AND POTASSIUM CHLORIDE 150; 450 MG/100ML; MG/100ML
250 INJECTION, SOLUTION INTRAVENOUS CONTINUOUS PRN
Status: DISCONTINUED | OUTPATIENT
Start: 2023-12-04 | End: 2023-12-06

## 2023-12-04 RX ORDER — SODIUM CHLORIDE 9 MG/ML
40 INJECTION, SOLUTION INTRAVENOUS AS NEEDED
Status: DISCONTINUED | OUTPATIENT
Start: 2023-12-04 | End: 2023-12-07 | Stop reason: HOSPADM

## 2023-12-04 RX ORDER — THIAMINE HYDROCHLORIDE 100 MG/ML
200 INJECTION, SOLUTION INTRAMUSCULAR; INTRAVENOUS EVERY 8 HOURS SCHEDULED
Status: DISCONTINUED | OUTPATIENT
Start: 2023-12-04 | End: 2023-12-04

## 2023-12-04 RX ORDER — ONDANSETRON 2 MG/ML
4 INJECTION INTRAMUSCULAR; INTRAVENOUS EVERY 6 HOURS PRN
Status: DISCONTINUED | OUTPATIENT
Start: 2023-12-04 | End: 2023-12-07 | Stop reason: HOSPADM

## 2023-12-04 RX ORDER — PROCHLORPERAZINE EDISYLATE 5 MG/ML
10 INJECTION INTRAMUSCULAR; INTRAVENOUS EVERY 6 HOURS PRN
Status: DISCONTINUED | OUTPATIENT
Start: 2023-12-04 | End: 2023-12-07 | Stop reason: HOSPADM

## 2023-12-04 RX ORDER — DEXTROSE MONOHYDRATE 25 G/50ML
10-50 INJECTION, SOLUTION INTRAVENOUS
Status: DISCONTINUED | OUTPATIENT
Start: 2023-12-04 | End: 2023-12-06

## 2023-12-04 RX ORDER — SODIUM CHLORIDE 0.9 % (FLUSH) 0.9 %
10 SYRINGE (ML) INJECTION AS NEEDED
Status: DISCONTINUED | OUTPATIENT
Start: 2023-12-04 | End: 2023-12-07 | Stop reason: HOSPADM

## 2023-12-04 RX ORDER — SODIUM CHLORIDE 9 MG/ML
250 INJECTION, SOLUTION INTRAVENOUS CONTINUOUS PRN
Status: DISCONTINUED | OUTPATIENT
Start: 2023-12-04 | End: 2023-12-06

## 2023-12-04 RX ORDER — SODIUM CHLORIDE 0.9 % (FLUSH) 0.9 %
10 SYRINGE (ML) INJECTION AS NEEDED
Status: DISCONTINUED | OUTPATIENT
Start: 2023-12-04 | End: 2023-12-06

## 2023-12-04 RX ORDER — IBUPROFEN 600 MG/1
1 TABLET ORAL
Status: DISCONTINUED | OUTPATIENT
Start: 2023-12-04 | End: 2023-12-06

## 2023-12-04 RX ORDER — DEXTROSE MONOHYDRATE, SODIUM CHLORIDE, AND POTASSIUM CHLORIDE 50; 1.49; 4.5 G/1000ML; G/1000ML; G/1000ML
150 INJECTION, SOLUTION INTRAVENOUS CONTINUOUS PRN
Status: DISCONTINUED | OUTPATIENT
Start: 2023-12-04 | End: 2023-12-06

## 2023-12-04 RX ORDER — ACETAMINOPHEN 325 MG/1
650 TABLET ORAL EVERY 4 HOURS PRN
Status: DISCONTINUED | OUTPATIENT
Start: 2023-12-04 | End: 2023-12-07 | Stop reason: HOSPADM

## 2023-12-04 RX ORDER — SODIUM CHLORIDE 9 MG/ML
40 INJECTION, SOLUTION INTRAVENOUS AS NEEDED
Status: DISCONTINUED | OUTPATIENT
Start: 2023-12-04 | End: 2023-12-06

## 2023-12-04 RX ORDER — SODIUM CHLORIDE 9 MG/ML
1000 INJECTION, SOLUTION INTRAVENOUS ONCE
Status: COMPLETED | OUTPATIENT
Start: 2023-12-04 | End: 2023-12-04

## 2023-12-04 RX ORDER — SODIUM CHLORIDE AND POTASSIUM CHLORIDE 300; 900 MG/100ML; MG/100ML
250 INJECTION, SOLUTION INTRAVENOUS CONTINUOUS PRN
Status: DISCONTINUED | OUTPATIENT
Start: 2023-12-04 | End: 2023-12-06

## 2023-12-04 RX ADMIN — SODIUM CHLORIDE 1000 ML: 9 INJECTION, SOLUTION INTRAVENOUS at 20:03

## 2023-12-04 RX ADMIN — SODIUM CHLORIDE 1000 ML/HR: 9 INJECTION, SOLUTION INTRAVENOUS at 22:46

## 2023-12-04 RX ADMIN — POTASSIUM CHLORIDE AND SODIUM CHLORIDE 250 ML/HR: 900; 150 INJECTION, SOLUTION INTRAVENOUS at 22:28

## 2023-12-04 RX ADMIN — Medication 10 ML: at 22:45

## 2023-12-04 RX ADMIN — PROCHLORPERAZINE EDISYLATE 10 MG: 5 INJECTION INTRAMUSCULAR; INTRAVENOUS at 22:54

## 2023-12-04 RX ADMIN — ACETAMINOPHEN 650 MG: 325 TABLET ORAL at 23:36

## 2023-12-04 RX ADMIN — SODIUM CHLORIDE 1000 ML: 9 INJECTION, SOLUTION INTRAVENOUS at 20:04

## 2023-12-04 RX ADMIN — ASPIRIN 324 MG: 81 TABLET, CHEWABLE ORAL at 16:52

## 2023-12-04 RX ADMIN — FAMOTIDINE 20 MG: 10 INJECTION INTRAVENOUS at 20:04

## 2023-12-04 RX ADMIN — SODIUM CHLORIDE 500 ML: 9 INJECTION, SOLUTION INTRAVENOUS at 16:52

## 2023-12-04 RX ADMIN — ENOXAPARIN SODIUM 40 MG: 100 INJECTION SUBCUTANEOUS at 22:28

## 2023-12-04 RX ADMIN — ONDANSETRON 4 MG: 2 INJECTION INTRAMUSCULAR; INTRAVENOUS at 20:04

## 2023-12-04 RX ADMIN — THIAMINE HYDROCHLORIDE 200 MG: 100 INJECTION, SOLUTION INTRAMUSCULAR; INTRAVENOUS at 20:04

## 2023-12-04 RX ADMIN — LORAZEPAM 1 MG: 2 INJECTION INTRAMUSCULAR; INTRAVENOUS at 23:37

## 2023-12-04 RX ADMIN — ONDANSETRON 4 MG: 2 INJECTION INTRAMUSCULAR; INTRAVENOUS at 16:52

## 2023-12-04 RX ADMIN — INSULIN HUMAN 0.6 UNITS/HR: 1 INJECTION, SOLUTION INTRAVENOUS at 22:27

## 2023-12-04 RX ADMIN — FOLIC ACID 1 MG: 5 INJECTION, SOLUTION INTRAMUSCULAR; INTRAVENOUS; SUBCUTANEOUS at 20:26

## 2023-12-04 RX ADMIN — ONDANSETRON 4 MG: 2 INJECTION INTRAMUSCULAR; INTRAVENOUS at 18:40

## 2023-12-04 NOTE — ED PROVIDER NOTES
Time: 4:03 PM EST  Date of encounter:  12/4/2023  Independent Historian/Clinical History and Information was obtained by:   Patient    History is limited by: N/A    Chief complaint: Nausea and vomiting      History of Present Illness:  Patient is a 48 y.o. year old male who presents to the emergency department for evaluation of nausea and vomiting.  Patient states that he began having nausea yesterday followed by vomiting.  Patient notes that he has had intractable vomiting since yesterday.  He has been unable to keep anything down.  The patient notes that he began having a fast rapid heart rate today.  He states shortly after began having chest pain as well.  He locates the pain in the lower left chest.  There is no radiation.  He also notes that he has some shortness of breath.  The patient's had no diaphoresis.  The patient's had no fever.  Patient denies any abdominal pain.  Patient does note that he has a history of SVT.  The patient states he had ablation in 2022.  Patient notes he had a stress test at that time that was normal.  The patient does have a history of hypertension, high cholesterol, diabetes and a family history of coronary disease.  He is not a smoker.  The patient also notes he has a history of alcohol abuse.  The patient states that his last drink was 5 days ago.  He has gone through DTs in the past.  He denies any tremors at this time.  Patient's bowel movements are normal.  He has no hematochezia or melena.  He has had no diarrhea.  Patient has had no fever.  Patient does note chills and muscle aches.  Patient's had no previous DVT or pulmonary embolism.  Patient denies any clinical signs of DVT such as unilateral calf pain and leg pain.  Patient also notes that he has a history of thyroid problems and has had a thyroidectomy.    Patient Care Team  Primary Care Provider: Lyla Guerrero APRN    Past Medical History:     No Known Allergies  Past Medical History:   Diagnosis Date     Alcoholism 01/16/2021    Allergies     Aneurysm     Anxiety 07/15/2022    Atrial fibrillation     Bipolar 2 disorder 07/20/2021    Cancer     Cervical stenosis of spinal canal 11/30/2018    Formatting of this note might be different from the original. Added automatically from request for surgery [id number]    Colon polyp 11/1/2003 Benign    Depression     Diabetes mellitus     GERD (gastroesophageal reflux disease)     History of colonic polyps 05/23/2017    Formatting of this note might be different from the original. Added automatically from request for surgery [id number]    Hyperlipidemia     Hypertension     Irritable bowel syndrome 2004    Lumbosacral spondylosis without myelopathy 08/29/2018    Pancreatitis 2008    Papillary thyroid carcinoma 04/01/2020    SVT (supraventricular tachycardia) 01/23/2022     Past Surgical History:   Procedure Laterality Date    ABLATION OF DYSRHYTHMIC FOCUS  9/9/2022    APPENDECTOMY  1983    CARDIAC ABLATION  09/09/2022    CARDIAC ELECTROPHYSIOLOGY PROCEDURE N/A 09/09/2022    Procedure: Ablation SVT HOLD Metoprolol 5 days;  Surgeon: Tai Lisa MD;  Location: [facility] INVASIVE LOCATION;  Service: Cardiovascular;  Laterality: N/A;    CERVICAL DISC SURGERY      COLONOSCOPY      2021 [facility]    THYROID SURGERY       Family History   Problem Relation Age of Onset    Arthritis Mother         rheumatoid    Diabetes Mother     Hyperlipidemia Mother     Hypertension Mother     Osteoporosis Mother     Rashes / Skin problems Mother     Anemia Mother     Arrhythmia Mother     Heart attack Father     Hyperlipidemia Father     Hypertension Father     Diabetes Father     Alcohol abuse Father     Stroke Father     Hypertension Sister     Heart disease Maternal Uncle     Hypertension Maternal Uncle     Mental illness Paternal Aunt     Heart disease Paternal Aunt     Diabetes Paternal Aunt     COPD Paternal Uncle     Stroke Maternal Grandmother     Thyroid disease Maternal Grandmother     COPD  Maternal Grandmother     Hypertension Maternal Grandmother     Heart disease Maternal Grandmother     Hypertension Maternal Grandfather     Diabetes Maternal Grandfather     Cancer Maternal Grandfather         throat    Heart disease Maternal Grandfather     Heart disease Paternal Grandmother     Hypertension Paternal Grandmother     Colon cancer Paternal Grandfather     Cancer Paternal Grandfather         colon       Home Medications:  Prior to Admission medications    Medication Sig Start Date End Date Taking? Authorizing Provider   Continuous Blood Gluc Sensor (FreeStyle Cooper 14 Day Sensor) misc  4/1/22   Kishore Galo MD   dilTIAZem CD (CARDIZEM CD) 180 MG 24 hr capsule TAKE 1 CAPSULE BY MOUTH DAILY 2/21/23   Yao Keen MD   escitalopram (LEXAPRO) 10 MG tablet Take 1 tablet by mouth Daily. 4/25/23   Lyla Guerrero APRN   fenofibrate (TRICOR) 145 MG tablet TAKE 1 TABLET BY MOUTH EVERY DAY 10/5/23   Lyla Guerrero APRN   fluticasone (FLONASE) 50 MCG/ACT nasal spray 2 sprays into the nostril(s) as directed by provider Daily.    Kishore Galo MD   glucose blood (Accu-Chek Guide) test strip USE AS INSTRUCTED 10/5/23   Lyla Guerrero APRN   glycopyrrolate (ROBINUL) 2 MG tablet Take 1 tablet by mouth Daily. 10/27/23   Lyla Guerrero APRN   Insulin Degludec (TRESIBA) 100 UNIT/ML solution injection Inject 36 Units under the skin into the appropriate area as directed every night at bedtime.    Kishore Galo MD   Jardiance 25 MG tablet tablet Take 1 tablet by mouth Daily. 4/3/23   Kishore Galo MD   Levocetirizine Dihydrochloride (XYZAL ALLERGY 24HR PO)  4/1/23   Kishore Galo MD   levothyroxine sodium (TIROSINT) 125 MCG capsule capsule Take 1 capsule by mouth Daily. PT takes 2 tablets daily M/F one tablet on Sat. And none on Sun.    Kishore Galo MD   metoprolol succinate XL (TOPROL-XL) 50 MG 24 hr tablet TAKE 1 TABLET BY MOUTH EVERY DAY 10/5/23    Crissy Light APRN   montelukast (Singulair) 10 MG tablet Take 1 tablet by mouth Every Night. 2/17/23   Lyla Guerrero APRN   NovoLOG FlexPen 100 UNIT/ML solution pen-injector sc pen INJECT 10 UNITS SUBCUTANEOUSLY THREE TIMES A DAY BEFORE MEALS 4/17/23   ProviderKishore MD   omeprazole (priLOSEC) 20 MG capsule Take 1 capsule by mouth Daily.    ProviderKishore MD   Vitamin D, Cholecalciferol, (CHOLECALCIFEROL) 10 MCG (400 UNIT) tablet Take 1 tablet by mouth Daily.    ProviderKishore MD        Social History:   Social History     Tobacco Use    Smoking status: Former     Packs/day: 1.00     Years: 11.00     Additional pack years: 0.00     Total pack years: 11.00     Types: Cigarettes     Quit date: 7/19/2008     Years since quitting: 15.3    Smokeless tobacco: Never   Vaping Use    Vaping Use: Never used   Substance Use Topics    Alcohol use: Not Currently     Comment: drinks 2-3 drinks 2-3 times/week.    Drug use: Never         Review of Systems:  Review of Systems   Constitutional:  Positive for chills. Negative for diaphoresis and fever.   HENT:  Negative for congestion, postnasal drip, rhinorrhea and sore throat.    Eyes:  Negative for photophobia.   Respiratory:  Positive for shortness of breath. Negative for cough and chest tightness.    Cardiovascular:  Positive for chest pain and palpitations. Negative for leg swelling.   Gastrointestinal:  Positive for nausea and vomiting. Negative for abdominal pain and diarrhea.   Genitourinary:  Negative for difficulty urinating, dysuria, flank pain, frequency, hematuria and urgency.   Musculoskeletal:  Positive for myalgias. Negative for neck pain and neck stiffness.   Skin:  Negative for pallor and rash.   Neurological:  Negative for dizziness, syncope, weakness, numbness and headaches.   Hematological:  Negative for adenopathy. Does not bruise/bleed easily.   Psychiatric/Behavioral: Negative.          Physical Exam:  /78 (BP  "Location: Right arm, Patient Position: Lying)   Pulse 83   Temp 97.2 °F (36.2 °C) (Oral)   Resp 20   Ht 185.4 cm (73\")   Wt 110 kg (242 lb 8.1 oz)   SpO2 95%   BMI 31.99 kg/m²         Physical Exam  Vitals and nursing note reviewed.   Constitutional:       General: He is not in acute distress.     Appearance: Normal appearance. He is not ill-appearing, toxic-appearing or diaphoretic.   HENT:      Head: Normocephalic and atraumatic.      Mouth/Throat:      Mouth: Mucous membranes are moist.   Eyes:      Extraocular Movements: Extraocular movements intact.      Conjunctiva/sclera: Conjunctivae normal.      Pupils: Pupils are equal, round, and reactive to light.   Cardiovascular:      Rate and Rhythm: Regular rhythm. Tachycardia present.      Pulses: Normal pulses.           Carotid pulses are 2+ on the right side and 2+ on the left side.       Radial pulses are 2+ on the right side and 2+ on the left side.        Femoral pulses are 2+ on the right side and 2+ on the left side.       Popliteal pulses are 2+ on the right side and 2+ on the left side.        Dorsalis pedis pulses are 2+ on the right side and 2+ on the left side.        Posterior tibial pulses are 2+ on the right side and 2+ on the left side.      Heart sounds: Normal heart sounds. No murmur heard.  Pulmonary:      Effort: Pulmonary effort is normal. No accessory muscle usage, respiratory distress or retractions.      Breath sounds: Normal breath sounds. No decreased breath sounds, wheezing, rhonchi or rales.   Chest:      Chest wall: No mass or tenderness.   Abdominal:      General: Abdomen is flat. There is no distension.      Palpations: Abdomen is soft. There is no mass or pulsatile mass.      Tenderness: There is no abdominal tenderness. There is no right CVA tenderness, left CVA tenderness, guarding or rebound.      Comments: No rigidity   Musculoskeletal:         General: No swelling, tenderness or deformity.      Cervical back: Neck supple. " No tenderness.      Right lower leg: No tenderness. No edema.      Left lower leg: No tenderness. No edema.   Skin:     General: Skin is warm and dry.      Capillary Refill: Capillary refill takes less than 2 seconds.      Coloration: Skin is not cyanotic, jaundiced or pale.      Findings: No erythema.   Neurological:      General: No focal deficit present.      Mental Status: He is alert and oriented to person, place, and time. Mental status is at baseline.      Cranial Nerves: Cranial nerves 2-12 are intact. No cranial nerve deficit.      Sensory: Sensation is intact. No sensory deficit.      Motor: Motor function is intact. No weakness or pronator drift.      Coordination: Coordination is intact. Coordination normal.   Psychiatric:         Attention and Perception: Attention and perception normal.         Mood and Affect: Mood normal.         Behavior: Behavior normal.                Procedures:  Procedures      Medical Decision Making:      Comorbidities that affect care:    Diabetes, depression, GERD, alcoholism, bipolar, SVT, hypertension, hyperlipidemia, pancreatitis    External Notes reviewed:    None      The following orders were placed and all results were independently analyzed by me:  Orders Placed This Encounter   Procedures    COVID PRE-OP / PRE-PROCEDURE SCREENING ORDER (NO ISOLATION) - Swab, Nasopharynx    COVID-19, FLU A/B, RSV PCR 1 HR TAT - Swab, Nasopharynx    Blood Culture - Blood,    Blood Culture - Blood,    XR Chest 1 View    [employer] Draw    High Sensitivity Troponin T    Comprehensive Metabolic Panel    Lipase    BNP    Magnesium    CBC Auto Differential    Scan Slide    High Sensitivity Troponin T 2Hr    T4, Free    TSH    Urine Drug Screen - Urine, Clean Catch    Ethanol    Acetone    Lactic Acid, Plasma    ABG with Co-Ox and Electrolytes    Hemoglobin A1c    CBC Auto Differential    Comprehensive Metabolic Panel    Urinalysis With Microscopic If Indicated (No Culture) - Urine, Clean  Catch    Urinalysis, Microscopic Only - Urine, Clean Catch    Comprehensive Metabolic Panel    CBC Auto Differential    Basic Metabolic Panel    Magnesium    Phosphorus    CBC Auto Differential    Undress & Gown    Discontinue Insulin Infusion at Specified Time After Basal Dose Administered    Discontinue Glucommander IV Insulin Order Set After Transition Complete    Discontinue Glucommander After Transition Complete    Inpatient Diabetes Educator Consult    Inpatient Pulmonology Consult    POC Glucose Once    POC Glucose Once    POC Glucose Once    POC Glucose Once    POC Glucose Once    POC Glucose Once    POC Glucose Once    POC Glucose Once    POC Glucose Once    POC Glucose Once    POC Glucose Once    POC Glucose Once    POC Glucose Once    POC Glucose Once    POC Glucose Once    POC Glucose Once    POC Glucose Once    POC Glucose Once    POC Glucose Once    POC Glucose Once    POC Glucose Once    POC Glucose Once    POC Glucose Once    POC Glucose Once    POC Glucose Once    POC Glucose Once    POC Glucose Once    POC Glucose Once    POC Glucose Once    POC Glucose Once    POC Glucose Once    POC Glucose Once    POC Glucose Once    POC Glucose Once    POC Glucose Once    POC Glucose Once    POC Glucose Once    POC Glucose Once    ECG 12 Lead ED Triage Standing Order; Chest Pain    Inpatient Admission    Transfer Patient    Discharge patient    CBC & Differential    Green Top (Gel)    Lavender Top    Gold Top - SST    Light Blue Top    CBC & Differential    CBC & Differential       Medications Given in the Emergency Department:  Medications   sodium chloride 0.9 % bolus (1,000 mL/hr Intravenous New Bag 12/4/23 2246)   aspirin chewable tablet 324 mg (324 mg Oral Given 12/4/23 1652)   ondansetron (ZOFRAN) injection 4 mg (4 mg Intravenous Given 12/4/23 1652)   sodium chloride 0.9 % bolus 500 mL (0 mL Intravenous Stopped 12/4/23 1816)   ondansetron (ZOFRAN) injection 4 mg (4 mg Intravenous Given 12/4/23 1840)    sodium chloride 0.9 % bolus 1,000 mL (0 mL Intravenous Stopped 12/4/23 2044)   ondansetron (ZOFRAN) injection 4 mg (4 mg Intravenous Given 12/4/23 2004)   famotidine (PEPCID) injection 20 mg (20 mg Intravenous Given 12/4/23 2004)   sodium chloride 0.9 % infusion 1,000 mL (1,000 mL Intravenous New Bag 12/4/23 2004)   folic acid 1 mg in sodium chloride 0.9 % 50 mL IVPB (0 mg Intravenous Stopped 12/4/23 2044)   thiamine (B-1) injection 200 mg (200 mg Intravenous Given 12/4/23 2004)   potassium phosphate 15 mmol in 0.9% normal saline 250 mL IVPB (15 mmol Intravenous New Bag 12/5/23 0711)   lactated ringers bolus 1,000 mL (1,000 mL Intravenous New Bag 12/5/23 0828)   potassium phosphate 15 mmol in 0.9% normal saline 250 mL IVPB (15 mmol Intravenous New Bag 12/5/23 0955)   potassium phosphate 15 mmol in 0.9% normal saline 250 mL IVPB (15 mmol Intravenous New Bag 12/5/23 2131)   potassium phosphate 15 mmol in 0.9% normal saline 250 mL IVPB (15 mmol Intravenous New Bag 12/6/23 0817)   magnesium sulfate 2g/50 mL (PREMIX) infusion (2 g Intravenous New Bag 12/6/23 1002)        ED Course:    The patient was initially evaluated in the triage area where orders were placed. The patient was later dispositioned by Carter Valle DO.      The patient was advised to stay for completion of workup which includes but is not limited to communication of labs and radiological results, reassessment and plan. The patient was advised that leaving prior to disposition by a provider could result in critical findings that are not communicated to the patient.     ED Course as of 12/08/23 0333   Mon Dec 04, 2023   1607 EKG:    Rhythm: Sinus tachycardia  Rate: 122  Intervals: Normal HI and QT interval  T-wave: Nonspecific T wave flattening  ST Segment: J-point elevation II, III, aVF, no pathological ST elevation or reciprocal ST depression to suggest STEMI    EKG Comparison: No change in the QRS and ST morphology from EKG performed February 10,  2023.  The patient does appear to have J-point elevation in the inferior leads on that EKG.  The patient does have an increase in heart rate from 90 EKG.    Interpreted by me   [SD]   1907 EKG:    Rhythm: Sinus tachycardia  Rate: 116  Intervals: Normal RI and QT interval  T-wave: Nonspecific T wave flattening  ST Segment: J-point elevation I, II, aVF, possible III, no obvious pathological ST elevation and reciprocal ST depression to suggest STEMI    EKG Comparison: No change in the QRS and ST morphology from the EKG performed earlier in the department.  There is a slight improvement in the patient's heart rate    Interpreted by me   [SD]      ED Course User Index  [SD] Carter Valle DO       Labs:    Lab Results (last 24 hours)       Procedure Component Value Units Date/Time    CBC & Differential [[id number]]  (Abnormal) Collected: 12/07/23 0518    Specimen: Blood from Hand, Left Updated: 12/07/23 0623    Narrative:      The following orders were created for panel order CBC & Differential.  Procedure                               Abnormality         Status                     ---------                               -----------         ------                     CBC Auto Differential[[id number]]        Abnormal            Final result                 Please view results for these tests on the individual orders.    Basic Metabolic Panel [[id number]]  (Abnormal) Collected: 12/07/23 0518    Specimen: Blood from Hand, Left Updated: 12/07/23 0641     Glucose 190 mg/dL      BUN 4 mg/dL      Creatinine 0.95 mg/dL      Sodium 136 mmol/L      Potassium 3.0 mmol/L      Chloride 100 mmol/L      CO2 23.9 mmol/L      Calcium 9.6 mg/dL      BUN/Creatinine Ratio 4.2     Anion Gap 12.1 mmol/L      eGFR 97.5 mL/min/1.73     Narrative:      GFR Normal >60  Chronic Kidney Disease <60  Kidney Failure <15      Magnesium [[id number]]  (Normal) Collected: 12/07/23 0518    Specimen: Blood from Hand, Left Updated: 12/07/23 0641     Magnesium  1.7 mg/dL     Phosphorus [[id number]]  (Normal) Collected: 12/07/23 0518    Specimen: Blood from Hand, Left Updated: 12/07/23 0641     Phosphorus 2.9 mg/dL     CBC Auto Differential [[id number]]  (Abnormal) Collected: 12/07/23 0518    Specimen: Blood from Hand, Left Updated: 12/07/23 0623     WBC 3.61 10*3/mm3      RBC 4.54 10*6/mm3      Hemoglobin 13.8 g/dL      Hematocrit 40.0 %      MCV 88.1 fL      MCH 30.4 pg      MCHC 34.5 g/dL      RDW 12.7 %      RDW-SD 40.9 fl      MPV 9.1 fL      Platelets 204 10*3/mm3      Neutrophil % 55.4 %      Lymphocyte % 18.3 %      Monocyte % 18.8 %      Eosinophil % 6.1 %      Basophil % 1.1 %      Immature Grans % 0.3 %      Neutrophils, Absolute 2.00 10*3/mm3      Lymphocytes, Absolute 0.66 10*3/mm3      Monocytes, Absolute 0.68 10*3/mm3      Eosinophils, Absolute 0.22 10*3/mm3      Basophils, Absolute 0.04 10*3/mm3      Immature Grans, Absolute 0.01 10*3/mm3      nRBC 0.0 /100 WBC     POC Glucose Once [[id number]]  (Abnormal) Collected: 12/07/23 0732    Specimen: Blood Updated: 12/07/23 0734     Glucose 155 mg/dL      Comment: Serial Number: [id number]:  [id number]       POC Glucose Once [[id number]]  (Abnormal) Collected: 12/07/23 1131    Specimen: Blood Updated: 12/07/23 1132     Glucose 231 mg/dL      Comment: Serial Number: [id number]:  [id number]                Imaging:    No Radiology Exams Resulted Within Past 24 Hours      Differential Diagnosis and Discussion:      Vomiting: Differential diagnosis includes but is not limited to migraine, labyrinthine disorders, psychogenic, metabolic and endocrine causes, peptic ulcer, gastric outlet obstruction, gastritis, gastroenteritis, appendicitis, intestinal obstruction, paralytic ileus, food poisoning, cholecystitis, acute hepatitis, acute pancreatitis, acute febrile illness, and myocardial infarction.    All labs were reviewed and interpreted by me.  All X-rays impressions were independently interpreted by me.  EKG was  interpreted by me.    MDM  Number of Diagnoses or Management Options  Diabetic ketoacidosis without coma associated with type 2 diabetes mellitus  Metabolic acidosis  Diagnosis management comments:   The patient's venous pH was 7.21 indicating metabolic acidosis    Patient's lactate was 1.1    Patient's alcohol level was 0    The patient's urine toxicology was clean    Patient had a large amount of serum ketones    The patient's Covid swab was negative   The patient's Influenza swab was negative     The patient's CBC was reviewed and shows no abnormalities of critical concern.  Of note, there is no anemia requiring a blood transfusion and the platelet count is acceptable    Patient's chemistry demonstrates a glucose of 179, the patient's renal function was preserved.  The patient's bicarb is 6.6 which is markedly decreased.  The patient's liver enzymes were normal.  The patient had a markedly elevated anion gap.    The patient was treated with 2 L of normal saline.  The patient was given Zofran and Pepcid for nausea.    The patient subsequently had euglycemic DKA.  This was discussed with the hospitalist.  The patient will be started on insulin drip once he arrives in the ICU        20:25 EST  The patient appears to be in euglycemic DKA.  The patient was initially treated with IV fluids in the emergency room.  The patient will be transferred to the ICU where the patient will be started on D5 drip with insulin.  At the time of admission, the patient is resting comfortably no acute distress and nontoxic.       Amount and/or Complexity of Data Reviewed  Clinical lab tests: reviewed  Tests in the radiology section of CPT®: reviewed  Tests in the medicine section of CPT®: reviewed  Discuss the patient with other providers: yes (20:25 EST  I discussed the case with the hospitalist.  We have discussed the patient's presenting symptoms, laboratory values, imaging and condition at the time of admission.  They will evaluate  the patient in the emergency room and admit the patient to the hospital)         Social Determinants of Health:    Patient is independent, reliable, and has access to care.       Disposition and Care Coordination:    Admit:   Through independent evaluation of the patient's history, physical, and imperical data, the patient meets criteria for observation/admission to the hospital.        Final diagnoses:   Metabolic acidosis   Diabetic ketoacidosis without coma associated with type 2 diabetes mellitus        ED Disposition       ED Disposition   Decision to Admit    Condition   --    Comment   Level of Care: Critical Care [6]   Diagnosis: DKA (diabetic ketoacidosis) [123870]   Isolate for COVID?: No [0]   Certification: I Certify That Inpatient Hospital Services Are Medically Necessary For Greater Than 2 Midnights                 This medical record created using voice recognition software.             Carter Valle DO  12/08/23 0333

## 2023-12-05 LAB
ALBUMIN SERPL-MCNC: 4 G/DL (ref 3.5–5.2)
ALBUMIN/GLOB SERPL: 1.4 G/DL
ALP SERPL-CCNC: 49 U/L (ref 39–117)
ALT SERPL W P-5'-P-CCNC: 24 U/L (ref 1–41)
ANION GAP SERPL CALCULATED.3IONS-SCNC: 15.2 MMOL/L (ref 5–15)
ANION GAP SERPL CALCULATED.3IONS-SCNC: 17.1 MMOL/L (ref 5–15)
ANION GAP SERPL CALCULATED.3IONS-SCNC: 18.1 MMOL/L (ref 5–15)
ANION GAP SERPL CALCULATED.3IONS-SCNC: 20.2 MMOL/L (ref 5–15)
ANION GAP SERPL CALCULATED.3IONS-SCNC: 24.5 MMOL/L (ref 5–15)
ANION GAP SERPL CALCULATED.3IONS-SCNC: 24.5 MMOL/L (ref 5–15)
ANION GAP SERPL CALCULATED.3IONS-SCNC: 29.1 MMOL/L (ref 5–15)
AST SERPL-CCNC: 23 U/L (ref 1–40)
BASOPHILS # BLD AUTO: 0.03 10*3/MM3 (ref 0–0.2)
BASOPHILS NFR BLD AUTO: 0.5 % (ref 0–1.5)
BILIRUB SERPL-MCNC: 0.6 MG/DL (ref 0–1.2)
BUN SERPL-MCNC: 8 MG/DL (ref 6–20)
BUN SERPL-MCNC: 9 MG/DL (ref 6–20)
BUN/CREAT SERPL: 6 (ref 7–25)
BUN/CREAT SERPL: 6.1 (ref 7–25)
BUN/CREAT SERPL: 6.4 (ref 7–25)
BUN/CREAT SERPL: 6.6 (ref 7–25)
BUN/CREAT SERPL: 6.6 (ref 7–25)
BUN/CREAT SERPL: 7.3 (ref 7–25)
BUN/CREAT SERPL: 8 (ref 7–25)
CALCIUM SPEC-SCNC: 8.1 MG/DL (ref 8.6–10.5)
CALCIUM SPEC-SCNC: 8.1 MG/DL (ref 8.6–10.5)
CALCIUM SPEC-SCNC: 8.2 MG/DL (ref 8.6–10.5)
CALCIUM SPEC-SCNC: 8.2 MG/DL (ref 8.6–10.5)
CALCIUM SPEC-SCNC: 8.7 MG/DL (ref 8.6–10.5)
CHLORIDE SERPL-SCNC: 100 MMOL/L (ref 98–107)
CHLORIDE SERPL-SCNC: 100 MMOL/L (ref 98–107)
CHLORIDE SERPL-SCNC: 101 MMOL/L (ref 98–107)
CHLORIDE SERPL-SCNC: 102 MMOL/L (ref 98–107)
CHLORIDE SERPL-SCNC: 102 MMOL/L (ref 98–107)
CO2 SERPL-SCNC: 12.8 MMOL/L (ref 22–29)
CO2 SERPL-SCNC: 13.9 MMOL/L (ref 22–29)
CO2 SERPL-SCNC: 15.9 MMOL/L (ref 22–29)
CO2 SERPL-SCNC: 17.8 MMOL/L (ref 22–29)
CO2 SERPL-SCNC: 6.9 MMOL/L (ref 22–29)
CO2 SERPL-SCNC: 9.5 MMOL/L (ref 22–29)
CO2 SERPL-SCNC: 9.5 MMOL/L (ref 22–29)
CREAT SERPL-MCNC: 1.1 MG/DL (ref 0.76–1.27)
CREAT SERPL-MCNC: 1.12 MG/DL (ref 0.76–1.27)
CREAT SERPL-MCNC: 1.22 MG/DL (ref 0.76–1.27)
CREAT SERPL-MCNC: 1.22 MG/DL (ref 0.76–1.27)
CREAT SERPL-MCNC: 1.25 MG/DL (ref 0.76–1.27)
CREAT SERPL-MCNC: 1.32 MG/DL (ref 0.76–1.27)
CREAT SERPL-MCNC: 1.33 MG/DL (ref 0.76–1.27)
DEPRECATED RDW RBC AUTO: 43.9 FL (ref 37–54)
EGFRCR SERPLBLD CKD-EPI 2021: 65.1 ML/MIN/1.73
EGFRCR SERPLBLD CKD-EPI 2021: 65.7 ML/MIN/1.73
EGFRCR SERPLBLD CKD-EPI 2021: 70.2 ML/MIN/1.73
EGFRCR SERPLBLD CKD-EPI 2021: 72.2 ML/MIN/1.73
EGFRCR SERPLBLD CKD-EPI 2021: 72.2 ML/MIN/1.73
EGFRCR SERPLBLD CKD-EPI 2021: 80 ML/MIN/1.73
EGFRCR SERPLBLD CKD-EPI 2021: 81.8 ML/MIN/1.73
EOSINOPHIL # BLD AUTO: 0.06 10*3/MM3 (ref 0–0.4)
EOSINOPHIL NFR BLD AUTO: 1.1 % (ref 0.3–6.2)
ERYTHROCYTE [DISTWIDTH] IN BLOOD BY AUTOMATED COUNT: 13 % (ref 12.3–15.4)
GLOBULIN UR ELPH-MCNC: 2.8 GM/DL
GLUCOSE BLDC GLUCOMTR-MCNC: 106 MG/DL (ref 70–99)
GLUCOSE BLDC GLUCOMTR-MCNC: 114 MG/DL (ref 70–99)
GLUCOSE BLDC GLUCOMTR-MCNC: 115 MG/DL (ref 70–99)
GLUCOSE BLDC GLUCOMTR-MCNC: 122 MG/DL (ref 70–99)
GLUCOSE BLDC GLUCOMTR-MCNC: 123 MG/DL (ref 70–99)
GLUCOSE BLDC GLUCOMTR-MCNC: 124 MG/DL (ref 70–99)
GLUCOSE BLDC GLUCOMTR-MCNC: 125 MG/DL (ref 70–99)
GLUCOSE BLDC GLUCOMTR-MCNC: 136 MG/DL (ref 70–99)
GLUCOSE BLDC GLUCOMTR-MCNC: 140 MG/DL (ref 70–99)
GLUCOSE BLDC GLUCOMTR-MCNC: 141 MG/DL (ref 70–99)
GLUCOSE BLDC GLUCOMTR-MCNC: 146 MG/DL (ref 70–99)
GLUCOSE BLDC GLUCOMTR-MCNC: 151 MG/DL (ref 70–99)
GLUCOSE BLDC GLUCOMTR-MCNC: 158 MG/DL (ref 70–99)
GLUCOSE BLDC GLUCOMTR-MCNC: 170 MG/DL (ref 70–99)
GLUCOSE BLDC GLUCOMTR-MCNC: 175 MG/DL (ref 70–99)
GLUCOSE BLDC GLUCOMTR-MCNC: 179 MG/DL (ref 70–99)
GLUCOSE BLDC GLUCOMTR-MCNC: 194 MG/DL (ref 70–99)
GLUCOSE BLDC GLUCOMTR-MCNC: 195 MG/DL (ref 70–99)
GLUCOSE BLDC GLUCOMTR-MCNC: 201 MG/DL (ref 70–99)
GLUCOSE BLDC GLUCOMTR-MCNC: 99 MG/DL (ref 70–99)
GLUCOSE SERPL-MCNC: 104 MG/DL (ref 65–99)
GLUCOSE SERPL-MCNC: 123 MG/DL (ref 65–99)
GLUCOSE SERPL-MCNC: 132 MG/DL (ref 65–99)
GLUCOSE SERPL-MCNC: 132 MG/DL (ref 65–99)
GLUCOSE SERPL-MCNC: 138 MG/DL (ref 65–99)
GLUCOSE SERPL-MCNC: 138 MG/DL (ref 65–99)
GLUCOSE SERPL-MCNC: 140 MG/DL (ref 65–99)
HBA1C MFR BLD: 7.2 % (ref 4.8–5.6)
HCT VFR BLD AUTO: 42 % (ref 37.5–51)
HGB BLD-MCNC: 13.5 G/DL (ref 13–17.7)
IMM GRANULOCYTES # BLD AUTO: 0.04 10*3/MM3 (ref 0–0.05)
IMM GRANULOCYTES NFR BLD AUTO: 0.7 % (ref 0–0.5)
LYMPHOCYTES # BLD AUTO: 1 10*3/MM3 (ref 0.7–3.1)
LYMPHOCYTES NFR BLD AUTO: 18.1 % (ref 19.6–45.3)
MAGNESIUM SERPL-MCNC: 1.7 MG/DL (ref 1.6–2.6)
MAGNESIUM SERPL-MCNC: 1.8 MG/DL (ref 1.6–2.6)
MAGNESIUM SERPL-MCNC: 1.9 MG/DL (ref 1.6–2.6)
MAGNESIUM SERPL-MCNC: 2 MG/DL (ref 1.6–2.6)
MCH RBC QN AUTO: 29.7 PG (ref 26.6–33)
MCHC RBC AUTO-ENTMCNC: 32.1 G/DL (ref 31.5–35.7)
MCV RBC AUTO: 92.5 FL (ref 79–97)
MONOCYTES # BLD AUTO: 0.88 10*3/MM3 (ref 0.1–0.9)
MONOCYTES NFR BLD AUTO: 15.9 % (ref 5–12)
NEUTROPHILS NFR BLD AUTO: 3.51 10*3/MM3 (ref 1.7–7)
NEUTROPHILS NFR BLD AUTO: 63.7 % (ref 42.7–76)
NRBC BLD AUTO-RTO: 0 /100 WBC (ref 0–0.2)
PHOSPHATE SERPL-MCNC: 1.3 MG/DL (ref 2.5–4.5)
PHOSPHATE SERPL-MCNC: 1.7 MG/DL (ref 2.5–4.5)
PHOSPHATE SERPL-MCNC: 1.7 MG/DL (ref 2.5–4.5)
PHOSPHATE SERPL-MCNC: 1.8 MG/DL (ref 2.5–4.5)
PHOSPHATE SERPL-MCNC: 1.8 MG/DL (ref 2.5–4.5)
PHOSPHATE SERPL-MCNC: 2 MG/DL (ref 2.5–4.5)
PLATELET # BLD AUTO: 212 10*3/MM3 (ref 140–450)
PMV BLD AUTO: 8.4 FL (ref 6–12)
POTASSIUM SERPL-SCNC: 3.4 MMOL/L (ref 3.5–5.2)
POTASSIUM SERPL-SCNC: 3.4 MMOL/L (ref 3.5–5.2)
POTASSIUM SERPL-SCNC: 3.5 MMOL/L (ref 3.5–5.2)
POTASSIUM SERPL-SCNC: 3.8 MMOL/L (ref 3.5–5.2)
POTASSIUM SERPL-SCNC: 3.9 MMOL/L (ref 3.5–5.2)
POTASSIUM SERPL-SCNC: 3.9 MMOL/L (ref 3.5–5.2)
POTASSIUM SERPL-SCNC: 4 MMOL/L (ref 3.5–5.2)
PROT SERPL-MCNC: 6.8 G/DL (ref 6–8.5)
QT INTERVAL: 312 MS
QT INTERVAL: 319 MS
QTC INTERVAL: 442 MS
QTC INTERVAL: 445 MS
RBC # BLD AUTO: 4.54 10*6/MM3 (ref 4.14–5.8)
SODIUM SERPL-SCNC: 133 MMOL/L (ref 136–145)
SODIUM SERPL-SCNC: 134 MMOL/L (ref 136–145)
SODIUM SERPL-SCNC: 134 MMOL/L (ref 136–145)
SODIUM SERPL-SCNC: 135 MMOL/L (ref 136–145)
SODIUM SERPL-SCNC: 136 MMOL/L (ref 136–145)
WBC NRBC COR # BLD AUTO: 5.52 10*3/MM3 (ref 3.4–10.8)

## 2023-12-05 PROCEDURE — 83735 ASSAY OF MAGNESIUM: CPT | Performed by: INTERNAL MEDICINE

## 2023-12-05 PROCEDURE — 82948 REAGENT STRIP/BLOOD GLUCOSE: CPT

## 2023-12-05 PROCEDURE — 94799 UNLISTED PULMONARY SVC/PX: CPT

## 2023-12-05 PROCEDURE — 25010000002 LORAZEPAM PER 2 MG: Performed by: INTERNAL MEDICINE

## 2023-12-05 PROCEDURE — 84100 ASSAY OF PHOSPHORUS: CPT | Performed by: INTERNAL MEDICINE

## 2023-12-05 PROCEDURE — 25810000003 SODIUM CHLORIDE 0.9 % SOLUTION: Performed by: STUDENT IN AN ORGANIZED HEALTH CARE EDUCATION/TRAINING PROGRAM

## 2023-12-05 PROCEDURE — 25810000003 SODIUM CHLORIDE 0.9 % SOLUTION: Performed by: INTERNAL MEDICINE

## 2023-12-05 PROCEDURE — 85025 COMPLETE CBC W/AUTO DIFF WBC: CPT | Performed by: INTERNAL MEDICINE

## 2023-12-05 PROCEDURE — 25010000002 THIAMINE HCL 200 MG/2ML SOLUTION: Performed by: INTERNAL MEDICINE

## 2023-12-05 PROCEDURE — 87040 BLOOD CULTURE FOR BACTERIA: CPT | Performed by: INTERNAL MEDICINE

## 2023-12-05 PROCEDURE — 25810000003 LACTATED RINGERS SOLUTION: Performed by: INTERNAL MEDICINE

## 2023-12-05 PROCEDURE — 94761 N-INVAS EAR/PLS OXIMETRY MLT: CPT

## 2023-12-05 PROCEDURE — 80053 COMPREHEN METABOLIC PANEL: CPT | Performed by: INTERNAL MEDICINE

## 2023-12-05 PROCEDURE — 99232 SBSQ HOSP IP/OBS MODERATE 35: CPT | Performed by: STUDENT IN AN ORGANIZED HEALTH CARE EDUCATION/TRAINING PROGRAM

## 2023-12-05 PROCEDURE — 99291 CRITICAL CARE FIRST HOUR: CPT | Performed by: INTERNAL MEDICINE

## 2023-12-05 RX ORDER — MONTELUKAST SODIUM 10 MG/1
10 TABLET ORAL NIGHTLY
Status: DISCONTINUED | OUTPATIENT
Start: 2023-12-05 | End: 2023-12-07 | Stop reason: HOSPADM

## 2023-12-05 RX ORDER — LEVOTHYROXINE SODIUM 0.12 MG/1
125 TABLET ORAL
Status: DISCONTINUED | OUTPATIENT
Start: 2023-12-09 | End: 2023-12-07 | Stop reason: HOSPADM

## 2023-12-05 RX ORDER — LEVOTHYROXINE SODIUM 0.12 MG/1
250 TABLET ORAL
Status: DISCONTINUED | OUTPATIENT
Start: 2023-12-05 | End: 2023-12-07 | Stop reason: HOSPADM

## 2023-12-05 RX ORDER — FENTANYL/ROPIVACAINE/NS/PF 2-625MCG/1
15 PLASTIC BAG, INJECTION (ML) EPIDURAL ONCE
Status: COMPLETED | OUTPATIENT
Start: 2023-12-05 | End: 2023-12-05

## 2023-12-05 RX ORDER — PANTOPRAZOLE SODIUM 40 MG/1
40 TABLET, DELAYED RELEASE ORAL
Status: DISCONTINUED | OUTPATIENT
Start: 2023-12-05 | End: 2023-12-07 | Stop reason: HOSPADM

## 2023-12-05 RX ORDER — DILTIAZEM HYDROCHLORIDE 180 MG/1
180 CAPSULE, COATED, EXTENDED RELEASE ORAL DAILY
Status: DISCONTINUED | OUTPATIENT
Start: 2023-12-05 | End: 2023-12-07 | Stop reason: HOSPADM

## 2023-12-05 RX ORDER — CETIRIZINE HYDROCHLORIDE 10 MG/1
10 TABLET ORAL DAILY
Status: DISCONTINUED | OUTPATIENT
Start: 2023-12-05 | End: 2023-12-07 | Stop reason: HOSPADM

## 2023-12-05 RX ORDER — FENTANYL/ROPIVACAINE/NS/PF 2-625MCG/1
15 PLASTIC BAG, INJECTION (ML) EPIDURAL
Status: COMPLETED | OUTPATIENT
Start: 2023-12-05 | End: 2023-12-06

## 2023-12-05 RX ORDER — METOPROLOL SUCCINATE 50 MG/1
50 TABLET, EXTENDED RELEASE ORAL DAILY
Status: DISCONTINUED | OUTPATIENT
Start: 2023-12-05 | End: 2023-12-07 | Stop reason: HOSPADM

## 2023-12-05 RX ORDER — OMEGA-3S/DHA/EPA/FISH OIL/D3 300MG-1000
400 CAPSULE ORAL DAILY
Status: DISCONTINUED | OUTPATIENT
Start: 2023-12-05 | End: 2023-12-07 | Stop reason: HOSPADM

## 2023-12-05 RX ORDER — ESCITALOPRAM OXALATE 10 MG/1
10 TABLET ORAL DAILY
Status: DISCONTINUED | OUTPATIENT
Start: 2023-12-05 | End: 2023-12-07 | Stop reason: HOSPADM

## 2023-12-05 RX ADMIN — ESCITALOPRAM OXALATE 10 MG: 10 TABLET ORAL at 09:23

## 2023-12-05 RX ADMIN — POTASSIUM PHOSPHATE, MONOBASIC POTASSIUM PHOSPHATE, DIBASIC 15 MMOL: 224; 236 INJECTION, SOLUTION, CONCENTRATE INTRAVENOUS at 09:55

## 2023-12-05 RX ADMIN — Medication 10 ML: at 09:24

## 2023-12-05 RX ADMIN — THIAMINE HYDROCHLORIDE 200 MG: 100 INJECTION, SOLUTION INTRAMUSCULAR; INTRAVENOUS at 15:39

## 2023-12-05 RX ADMIN — POTASSIUM CHLORIDE, DEXTROSE MONOHYDRATE AND SODIUM CHLORIDE 150 ML/HR: 150; 5; 450 INJECTION, SOLUTION INTRAVENOUS at 09:25

## 2023-12-05 RX ADMIN — THIAMINE HYDROCHLORIDE 200 MG: 100 INJECTION, SOLUTION INTRAMUSCULAR; INTRAVENOUS at 21:38

## 2023-12-05 RX ADMIN — METOPROLOL SUCCINATE 50 MG: 50 TABLET, EXTENDED RELEASE ORAL at 09:23

## 2023-12-05 RX ADMIN — FOLIC ACID 1 MG: 1 TABLET ORAL at 09:23

## 2023-12-05 RX ADMIN — POTASSIUM CHLORIDE, DEXTROSE MONOHYDRATE AND SODIUM CHLORIDE 150 ML/HR: 150; 5; 900 INJECTION, SOLUTION INTRAVENOUS at 21:23

## 2023-12-05 RX ADMIN — DILTIAZEM HYDROCHLORIDE 180 MG: 180 CAPSULE, COATED, EXTENDED RELEASE ORAL at 09:23

## 2023-12-05 RX ADMIN — POTASSIUM PHOSPHATE, MONOBASIC POTASSIUM PHOSPHATE, DIBASIC 15 MMOL: 224; 236 INJECTION, SOLUTION, CONCENTRATE INTRAVENOUS at 21:31

## 2023-12-05 RX ADMIN — POTASSIUM CHLORIDE, DEXTROSE MONOHYDRATE AND SODIUM CHLORIDE 150 ML/HR: 150; 5; 900 INJECTION, SOLUTION INTRAVENOUS at 14:08

## 2023-12-05 RX ADMIN — POTASSIUM PHOSPHATE, MONOBASIC POTASSIUM PHOSPHATE, DIBASIC 15 MMOL: 224; 236 INJECTION, SOLUTION, CONCENTRATE INTRAVENOUS at 07:11

## 2023-12-05 RX ADMIN — PANTOPRAZOLE SODIUM 40 MG: 40 TABLET, DELAYED RELEASE ORAL at 09:23

## 2023-12-05 RX ADMIN — SODIUM CHLORIDE, POTASSIUM CHLORIDE, SODIUM LACTATE AND CALCIUM CHLORIDE 1000 ML: 600; 310; 30; 20 INJECTION, SOLUTION INTRAVENOUS at 08:28

## 2023-12-05 RX ADMIN — CHOLECALCIFEROL (VITAMIN D3) 10 MCG (400 UNIT) TABLET 400 UNITS: at 09:23

## 2023-12-05 RX ADMIN — THIAMINE HYDROCHLORIDE 200 MG: 100 INJECTION, SOLUTION INTRAMUSCULAR; INTRAVENOUS at 07:11

## 2023-12-05 RX ADMIN — POTASSIUM PHOSPHATE, MONOBASIC POTASSIUM PHOSPHATE, DIBASIC 15 MMOL: 224; 236 INJECTION, SOLUTION, CONCENTRATE INTRAVENOUS at 17:37

## 2023-12-05 RX ADMIN — MONTELUKAST 10 MG: 10 TABLET, FILM COATED ORAL at 21:37

## 2023-12-05 RX ADMIN — LEVOTHYROXINE SODIUM 250 MCG: 125 TABLET ORAL at 15:38

## 2023-12-05 RX ADMIN — Medication 10 ML: at 21:35

## 2023-12-05 RX ADMIN — CETIRIZINE HYDROCHLORIDE 10 MG: 10 TABLET, FILM COATED ORAL at 09:23

## 2023-12-05 RX ADMIN — Medication 10 ML: at 21:34

## 2023-12-05 NOTE — H&P
[facility]  Date: 2023   Patient Name: Wayne Guan  : 1975  MRN: [id number]  Primary Care Physician:  Lyla Guerrero APRN  Date of admission: 2023    Subjective   Subjective     Chief Complaint: Nausea and vomiting    HPI:    Wayne Guan is a 48 y.o. male alcohol use with withdrawal in the past, type 2 diabetes on Jardiance, bipolar 2, hypertension, bipolar 2, and thyroid carcinoma who presents with nausea and vomiting.    Patient states that he has not been feeling well for a day or 2 and started having some severe nausea and vomiting.  He cannot keep anything down.  He denies fevers or chills.  No abdominal pain.  His nausea and vomiting.  He does drink a significant amount of alcohol but has not had a drink in several days.  He has experienced withdrawal symptoms in the past.  No fever or chills.  As result of his continued nausea and vomiting came to ER for further evaluation.    In the emergency department patient's vital signs are as follows temperature 98.5, pulse 120, respiratory rate 27, blood pressure 147/97, 97% on room air.  CBC shows no abnormalities.  CMP shows a creatinine of 1.4.  Bicarb is 6.6 with an anion gap of 32.4.  Patient does take an SGLT2.  TSH is 0.086 and his free T4 is 1.72.  Patient has large ketones in his blood.  Patient was given 2500 mL of fluid in the emergency department.  The patient appears to be in euglycemic DKA.  Discussed with the emergency room physician and we will transfer the patient to the ICU for continued management of DKA.    All systems reviewed abnormalities noted above    Personal History     Past Medical History:  Heavy alcohol user  Anxiety/depression  A-fib  Bipolar 2  Cervical stenosis of the spinal canal  Colonic polyps  Type 2 diabetes on an SGLT2  Hypertension  Dyslipidemia  IBS  Papillary thyroid carcinoma  SVT      Past Surgical History:  Ablation of dysrhythmic focus  Appendectomy  Cervical  disc surgery  Colonoscopy  Thyroid surgery    Family History:   Alcohol abuse  Diabetes  Heart disease    Social History:   Former smoker.  Alcohol use.    Home Medications:  FreeStyle Cooper 14 Day Sensor, Insulin Degludec, Levocetirizine Dihydrochloride, Vitamin D (Cholecalciferol), dilTIAZem CD, empagliflozin, escitalopram, fenofibrate, fluticasone, glucose blood, glycopyrrolate, insulin aspart, levothyroxine sodium, metoprolol succinate XL, montelukast, and omeprazole    Allergies:  No Known Allergies    Objective   Objective     Vitals:   Temp:  [97.5 °F (36.4 °C)-97.9 °F (36.6 °C)] 97.5 °F (36.4 °C)  Heart Rate:  [119-140] 120  Resp:  [27] 27  BP: (147-168)/() 147/97    Physical Exam    Constitutional: Uncomfortable.  Nontoxic   Eyes: Pupils equal, sclerae anicteric, no conjunctival injection   HENT: NCAT, mucous membranes moist   Neck: Supple, no thyromegaly, no lymphadenopathy, trachea midline   Respiratory: Clear to auscultation bilaterally, nonlabored respirations    Cardiovascular: Tachycardia no murmurs, rubs, or gallops, palpable pedal pulses bilaterally   Gastrointestinal: Positive bowel sounds, soft, nontender, nondistended   Musculoskeletal: No bilateral ankle edema, no clubbing or cyanosis to extremities   Psychiatric: Appropriate affect, cooperative   Neurologic: Oriented x 3, strength symmetric in all extremities, Cranial Nerves grossly intact to confrontation, speech clear   Skin: No rashes     Result Review    Result Review:  I have personally reviewed the results from the time of this admission to 12/4/2023 20:23 EST and agree with these findings:  Bicarb of 6.6 and anion gap of 32.4 with large acetone in blood  VBG 7.2/18      Assessment & Plan   Assessment / Plan     Assessment/Plan:   Euglycemic DKA due to SGLT2  Anion gap metabolic acidosis  Acute kidney injury  Mild hyperthyroidism due to levothyroxine  Alcohol abuse with history of withdrawal      Plan:  --Admit to hospital  service  -- Consult pulmonary/critical care  -- DKA protocol  -- Patient's glucose is below 250 so we will need dextrose containing fluids with insulin drip  -- I have given the patient 2 extra boluses of normal saline in the emergency department due to dehydration  --Would not restart Jardiance  -- BMP every 4 hours  -- Diabetes educator  --Send urinalysis  -- Chest x-ray does not show infection  --Blood cultures ordered  --CIWA protocol ordered      DVT prophylaxis: Lovenox    CODE STATUS:     Full code    Admission Status:  I believe this patient meets admission status.    Electronically signed by Dung Nunez MD, 12/04/23, 8:23 PM EST.

## 2023-12-05 NOTE — PAYOR COMM NOTE
"UR DEPARTMENT    Martha Mcdonald RN  Phone [phone]  Fax [phone]    [address] [place] [address]    NPI [id number]  TAX ID [id number]    PHYSICIAN NAME AND NPI  CRIS COOK [id number]    BED TYPE  INPATIENT CRITICAL CARE    TYPE OF ADMISSION: ED ADMISSION MEDICAL    ICD 10 CODE  E87.20    DATE OF ADMISSION 12/04/2023    CASE # [id number]      Wayne Guan (48 y.o. Male)       Date of Birth   1975    Social Security Number       Address   [address] [place] [address]    Home Phone   [phone]    MRN   [id number]       Presybeterian   None    Marital Status   Single                            Admission Date   12/4/23    Admission Type   Emergency    Admitting Provider   Cris Cook MD    Attending Provider   Tamia Ely MD    Department, Room/Bed   [facility] INTENSIVE CARE UNIT, I02/1       Discharge Date       Discharge Disposition       Discharge Destination                                 Attending Provider: Tamia Ely MD    Allergies: No Known Allergies    Isolation: None   Infection: None   Code Status: CPR    Ht: 185.4 cm (73\")   Wt: 110 kg (242 lb 8.1 oz)    Admission Cmt: None   Principal Problem: DKA (diabetic ketoacidosis) [E11.10]                   Active Insurance as of 12/4/2023       Primary Coverage       Payor Plan Insurance Group Employer/Plan Group    [employer]HE[employer] BLUE CROSS AN[employer] BLUE CROSS BLUE SHIELD PPO [id number]       Payor Plan Address Payor Plan Phone Number Payor Plan Fax Number Effective Dates    [address] [address] [phone]  7/1/2023 - None Entered    [place] [address]         Subscriber Name Subscriber Birth Date Member ID       WAYNE GUAN 1975 [id number]                     Emergency Contacts        (Rel.) Home Phone Work Phone Mobile Phone    MARTINE DYE (Significant Other) [phone] -- [phone]    Madeleine Thomas (Mother) -- -- [phone]    ARNOLDO THOMAS (Step Parent) [phone] " -- [phone]             Diabetes [facility] Inpatient Care       Indications Met   Last updated by Martha Mcdonald on 12/5/2023 0828     Review Status Created By   Primary Completed Martha Mcdonald      Criteria Review   Diabetes [facility] Inpatient Care     Overall Determination: Indications Met     Criteria:  [×] Admission is indicated for  1 or more  of the following :      [×] Hyperglycemia requiring inpatient care, as indicated by  1 or more  of the following :          [×] Hemodynamic instability              12/5/2023  8:28 AM                  -- 12/5/2023  8:28 AM by Martha Mcdonald --                                            (X) Hemodynamic instability, as indicated by  1 or more  of the following  (1) (2) (3) (4) (5) (6):                      (X) Vital sign abnormality not readily corrected by appropriate treatment, as indicated by  1 or more  of the following  [A]:                      (X) Tachycardia that persists despite appropriate treatment (eg, volume repletion, treatment of pain, treatment of underlying cause)              12/5/2023  8:28 AM                  -- 12/5/2023  8:28 AM by Martha Mcdonald --                      HR remains elevated at 118 despite having received NS IV fluid bolus times 2     Notes:  -- 12/5/2023  8:28 AM by Martha Mcdonald --      Presented to ER for nausea and vomiting. Patient states that he has not been feeling well for a day or 2 and started having some severe nausea and vomiting. He cannot keep anything down. He denies fevers or chills. No abdominal pain. His nausea and vomiting. He does drink a significant amount of alcohol but has not had a drink in several days. He has experienced withdrawal symptoms in the past.n the emergency department patient's vital signs are as follows temperature 98.5, pulse 120, respiratory rate 27, blood pressure 147/97, 97% on room air. CBC shows no abnormalities. CMP shows a creatinine of 1.4. Bicarb is 6.6 with an anion gap of 32.4. Patient does take an SGLT2. TSH is  0.086 and his free T4 is 1.72. Patient has large ketones in his blood. Patient was given 2500 mL of fluid in the emergency department. The patient appears to be in euglycemic DKA. Received Zofran times 3, Pepcid IV, Folic acid IV, Thiamine IV in ER. -140, /121.                  Plan:      --Admit to hospital service      -- Consult pulmonary/critical care      -- DKA protocol      -- Patient's glucose is below 250 so we will need dextrose containing fluids with insulin drip      -- I have given the patient 2 extra boluses of normal saline in the emergency department due to dehydration      --Would not restart Jardiance      -- BMP every 4 hours      -- Diabetes educator      --Send urinalysis      -- Chest x-ray does not show infection      --Blood cultures ordered      --CIWA protocol ordered                   Perla: MARY ANN [id number] Tax ID [id number]     History & Physical        Dung Nunez MD at 23           [facility] HISTORY AND PHYSICAL  Date: 2023   Patient Name: Wayne Guan  : 1975  MRN: [id number]  Primary Care Physician:  Lyla Guerrero APRN  Date of admission: 2023    Subjective  Subjective     Chief Complaint: Nausea and vomiting    HPI:    Wayne Guan is a 48 y.o. male alcohol use with withdrawal in the past, type 2 diabetes on Jardiance, bipolar 2, hypertension, bipolar 2, and thyroid carcinoma who presents with nausea and vomiting.    Patient states that he has not been feeling well for a day or 2 and started having some severe nausea and vomiting.  He cannot keep anything down.  He denies fevers or chills.  No abdominal pain.  His nausea and vomiting.  He does drink a significant amount of alcohol but has not had a drink in several days.  He has experienced withdrawal symptoms in the past.  No fever or chills.  As result of his continued nausea and vomiting came to ER for further evaluation.    In the emergency department patient's vital  signs are as follows temperature 98.5, pulse 120, respiratory rate 27, blood pressure 147/97, 97% on room air.  CBC shows no abnormalities.  CMP shows a creatinine of 1.4.  Bicarb is 6.6 with an anion gap of 32.4.  Patient does take an SGLT2.  TSH is 0.086 and his free T4 is 1.72.  Patient has large ketones in his blood.  Patient was given 2500 mL of fluid in the emergency department.  The patient appears to be in euglycemic DKA.  Discussed with the emergency room physician and we will transfer the patient to the ICU for continued management of DKA.    All systems reviewed abnormalities noted above    Personal History     Past Medical History:  Heavy alcohol user  Anxiety/depression  A-fib  Bipolar 2  Cervical stenosis of the spinal canal  Colonic polyps  Type 2 diabetes on an SGLT2  Hypertension  Dyslipidemia  IBS  Papillary thyroid carcinoma  SVT      Past Surgical History:  Ablation of dysrhythmic focus  Appendectomy  Cervical disc surgery  Colonoscopy  Thyroid surgery    Family History:   Alcohol abuse  Diabetes  Heart disease    Social History:   Former smoker.  Alcohol use.    Home Medications:  FreeStyle Cooper 14 Day Sensor, Insulin Degludec, Levocetirizine Dihydrochloride, Vitamin D (Cholecalciferol), dilTIAZem CD, empagliflozin, escitalopram, fenofibrate, fluticasone, glucose blood, glycopyrrolate, insulin aspart, levothyroxine sodium, metoprolol succinate XL, montelukast, and omeprazole    Allergies:  No Known Allergies    Objective  Objective     Vitals:   Temp:  [97.5 °F (36.4 °C)-97.9 °F (36.6 °C)] 97.5 °F (36.4 °C)  Heart Rate:  [119-140] 120  Resp:  [27] 27  BP: (147-168)/() 147/97    Physical Exam    Constitutional: Uncomfortable.  Nontoxic   Eyes: Pupils equal, sclerae anicteric, no conjunctival injection   HENT: NCAT, mucous membranes moist   Neck: Supple, no thyromegaly, no lymphadenopathy, trachea midline   Respiratory: Clear to auscultation bilaterally, nonlabored respirations     Cardiovascular: Tachycardia no murmurs, rubs, or gallops, palpable pedal pulses bilaterally   Gastrointestinal: Positive bowel sounds, soft, nontender, nondistended   Musculoskeletal: No bilateral ankle edema, no clubbing or cyanosis to extremities   Psychiatric: Appropriate affect, cooperative   Neurologic: Oriented x 3, strength symmetric in all extremities, Cranial Nerves grossly intact to confrontation, speech clear   Skin: No rashes     Result Review   Result Review:  I have personally reviewed the results from the time of this admission to 12/4/2023 20:23 EST and agree with these findings:  Bicarb of 6.6 and anion gap of 32.4 with large acetone in blood  VBG 7.2/18      Assessment & Plan  Assessment / Plan     Assessment/Plan:   Euglycemic DKA due to SGLT2  Anion gap metabolic acidosis  Acute kidney injury  Mild hyperthyroidism due to levothyroxine  Alcohol abuse with history of withdrawal      Plan:  --Admit to hospital service  -- Consult pulmonary/critical care  -- DKA protocol  -- Patient's glucose is below 250 so we will need dextrose containing fluids with insulin drip  -- I have given the patient 2 extra boluses of normal saline in the emergency department due to dehydration  --Would not restart Jardiance  -- BMP every 4 hours  -- Diabetes educator  --Send urinalysis  -- Chest x-ray does not show infection  --Blood cultures ordered  --CIWA protocol ordered      DVT prophylaxis: Lovenox    CODE STATUS:     Full code    Admission Status:  I believe this patient meets admission status.    Electronically signed by Dung Nunez MD, 12/04/23, 8:23 PM EST.             Electronically signed by Dung Nunez MD at 12/04/23 2110       Emergency Department Notes    No notes of this type exist for this encounter.       Current Facility-Administered Medications   Medication Dose Route Frequency Provider Last Rate Last Admin   • acetaminophen (TYLENOL) tablet 650 mg  650 mg Oral Q4H PRN Dung Nunez MD   650 mg at  12/04/23 2336   • cetirizine (zyrTEC) tablet 10 mg  10 mg Oral Daily Abilio Vazquez MD   10 mg at 12/05/23 0923   • cholecalciferol (VITAMIN D3) tablet 400 Units  400 Units Oral Daily Abilio Vazquez MD   400 Units at 12/05/23 0923   • dextrose (D50W) (25 g/50 mL) IV injection 10-50 mL  10-50 mL Intravenous Q15 Min PRN Dung Nunez MD       • dextrose (GLUTOSE) oral gel 15 g  15 g Oral Q15 Min PRN Dung Nunez MD       • dextrose 5 % and sodium chloride 0.45 % infusion  150 mL/hr Intravenous Continuous PRN Dung Nunez MD       • dextrose 5 % and sodium chloride 0.45 % with KCl 20 mEq/L infusion  150 mL/hr Intravenous Continuous PRN Dung Nunez  mL/hr at 12/05/23 0925 150 mL/hr at 12/05/23 0925   • dextrose 5 % and sodium chloride 0.45 % with KCl 40 mEq/L infusion  150 mL/hr Intravenous Continuous PRN Dung Nunez MD       • dextrose 5 % and sodium chloride 0.9 % infusion  150 mL/hr Intravenous Continuous PRN Dung Nunez MD       • dextrose 5 % and sodium chloride 0.9 % with KCl 20 mEq/L infusion  150 mL/hr Intravenous Continuous PRN Dung Nunez  mL/hr at 12/05/23 1408 150 mL/hr at 12/05/23 1408   • dextrose 5 % and sodium chloride 0.9 % with KCl 40 mEq/L infusion  150 mL/hr Intravenous Continuous PRN Dung Nunez MD       • dilTIAZem CD (CARDIZEM CD) 24 hr capsule 180 mg  180 mg Oral Daily Abilio Vazquez MD   180 mg at 12/05/23 0923   • Enoxaparin Sodium (LOVENOX) syringe 40 mg  40 mg Subcutaneous Daily Dung Nunez MD   40 mg at 12/04/23 2228   • escitalopram (LEXAPRO) tablet 10 mg  10 mg Oral Daily Abilio Vazquez MD   10 mg at 12/05/23 0923   • folic acid (FOLVITE) tablet 1 mg  1 mg Oral Daily Dung Nunez MD   1 mg at 12/05/23 0923   • glucagon (GLUCAGEN) injection 1 mg  1 mg Intramuscular Q15 Min PRN Dung Nunez MD       • insulin regular 1 unit/mL in 0.9% sodium chloride (Glucommander)  0-100 Units/hr Intravenous Titrated Dung Nunez MD 0.6 mL/hr at 12/04/23 2227 0.6 Units/hr at 12/04/23 2227    • [START ON 12/9/2023] levothyroxine (SYNTHROID, LEVOTHROID) tablet 125 mcg  125 mcg Oral Once per day on Sat Abilio Vazquez MD       • levothyroxine (SYNTHROID, LEVOTHROID) tablet 250 mcg  250 mcg Oral Once per day on Mon Tue Wed Thu Fri Abilio Vazquez MD       • LORazepam (ATIVAN) tablet 1 mg  1 mg Oral Q1H PRN Dung Nunez MD        Or   • LORazepam (ATIVAN) injection 1 mg  1 mg Intravenous Q1H PRN Dung Nunez MD   1 mg at 12/04/23 2337    Or   • LORazepam (ATIVAN) tablet 2 mg  2 mg Oral Q1H PRN Dung Nunez MD        Or   • LORazepam (ATIVAN) injection 2 mg  2 mg Intravenous Q1H PRDung Wade MD        Or   • LORazepam (ATIVAN) injection 2 mg  2 mg Intravenous Q15 Min PRDung Wade MD        Or   • LORazepam (ATIVAN) injection 2 mg  2 mg Intramuscular Q15 Min PRDung Wade MD       • metoprolol succinate XL (TOPROL-XL) 24 hr tablet 50 mg  50 mg Oral Daily Abilio Vazquez MD   50 mg at 12/05/23 0923   • montelukast (SINGULAIR) tablet 10 mg  10 mg Oral Nightly Abilio Vazquez MD       • ondansetron (ZOFRAN) injection 4 mg  4 mg Intravenous Q6H PRN Dung Nunez MD       • pantoprazole (PROTONIX) EC tablet 40 mg  40 mg Oral Q AM Abilio Vazquez MD   40 mg at 12/05/23 0923   • prochlorperazine (COMPAZINE) injection 10 mg  10 mg Intravenous Q6H PRN Dung Nunez MD   10 mg at 12/04/23 2254   • sodium chloride 0.45 % 1,000 mL with potassium chloride 40 mEq infusion  250 mL/hr Intravenous Continuous PRN Dung Nunez MD       • sodium chloride 0.45 % infusion  250 mL/hr Intravenous Continuous PRN Dung Nunez MD       • sodium chloride 0.45 % with KCl 20 mEq/L infusion  250 mL/hr Intravenous Continuous PRN Dung Nunez MD       • sodium chloride 0.9 % flush 10 mL  10 mL Intravenous PRN Dung Nunez MD       • sodium chloride 0.9 % flush 10 mL  10 mL Intravenous Q12H Dung Nunez MD   10 mL at 12/05/23 0924   • sodium chloride 0.9 % flush 10 mL  10 mL Intravenous PRN Dung Nunez MD       • sodium chloride 0.9 %  flush 10 mL  10 mL Intravenous Q12H Dung Nunez MD   10 mL at 23 0924   • sodium chloride 0.9 % flush 10 mL  10 mL Intravenous PRN Dung Nunez MD       • sodium chloride 0.9 % infusion 40 mL  40 mL Intravenous PRN Dung Nunez MD       • sodium chloride 0.9 % infusion 40 mL  40 mL Intravenous PRN Dung Nunez MD       • sodium chloride 0.9 % infusion  250 mL/hr Intravenous Continuous PRN Dung Nunez MD       • sodium chloride 0.9 % with KCl 20 mEq/L infusion  250 mL/hr Intravenous Continuous PRN Dung Nunez  mL/hr at 23 2228 250 mL/hr at 23 2228   • sodium chloride 0.9 % with KCl 40 mEq/L infusion  250 mL/hr Intravenous Continuous PRN Dung Nunez MD       • thiamine (B-1) injection 200 mg  200 mg Intravenous Q8H Dung Nunez MD   200 mg at 23 0711    Followed by   • [START ON 12/10/2023] thiamine (VITAMIN B-1) tablet 100 mg  100 mg Oral Daily Dung Nunez MD         Physician Progress Notes (last 24 hours)  Notes from 23 1525 through 23 1525   No notes of this type exist for this encounter.          Consult Notes (last 24 hours)        Abilio Vazquez MD at 23 0730        Consult Orders    1. Inpatient Pulmonology Consult [[id number]] ordered by Dung Nunez MD at 23 2049                 Pulmonary / Critical Care Consult Note      Patient Name: Wayne Guan  : 1975  MRN: [id number]  Primary Care Physician:  Lyla Guerrero APRN  Referring Physician: Tamia Ely MD  Date of admission: 2023    Subjective   Subjective     Reason for Consult/ Chief Complaint:   DKA    HPI:  Wayne Guan is a 48 y.o. male with history of alcohol abuse with withdrawal in the past, type 2 diabetes on Jardiance, thyroid cancer came in feeling poorly.  Reports 2 to 3 days of increasing nausea, vomiting and overall feeling poorly.  He has been compliant with his medications.  He is have a significant drinking history but has not had a drink in a number of days.  Has not had  any recent withdrawal symptoms.  The emergency department he appeared dehydrated with sinus tachycardia.  Also had borderline elevated blood sugar but findings consistent with euglycemic DKA.  He was started on IV fluids and insulin drip.  This morning still has a very large anion gap.  He reports feeling little bit better but still complaining of some nausea, vomiting and fatigue.  Is a little bit dehydrated still and is tachycardic.    Review of Systems  Constitutional symptoms: Fatigue and weakness, otherwise denied complaints   Ear, nose, throat: Denied complaints  Cardiovascular:  Denied complaints  Respiratory: Denied complaints  Gastrointestinal: Nausea and vomiting, otherwise denied complaints  Musculoskeletal: Denied complaints  Genitourinary: Denied complaints  Allergy / Immunology: Denied complaints  Hematologic: Denied complaints  Neurologic: Denied complaints  Skin: Denied complaints  Endocrine: Denied complaints  Psychiatric: Denied complaints      Personal History     Past Medical History:   Diagnosis Date   • Alcoholism 01/16/2021   • Allergies    • Aneurysm    • Anxiety 07/15/2022   • Atrial fibrillation    • Bipolar 2 disorder 07/20/2021   • Cancer    • Cervical stenosis of spinal canal 11/30/2018    Formatting of this note might be different from the original. Added automatically from request for surgery [id number]   • Colon polyp 11/1/2003 Benign   • Depression    • Diabetes mellitus    • GERD (gastroesophageal reflux disease)    • History of colonic polyps 05/23/2017    Formatting of this note might be different from the original. Added automatically from request for surgery [id number]   • Hyperlipidemia    • Hypertension    • Irritable bowel syndrome 2004   • Lumbosacral spondylosis without myelopathy 08/29/2018   • Pancreatitis 2008   • Papillary thyroid carcinoma 04/01/2020   • SVT (supraventricular tachycardia) 01/23/2022       Past Surgical History:   Procedure Laterality Date   • ABLATION OF  DYSRHYTHMIC FOCUS  9/9/2022   • APPENDECTOMY  1983   • CARDIAC ABLATION  09/09/2022   • CARDIAC ELECTROPHYSIOLOGY PROCEDURE N/A 09/09/2022    Procedure: Ablation SVT HOLD Metoprolol 5 days;  Surgeon: Tai Lisa MD;  Location: [facility] INVASIVE LOCATION;  Service: Cardiovascular;  Laterality: N/A;   • CERVICAL DISC SURGERY     • COLONOSCOPY      2021 [facility]   • THYROID SURGERY         Family History: family history includes Alcohol abuse in his father; Anemia in his mother; Arrhythmia in his mother; Arthritis in his mother; COPD in his maternal grandmother and paternal uncle; Cancer in his maternal grandfather and paternal grandfather; Colon cancer in his paternal grandfather; Diabetes in his father, maternal grandfather, mother, and paternal aunt; Heart attack in his father; Heart disease in his maternal grandfather, maternal grandmother, maternal uncle, paternal aunt, and paternal grandmother; Hyperlipidemia in his father and mother; Hypertension in his father, maternal grandfather, maternal grandmother, maternal uncle, mother, paternal grandmother, and sister; Mental illness in his paternal aunt; Osteoporosis in his mother; Rashes / Skin problems in his mother; Stroke in his father and maternal grandmother; Thyroid disease in his maternal grandmother. Otherwise pertinent FHx was reviewed and not pertinent to current issue.    Social History:  reports that he quit smoking about 15 years ago. His smoking use included cigarettes. He has a 11.00 pack-year smoking history. He has never used smokeless tobacco. He reports that he does not currently use alcohol. He reports that he does not use drugs.    Home Medications:  FreeStyle Cooper 14 Day Sensor, Insulin Degludec, Vitamin D (Cholecalciferol), dilTIAZem CD, empagliflozin, escitalopram, fenofibrate, fluticasone, glucose blood, glycopyrrolate, insulin aspart, levocetirizine, levothyroxine sodium, metoprolol succinate XL, montelukast, and  omeprazole    Allergies:  No Known Allergies    Objective    Objective     Vitals:   Temp:  [97.5 °F (36.4 °C)-98.9 °F (37.2 °C)] 98.3 °F (36.8 °C)  Heart Rate:  [106-140] 106  Resp:  [15-27] 16  BP: (105-179)/() 117/67    Physical Exam:  Vital Signs Reviewed   General:  WDWN, Alert, NAD.    HEENT:  PERRL, EOMI.  OP, nares clear  Neck:  Supple, no JVD, no thyromegaly  Chest:  good aeration, clear to auscultation bilaterally, tympanic to percussion bilaterally, no work of breathing noted  CV: Sinus tachycardia, no MGR, pulses 2+, equal.  Abd:  Soft, NT, ND, + BS, no HSM  EXT:  no clubbing, no cyanosis, no edema  Neuro:  A&Ox3, CN grossly intact, no focal deficits.  Skin: No rashes or lesions noted      Result Review    Result Review:  I have personally reviewed the results from the time of this admission to 12/5/2023 07:31 EST and agree with these findings:  [x]  Laboratory  [x]  Microbiology  [x]  Radiology  [x]  EKG/Telemetry   [x]  Cardiology/Vascular   []  Pathology  []  Old records  []  Other:  Most notable findings include:       Lab 12/05/23  0756 12/05/23  0340 12/05/23  0010 12/04/23 2004 12/04/23  1645   WBC  --  5.52  --   --  6.56   HEMOGLOBIN  --  13.5  --   --  16.6   HEMATOCRIT  --  42.0  --   --  50.4   PLATELETS  --  212  --   --  276   SODIUM 135* 135*  135* 136  --  130*   SODIUM, ARTERIAL  --   --   --  138.4  --    POTASSIUM 3.8 3.9  3.9 4.0  --  4.7   CHLORIDE 102 101  101 100  --  91*   CO2 12.8* 9.5*  9.5* 6.9*  --  6.6*   BUN 8 8  8 9  --  10   CREATININE 1.10 1.22  1.22 1.12  --  1.40*   GLUCOSE 123* 132*  132* 104*  --  179*   GLUCOSE, ARTERIAL  --   --   --  143*  --    CALCIUM 8.2* 8.1*  8.1* 8.2*  --  9.6   PHOSPHORUS 1.8* 1.7* 1.8*  --   --    TOTAL PROTEIN  --  6.8  --   --  9.0*   ALBUMIN  --  4.0  --   --  5.2   GLOBULIN  --  2.8  --   --  3.8     Chest x-ray personally reviewed showing clear lung fields      Assessment & Plan   Assessment / Plan     Active Hospital  Problems:  Active Hospital Problems    Diagnosis    • **DKA (diabetic ketoacidosis)      Impression:  Euglycemic DKA secondary to SGLT2 inhibitor  Type 2 diabetes with hyperglycemia  Dehydration  Pseudohyponatremia  Hypokalemia  Hypophosphatemia  History of alcohol abuse and alcohol withdrawal  Obesity BMI 31.9  Tobacco use of cigarettes in remission  atrial fibrillation  Bipolar 2 disorder    Plan:  Suspect euglycemic DKA is due to the patient's Jardiance.  Jardiance on hold for now  Still with anion gap.  Will continue insulin drip and IV fluids per DKA protocol  Does appear dehydrated.  Will bolus 1 L of LR and reassess later today  Trend renal panel electrolytes.  Replace potassium and phosphorus IV  Start consistent diet  Diabetic educator consulted  Home medications reconciled.  Will restart Cardizem and metoprolol, Lexapro, Zyrtec, vitamin D, Singulair  Start multivitamin, thiamine, folic acid  Continue CIWA with as needed benzodiazepines  Encourage activity with incentive spirometer use    DVT prophylaxis:  Medical DVT prophylaxis orders are present.     Code Status and Medical Interventions:   Ordered at: 12/04/23 2050     Level Of Support Discussed With:    Patient     Code Status (Patient has no pulse and is not breathing):    CPR (Attempt to Resuscitate)     Medical Interventions (Patient has pulse or is breathing):    Full Support        The patient is critically ill in the ICU with euglycemic DKA, dehydration, multiple electrolyte disturbances. Multidisciplinary bedside critical care rounds were performed with nursing staff, respiratory therapy, pharmacy, nutritional services, social work. I have personally reviewed the chart, labs and any pertinent imaging available.  I have spent 31 minutes of critical care time, excluding procedures, in the care of this patient.    Electronically signed by Abilio Vazquez MD, 12/05/23, 10:32 AM EST.      Electronically signed by Abilio Vazquez MD at 12/05/23  [id number]

## 2023-12-05 NOTE — PROGRESS NOTES
[facility]   Hospitalist Progress Note  Date: 2023  Patient Name: Wayne Guan  : 1975  MRN: [id number]  Date of admission: 2023  Room/Bed: I02      Subjective   Subjective     Chief Complaint: Nausea and vomiting    Summary:Wayne Guan is a 48 y.o. male with past medical history of type 2 diabetes on Jardiance, type II bipolar, hypertension, papillary thyroid carcinoma and alcohol use with withdrawal presented with complaints of nausea and vomiting.  Patient was not feeling well for past 2 days prior to presentation started having severe nausea and vomiting.  Was not able to keep anything down.  He also drinks significant amount of alcohol but was not able to drink for a few days.  Given worsening symptoms, patient presented to ER for further evaluation.  On presentation, patient was found to have anion gap metabolic acidosis with euglycemic DKA likely due to SGLT2.  Patient was started on DKA protocol with insulin drip and admitted to critical care unit for further evaluation and management.    Interval Followup: No acute events overnight.  Patient stated his nausea and vomiting has improved significantly.  Denied fever, chills, chest pain, difficulty breathing or abdominal pain.  Lab work showed anion gap of 20.2 today.  Low phosphorus.  Currently on insulin drip.    Review of Systems    All systems reviewed and negative except for what is outlined above.      Objective   Objective     Vitals:   Temp:  [97.5 °F (36.4 °C)-98.9 °F (37.2 °C)] 98.3 °F (36.8 °C)  Heart Rate:  [106-140] 106  Resp:  [15-27] 16  BP: (105-179)/() 117/67    Physical Exam   General: Awake, alert, NAD  Cardiovascular: RRR, no murmurs   Pulmonary: CTA bilaterally; no wheezes; no conversational dyspnea  Gastrointestinal: S/ND/NT, +BS  Musculoskeletal: No gross deformities  Neuro: Alert, awake, oriented x 3; speech clear; no tremor  : No Siddiqui catheter; no suprapubic tenderness    Result Review    Result  Review:  I have personally reviewed these results:  [x]  Laboratory      Lab 12/05/23 0340 12/04/23 2018 12/04/23 2004 12/04/23  1645   WBC 5.52  --   --  6.56   HEMOGLOBIN 13.5  --   --  16.6   HEMATOCRIT 42.0  --   --  50.4   PLATELETS 212  --   --  276   NEUTROS ABS 3.51  --   --  5.01   IMMATURE GRANS (ABS) 0.04  --   --  0.06*   LYMPHS ABS 1.00  --   --  0.61*   MONOS ABS 0.88  --   --  0.83   EOS ABS 0.06  --   --  0.01   MCV 92.5  --   --  92.3   LACTATE  --  1.1  --   --    LACTATE, ARTERIAL  --   --  1.59  --          Lab 12/05/23 0340 12/05/23 0010 12/04/23 2004 12/04/23 1850 12/04/23  1645   SODIUM 135*  135* 136  --   --  130*   SODIUM, ARTERIAL  --   --  138.4  --   --    POTASSIUM 3.9  3.9 4.0  --   --  4.7   CHLORIDE 101  101 100  --   --  91*   CO2 9.5*  9.5* 6.9*  --   --  6.6*   ANION GAP 24.5*  24.5* 29.1*  --   --  32.4*   BUN 8  8 9  --   --  10   CREATININE 1.22  1.22 1.12  --   --  1.40*   EGFR 72.2  72.2 80.0  --   --  61.2   GLUCOSE 132*  132* 104*  --   --  179*   GLUCOSE, ARTERIAL  --   --  143*  --   --    CALCIUM 8.1*  8.1* 8.2*  --   --  9.6   IONIZED CALCIUM  --   --  1.29  --   --    MAGNESIUM 2.0 1.8  --   --  2.0   PHOSPHORUS 1.7* 1.8*  --   --   --    TSH  --   --   --  0.086*  --          Lab 12/05/23 0340 12/04/23  1645   TOTAL PROTEIN 6.8 9.0*   ALBUMIN 4.0 5.2   GLOBULIN 2.8 3.8   ALT (SGPT) 24 35   AST (SGOT) 23 39   BILIRUBIN 0.6 0.9   ALK PHOS 49 65   LIPASE  --  79*         Lab 12/04/23  1850 12/04/23  1645   PROBNP  --  91.7   HSTROP T 16 17                 Lab 12/04/23 2004   PH, ARTERIAL 7.213*   PCO2, ARTERIAL 18.3*   PO2 .8*   O2 SATURATION ART 97.6   FIO2 21   HCO3 ART 7.2*   BASE EXCESS ART -18.0*   CARBOXYHEMOGLOBIN 0.6     Brief Urine Lab Results  (Last result in the past 365 days)        Color   Clarity   Blood   Leuk Est   Nitrite   Protein   CREAT   Urine HCG        12/04/23 2004 Yellow   Clear   Moderate (2+)   Negative   Negative    100 mg/dL (2+)                 [x]  Microbiology   Microbiology Results (last 10 days)       Procedure Component Value - Date/Time    COVID PRE-OP / PRE-PROCEDURE SCREENING ORDER (NO ISOLATION) - Swab, Nasopharynx [[id number]]  (Normal) Collected: 12/04/23 1654    Lab Status: Final result Specimen: Swab from Nasopharynx Updated: 12/04/23 1742    Narrative:      The following orders were created for panel order COVID PRE-OP / PRE-PROCEDURE SCREENING ORDER (NO ISOLATION) - Swab, Nasopharynx.  Procedure                               Abnormality         Status                     ---------                               -----------         ------                     COVID-19, FLU A/B, RSV P...[[id number]]  Normal              Final result                 Please view results for these tests on the individual orders.    COVID-19, FLU A/B, RSV PCR 1 HR TAT - Swab, Nasopharynx [[id number]]  (Normal) Collected: 12/04/23 1654    Lab Status: Final result Specimen: Swab from Nasopharynx Updated: 12/04/23 1742     COVID19 Not Detected     Influenza A PCR Not Detected     Influenza B PCR Not Detected     RSV, PCR Not Detected    Narrative:      Fact sheet for providers: https://www.fda.gov/media/678447/download    Fact sheet for patients: https://www.fda.gov/media/846792/download    Test performed by PCR.          [x]  Radiology  No radiology results for the last 7 days  []  EKG/Telemetry   []  Cardiology/Vascular   []  Pathology  []  Old records  []  Other:    Assessment & Plan   Assessment / Plan     Assessment:  Euglycemic DKA due to SGLT2  Anion gap metabolic acidosis  Pseudohyponatremia  Hypokalemia  Hypophosphatemia  Acute kidney injury  Mild hyperthyroidism due to levothyroxine  Alcohol abuse with history of withdrawal  Type II bipolar disorder  History of papillary thyroid carcinoma  History of alcohol abuse with alcohol withdrawal      Plan:  Patient currently being managed in critical care unit, [facility] intensivist  input.  Anion gap of 20.2.  Currently on insulin drip and IV fluid.  Continue as per DKA protocol.  Potassium was repleted.  Phosphorus 1.8, receiving potassium phosphate IV.  Continue to trend and replete as needed.  Started on consistent carbohydrate diet.  Tolerating well.  Home medication including Cardizem, metoprolol along with Lexapro, Zyrtec restarted.  Currently on CIWA protocol, not having alcohol withdrawal currently.  Continue to monitor.  Continue checking BMP every 4 hours as per DKA protocol.  Replete as needed.  SUREHS, improving.  Continue to monitor.  Continue multivitamin, thiamine and folic acid.  Would appreciate further recommendation by intensivist.  Diabetic educator consult placed.  Continue rest of the current management.       DVT prophylaxis:  Medical DVT prophylaxis orders are present.    CODE STATUS:   Level Of Support Discussed With: Patient  Code Status (Patient has no pulse and is not breathing): CPR (Attempt to Resuscitate)  Medical Interventions (Patient has pulse or is breathing): Full Support      Electronically signed by Tamia Ely MD, 12/05/23, 7:38 AM EST.

## 2023-12-05 NOTE — CONSULTS
Pulmonary / Critical Care Consult Note      Patient Name: Wayne Guan  : 1975  MRN: [id number]  Primary Care Physician:  Lyla Guerrero APRN  Referring Physician: Tamia Ely MD  Date of admission: 2023    Subjective   Subjective     Reason for Consult/ Chief Complaint:   DKA    HPI:  Wayne Guan is a 48 y.o. male with history of alcohol abuse with withdrawal in the past, type 2 diabetes on Jardiance, thyroid cancer came in feeling poorly.  Reports 2 to 3 days of increasing nausea, vomiting and overall feeling poorly.  He has been compliant with his medications.  He is have a significant drinking history but has not had a drink in a number of days.  Has not had any recent withdrawal symptoms.  The emergency department he appeared dehydrated with sinus tachycardia.  Also had borderline elevated blood sugar but findings consistent with euglycemic DKA.  He was started on IV fluids and insulin drip.  This morning still has a very large anion gap.  He reports feeling little bit better but still complaining of some nausea, vomiting and fatigue.  Is a little bit dehydrated still and is tachycardic.    Review of Systems  Constitutional symptoms: Fatigue and weakness, otherwise denied complaints   Ear, nose, throat: Denied complaints  Cardiovascular:  Denied complaints  Respiratory: Denied complaints  Gastrointestinal: Nausea and vomiting, otherwise denied complaints  Musculoskeletal: Denied complaints  Genitourinary: Denied complaints  Allergy / Immunology: Denied complaints  Hematologic: Denied complaints  Neurologic: Denied complaints  Skin: Denied complaints  Endocrine: Denied complaints  Psychiatric: Denied complaints      Personal History     Past Medical History:   Diagnosis Date    Alcoholism 2021    Allergies     Aneurysm     Anxiety 07/15/2022    Atrial fibrillation     Bipolar 2 disorder 2021    Cancer     Cervical stenosis of spinal canal 2018    Formatting of this note  might be different from the original. Added automatically from request for surgery [id number]    Colon polyp 11/1/2003 Benign    Depression     Diabetes mellitus     GERD (gastroesophageal reflux disease)     History of colonic polyps 05/23/2017    Formatting of this note might be different from the original. Added automatically from request for surgery [id number]    Hyperlipidemia     Hypertension     Irritable bowel syndrome 2004    Lumbosacral spondylosis without myelopathy 08/29/2018    Pancreatitis 2008    Papillary thyroid carcinoma 04/01/2020    SVT (supraventricular tachycardia) 01/23/2022       Past Surgical History:   Procedure Laterality Date    ABLATION OF DYSRHYTHMIC FOCUS  9/9/2022    APPENDECTOMY  1983    CARDIAC ABLATION  09/09/2022    CARDIAC ELECTROPHYSIOLOGY PROCEDURE N/A 09/09/2022    Procedure: Ablation SVT HOLD Metoprolol 5 days;  Surgeon: Tai Lisa MD;  Location: [facility] INVASIVE LOCATION;  Service: Cardiovascular;  Laterality: N/A;    CERVICAL DISC SURGERY      COLONOSCOPY      2021 [facility]    THYROID SURGERY         Family History: family history includes Alcohol abuse in his father; Anemia in his mother; Arrhythmia in his mother; Arthritis in his mother; COPD in his maternal grandmother and paternal uncle; Cancer in his maternal grandfather and paternal grandfather; Colon cancer in his paternal grandfather; Diabetes in his father, maternal grandfather, mother, and paternal aunt; Heart attack in his father; Heart disease in his maternal grandfather, maternal grandmother, maternal uncle, paternal aunt, and paternal grandmother; Hyperlipidemia in his father and mother; Hypertension in his father, maternal grandfather, maternal grandmother, maternal uncle, mother, paternal grandmother, and sister; Mental illness in his paternal aunt; Osteoporosis in his mother; Rashes / Skin problems in his mother; Stroke in his father and maternal grandmother; Thyroid disease in his maternal grandmother.  Otherwise pertinent FHx was reviewed and not pertinent to current issue.    Social History:  reports that he quit smoking about 15 years ago. His smoking use included cigarettes. He has a 11.00 pack-year smoking history. He has never used smokeless tobacco. He reports that he does not currently use alcohol. He reports that he does not use drugs.    Home Medications:  FreeStyle Cooper 14 Day Sensor, Insulin Degludec, Vitamin D (Cholecalciferol), dilTIAZem CD, empagliflozin, escitalopram, fenofibrate, fluticasone, glucose blood, glycopyrrolate, insulin aspart, levocetirizine, levothyroxine sodium, metoprolol succinate XL, montelukast, and omeprazole    Allergies:  No Known Allergies    Objective    Objective     Vitals:   Temp:  [97.5 °F (36.4 °C)-98.9 °F (37.2 °C)] 98.3 °F (36.8 °C)  Heart Rate:  [106-140] 106  Resp:  [15-27] 16  BP: (105-179)/() 117/67    Physical Exam:  Vital Signs Reviewed   General:  WDWN, Alert, NAD.    HEENT:  PERRL, EOMI.  OP, nares clear  Neck:  Supple, no JVD, no thyromegaly  Chest:  good aeration, clear to auscultation bilaterally, tympanic to percussion bilaterally, no work of breathing noted  CV: Sinus tachycardia, no MGR, pulses 2+, equal.  Abd:  Soft, NT, ND, + BS, no HSM  EXT:  no clubbing, no cyanosis, no edema  Neuro:  A&Ox3, CN grossly intact, no focal deficits.  Skin: No rashes or lesions noted      Result Review    Result Review:  I have personally reviewed the results from the time of this admission to 12/5/2023 07:31 EST and agree with these findings:  [x]  Laboratory  [x]  Microbiology  [x]  Radiology  [x]  EKG/Telemetry   [x]  Cardiology/Vascular   []  Pathology  []  Old records  []  Other:  Most notable findings include:       Lab 12/05/23  0756 12/05/23  0340 12/05/23  0010 12/04/23  2004 12/04/23  1645   WBC  --  5.52  --   --  6.56   HEMOGLOBIN  --  13.5  --   --  16.6   HEMATOCRIT  --  42.0  --   --  50.4   PLATELETS  --  212  --   --  276   SODIUM 135* 135*  135*  136  --  130*   SODIUM, ARTERIAL  --   --   --  138.4  --    POTASSIUM 3.8 3.9  3.9 4.0  --  4.7   CHLORIDE 102 101  101 100  --  91*   CO2 12.8* 9.5*  9.5* 6.9*  --  6.6*   BUN 8 8  8 9  --  10   CREATININE 1.10 1.22  1.22 1.12  --  1.40*   GLUCOSE 123* 132*  132* 104*  --  179*   GLUCOSE, ARTERIAL  --   --   --  143*  --    CALCIUM 8.2* 8.1*  8.1* 8.2*  --  9.6   PHOSPHORUS 1.8* 1.7* 1.8*  --   --    TOTAL PROTEIN  --  6.8  --   --  9.0*   ALBUMIN  --  4.0  --   --  5.2   GLOBULIN  --  2.8  --   --  3.8     Chest x-ray personally reviewed showing clear lung fields      Assessment & Plan   Assessment / Plan     Active Hospital Problems:  Active Hospital Problems    Diagnosis     **DKA (diabetic ketoacidosis)      Impression:  Euglycemic DKA secondary to SGLT2 inhibitor  Type 2 diabetes with hyperglycemia  Dehydration  Pseudohyponatremia  SURESH secondary to prerenal etiology  Hypokalemia  Hypophosphatemia  History of alcohol abuse and alcohol withdrawal  Obesity BMI 31.9  Tobacco use of cigarettes in remission  atrial fibrillation  Bipolar 2 disorder    Plan:  Suspect euglycemic DKA is due to the patient's Jardiance.  Jardiance on hold for now  Still with anion gap.  Will continue insulin drip and IV fluids per DKA protocol  Does appear dehydrated.  Will bolus 1 L of LR and reassess later today  Trend renal panel electrolytes.  Replace potassium and phosphorus IV  Start consistent diet  Diabetic educator consulted  Home medications reconciled.  Will restart Cardizem and metoprolol, Lexapro, Zyrtec, vitamin D, Singulair  Start multivitamin, thiamine, folic acid  Continue CIWA with as needed benzodiazepines  Encourage activity with incentive spirometer use    DVT prophylaxis:  Medical DVT prophylaxis orders are present.     Code Status and Medical Interventions:   Ordered at: 12/04/23 2050     Level Of Support Discussed With:    Patient     Code Status (Patient has no pulse and is not breathing):    CPR (Attempt  to Resuscitate)     Medical Interventions (Patient has pulse or is breathing):    Full Support        The patient is critically ill in the ICU with euglycemic DKA, dehydration, multiple electrolyte disturbances. Multidisciplinary bedside critical care rounds were performed with nursing staff, respiratory therapy, pharmacy, nutritional services, social work. I have personally reviewed the chart, labs and any pertinent imaging available.  I have spent 31 minutes of critical care time, excluding procedures, in the care of this patient.    Electronically signed by Abilio Vazquez MD, 12/05/23, 10:32 AM EST.

## 2023-12-05 NOTE — SIGNIFICANT NOTE
12/05/23 0900   Coping/Psychosocial   Observed Emotional State calm;cooperative;happy   Verbalized Emotional State relief;hopefulness   Trust Relationship/Rapport empathic listening provided   Family/Support Persons spouse   Involvement in Care interacting with patient   Additional Documentation Spiritual Care (Group)   Spiritual Care   Use of Spiritual Resources non-Adventism use of spiritual care   Spiritual Care Source  initiative   Spiritual Care Follow-Up follow-up, none required as presently assessed   Response to Spiritual Care engaged in conversation   Spiritual Care Interventions supportive conversation provided   Spiritual Care Visit Type initial   Receptivity to Spiritual Care visit welcomed

## 2023-12-06 LAB
ALBUMIN SERPL-MCNC: 3.9 G/DL (ref 3.5–5.2)
ALBUMIN/GLOB SERPL: 1.5 G/DL
ALP SERPL-CCNC: 46 U/L (ref 39–117)
ALT SERPL W P-5'-P-CCNC: 18 U/L (ref 1–41)
ANION GAP SERPL CALCULATED.3IONS-SCNC: 12.6 MMOL/L (ref 5–15)
ANION GAP SERPL CALCULATED.3IONS-SCNC: 13.5 MMOL/L (ref 5–15)
ANION GAP SERPL CALCULATED.3IONS-SCNC: 14.3 MMOL/L (ref 5–15)
ANION GAP SERPL CALCULATED.3IONS-SCNC: 14.5 MMOL/L (ref 5–15)
ANION GAP SERPL CALCULATED.3IONS-SCNC: 14.7 MMOL/L (ref 5–15)
AST SERPL-CCNC: 17 U/L (ref 1–40)
BASOPHILS # BLD AUTO: 0.03 10*3/MM3 (ref 0–0.2)
BASOPHILS NFR BLD AUTO: 0.7 % (ref 0–1.5)
BILIRUB SERPL-MCNC: 0.6 MG/DL (ref 0–1.2)
BUN SERPL-MCNC: 4 MG/DL (ref 6–20)
BUN SERPL-MCNC: 5 MG/DL (ref 6–20)
BUN SERPL-MCNC: 6 MG/DL (ref 6–20)
BUN SERPL-MCNC: 6 MG/DL (ref 6–20)
BUN SERPL-MCNC: 7 MG/DL (ref 6–20)
BUN/CREAT SERPL: 4 (ref 7–25)
BUN/CREAT SERPL: 5.1 (ref 7–25)
BUN/CREAT SERPL: 5.7 (ref 7–25)
BUN/CREAT SERPL: 6.2 (ref 7–25)
BUN/CREAT SERPL: 6.3 (ref 7–25)
CALCIUM SPEC-SCNC: 8.7 MG/DL (ref 8.6–10.5)
CALCIUM SPEC-SCNC: 8.9 MG/DL (ref 8.6–10.5)
CALCIUM SPEC-SCNC: 9.1 MG/DL (ref 8.6–10.5)
CHLORIDE SERPL-SCNC: 101 MMOL/L (ref 98–107)
CHLORIDE SERPL-SCNC: 101 MMOL/L (ref 98–107)
CHLORIDE SERPL-SCNC: 102 MMOL/L (ref 98–107)
CHLORIDE SERPL-SCNC: 103 MMOL/L (ref 98–107)
CHLORIDE SERPL-SCNC: 98 MMOL/L (ref 98–107)
CO2 SERPL-SCNC: 17.5 MMOL/L (ref 22–29)
CO2 SERPL-SCNC: 17.7 MMOL/L (ref 22–29)
CO2 SERPL-SCNC: 19.3 MMOL/L (ref 22–29)
CO2 SERPL-SCNC: 20.5 MMOL/L (ref 22–29)
CO2 SERPL-SCNC: 23.4 MMOL/L (ref 22–29)
CREAT SERPL-MCNC: 0.96 MG/DL (ref 0.76–1.27)
CREAT SERPL-MCNC: 0.99 MG/DL (ref 0.76–1.27)
CREAT SERPL-MCNC: 1 MG/DL (ref 0.76–1.27)
CREAT SERPL-MCNC: 1.06 MG/DL (ref 0.76–1.27)
CREAT SERPL-MCNC: 1.13 MG/DL (ref 0.76–1.27)
DEPRECATED RDW RBC AUTO: 40.9 FL (ref 37–54)
EGFRCR SERPLBLD CKD-EPI 2021: 79.2 ML/MIN/1.73
EGFRCR SERPLBLD CKD-EPI 2021: 85.5 ML/MIN/1.73
EGFRCR SERPLBLD CKD-EPI 2021: 91.7 ML/MIN/1.73
EGFRCR SERPLBLD CKD-EPI 2021: 92.8 ML/MIN/1.73
EGFRCR SERPLBLD CKD-EPI 2021: 96.3 ML/MIN/1.73
EOSINOPHIL # BLD AUTO: 0.22 10*3/MM3 (ref 0–0.4)
EOSINOPHIL NFR BLD AUTO: 5 % (ref 0.3–6.2)
ERYTHROCYTE [DISTWIDTH] IN BLOOD BY AUTOMATED COUNT: 12.5 % (ref 12.3–15.4)
GLOBULIN UR ELPH-MCNC: 2.6 GM/DL
GLUCOSE BLDC GLUCOMTR-MCNC: 131 MG/DL (ref 70–99)
GLUCOSE BLDC GLUCOMTR-MCNC: 132 MG/DL (ref 70–99)
GLUCOSE BLDC GLUCOMTR-MCNC: 132 MG/DL (ref 70–99)
GLUCOSE BLDC GLUCOMTR-MCNC: 134 MG/DL (ref 70–99)
GLUCOSE BLDC GLUCOMTR-MCNC: 137 MG/DL (ref 70–99)
GLUCOSE BLDC GLUCOMTR-MCNC: 147 MG/DL (ref 70–99)
GLUCOSE BLDC GLUCOMTR-MCNC: 172 MG/DL (ref 70–99)
GLUCOSE BLDC GLUCOMTR-MCNC: 177 MG/DL (ref 70–99)
GLUCOSE BLDC GLUCOMTR-MCNC: 179 MG/DL (ref 70–99)
GLUCOSE BLDC GLUCOMTR-MCNC: 184 MG/DL (ref 70–99)
GLUCOSE BLDC GLUCOMTR-MCNC: 188 MG/DL (ref 70–99)
GLUCOSE BLDC GLUCOMTR-MCNC: 188 MG/DL (ref 70–99)
GLUCOSE BLDC GLUCOMTR-MCNC: 217 MG/DL (ref 70–99)
GLUCOSE BLDC GLUCOMTR-MCNC: 271 MG/DL (ref 70–99)
GLUCOSE SERPL-MCNC: 126 MG/DL (ref 65–99)
GLUCOSE SERPL-MCNC: 134 MG/DL (ref 65–99)
GLUCOSE SERPL-MCNC: 137 MG/DL (ref 65–99)
GLUCOSE SERPL-MCNC: 161 MG/DL (ref 65–99)
GLUCOSE SERPL-MCNC: 221 MG/DL (ref 65–99)
HCT VFR BLD AUTO: 38.4 % (ref 37.5–51)
HGB BLD-MCNC: 13.1 G/DL (ref 13–17.7)
IMM GRANULOCYTES # BLD AUTO: 0.03 10*3/MM3 (ref 0–0.05)
IMM GRANULOCYTES NFR BLD AUTO: 0.7 % (ref 0–0.5)
LYMPHOCYTES # BLD AUTO: 0.76 10*3/MM3 (ref 0.7–3.1)
LYMPHOCYTES NFR BLD AUTO: 17.2 % (ref 19.6–45.3)
MAGNESIUM SERPL-MCNC: 1.5 MG/DL (ref 1.6–2.6)
MAGNESIUM SERPL-MCNC: 1.6 MG/DL (ref 1.6–2.6)
MAGNESIUM SERPL-MCNC: 1.6 MG/DL (ref 1.6–2.6)
MAGNESIUM SERPL-MCNC: 2.1 MG/DL (ref 1.6–2.6)
MAGNESIUM SERPL-MCNC: 2.3 MG/DL (ref 1.6–2.6)
MCH RBC QN AUTO: 30.1 PG (ref 26.6–33)
MCHC RBC AUTO-ENTMCNC: 34.1 G/DL (ref 31.5–35.7)
MCV RBC AUTO: 88.3 FL (ref 79–97)
MONOCYTES # BLD AUTO: 0.53 10*3/MM3 (ref 0.1–0.9)
MONOCYTES NFR BLD AUTO: 12 % (ref 5–12)
NEUTROPHILS NFR BLD AUTO: 2.86 10*3/MM3 (ref 1.7–7)
NEUTROPHILS NFR BLD AUTO: 64.4 % (ref 42.7–76)
NRBC BLD AUTO-RTO: 0 /100 WBC (ref 0–0.2)
PHOSPHATE SERPL-MCNC: 2 MG/DL (ref 2.5–4.5)
PHOSPHATE SERPL-MCNC: 2 MG/DL (ref 2.5–4.5)
PHOSPHATE SERPL-MCNC: 2.1 MG/DL (ref 2.5–4.5)
PHOSPHATE SERPL-MCNC: 2.6 MG/DL (ref 2.5–4.5)
PHOSPHATE SERPL-MCNC: 2.7 MG/DL (ref 2.5–4.5)
PLATELET # BLD AUTO: 186 10*3/MM3 (ref 140–450)
PMV BLD AUTO: 8.5 FL (ref 6–12)
POTASSIUM SERPL-SCNC: 3.2 MMOL/L (ref 3.5–5.2)
POTASSIUM SERPL-SCNC: 3.4 MMOL/L (ref 3.5–5.2)
POTASSIUM SERPL-SCNC: 3.4 MMOL/L (ref 3.5–5.2)
PROT SERPL-MCNC: 6.5 G/DL (ref 6–8.5)
RBC # BLD AUTO: 4.35 10*6/MM3 (ref 4.14–5.8)
SODIUM SERPL-SCNC: 133 MMOL/L (ref 136–145)
SODIUM SERPL-SCNC: 133 MMOL/L (ref 136–145)
SODIUM SERPL-SCNC: 134 MMOL/L (ref 136–145)
SODIUM SERPL-SCNC: 136 MMOL/L (ref 136–145)
SODIUM SERPL-SCNC: 137 MMOL/L (ref 136–145)
WBC NRBC COR # BLD AUTO: 4.43 10*3/MM3 (ref 3.4–10.8)

## 2023-12-06 PROCEDURE — 25010000002 MAGNESIUM SULFATE 2 GM/50ML SOLUTION: Performed by: INTERNAL MEDICINE

## 2023-12-06 PROCEDURE — 99291 CRITICAL CARE FIRST HOUR: CPT | Performed by: INTERNAL MEDICINE

## 2023-12-06 PROCEDURE — 83735 ASSAY OF MAGNESIUM: CPT | Performed by: INTERNAL MEDICINE

## 2023-12-06 PROCEDURE — 84100 ASSAY OF PHOSPHORUS: CPT | Performed by: INTERNAL MEDICINE

## 2023-12-06 PROCEDURE — 25010000002 ENOXAPARIN PER 10 MG: Performed by: INTERNAL MEDICINE

## 2023-12-06 PROCEDURE — 94799 UNLISTED PULMONARY SVC/PX: CPT

## 2023-12-06 PROCEDURE — 25810000003 SODIUM CHLORIDE 0.9 % SOLUTION: Performed by: INTERNAL MEDICINE

## 2023-12-06 PROCEDURE — 63710000001 INSULIN LISPRO (HUMAN) PER 5 UNITS: Performed by: NURSE PRACTITIONER

## 2023-12-06 PROCEDURE — 85025 COMPLETE CBC W/AUTO DIFF WBC: CPT | Performed by: INTERNAL MEDICINE

## 2023-12-06 PROCEDURE — 25810000003 LACTATED RINGERS PER 1000 ML: Performed by: INTERNAL MEDICINE

## 2023-12-06 PROCEDURE — 63710000001 INSULIN DETEMIR PER 5 UNITS: Performed by: NURSE PRACTITIONER

## 2023-12-06 PROCEDURE — 80053 COMPREHEN METABOLIC PANEL: CPT | Performed by: INTERNAL MEDICINE

## 2023-12-06 PROCEDURE — 82948 REAGENT STRIP/BLOOD GLUCOSE: CPT

## 2023-12-06 PROCEDURE — 25010000002 THIAMINE HCL 200 MG/2ML SOLUTION: Performed by: INTERNAL MEDICINE

## 2023-12-06 PROCEDURE — 99232 SBSQ HOSP IP/OBS MODERATE 35: CPT | Performed by: STUDENT IN AN ORGANIZED HEALTH CARE EDUCATION/TRAINING PROGRAM

## 2023-12-06 RX ORDER — INSULIN LISPRO 100 [IU]/ML
2-7 INJECTION, SOLUTION INTRAVENOUS; SUBCUTANEOUS
Status: DISCONTINUED | OUTPATIENT
Start: 2023-12-06 | End: 2023-12-07 | Stop reason: HOSPADM

## 2023-12-06 RX ORDER — NICOTINE POLACRILEX 4 MG
15 LOZENGE BUCCAL
Status: DISCONTINUED | OUTPATIENT
Start: 2023-12-06 | End: 2023-12-07 | Stop reason: HOSPADM

## 2023-12-06 RX ORDER — SODIUM CHLORIDE, SODIUM LACTATE, POTASSIUM CHLORIDE, CALCIUM CHLORIDE 600; 310; 30; 20 MG/100ML; MG/100ML; MG/100ML; MG/100ML
100 INJECTION, SOLUTION INTRAVENOUS CONTINUOUS
Status: DISCONTINUED | OUTPATIENT
Start: 2023-12-06 | End: 2023-12-07 | Stop reason: HOSPADM

## 2023-12-06 RX ORDER — IBUPROFEN 600 MG/1
1 TABLET ORAL
Status: DISCONTINUED | OUTPATIENT
Start: 2023-12-06 | End: 2023-12-07 | Stop reason: HOSPADM

## 2023-12-06 RX ORDER — MAGNESIUM SULFATE 1 G/100ML
2 INJECTION INTRAVENOUS
Status: DISCONTINUED | OUTPATIENT
Start: 2023-12-06 | End: 2023-12-06

## 2023-12-06 RX ORDER — DEXTROSE MONOHYDRATE 25 G/50ML
25 INJECTION, SOLUTION INTRAVENOUS
Status: DISCONTINUED | OUTPATIENT
Start: 2023-12-06 | End: 2023-12-07 | Stop reason: HOSPADM

## 2023-12-06 RX ORDER — FENTANYL/ROPIVACAINE/NS/PF 2-625MCG/1
15 PLASTIC BAG, INJECTION (ML) EPIDURAL ONCE
Status: COMPLETED | OUTPATIENT
Start: 2023-12-06 | End: 2023-12-06

## 2023-12-06 RX ORDER — MAGNESIUM SULFATE HEPTAHYDRATE 40 MG/ML
2 INJECTION, SOLUTION INTRAVENOUS
Status: COMPLETED | OUTPATIENT
Start: 2023-12-06 | End: 2023-12-06

## 2023-12-06 RX ADMIN — PANTOPRAZOLE SODIUM 40 MG: 40 TABLET, DELAYED RELEASE ORAL at 05:45

## 2023-12-06 RX ADMIN — POTASSIUM CHLORIDE, DEXTROSE MONOHYDRATE AND SODIUM CHLORIDE 150 ML/HR: 150; 5; 900 INJECTION, SOLUTION INTRAVENOUS at 04:08

## 2023-12-06 RX ADMIN — INSULIN LISPRO 4 UNITS: 100 INJECTION, SOLUTION INTRAVENOUS; SUBCUTANEOUS at 17:40

## 2023-12-06 RX ADMIN — THIAMINE HYDROCHLORIDE 200 MG: 100 INJECTION, SOLUTION INTRAMUSCULAR; INTRAVENOUS at 21:13

## 2023-12-06 RX ADMIN — Medication 10 ML: at 21:14

## 2023-12-06 RX ADMIN — DILTIAZEM HYDROCHLORIDE 180 MG: 180 CAPSULE, COATED, EXTENDED RELEASE ORAL at 08:19

## 2023-12-06 RX ADMIN — THIAMINE HYDROCHLORIDE 200 MG: 100 INJECTION, SOLUTION INTRAMUSCULAR; INTRAVENOUS at 13:46

## 2023-12-06 RX ADMIN — POTASSIUM CHLORIDE, DEXTROSE MONOHYDRATE AND SODIUM CHLORIDE 150 ML/HR: 300; 5; 900 INJECTION, SOLUTION INTRAVENOUS at 05:47

## 2023-12-06 RX ADMIN — POTASSIUM PHOSPHATE, MONOBASIC POTASSIUM PHOSPHATE, DIBASIC 15 MMOL: 224; 236 INJECTION, SOLUTION, CONCENTRATE INTRAVENOUS at 08:17

## 2023-12-06 RX ADMIN — MONTELUKAST 10 MG: 10 TABLET, FILM COATED ORAL at 21:13

## 2023-12-06 RX ADMIN — METOPROLOL SUCCINATE 50 MG: 50 TABLET, EXTENDED RELEASE ORAL at 08:18

## 2023-12-06 RX ADMIN — INSULIN DETEMIR 10 UNITS: 100 INJECTION, SOLUTION SUBCUTANEOUS at 10:01

## 2023-12-06 RX ADMIN — LEVOTHYROXINE SODIUM 250 MCG: 125 TABLET ORAL at 05:45

## 2023-12-06 RX ADMIN — MAGNESIUM SULFATE HEPTAHYDRATE 2 G: 2 INJECTION, SOLUTION INTRAVENOUS at 08:16

## 2023-12-06 RX ADMIN — ESCITALOPRAM OXALATE 10 MG: 10 TABLET ORAL at 08:18

## 2023-12-06 RX ADMIN — FOLIC ACID 1 MG: 1 TABLET ORAL at 08:24

## 2023-12-06 RX ADMIN — INSULIN LISPRO 3 UNITS: 100 INJECTION, SOLUTION INTRAVENOUS; SUBCUTANEOUS at 21:13

## 2023-12-06 RX ADMIN — ACETAMINOPHEN 650 MG: 325 TABLET ORAL at 02:10

## 2023-12-06 RX ADMIN — LORAZEPAM 1 MG: 0.5 TABLET ORAL at 08:24

## 2023-12-06 RX ADMIN — SODIUM CHLORIDE, POTASSIUM CHLORIDE, SODIUM LACTATE AND CALCIUM CHLORIDE 100 ML/HR: 600; 310; 30; 20 INJECTION, SOLUTION INTRAVENOUS at 08:15

## 2023-12-06 RX ADMIN — MAGNESIUM SULFATE HEPTAHYDRATE 2 G: 2 INJECTION, SOLUTION INTRAVENOUS at 10:02

## 2023-12-06 RX ADMIN — THIAMINE HYDROCHLORIDE 200 MG: 100 INJECTION, SOLUTION INTRAMUSCULAR; INTRAVENOUS at 05:45

## 2023-12-06 RX ADMIN — Medication 10 ML: at 08:19

## 2023-12-06 RX ADMIN — CETIRIZINE HYDROCHLORIDE 10 MG: 10 TABLET, FILM COATED ORAL at 08:18

## 2023-12-06 RX ADMIN — CHOLECALCIFEROL (VITAMIN D3) 10 MCG (400 UNIT) TABLET 400 UNITS: at 08:18

## 2023-12-06 NOTE — PROGRESS NOTES
[facility]   Hospitalist Progress Note  Date: 2023  Patient Name: Wayne Guan  : 1975  MRN: [id number]  Date of admission: 2023  Room/Bed: [facility]      Subjective   Subjective     Chief Complaint: Nausea and vomiting    Summary:Wayne Guan is a 48 y.o. male with past medical history of type 2 diabetes on Jardiance, type II bipolar, hypertension, papillary thyroid carcinoma and alcohol use with withdrawal presented with complaints of nausea and vomiting.  Patient was not feeling well for past 2 days prior to presentation started having severe nausea and vomiting.  Was not able to keep anything down.  He also drinks significant amount of alcohol but was not able to drink for a few days.  Given worsening symptoms, patient presented to ER for further evaluation.  On presentation, patient was found to have anion gap metabolic acidosis with euglycemic DKA likely due to SGLT2.  Patient was started on DKA protocol with insulin drip and admitted to critical care unit for further evaluation and management.    Interval Followup: No acute events overnight.  Patient stated his nausea and vomiting has resolved.  Denied fever, chills, chest pain, difficulty breathing or abdominal pain.  Currently on insulin drip.    Review of Systems    All systems reviewed and negative except for what is outlined above.      Objective   Objective     Vitals:   Temp:  [98.1 °F (36.7 °C)-98.6 °F (37 °C)] 98.6 °F (37 °C)  Heart Rate:  [] 111  Resp:  [17-21] 18  BP: (133-174)/() 155/103    Physical Exam   General: Awake, alert, NAD  Cardiovascular: RRR, no murmurs   Pulmonary: CTA bilaterally; no wheezes; no conversational dyspnea  Gastrointestinal: S/ND/NT, +BS  Musculoskeletal: No gross deformities  Neuro: Alert, awake, oriented x 3; speech clear; no tremor  : No Siddiqui catheter; no suprapubic tenderness    Result Review    Result Review:  I have personally reviewed these results:  [x]  Laboratory      Lab  12/06/23  0405 12/05/23  0340 12/04/23 2018 12/04/23 2004 12/04/23  1645   WBC 4.43 5.52  --   --  6.56   HEMOGLOBIN 13.1 13.5  --   --  16.6   HEMATOCRIT 38.4 42.0  --   --  50.4   PLATELETS 186 212  --   --  276   NEUTROS ABS 2.86 3.51  --   --  5.01   IMMATURE GRANS (ABS) 0.03 0.04  --   --  0.06*   LYMPHS ABS 0.76 1.00  --   --  0.61*   MONOS ABS 0.53 0.88  --   --  0.83   EOS ABS 0.22 0.06  --   --  0.01   MCV 88.3 92.5  --   --  92.3   LACTATE  --   --  1.1  --   --    LACTATE, ARTERIAL  --   --   --  1.59  --          Lab 12/06/23  1131 12/06/23  0734 12/06/23  0405 12/05/23  0010 12/04/23 2004 12/04/23  1850 12/04/23  1645   SODIUM 136 137 133*   < >  --   --  130*   SODIUM, ARTERIAL  --   --   --   --  138.4  --   --    POTASSIUM 3.2* 3.2* 3.2*   < >  --   --  4.7   CHLORIDE 102 103 101   < >  --   --  91*   CO2 20.5* 19.3* 17.5*   < >  --   --  6.6*   ANION GAP 13.5 14.7 14.5   < >  --   --  32.4*   BUN 5* 6 6   < >  --   --  10   CREATININE 0.99 0.96 1.06   < >  --   --  1.40*   EGFR 92.8 96.3 85.5   < >  --   --  61.2   GLUCOSE 134* 126* 137*   < >  --   --  179*   GLUCOSE, ARTERIAL  --   --   --   --  143*  --   --    CALCIUM 9.1 8.9 9.1   < >  --   --  9.6   IONIZED CALCIUM  --   --   --   --  1.29  --   --    MAGNESIUM 2.3 1.5* 1.6   < >  --   --  2.0   PHOSPHORUS 2.6 2.0* 2.0*   < >  --   --   --    HEMOGLOBIN A1C  --   --   --   --   --   --  7.20*   TSH  --   --   --   --   --  0.086*  --     < > = values in this interval not displayed.         Lab 12/06/23  0405 12/05/23  0340 12/04/23  1645   TOTAL PROTEIN 6.5 6.8 9.0*   ALBUMIN 3.9 4.0 5.2   GLOBULIN 2.6 2.8 3.8   ALT (SGPT) 18 24 35   AST (SGOT) 17 23 39   BILIRUBIN 0.6 0.6 0.9   ALK PHOS 46 49 65   LIPASE  --   --  79*         Lab 12/04/23  1850 12/04/23  1645   PROBNP  --  91.7   HSTROP T 16 17                 Lab 12/04/23 2004   PH, ARTERIAL 7.213*   PCO2, ARTERIAL 18.3*   PO2 .8*   O2 SATURATION ART 97.6   FIO2 21   HCO3 ART 7.2*    BASE EXCESS ART -18.0*   CARBOXYHEMOGLOBIN 0.6     Brief Urine Lab Results  (Last result in the past 365 days)        Color   Clarity   Blood   Leuk Est   Nitrite   Protein   CREAT   Urine HCG        12/04/23 2004 Yellow   Clear   Moderate (2+)   Negative   Negative   100 mg/dL (2+)                 [x]  Microbiology   Microbiology Results (last 10 days)       Procedure Component Value - Date/Time    Blood Culture - Blood, Hand, Left [[id number]]  (Normal) Collected: 12/05/23 0010    Lab Status: Preliminary result Specimen: Blood from Hand, Left Updated: 12/06/23 0045     Blood Culture No growth at 24 hours    Blood Culture - Blood, Arm, Left [[id number]]  (Normal) Collected: 12/05/23 0010    Lab Status: Preliminary result Specimen: Blood from Arm, Left Updated: 12/06/23 0045     Blood Culture No growth at 24 hours    COVID PRE-OP / PRE-PROCEDURE SCREENING ORDER (NO ISOLATION) - Swab, Nasopharynx [[id number]]  (Normal) Collected: 12/04/23 1654    Lab Status: Final result Specimen: Swab from Nasopharynx Updated: 12/04/23 1742    Narrative:      The following orders were created for panel order COVID PRE-OP / PRE-PROCEDURE SCREENING ORDER (NO ISOLATION) - Swab, Nasopharynx.  Procedure                               Abnormality         Status                     ---------                               -----------         ------                     COVID-19, FLU A/B, RSV P...[[id number]]  Normal              Final result                 Please view results for these tests on the individual orders.    COVID-19, FLU A/B, RSV PCR 1 HR TAT - Swab, Nasopharynx [[id number]]  (Normal) Collected: 12/04/23 1654    Lab Status: Final result Specimen: Swab from Nasopharynx Updated: 12/04/23 1742     COVID19 Not Detected     Influenza A PCR Not Detected     Influenza B PCR Not Detected     RSV, PCR Not Detected    Narrative:      Fact sheet for providers: https://www.fda.gov/media/574407/download    Fact sheet for patients:  https://www.fda.gov/media/218371/download    Test performed by PCR.          [x]  Radiology  No radiology results for the last 7 days  []  EKG/Telemetry   []  Cardiology/Vascular   []  Pathology  []  Old records  []  Other:    Assessment & Plan   Assessment / Plan     Assessment:  Euglycemic DKA due to SGLT2  Anion gap metabolic acidosis  Pseudohyponatremia  Hypokalemia  Hypophosphatemia  Acute kidney injury  Mild hyperthyroidism due to levothyroxine  Alcohol abuse with history of withdrawal  Type II bipolar disorder  History of papillary thyroid carcinoma  History of alcohol abuse with alcohol withdrawal      Plan:  Patient currently being managed in critical care unit, appreciate intensivist input.  Anion gap of 14.7 today.    Currently on insulin drip and IV fluid.  Started on insulin detemir 10 units daily.  Plan to discontinue insulin drip later today.  Potassium was repleted.  Hypophosphatemia, resolved. Continue to trend and replete as needed.  Started on consistent carbohydrate diet.  Tolerating well.  Home medication including Cardizem, metoprolol along with Lexapro, Zyrtec restarted.  Currently on CIWA protocol, not having alcohol withdrawal currently.  Continue to monitor.  SURESH, resolved.  Continue to monitor.  Continue multivitamin, thiamine and folic acid.  Plan to transfer patient to med/surgery floor today.  Diabetic educator consult placed.  Continue rest of the current management.  Likely discharge in next 24 hours.       DVT prophylaxis:  Medical DVT prophylaxis orders are present.    CODE STATUS:   Level Of Support Discussed With: Patient  Code Status (Patient has no pulse and is not breathing): CPR (Attempt to Resuscitate)  Medical Interventions (Patient has pulse or is breathing): Full Support      Electronically signed by Tamia Ely MD, 12/06/23, 7:38 AM EST.

## 2023-12-06 NOTE — PROGRESS NOTES
Pulmonary / Critical Care Progress Note      Patient Name: Wayne Guan  : 1975  MRN: [id number]  Attending:  Tamia Ely MD   Date of admission: 2023    Subjective   Subjective   Patient critically ill with KA    Anion gap open this morning  Tolerating diet  Remains on insulin drip and fluid exchanges  Also developing none anion gap acidosis due to chloride rich fluids  Urine output adequate  Overall does feel better  Given some Ativan overnight for elevated CIWA      Objective   Objective     Vitals:   Vital signs for last 24 hours:  Temp:  [97.9 °F (36.6 °C)-98.6 °F (37 °C)] 98.6 °F (37 °C)  Heart Rate:  [] 111  Resp:  [17-21] 18  BP: (126-174)/() 155/103    Intake/Output last 3 shifts:  I/O last 3 completed shifts:  In: 4605 [P.O.:1200; I.V.:3405]  Out: 6725 [Urine:6725]  Intake/Output this shift:  I/O this shift:  In: 0   Out: 900 [Urine:900]    Vent settings for last 24 hours:       Hemodynamic parameters for last 24 hours:       Physical Exam   Vital Signs Reviewed   General:  WDWN, Alert, NAD.    HEENT:  PERRL, EOMI.  OP, nares clear  Chest:  good aeration, clear to auscultation bilaterally, tympanic to percussion bilaterally, no work of breathing noted  CV: Sinus tachycardia, no MGR, pulses 2+, equal.  Abd:  Soft, NT, ND, + BS, no HSM  EXT:  no clubbing, no cyanosis, no edema  Neuro:  A&Ox3, CN grossly intact, no focal deficits.  Skin: No rashes or lesions noted        Result Review    Result Review:  I have personally reviewed the results from the time of this admission to 2023 13:28 EST and agree with these findings:  [x]  Laboratory  [x]  Microbiology  []  Radiology  [x]  EKG/Telemetry   []  Cardiology/Vascular   []  Pathology  []  Old records  []  Other:  Most notable findings include:       Lab 23  1131 23  0734 23  0405 23  0046 23  2017 23  1547 23  1157 23  0756 23  0340 23  2004 23  1645   WBC  --    --  4.43  --   --   --   --   --  5.52  --  6.56   HEMOGLOBIN  --   --  13.1  --   --   --   --   --  13.5  --  16.6   HEMATOCRIT  --   --  38.4  --   --   --   --   --  42.0  --  50.4   PLATELETS  --   --  186  --   --   --   --   --  212  --  276   SODIUM 136 137 133* 133* 133* 134* 134*   < > 135*  135*   < > 130*   SODIUM, ARTERIAL  --   --   --   --   --   --   --   --   --    < >  --    POTASSIUM 3.2* 3.2* 3.2* 3.4* 3.4* 3.4* 3.5   < > 3.9  3.9   < > 4.7   CHLORIDE 102 103 101 101 100 101 102   < > 101  101   < > 91*   CO2 20.5* 19.3* 17.5* 17.7* 17.8* 15.9* 13.9*   < > 9.5*  9.5*   < > 6.6*   BUN 5* 6 6 7 8 8 8   < > 8  8   < > 10   CREATININE 0.99 0.96 1.06 1.13 1.32* 1.33* 1.25   < > 1.22  1.22   < > 1.40*   GLUCOSE 134* 126* 137* 221* 138* 138* 140*   < > 132*  132*   < > 179*   GLUCOSE, ARTERIAL  --   --   --   --   --   --   --   --   --    < >  --    CALCIUM 9.1 8.9 9.1 8.7 8.7 8.7 8.7   < > 8.1*  8.1*   < > 9.6   PHOSPHORUS 2.6 2.0* 2.0* 2.1* 1.7* 1.3* 2.0*   < > 1.7*   < >  --    TOTAL PROTEIN  --   --  6.5  --   --   --   --   --  6.8  --  9.0*   ALBUMIN  --   --  3.9  --   --   --   --   --  4.0  --  5.2   GLOBULIN  --   --  2.6  --   --   --   --   --  2.8  --  3.8    < > = values in this interval not displayed.         Assessment & Plan   Assessment / Plan     Active Hospital Problems:  Active Hospital Problems    Diagnosis     **DKA (diabetic ketoacidosis)      Impression:  Euglycemic DKA secondary to SGLT2 inhibitor  Type 2 diabetes with hyperglycemia  Dehydration  Pseudohyponatremia  SURESH secondary to prerenal etiology  Hypokalemia  Hypophosphatemia  Hypomagnesemia  History of alcohol abuse and alcohol withdrawal  Obesity BMI 31.9  Tobacco use of cigarettes in remission  atrial fibrillation  Bipolar 2 disorder     Plan:  Suspect euglycemic DKA is due to the patient's Jardiance.  Keep Jardiance on hold  Labs this morning still showed anion gap.  Continue insulin drip.  When the patient's  anion gap closes, would start Levemir 8 units daily and sliding scale insulin.  Would bridge insulin drip and Levemir by 2 hours  DC IV fluids per DKA protocol.  Starting to develop hyperchloremic nongap acidosis.  Likely from fluids.  Change IV fluids to LR at 100 mL/h for the next 24 hours  Trend renal panel electrolytes.  Replace magnesium, potassium and phosphorus IV  Continue carb consistent diet.  Appreciate diabetic educator assistance  Continue Cardizem and metoprolol, Lexapro, Zyrtec, vitamin D, Singulair  Continue multivitamin, thiamine, folic acid  Continue CIWA with as needed benzodiazepines  Encourage activity with incentive spirometer use     DVT prophylaxis:  Medical DVT prophylaxis orders are present.    CODE STATUS:   Level Of Support Discussed With: Patient  Code Status (Patient has no pulse and is not breathing): CPR (Attempt to Resuscitate)  Medical Interventions (Patient has pulse or is breathing): Full Support    Once anion gap closed and patient off the insulin drip, patient can transfer out of ICU.  Anticipate later today      The patient is critically ill in the ICU with glycemic DKA secondary to Jardiance, type 2 diabetes with hyperglycemia, multiple electrolyte disturbances. Multidisciplinary bedside critical care rounds were performed with nursing staff, respiratory therapy, pharmacy, nutritional services, social work. I have personally reviewed the chart, labs and any pertinent imaging available.  I have spent 30 minutes of critical care time, excluding procedures, in the care of this patient.    Electronically signed by Abilio Vazquez MD, 12/06/23, 1:29 PM EST.

## 2023-12-06 NOTE — PLAN OF CARE
Goal Outcome Evaluation:labs improving GAP closed, fluids changed per fluid exchange protocol, tylenol for headache.

## 2023-12-07 ENCOUNTER — READMISSION MANAGEMENT (OUTPATIENT)
Dept: CALL CENTER | Facility: HOSPITAL | Age: 48
End: 2023-12-07
Payer: COMMERCIAL

## 2023-12-07 VITALS
HEIGHT: 73 IN | TEMPERATURE: 97.2 F | OXYGEN SATURATION: 95 % | DIASTOLIC BLOOD PRESSURE: 78 MMHG | BODY MASS INDEX: 32.14 KG/M2 | HEART RATE: 83 BPM | WEIGHT: 242.51 LBS | RESPIRATION RATE: 20 BRPM | SYSTOLIC BLOOD PRESSURE: 132 MMHG

## 2023-12-07 LAB
ANION GAP SERPL CALCULATED.3IONS-SCNC: 12.1 MMOL/L (ref 5–15)
BASOPHILS # BLD AUTO: 0.04 10*3/MM3 (ref 0–0.2)
BASOPHILS NFR BLD AUTO: 1.1 % (ref 0–1.5)
BUN SERPL-MCNC: 4 MG/DL (ref 6–20)
BUN/CREAT SERPL: 4.2 (ref 7–25)
CALCIUM SPEC-SCNC: 9.6 MG/DL (ref 8.6–10.5)
CHLORIDE SERPL-SCNC: 100 MMOL/L (ref 98–107)
CO2 SERPL-SCNC: 23.9 MMOL/L (ref 22–29)
CREAT SERPL-MCNC: 0.95 MG/DL (ref 0.76–1.27)
DEPRECATED RDW RBC AUTO: 40.9 FL (ref 37–54)
EGFRCR SERPLBLD CKD-EPI 2021: 97.5 ML/MIN/1.73
EOSINOPHIL # BLD AUTO: 0.22 10*3/MM3 (ref 0–0.4)
EOSINOPHIL NFR BLD AUTO: 6.1 % (ref 0.3–6.2)
ERYTHROCYTE [DISTWIDTH] IN BLOOD BY AUTOMATED COUNT: 12.7 % (ref 12.3–15.4)
GLUCOSE BLDC GLUCOMTR-MCNC: 155 MG/DL (ref 70–99)
GLUCOSE BLDC GLUCOMTR-MCNC: 231 MG/DL (ref 70–99)
GLUCOSE SERPL-MCNC: 190 MG/DL (ref 65–99)
HCT VFR BLD AUTO: 40 % (ref 37.5–51)
HGB BLD-MCNC: 13.8 G/DL (ref 13–17.7)
IMM GRANULOCYTES # BLD AUTO: 0.01 10*3/MM3 (ref 0–0.05)
IMM GRANULOCYTES NFR BLD AUTO: 0.3 % (ref 0–0.5)
LYMPHOCYTES # BLD AUTO: 0.66 10*3/MM3 (ref 0.7–3.1)
LYMPHOCYTES NFR BLD AUTO: 18.3 % (ref 19.6–45.3)
MAGNESIUM SERPL-MCNC: 1.7 MG/DL (ref 1.6–2.6)
MCH RBC QN AUTO: 30.4 PG (ref 26.6–33)
MCHC RBC AUTO-ENTMCNC: 34.5 G/DL (ref 31.5–35.7)
MCV RBC AUTO: 88.1 FL (ref 79–97)
MONOCYTES # BLD AUTO: 0.68 10*3/MM3 (ref 0.1–0.9)
MONOCYTES NFR BLD AUTO: 18.8 % (ref 5–12)
NEUTROPHILS NFR BLD AUTO: 2 10*3/MM3 (ref 1.7–7)
NEUTROPHILS NFR BLD AUTO: 55.4 % (ref 42.7–76)
NRBC BLD AUTO-RTO: 0 /100 WBC (ref 0–0.2)
PHOSPHATE SERPL-MCNC: 2.9 MG/DL (ref 2.5–4.5)
PLATELET # BLD AUTO: 204 10*3/MM3 (ref 140–450)
PMV BLD AUTO: 9.1 FL (ref 6–12)
POTASSIUM SERPL-SCNC: 3 MMOL/L (ref 3.5–5.2)
RBC # BLD AUTO: 4.54 10*6/MM3 (ref 4.14–5.8)
SODIUM SERPL-SCNC: 136 MMOL/L (ref 136–145)
WBC NRBC COR # BLD AUTO: 3.61 10*3/MM3 (ref 3.4–10.8)

## 2023-12-07 PROCEDURE — 63710000001 INSULIN LISPRO (HUMAN) PER 5 UNITS: Performed by: NURSE PRACTITIONER

## 2023-12-07 PROCEDURE — 80048 BASIC METABOLIC PNL TOTAL CA: CPT | Performed by: STUDENT IN AN ORGANIZED HEALTH CARE EDUCATION/TRAINING PROGRAM

## 2023-12-07 PROCEDURE — 82948 REAGENT STRIP/BLOOD GLUCOSE: CPT

## 2023-12-07 PROCEDURE — 25810000003 LACTATED RINGERS PER 1000 ML: Performed by: INTERNAL MEDICINE

## 2023-12-07 PROCEDURE — 99239 HOSP IP/OBS DSCHRG MGMT >30: CPT | Performed by: STUDENT IN AN ORGANIZED HEALTH CARE EDUCATION/TRAINING PROGRAM

## 2023-12-07 PROCEDURE — 83735 ASSAY OF MAGNESIUM: CPT | Performed by: STUDENT IN AN ORGANIZED HEALTH CARE EDUCATION/TRAINING PROGRAM

## 2023-12-07 PROCEDURE — 84100 ASSAY OF PHOSPHORUS: CPT | Performed by: STUDENT IN AN ORGANIZED HEALTH CARE EDUCATION/TRAINING PROGRAM

## 2023-12-07 PROCEDURE — 85025 COMPLETE CBC W/AUTO DIFF WBC: CPT | Performed by: INTERNAL MEDICINE

## 2023-12-07 PROCEDURE — 63710000001 INSULIN DETEMIR PER 5 UNITS: Performed by: NURSE PRACTITIONER

## 2023-12-07 PROCEDURE — 94799 UNLISTED PULMONARY SVC/PX: CPT

## 2023-12-07 PROCEDURE — 25010000002 ENOXAPARIN PER 10 MG: Performed by: INTERNAL MEDICINE

## 2023-12-07 PROCEDURE — 25010000002 THIAMINE HCL 200 MG/2ML SOLUTION: Performed by: INTERNAL MEDICINE

## 2023-12-07 RX ORDER — LANOLIN ALCOHOL/MO/W.PET/CERES
100 CREAM (GRAM) TOPICAL DAILY
Qty: 14 TABLET | Refills: 0 | Status: SHIPPED | OUTPATIENT
Start: 2023-12-10 | End: 2023-12-24

## 2023-12-07 RX ORDER — FOLIC ACID 1 MG/1
1 TABLET ORAL DAILY
Qty: 14 TABLET | Refills: 0 | Status: SHIPPED | OUTPATIENT
Start: 2023-12-08 | End: 2023-12-22

## 2023-12-07 RX ORDER — POTASSIUM CHLORIDE 750 MG/1
40 CAPSULE, EXTENDED RELEASE ORAL 2 TIMES DAILY WITH MEALS
Status: DISCONTINUED | OUTPATIENT
Start: 2023-12-07 | End: 2023-12-07 | Stop reason: HOSPADM

## 2023-12-07 RX ORDER — GLIPIZIDE 5 MG/1
5 TABLET ORAL
Qty: 60 TABLET | Refills: 0 | Status: SHIPPED | OUTPATIENT
Start: 2023-12-07 | End: 2024-01-06

## 2023-12-07 RX ADMIN — CHOLECALCIFEROL (VITAMIN D3) 10 MCG (400 UNIT) TABLET 400 UNITS: at 08:27

## 2023-12-07 RX ADMIN — INSULIN LISPRO 3 UNITS: 100 INJECTION, SOLUTION INTRAVENOUS; SUBCUTANEOUS at 12:32

## 2023-12-07 RX ADMIN — INSULIN LISPRO 2 UNITS: 100 INJECTION, SOLUTION INTRAVENOUS; SUBCUTANEOUS at 08:26

## 2023-12-07 RX ADMIN — LEVOTHYROXINE SODIUM 250 MCG: 125 TABLET ORAL at 05:31

## 2023-12-07 RX ADMIN — ESCITALOPRAM OXALATE 10 MG: 10 TABLET ORAL at 08:27

## 2023-12-07 RX ADMIN — SODIUM CHLORIDE, POTASSIUM CHLORIDE, SODIUM LACTATE AND CALCIUM CHLORIDE 100 ML/HR: 600; 310; 30; 20 INJECTION, SOLUTION INTRAVENOUS at 00:06

## 2023-12-07 RX ADMIN — ENOXAPARIN SODIUM 40 MG: 100 INJECTION SUBCUTANEOUS at 08:26

## 2023-12-07 RX ADMIN — FOLIC ACID 1 MG: 1 TABLET ORAL at 08:27

## 2023-12-07 RX ADMIN — ACETAMINOPHEN 650 MG: 325 TABLET ORAL at 02:08

## 2023-12-07 RX ADMIN — INSULIN DETEMIR 10 UNITS: 100 INJECTION, SOLUTION SUBCUTANEOUS at 08:26

## 2023-12-07 RX ADMIN — SODIUM CHLORIDE, POTASSIUM CHLORIDE, SODIUM LACTATE AND CALCIUM CHLORIDE 100 ML/HR: 600; 310; 30; 20 INJECTION, SOLUTION INTRAVENOUS at 08:26

## 2023-12-07 RX ADMIN — THIAMINE HYDROCHLORIDE 200 MG: 100 INJECTION, SOLUTION INTRAMUSCULAR; INTRAVENOUS at 14:39

## 2023-12-07 RX ADMIN — DILTIAZEM HYDROCHLORIDE 180 MG: 180 CAPSULE, COATED, EXTENDED RELEASE ORAL at 08:27

## 2023-12-07 RX ADMIN — THIAMINE HYDROCHLORIDE 200 MG: 100 INJECTION, SOLUTION INTRAMUSCULAR; INTRAVENOUS at 05:30

## 2023-12-07 RX ADMIN — METOPROLOL SUCCINATE 50 MG: 50 TABLET, EXTENDED RELEASE ORAL at 08:27

## 2023-12-07 RX ADMIN — PANTOPRAZOLE SODIUM 40 MG: 40 TABLET, DELAYED RELEASE ORAL at 05:30

## 2023-12-07 RX ADMIN — POTASSIUM CHLORIDE 40 MEQ: 10 CAPSULE, COATED, EXTENDED RELEASE ORAL at 08:27

## 2023-12-07 RX ADMIN — CETIRIZINE HYDROCHLORIDE 10 MG: 10 TABLET, FILM COATED ORAL at 08:27

## 2023-12-07 NOTE — CONSULTS
Provided education to patient and spouse regarding normoglycemic DKA, and how Jardiance and alcohol play a role as diuretics. Discussed the likely need to avoid all medications in the SGLT-2 category going forward. Patient to follow-up with endocrinology on discharge to discuss management of diabetes moving forward.

## 2023-12-07 NOTE — OUTREACH NOTE
Prep Survey      Flowsheet Row Responses   [facility] patient discharged from? [facility]   Is LACE score < 7 ? Yes   Eligibility [facility]   Date of Admission 12/04/23   Date of Discharge 12/07/23   Discharge Disposition Home or Self Care   Discharge diagnosis DKA (diabetic ketoacidosis)   Does the patient have one of the following disease processes/diagnoses(primary or secondary)? Other   Does the patient have Home health ordered? No   Is there a DME ordered? No   Prep survey completed? Yes            Kathie DAVIS - Registered Nurse

## 2023-12-07 NOTE — DISCHARGE SUMMARY
[facility]         HOSPITALIST  DISCHARGE SUMMARY    Patient Name: Wayne Guan  : 1975  MRN: [id number]    Date of Admission: 2023  Date of Discharge:  2023  Primary Care Physician: Lyla Guerrero APRN    Consults       Date and Time Order Name Status Description    2023 10:09 PM Inpatient Pulmonology Consult Completed     2023  8:18 PM Inpatient Hospitalist Consult              Active and Resolved Hospital Problems:  Active Hospital Problems    Diagnosis POA   • **DKA (diabetic ketoacidosis) [E11.10] Yes      Resolved Hospital Problems   No resolved problems to display.       Hospital Course     Hospital Course:  Wayne Guan is a 48 y.o. male with past medical history of type 2 diabetes on Jardiance, type II bipolar, hypertension, papillary thyroid carcinoma and alcohol use with withdrawal presented with complaints of nausea and vomiting.  Patient was not feeling well for past 2 days prior to presentation started having severe nausea and vomiting.  Was not able to keep anything down.  He also drinks significant amount of alcohol but was not able to drink for a few days.  Given worsening symptoms, patient presented to ER for further evaluation.  On presentation, patient was found to have anion gap metabolic acidosis with euglycemic DKA likely due to SGLT2.  Patient was started on DKA protocol with insulin drip and admitted to critical care unit for further evaluation and management.  Eventually, anion gap closed and patient was transitioned to subcutaneous long-acting insulin along with insulin sliding scale.  Insulin drip was discontinued.  Patient was started on diet which she tolerated well.  He is euglycemic DKA was thought secondary to medication induced as patient was on Jardiance (SGLT2 inhibitor).  Diabetes educator also consulted the patient.    Patient is planned to be discharged home with self-care today.  Plan was discussed with the patient and his wife  at bedside.  He is Jardiance was discontinued and started on glipizide 5 mg twice daily instead.  Patient will continue taking his home dose insulin.  Patient will follow-up with PCP in 3-7 days, needs to trend CBC and chemistries during the follow-up.  Patient will also follow-up with his endocrinologist as outpatient for further evaluation and management of diabetes.  Advised to be compliant with medication and diet.  Advised to exercise regularly.  Patient is clinically and hemodynamically stable at the time of discharge.        DISCHARGE Follow Up Recommendations for labs and diagnostics:   As mentioned above.    Day of Discharge     Vital Signs:  Temp:  [97.3 °F (36.3 °C)-98.6 °F (37 °C)] 97.7 °F (36.5 °C)  Heart Rate:  [] 109  Resp:  [18-20] 20  BP: (124-149)/(73-98) 138/96  Physical Exam:   General: Awake, alert, NAD  Cardiovascular: RRR, no murmurs   Pulmonary: CTA bilaterally; no wheezes; no conversational dyspnea  Gastrointestinal: S/ND/NT, +BS  Musculoskeletal: No gross deformities  Neuro: Alert, awake, oriented x 3; speech clear; no tremor  : No Siddiqui catheter; no suprapubic tenderness     Discharge Details        Discharge Medications        New Medications        Instructions Start Date   folic acid 1 MG tablet  Commonly known as: FOLVITE   1 mg, Oral, Daily   Start Date: December 8, 2023     glipizide 5 MG tablet  Commonly known as: Glucotrol   5 mg, Oral, 2 Times Daily Before Meals      thiamine 100 MG tablet  Commonly known as: VITAMIN B1   100 mg, Oral, Daily   Start Date: December 10, 2023            Continue These Medications        Instructions Start Date   Accu-Chek Guide test strip  Generic drug: glucose blood   USE AS INSTRUCTED      dilTIAZem  MG 24 hr capsule  Commonly known as: CARDIZEM CD   TAKE 1 CAPSULE BY MOUTH DAILY      escitalopram 10 MG tablet  Commonly known as: LEXAPRO   10 mg, Oral, Daily      fenofibrate 145 MG tablet  Commonly known as: TRICOR   TAKE 1 TABLET BY  MOUTH EVERY DAY      fluticasone 50 MCG/ACT nasal spray  Commonly known as: FLONASE   2 sprays, Nasal, Daily      FreeStyle Cooper 14 Day Sensor misc       glycopyrrolate 2 MG tablet  Commonly known as: ROBINUL   2 mg, Oral, Daily      Insulin Degludec 100 UNIT/ML solution injection  Commonly known as: TRESIBA   50 Units, Subcutaneous, Every Morning      levocetirizine 5 MG tablet  Commonly known as: XYZAL   Take 1 tablet by mouth Daily As Needed.      metoprolol succinate XL 50 MG 24 hr tablet  Commonly known as: TOPROL-XL   TAKE 1 TABLET BY MOUTH EVERY DAY      montelukast 10 MG tablet  Commonly known as: Singulair   10 mg, Oral, Nightly      NovoLOG FlexPen 100 UNIT/ML solution pen-injector sc pen  Generic drug: insulin aspart   Inject 4-6 Units under the skin into the appropriate area as directed 3 (Three) Times a Day With Meals.      omeprazole 20 MG capsule  Commonly known as: priLOSEC   20 mg, Oral, Daily      Tirosint 125 MCG capsule capsule  Generic drug: levothyroxine sodium   250 mcg, Oral, Take As Directed, PATIENT TAKES 250MCG ON MON,TUES,WED,THURS,FRI PATIENT TAKES 125MCG ON SAT PATIENT TAKES NO LEVOTHYROXINE ON SUN      Tirosint 125 MCG capsule capsule  Generic drug: levothyroxine sodium   125 mcg, Oral, Weekly, PATIENT TAKES 250MCG ON MON,TUES,WED,THURS,FRI PATIENT TAKES 125MCG ON SAT PATIENT TAKES NO LEVOTHYROXINE ON SUN      Vitamin D (Cholecalciferol) 10 MCG (400 UNIT) tablet  Commonly known as: CHOLECALCIFEROL   400 Units, Oral, Daily             Stop These Medications      Jardiance 25 MG tablet tablet  Generic drug: empagliflozin              No Known Allergies    Discharge Disposition:  Home or Self Care    Diet:  Hospital:  Diet Order   Procedures   • Diet: Diabetic Diets; Consistent Carbohydrate; Texture: Regular Texture (IDDSI 7); Fluid Consistency: Thin (IDDSI 0)       Discharge Activity:       CODE STATUS:  Code Status and Medical Interventions:   Ordered at: 12/04/23 2050     Level Of  Support Discussed With:    Patient     Code Status (Patient has no pulse and is not breathing):    CPR (Attempt to Resuscitate)     Medical Interventions (Patient has pulse or is breathing):    Full Support       Future Appointments   Date Time Provider Department Center   1/19/2024  9:30 AM Stu Jean DPM [facility]OD [facility]   4/24/2024  4:00 PM Lyla Guerrero APRN [facility]   5/28/2024 12:30 PM Gonzales Chen DO [facility] ETWN [facility]   6/28/2024 11:00 AM Yao Keen MD [facility] CD EDIX[facility]           Pertinent  and/or Most Recent Results     PROCEDURES:   N/A    LAB RESULTS:      Lab 12/07/23  0518 12/06/23  0405 12/05/23  0340 12/04/23 2018 12/04/23 2004 12/04/23  1645   WBC 3.61 4.43 5.52  --   --  6.56   HEMOGLOBIN 13.8 13.1 13.5  --   --  16.6   HEMATOCRIT 40.0 38.4 42.0  --   --  50.4   PLATELETS 204 186 212  --   --  276   NEUTROS ABS 2.00 2.86 3.51  --   --  5.01   IMMATURE GRANS (ABS) 0.01 0.03 0.04  --   --  0.06*   LYMPHS ABS 0.66* 0.76 1.00  --   --  0.61*   MONOS ABS 0.68 0.53 0.88  --   --  0.83   EOS ABS 0.22 0.22 0.06  --   --  0.01   MCV 88.1 88.3 92.5  --   --  92.3   LACTATE  --   --   --  1.1  --   --    LACTATE, ARTERIAL  --   --   --   --  1.59  --          Lab 12/07/23  0518 12/06/23  1630 12/06/23  1131 12/06/23  0734 12/06/23  0405 12/05/23  0010 12/04/23 2004 12/04/23  1850 12/04/23  1645   SODIUM 136 134* 136 137 133*   < >  --   --  130*   SODIUM, ARTERIAL  --   --   --   --   --   --  138.4  --   --    POTASSIUM 3.0* 3.4* 3.2* 3.2* 3.2*   < >  --   --  4.7   CHLORIDE 100 98 102 103 101   < >  --   --  91*   CO2 23.9 23.4 20.5* 19.3* 17.5*   < >  --   --  6.6*   ANION GAP 12.1 12.6 13.5 14.7 14.5   < >  --   --  32.4*   BUN 4* 4* 5* 6 6   < >  --   --  10   CREATININE 0.95 1.00 0.99 0.96 1.06   < >  --   --  1.40*   EGFR 97.5 91.7 92.8 96.3 85.5   < >  --   --  61.2   GLUCOSE 190* 161* 134* 126* 137*   < >  --   --  179*   GLUCOSE, ARTERIAL  --   --   --   --   --   --   143*  --   --    CALCIUM 9.6 9.1 9.1 8.9 9.1   < >  --   --  9.6   IONIZED CALCIUM  --   --   --   --   --   --  1.29  --   --    MAGNESIUM 1.7 2.1 2.3 1.5* 1.6   < >  --   --  2.0   PHOSPHORUS 2.9 2.7 2.6 2.0* 2.0*   < >  --   --   --    HEMOGLOBIN A1C  --   --   --   --   --   --   --   --  7.20*   TSH  --   --   --   --   --   --   --  0.086*  --     < > = values in this interval not displayed.         Lab 12/06/23  0405 12/05/23  0340 12/04/23  1645   TOTAL PROTEIN 6.5 6.8 9.0*   ALBUMIN 3.9 4.0 5.2   GLOBULIN 2.6 2.8 3.8   ALT (SGPT) 18 24 35   AST (SGOT) 17 23 39   BILIRUBIN 0.6 0.6 0.9   ALK PHOS 46 49 65   LIPASE  --   --  79*         Lab 12/04/23  1850 12/04/23  1645   PROBNP  --  91.7   HSTROP T 16 17                 Lab 12/04/23 2004   PH, ARTERIAL 7.213*   PCO2, ARTERIAL 18.3*   PO2 .8*   O2 SATURATION ART 97.6   FIO2 21   HCO3 ART 7.2*   BASE EXCESS ART -18.0*   CARBOXYHEMOGLOBIN 0.6     Brief Urine Lab Results  (Last result in the past 365 days)        Color   Clarity   Blood   Leuk Est   Nitrite   Protein   CREAT   Urine HCG        12/04/23 2004 Yellow   Clear   Moderate (2+)   Negative   Negative   100 mg/dL (2+)                 Microbiology Results (last 10 days)       Procedure Component Value - Date/Time    Blood Culture - Blood, Hand, Left [[id number]]  (Normal) Collected: 12/05/23 0010    Lab Status: Preliminary result Specimen: Blood from Hand, Left Updated: 12/07/23 0045     Blood Culture No growth at 2 days    Blood Culture - Blood, Arm, Left [[id number]]  (Normal) Collected: 12/05/23 0010    Lab Status: Preliminary result Specimen: Blood from Arm, Left Updated: 12/07/23 0045     Blood Culture No growth at 2 days    COVID PRE-OP / PRE-PROCEDURE SCREENING ORDER (NO ISOLATION) - Swab, Nasopharynx [[id number]]  (Normal) Collected: 12/04/23 1654    Lab Status: Final result Specimen: Swab from Nasopharynx Updated: 12/04/23 9672    Narrative:      The following orders were created for panel  order COVID PRE-OP / PRE-PROCEDURE SCREENING ORDER (NO ISOLATION) - Swab, Nasopharynx.  Procedure                               Abnormality         Status                     ---------                               -----------         ------                     COVID-19, FLU A/B, RSV P...[[id number]]  Normal              Final result                 Please view results for these tests on the individual orders.    COVID-19, FLU A/B, RSV PCR 1 HR TAT - Swab, Nasopharynx [[id number]]  (Normal) Collected: 12/04/23 1654    Lab Status: Final result Specimen: Swab from Nasopharynx Updated: 12/04/23 1742     COVID19 Not Detected     Influenza A PCR Not Detected     Influenza B PCR Not Detected     RSV, PCR Not Detected    Narrative:      Fact sheet for providers: https://www.fda.gov/media/691465/download    Fact sheet for patients: https://www.fda.gov/media/241273/download    Test performed by PCR.            No radiology results for the last 7 days             Results for orders placed during the hospital encounter of 02/10/23    Adult Transthoracic Echo Complete w/ Color, Spectral and Contrast if necessary per protocol    Interpretation Summary  •  Left ventricular systolic function is normal. Left ventricular ejection fraction appears to be 56 - 60%.  •  Left ventricular diastolic function is consistent with (grade I) impaired relaxation.  •  There is borderline dilation of aortic root measuring 3.7 cm in diameter.  •  There are no significant valvular abnormalities.  •  Estimated right ventricular systolic pressure from tricuspid regurgitation is normal (<35 mmHg).      Labs Pending at Discharge:  Pending Labs       Order Current Status    Blood Culture - Blood, Arm, Left Preliminary result    Blood Culture - Blood, Hand, Left Preliminary result              Time spent on Discharge including face to face service:  35 minutes    Electronically signed by Tamia Ely MD, 12/07/23, 2:46 PM EST.

## 2023-12-07 NOTE — PLAN OF CARE
Goal Outcome Evaluation:  Patient alert and oriented x4.  Patient medicated with tylenol due to some generalized aching.  Patient calm and pleasant with no needs voiced at this time. Patient with no acute events thus far this shift. Continue plan of care.

## 2023-12-07 NOTE — PLAN OF CARE
Problem: Adult Inpatient Plan of Care  Goal: Plan of Care Review  Outcome: Met  Goal: Patient-Specific Goal (Individualized)  Outcome: Met  Goal: Absence of Hospital-Acquired Illness or Injury  Outcome: Met  Intervention: Identify and Manage Fall Risk  Recent Flowsheet Documentation  Taken 12/7/2023 1320 by Guevara Chavez, RN  Safety Promotion/Fall Prevention: safety round/check completed  Taken 12/7/2023 1140 by Guevara Chavez, RN  Safety Promotion/Fall Prevention: safety round/check completed  Taken 12/7/2023 0735 by Guevara Chavez, RN  Safety Promotion/Fall Prevention: safety round/check completed  Goal: Optimal Comfort and Wellbeing  Outcome: Met  Goal: Readiness for Transition of Care  Outcome: Met     Problem: Diabetic Ketoacidosis  Goal: Fluid and Electrolyte Balance with Absence of Ketosis  Outcome: Met   Goal Outcome Evaluation:

## 2023-12-08 ENCOUNTER — TRANSITIONAL CARE MANAGEMENT TELEPHONE ENCOUNTER (OUTPATIENT)
Dept: CALL CENTER | Facility: HOSPITAL | Age: 48
End: 2023-12-08
Payer: COMMERCIAL

## 2023-12-08 NOTE — OUTREACH NOTE
Call Center TCM Note      Flowsheet Row Responses   [facility] facility patient discharged from? [facility]   Does the patient have one of the following disease processes/diagnoses(primary or secondary)? Other   TCM attempt successful? No  [verbal release ]   Unsuccessful attempts Attempt 1            Maite Mayers RN    2023, 11:15 EST

## 2023-12-08 NOTE — OUTREACH NOTE
Call Center TCM Note      Flowsheet Row Responses   [facility] patient discharged from? [facility]   Does the patient have one of the following disease processes/diagnoses(primary or secondary)? Other   TCM attempt successful? No   Unsuccessful attempts Attempt 2            Maite Mayers RN    12/8/2023, 14:46 EST

## 2023-12-10 LAB
BACTERIA SPEC AEROBE CULT: NORMAL
BACTERIA SPEC AEROBE CULT: NORMAL

## 2023-12-11 ENCOUNTER — TRANSITIONAL CARE MANAGEMENT TELEPHONE ENCOUNTER (OUTPATIENT)
Dept: CALL CENTER | Facility: HOSPITAL | Age: 48
End: 2023-12-11
Payer: COMMERCIAL

## 2023-12-11 NOTE — PROGRESS NOTES
Transitional Care Follow Up Visit  Subjective     Wayne Guan is a 48 y.o. male who presents for a transitional care management visit.    Within 48 business hours after discharge our office contacted him via telephone to coordinate his care and needs.      I reviewed and discussed the details of that call along with the discharge summary, hospital problems, inpatient lab results, inpatient diagnostic studies, and consultation reports with Wayne.     Current outpatient and discharge medications have been reconciled for the patient.  Reviewed by: DELL Morrison          12/7/2023     6:42 PM   Date of TCM Phone Call   [facility]   Date of Admission 12/4/2023   Date of Discharge 12/7/2023   Discharge Disposition Home or Self Care     Risk for Readmission (LACE) Score: 11 (12/7/2023  6:00 AM)      History of Present Illness   Course During Hospital Stay:  Patient presented with complaints of nausea and vomiting to the ER.  Patient was not feeling well for past 2 days prior to presentation started having severe nausea and vomiting.  Was not able to keep anything down.  He also drinks significant amount of alcohol but was not able to drink for a few days.  Given worsening symptoms, patient presented to ER for further evaluation.  On presentation, patient was found to have anion gap metabolic acidosis with euglycemic DKA likely due to SGLT2. Patient was started on DKA protocol with insulin drip and admitted to critical care unit for further evaluation and management.  Eventually, anion gap closed and patient was transitioned to subcutaneous long-acting insulin along with insulin sliding scale.  Insulin drip was discontinued.  Patient was started on diet which he tolerated well.  His euglycemic DKA was thought secondary to medication induced as patient was on Jardiance (SGLT2 inhibitor).  Diabetes educator also consulted the patient.     Patient is planned to be discharged home with self-care  today.  Plan was discussed with the patient and his wife at bedside.  He is Jardiance was discontinued and started on glipizide 5 mg twice daily instead.  Patient will continue taking his home dose insulin. Patient will follow-up with PCP in 3-7 days, needs to trend CBC and chemistries during the follow-up.      The following portions of the patient's history were reviewed and updated as appropriate: He  has a past medical history of Alcoholism (01/16/2021), Allergies, Aneurysm, Anxiety (07/15/2022), Atrial fibrillation, Bipolar 2 disorder (07/20/2021), Cancer, Cervical stenosis of spinal canal (11/30/2018), Colon polyp (11/1/2003 Benign), Depression, Diabetes mellitus, GERD (gastroesophageal reflux disease), History of colonic polyps (05/23/2017), Hyperlipidemia, Hypertension, Irritable bowel syndrome (2004), Lumbosacral spondylosis without myelopathy (08/29/2018), Pancreatitis (2008), Papillary thyroid carcinoma (04/01/2020), and SVT (supraventricular tachycardia) (01/23/2022).  He has DM (diabetes mellitus); Hyperlipidemia; and Essential hypertension on their pertinent problem list.  He  has a past surgical history that includes Cervical disc surgery; Thyroid surgery; Cardiac electrophysiology procedure (N/A, 09/09/2022); Cardiac Ablation (09/09/2022); Ablation of dysrhythmic focus (9/9/2022); Colonoscopy; and Appendectomy (1983).  His family history includes Alcohol abuse in his father; Anemia in his mother; Arrhythmia in his mother; Arthritis in his mother; COPD in his maternal grandmother and paternal uncle; Cancer in his maternal grandfather and paternal grandfather; Colon cancer in his paternal grandfather; Diabetes in his father, maternal grandfather, mother, and paternal aunt; Heart attack in his father; Heart disease in his maternal grandfather, maternal grandmother, maternal uncle, paternal aunt, and paternal grandmother; Hyperlipidemia in his father and mother; Hypertension in his father, maternal  grandfather, maternal grandmother, maternal uncle, mother, paternal grandmother, and sister; Mental illness in his paternal aunt; Osteoporosis in his mother; Rashes / Skin problems in his mother; Stroke in his father and maternal grandmother; Thyroid disease in his maternal grandmother.  He  reports that he quit smoking about 15 years ago. His smoking use included cigarettes. He has a 11.00 pack-year smoking history. He has never used smokeless tobacco. He reports that he does not currently use alcohol. He reports that he does not use drugs.  Current Outpatient Medications   Medication Sig Dispense Refill    Continuous Blood Gluc Sensor (FreeStyle Cooper 14 Day Sensor) misc       dilTIAZem CD (CARDIZEM CD) 180 MG 24 hr capsule TAKE 1 CAPSULE BY MOUTH DAILY 90 capsule 3    fenofibrate (TRICOR) 145 MG tablet TAKE 1 TABLET BY MOUTH EVERY DAY 90 tablet 0    fluticasone (FLONASE) 50 MCG/ACT nasal spray 2 sprays into the nostril(s) as directed by provider Daily.      folic acid (FOLVITE) 1 MG tablet Take 1 tablet by mouth Daily for 14 days. 14 tablet 0    glipizide (Glucotrol) 5 MG tablet Take 1 tablet by mouth 2 (Two) Times a Day Before Meals for 30 days. 60 tablet 0    glucose blood (Accu-Chek Guide) test strip USE AS INSTRUCTED 1 each 5    glycopyrrolate (ROBINUL) 2 MG tablet Take 1 tablet by mouth Daily. 90 tablet 1    Insulin Degludec (TRESIBA) 100 UNIT/ML solution injection Inject 50 Units under the skin into the appropriate area as directed Every Morning.      levocetirizine (XYZAL) 5 MG tablet Take 1 tablet by mouth Daily As Needed.      metoprolol succinate XL (TOPROL-XL) 50 MG 24 hr tablet TAKE 1 TABLET BY MOUTH EVERY DAY 90 tablet 2    montelukast (Singulair) 10 MG tablet Take 1 tablet by mouth Every Night. 90 tablet 3    NovoLOG FlexPen 100 UNIT/ML solution pen-injector sc pen Inject 4-6 Units under the skin into the appropriate area as directed 3 (Three) Times a Day With Meals.      omeprazole (priLOSEC) 20 MG  capsule Take 1 capsule by mouth Daily.      thiamine (VITAMIN B1) 100 MG tablet Take 1 tablet by mouth Daily for 14 days. 90 tablet 3    Vitamin D, Cholecalciferol, (CHOLECALCIFEROL) 10 MCG (400 UNIT) tablet Take 1 tablet by mouth Daily.      escitalopram (Lexapro) 20 MG tablet Take 1 tablet by mouth Daily. 90 tablet 3    levothyroxine sodium (Tirosint) 125 MCG capsule capsule Take 2 capsules by mouth Take As Directed. PATIENT TAKES 250MCG ON MON,TUES,WED,THURS,FRI  PATIENT TAKES 125MCG ON SAT  PATIENT TAKES NO LEVOTHYROXINE ON SUN      levothyroxine sodium (Tirosint) 125 MCG capsule capsule Take 1 capsule by mouth 1 (One) Time Per Week. PATIENT TAKES 250MCG ON MON,TUES,WED,THURS,FRI  PATIENT TAKES 125MCG ON SAT  PATIENT TAKES NO LEVOTHYROXINE ON SUN       No current facility-administered medications for this visit.     Current Outpatient Medications on File Prior to Visit   Medication Sig    Continuous Blood Gluc Sensor (FreeStyle Cooper 14 Day Sensor) misc     dilTIAZem CD (CARDIZEM CD) 180 MG 24 hr capsule TAKE 1 CAPSULE BY MOUTH DAILY    fenofibrate (TRICOR) 145 MG tablet TAKE 1 TABLET BY MOUTH EVERY DAY    fluticasone (FLONASE) 50 MCG/ACT nasal spray 2 sprays into the nostril(s) as directed by provider Daily.    folic acid (FOLVITE) 1 MG tablet Take 1 tablet by mouth Daily for 14 days.    glipizide (Glucotrol) 5 MG tablet Take 1 tablet by mouth 2 (Two) Times a Day Before Meals for 30 days.    glucose blood (Accu-Chek Guide) test strip USE AS INSTRUCTED    glycopyrrolate (ROBINUL) 2 MG tablet Take 1 tablet by mouth Daily.    Insulin Degludec (TRESIBA) 100 UNIT/ML solution injection Inject 50 Units under the skin into the appropriate area as directed Every Morning.    levocetirizine (XYZAL) 5 MG tablet Take 1 tablet by mouth Daily As Needed.    metoprolol succinate XL (TOPROL-XL) 50 MG 24 hr tablet TAKE 1 TABLET BY MOUTH EVERY DAY    montelukast (Singulair) 10 MG tablet Take 1 tablet by mouth Every Night.     NovoLOG FlexPen 100 UNIT/ML solution pen-injector sc pen Inject 4-6 Units under the skin into the appropriate area as directed 3 (Three) Times a Day With Meals.    omeprazole (priLOSEC) 20 MG capsule Take 1 capsule by mouth Daily.    Vitamin D, Cholecalciferol, (CHOLECALCIFEROL) 10 MCG (400 UNIT) tablet Take 1 tablet by mouth Daily.    [DISCONTINUED] escitalopram (LEXAPRO) 10 MG tablet Take 1 tablet by mouth Daily.    [DISCONTINUED] thiamine (VITAMIN B1) 100 MG tablet Take 1 tablet by mouth Daily for 14 days.    levothyroxine sodium (Tirosint) 125 MCG capsule capsule Take 2 capsules by mouth Take As Directed. PATIENT TAKES 250MCG ON MON,TUES,WED,THURS,FRI  PATIENT TAKES 125MCG ON SAT  PATIENT TAKES NO LEVOTHYROXINE ON SUN    levothyroxine sodium (Tirosint) 125 MCG capsule capsule Take 1 capsule by mouth 1 (One) Time Per Week. PATIENT TAKES 250MCG ON MON,TUES,WED,THURS,FRI  PATIENT TAKES 125MCG ON SAT  PATIENT TAKES NO LEVOTHYROXINE ON SUN     No current facility-administered medications on file prior to visit.     He is allergic to jardiance [empagliflozin]..    Review of Systems   Constitutional:  Negative for chills and fever.   Respiratory:  Negative for cough, shortness of breath and wheezing.    Cardiovascular:  Negative for chest pain.   Gastrointestinal:  Negative for constipation, diarrhea and vomiting.   Genitourinary:  Negative for difficulty urinating.   All other systems reviewed and are negative.      Objective   Physical Exam  Constitutional:       General: He is not in acute distress.  HENT:      Head: Normocephalic and atraumatic.   Cardiovascular:      Rate and Rhythm: Normal rate and regular rhythm.      Heart sounds: Normal heart sounds.   Pulmonary:      Breath sounds: Normal breath sounds. No wheezing, rhonchi or rales.   Lymphadenopathy:      Cervical: No cervical adenopathy.   Neurological:      General: No focal deficit present.      Mental Status: He is alert.         Assessment & Plan    Problems Addressed this Visit          Cardiac and Vasculature    Essential hypertension    Relevant Orders    CBC & Differential    Basic Metabolic Panel       Endocrine and Metabolic    DM (diabetes mellitus)    Relevant Orders    CBC & Differential    Basic Metabolic Panel       Mental Health    Alcoholism     Other Visit Diagnoses       Hospital discharge follow-up    -  Primary    Doing well post discharge.    Anxiety and depression  (Chronic)       Increase Lexapro to 20 mg daily.    Relevant Medications    escitalopram (Lexapro) 20 MG tablet          Diagnoses         Codes Comments    Hospital discharge follow-up    -  Primary ICD-10-CM: Z09  ICD-9-CM: V67.59 Doing well post discharge.    Type 2 diabetes mellitus with hyperglycemia, unspecified whether long term insulin use     ICD-10-CM: E11.65  ICD-9-CM: 250.00 Follow-up with diabetes specialty tomorrow.    Essential hypertension     ICD-10-CM: I10  ICD-9-CM: 401.9 Stable and to continue medication and current treatment plan.    Alcoholism     ICD-10-CM: F10.20  ICD-9-CM: 303.90 Pt reports not drinking. Refilled thiamine 100 mg daily.    Anxiety and depression     ICD-10-CM: F41.9, F32.A  ICD-9-CM: 300.00, 311 Increase Lexapro to 20 mg daily.

## 2023-12-11 NOTE — OUTREACH NOTE
Call Center TCM Note      Flowsheet Row Responses   [facility] facility patient discharged from? [facility]   Does the patient have one of the following disease processes/diagnoses(primary or secondary)? Other   TCM attempt successful? No   Unsuccessful attempts Attempt 3            Lorena Ricardo RN    12/11/2023, 16:16 EST

## 2023-12-12 ENCOUNTER — OFFICE VISIT (OUTPATIENT)
Dept: INTERNAL MEDICINE | Facility: CLINIC | Age: 48
End: 2023-12-12
Payer: COMMERCIAL

## 2023-12-12 VITALS
SYSTOLIC BLOOD PRESSURE: 132 MMHG | OXYGEN SATURATION: 98 % | WEIGHT: 267 LBS | DIASTOLIC BLOOD PRESSURE: 80 MMHG | HEART RATE: 68 BPM | HEIGHT: 73 IN | BODY MASS INDEX: 35.39 KG/M2 | TEMPERATURE: 98 F

## 2023-12-12 DIAGNOSIS — Z09 HOSPITAL DISCHARGE FOLLOW-UP: Primary | ICD-10-CM

## 2023-12-12 DIAGNOSIS — F32.A ANXIETY AND DEPRESSION: Chronic | ICD-10-CM

## 2023-12-12 DIAGNOSIS — F41.9 ANXIETY AND DEPRESSION: Chronic | ICD-10-CM

## 2023-12-12 DIAGNOSIS — I10 ESSENTIAL HYPERTENSION: ICD-10-CM

## 2023-12-12 DIAGNOSIS — F10.20 ALCOHOLISM: ICD-10-CM

## 2023-12-12 DIAGNOSIS — E11.65 TYPE 2 DIABETES MELLITUS WITH HYPERGLYCEMIA, UNSPECIFIED WHETHER LONG TERM INSULIN USE: ICD-10-CM

## 2023-12-12 LAB
ANION GAP SERPL CALCULATED.3IONS-SCNC: 11 MMOL/L (ref 5–15)
BUN SERPL-MCNC: 8 MG/DL (ref 6–20)
BUN/CREAT SERPL: 5.5 (ref 7–25)
CALCIUM SPEC-SCNC: 10 MG/DL (ref 8.6–10.5)
CHLORIDE SERPL-SCNC: 98 MMOL/L (ref 98–107)
CO2 SERPL-SCNC: 28 MMOL/L (ref 22–29)
CREAT SERPL-MCNC: 1.46 MG/DL (ref 0.76–1.27)
DEPRECATED RDW RBC AUTO: 41.2 FL (ref 37–54)
EGFRCR SERPLBLD CKD-EPI 2021: 58.2 ML/MIN/1.73
ERYTHROCYTE [DISTWIDTH] IN BLOOD BY AUTOMATED COUNT: 12.8 % (ref 12.3–15.4)
GLUCOSE SERPL-MCNC: 102 MG/DL (ref 65–99)
HCT VFR BLD AUTO: 39.6 % (ref 37.5–51)
HGB BLD-MCNC: 13.7 G/DL (ref 13–17.7)
MCH RBC QN AUTO: 30.7 PG (ref 26.6–33)
MCHC RBC AUTO-ENTMCNC: 34.6 G/DL (ref 31.5–35.7)
MCV RBC AUTO: 88.8 FL (ref 79–97)
NRBC BLD AUTO-RTO: 0 /100 WBC (ref 0–0.2)
PLATELET # BLD AUTO: 384 10*3/MM3 (ref 140–450)
PMV BLD AUTO: 9.2 FL (ref 6–12)
POTASSIUM SERPL-SCNC: 3.9 MMOL/L (ref 3.5–5.2)
RBC # BLD AUTO: 4.46 10*6/MM3 (ref 4.14–5.8)
SODIUM SERPL-SCNC: 137 MMOL/L (ref 136–145)
WBC NRBC COR # BLD AUTO: 4.46 10*3/MM3 (ref 3.4–10.8)

## 2023-12-12 PROCEDURE — 85025 COMPLETE CBC W/AUTO DIFF WBC: CPT | Performed by: NURSE PRACTITIONER

## 2023-12-12 PROCEDURE — 80048 BASIC METABOLIC PNL TOTAL CA: CPT | Performed by: NURSE PRACTITIONER

## 2023-12-12 PROCEDURE — 85007 BL SMEAR W/DIFF WBC COUNT: CPT | Performed by: NURSE PRACTITIONER

## 2023-12-12 RX ORDER — ESCITALOPRAM OXALATE 20 MG/1
20 TABLET ORAL DAILY
Qty: 90 TABLET | Refills: 3 | Status: SHIPPED | OUTPATIENT
Start: 2023-12-12

## 2023-12-12 RX ORDER — LANOLIN ALCOHOL/MO/W.PET/CERES
100 CREAM (GRAM) TOPICAL DAILY
Qty: 90 TABLET | Refills: 3 | Status: SHIPPED | OUTPATIENT
Start: 2023-12-12 | End: 2023-12-26

## 2023-12-13 LAB
BASOPHILS # BLD MANUAL: 0.09 10*3/MM3 (ref 0–0.2)
BASOPHILS NFR BLD MANUAL: 2.1 % (ref 0–1.5)
EOSINOPHIL # BLD MANUAL: 0.19 10*3/MM3 (ref 0–0.4)
EOSINOPHIL NFR BLD MANUAL: 4.2 % (ref 0.3–6.2)
LYMPHOCYTES # BLD MANUAL: 0.94 10*3/MM3 (ref 0.7–3.1)
LYMPHOCYTES NFR BLD MANUAL: 15.8 % (ref 5–12)
MONOCYTES # BLD: 0.7 10*3/MM3 (ref 0.1–0.9)
NEUTROPHILS # BLD AUTO: 2.53 10*3/MM3 (ref 1.7–7)
NEUTROPHILS NFR BLD MANUAL: 56.8 % (ref 42.7–76)
PLAT MORPH BLD: NORMAL
RBC MORPH BLD: NORMAL
VARIANT LYMPHS NFR BLD MANUAL: 21.1 % (ref 19.6–45.3)
WBC MORPH BLD: NORMAL

## 2023-12-19 RX ORDER — FOLIC ACID 1 MG/1
1 TABLET ORAL DAILY
Qty: 90 TABLET | Refills: 1 | Status: SHIPPED | OUTPATIENT
Start: 2023-12-19

## 2024-01-02 RX ORDER — FENOFIBRATE 145 MG/1
TABLET, COATED ORAL
Qty: 90 TABLET | Refills: 0 | Status: SHIPPED | OUTPATIENT
Start: 2024-01-02

## 2024-01-19 ENCOUNTER — OFFICE VISIT (OUTPATIENT)
Dept: PODIATRY | Facility: CLINIC | Age: 49
End: 2024-01-19
Payer: COMMERCIAL

## 2024-01-19 VITALS
DIASTOLIC BLOOD PRESSURE: 72 MMHG | TEMPERATURE: 97.8 F | HEART RATE: 100 BPM | BODY MASS INDEX: 36.84 KG/M2 | OXYGEN SATURATION: 95 % | HEIGHT: 73 IN | WEIGHT: 278 LBS | SYSTOLIC BLOOD PRESSURE: 132 MMHG

## 2024-01-19 DIAGNOSIS — Z79.4 TYPE 2 DIABETES MELLITUS WITHOUT COMPLICATION, WITH LONG-TERM CURRENT USE OF INSULIN: Primary | ICD-10-CM

## 2024-01-19 DIAGNOSIS — E11.8 DIABETIC FOOT: ICD-10-CM

## 2024-01-19 DIAGNOSIS — E11.9 TYPE 2 DIABETES MELLITUS WITHOUT COMPLICATION, WITH LONG-TERM CURRENT USE OF INSULIN: Primary | ICD-10-CM

## 2024-01-19 RX ORDER — INSULIN GLARGINE 300 U/ML
INJECTION, SOLUTION SUBCUTANEOUS
COMMUNITY
Start: 2023-12-13

## 2024-01-19 RX ORDER — INSULIN LISPRO 100 [IU]/ML
INJECTION, SOLUTION INTRAVENOUS; SUBCUTANEOUS
COMMUNITY
Start: 2023-12-13

## 2024-01-19 NOTE — PROGRESS NOTES
[facility] - PODIATRY    Today's Date: 01/19/24    Patient Name: Wayne Guan  MRN: [id number]  CSN: [id number]  PCP: Lyla Guerrero APRN, Last PCP Visit: 12 December 2023  Referring Provider: No ref. provider found    SUBJECTIVE     Chief Complaint   Patient presents with    Left Foot - Follow-up, Annual Exam, Diabetes    Right Foot - Follow-up, Annual Exam, Diabetes     HPI: Wayne Guan, a 48 y.o.male, presents to clinic for a diabetic foot evaluation.    New, Established, New Problem: Established  Onset: Insidious    Nature:  IDDM    Patient controlling diabetes via:  insulin    Patient states there last blood glucose was:  146    Patient denies any fevers, chills, nausea, vomiting, shortness of breath, nor any other constitutional signs nor symptoms.    Medical changes:  12/4/23, recent Admit to [facility], heart palps and DKA.  He states that he was told that he had an allergy to Jardiance and had to also discontinue metformin due to renal issues.    Past Medical History:   Diagnosis Date    Alcoholism 01/16/2021    Allergies     Aneurysm     Anxiety 07/15/2022    Atrial fibrillation     Bipolar 2 disorder 07/20/2021    Cancer     Cervical stenosis of spinal canal 11/30/2018    Formatting of this note might be different from the original. Added automatically from request for surgery [id number]    Colon polyp 11/1/2003 Benign    Depression     Diabetes mellitus     GERD (gastroesophageal reflux disease)     History of colonic polyps 05/23/2017    Formatting of this note might be different from the original. Added automatically from request for surgery [id number]    Hyperlipidemia     Hypertension     Irritable bowel syndrome 2004    Lumbosacral spondylosis without myelopathy 08/29/2018    Pancreatitis 2008    Papillary thyroid carcinoma 04/01/2020    SVT (supraventricular tachycardia) 01/23/2022     Past Surgical History:   Procedure Laterality Date    ABLATION OF DYSRHYTHMIC FOCUS  9/9/2022    APPENDECTOMY   1983    CARDIAC ABLATION  09/09/2022    CARDIAC ELECTROPHYSIOLOGY PROCEDURE N/A 09/09/2022    Procedure: Ablation SVT HOLD Metoprolol 5 days;  Surgeon: Tai Lisa MD;  Location: [facility] INVASIVE LOC[facility];  Service: Cardiovascular;  Laterality: N/A;    CERVICAL DISC SURGERY      COLONOSCOPY      2021 [facility]    THYROID SURGERY       Family History   Problem Relation Age of Onset    Arthritis Mother         rheumatoid    Diabetes Mother     Hyperlipidemia Mother     Hypertension Mother     Osteoporosis Mother     Rashes / Skin problems Mother     Anemia Mother     Arrhythmia Mother     Heart attack Father     Hyperlipidemia Father     Hypertension Father     Diabetes Father     Alcohol abuse Father     Stroke Father     Hypertension Sister     Heart disease Maternal Uncle     Hypertension Maternal Uncle     Mental illness Paternal Aunt     Heart disease Paternal Aunt     Diabetes Paternal Aunt     COPD Paternal Uncle     Stroke Maternal Grandmother     Thyroid disease Maternal Grandmother     COPD Maternal Grandmother     Hypertension Maternal Grandmother     Heart disease Maternal Grandmother     Hypertension Maternal Grandfather     Diabetes Maternal Grandfather     Cancer Maternal Grandfather         throat    Heart disease Maternal Grandfather     Heart disease Paternal Grandmother     Hypertension Paternal Grandmother     Colon cancer Paternal Grandfather     Cancer Paternal Grandfather         colon     Social History     Socioeconomic History    Marital status: Single   Tobacco Use    Smoking status: Former     Packs/day: 1.00     Years: 11.00     Additional pack years: 0.00     Total pack years: 11.00     Types: Cigarettes     Quit date: 7/19/2008     Years since quitting: 15.5    Smokeless tobacco: Never   Vaping Use    Vaping Use: Never used   Substance and Sexual Activity    Alcohol use: Not Currently     Comment: drinks 2-3 drinks 2-3 times/week.    Drug use: Never    Sexual activity: Yes      Partners: Female     Birth control/protection: Hysterectomy     Allergies   Allergen Reactions    Jardiance [Empagliflozin] Palpitations     Current Outpatient Medications   Medication Sig Dispense Refill    Continuous Blood Gluc Sensor (FreeStyle Cooper 14 Day Sensor) misc       dilTIAZem CD (CARDIZEM CD) 180 MG 24 hr capsule TAKE 1 CAPSULE BY MOUTH DAILY 90 capsule 3    escitalopram (Lexapro) 20 MG tablet Take 1 tablet by mouth Daily. 90 tablet 3    fenofibrate (TRICOR) 145 MG tablet TAKE 1 TABLET BY MOUTH EVERY DAY 90 tablet 0    fluticasone (FLONASE) 50 MCG/ACT nasal spray 2 sprays into the nostril(s) as directed by provider Daily.      folic acid (FOLVITE) 1 MG tablet Take 1 tablet by mouth Daily. 90 tablet 1    glucose blood (Accu-Chek Guide) test strip USE AS INSTRUCTED 1 each 5    glycopyrrolate (ROBINUL) 2 MG tablet Take 1 tablet by mouth Daily. 90 tablet 1    HumaLOG KwikPen 100 UNIT/ML solution pen-injector       levocetirizine (XYZAL) 5 MG tablet Take 1 tablet by mouth Daily As Needed.      levothyroxine sodium (Tirosint) 125 MCG capsule capsule Take 2 capsules by mouth Take As Directed. PATIENT TAKES 250MCG ON MON,TUES,WED,THURS,FRI  PATIENT TAKES 125MCG ON SAT  PATIENT TAKES NO LEVOTHYROXINE ON SUN      metoprolol succinate XL (TOPROL-XL) 50 MG 24 hr tablet TAKE 1 TABLET BY MOUTH EVERY DAY 90 tablet 2    montelukast (Singulair) 10 MG tablet Take 1 tablet by mouth Every Night. 90 tablet 3    omeprazole (priLOSEC) 20 MG capsule Take 1 capsule by mouth Daily.      Toujeo Max SoloStar 300 UNIT/ML solution pen-injector injection       Vitamin D, Cholecalciferol, (CHOLECALCIFEROL) 10 MCG (400 UNIT) tablet Take 1 tablet by mouth Daily.       No current facility-administered medications for this visit.     Review of Systems   Constitutional: Negative.    All other systems reviewed and are negative.      OBJECTIVE     Vitals:    01/19/24 0920   BP: 132/72   Pulse: 100   Temp: 97.8 °F (36.6 °C)   SpO2: 95%        Body mass index is 36.68 kg/m².    Lab Results   Component Value Date    HGBA1C 7.20 (H) 12/04/2023       Lab Results   Component Value Date    GLUCOSE 102 (H) 12/12/2023    CALCIUM 10.0 12/12/2023     12/12/2023    K 3.9 12/12/2023    CO2 28.0 12/12/2023    CL 98 12/12/2023    BUN 8 12/12/2023    CREATININE 1.46 (H) 12/12/2023    EGFRIFAFRI >60 12/05/2022    EGFRIFNONA 78 01/24/2022    BCR 5.5 (L) 12/12/2023    ANIONGAP 11.0 12/12/2023       Patient seen in no apparent distress.      PHYSICAL EXAM:     Foot/Ankle Exam    GENERAL  Diabetic foot exam performed    Appearance:  obese  Orientation:  AAOx3  Affect:  appropriate  Gait:  unimpaired  Assistance:  independent  Right shoe gear: casual shoe  Left shoe gear: casual shoe    VASCULAR     Right Foot Vascularity   Normal vascular exam    Dorsalis pedis:  2+  Posterior tibial:  2+  Skin temperature:  warm  Edema grading:  None  CFT:  < 3 seconds  Pedal hair growth:  Present  Varicosities:  none     Left Foot Vascularity   Normal vascular exam    Dorsalis pedis:  2+  Posterior tibial:  2+  Skin temperature:  warm  Edema grading:  None  CFT:  < 3 seconds  Pedal hair growth:  Present  Varicosities:  none     NEUROLOGIC     Right Foot Neurologic   Normal sensation    Light touch sensation: normal  Vibratory sensation: normal  Hot/Cold sensation: normal     Left Foot Neurologic   Normal sensation    Light touch sensation: normal  Vibratory sensation: normal  Hot/Cold sensation:  normal    MUSCULOSKELETAL     Left Foot Musculoskeletal   Arch: Skew foot type.  Left foot bunion: Hallux varus.      MUSCLE STRENGTH     Right Foot Muscle Strength   Foot dorsiflexion:  4  Foot plantar flexion:  4  Foot inversion:  4  Foot eversion:  4     Left Foot Muscle Strength   Foot dorsiflexion:  4  Foot plantar flexion:  4  Foot inversion:  4  Foot eversion:  4    RANGE OF MOTION     Right Foot Range of Motion   Foot and ankle ROM within normal limits       Left Foot Range of  Motion   Foot and ankle ROM within normal limits      DERMATOLOGIC      Right Foot Dermatologic   Skin  Right foot skin is intact.      Left Foot Dermatologic   Skin  Left foot skin is intact.      Right foot additional comments: Hallux varus     Left foot additional comments: Hallux varus    Diabetic Foot Exam Performed and Monofilament Test Performed      ASSESSMENT/PLAN     Diagnoses and all orders for this visit:    1. Type 2 diabetes mellitus without complication, with long-term current use of insulin (Primary)    2. Diabetic foot    Comprehensive lower extremity examination and evaluation was performed.    Discussed findings and treatment plan including risks, benefits, and treatment options with patient in detail. Patient agreed with treatment plan.    Medications and allergies reviewed.  Reviewed available blood glucose and HgB A1C lab values along with other pertinent labs.  These were discussed with the patient as to their importance of diabetic maintenance.    Diabetic foot exam performed and documented this date, compliant with CQM required standards. Detail of findings as noted in physical exam.  Lower extremity Neurologic exam for diabetic patient performed and documented this date, compliant with PQRS required standards. Detail of findings as noted in physical exam.  Advised patient importance of good routine lower extremity hygiene. Advised patient importance of evaluating for intact skin and pain free nail borders.  Advised patient to use mirror to evaluate plantar/ soles of feet for better visualization. Advised patient monitor and phone office to be seen if any cracking to skin, open lesions, painful nail borders or if nails become elongated prior to next visit. Advised patient importance of daily cleansing of lower extremities, followed by good skin cream to maintain normal hydration of skin. Also advised patient importance of close daily monitoring of blood sugar. Advised to regulate diet and  medications to maintain control of blood sugar in optimal range. Contact primary care provider if difficulties maintaining blood sugar levels.  Advised Patient of presence of Diabetes Mellitus condition.  Advised Patient risk of progression and worsening or improvement, then return of condition.  Will monitor condition for any change in future. Treat with most appropriate treatment pending status of condition.  Counseled and advised patient extensively on nature and ramifications of diabetes. Standard instructions given to patient for good diabetic foot care and maintenance. Advised importance of careful monitoring to avoid break down and complications secondary to diabetes. Advised patient importance of strict maintenance of blood sugar control. Advised patient of possible ominous results from neglect of condition, i.e.: amputation/ loss of digits, feet and legs, or even death.  Patient states understands counseling, will monitor closely, continue good hygiene and routine diabetic foot care. Patient will contact office is questions or problems.      An After Visit Summary was printed and given to the patient at discharge, including (if requested) any available informative/educational handouts regarding diagnosis, treatment, or medications. All questions were answered to patient/family satisfaction. Should symptoms fail to improve or worsen they agree to call or return to clinic or to go to the Emergency Department. Discussed the importance of following up with any needed screening tests/labs/specialist appointments and any requested follow-up recommended by me today. Importance of maintaining follow-up discussed and patient accepts that missed appointments can delay diagnosis and potentially lead to worsening of conditions.    Return in about 1 year (around 1/19/2025) for DFE., or sooner if acute issues arise.    This document has been electronically signed by Stu Jean DPM on January 19, 2024 09:44 EST

## 2024-02-20 RX ORDER — METOPROLOL SUCCINATE 50 MG/1
50 TABLET, EXTENDED RELEASE ORAL DAILY
Qty: 90 TABLET | Refills: 1 | Status: SHIPPED | OUTPATIENT
Start: 2024-02-20

## 2024-02-20 NOTE — TELEPHONE ENCOUNTER
RECEIVED METOPROLOL REFILL REQUEST VIA FAX FROM [facility] [employer] [facility].   no wheezing/no dyspnea

## 2024-02-21 RX ORDER — DILTIAZEM HYDROCHLORIDE 180 MG/1
180 CAPSULE, COATED, EXTENDED RELEASE ORAL DAILY
Qty: 90 CAPSULE | Refills: 1 | Status: SHIPPED | OUTPATIENT
Start: 2024-02-21

## 2024-03-01 ENCOUNTER — APPOINTMENT (OUTPATIENT)
Dept: GENERAL RADIOLOGY | Facility: HOSPITAL | Age: 49
DRG: 897 | End: 2024-03-01
Payer: COMMERCIAL

## 2024-03-01 ENCOUNTER — HOSPITAL ENCOUNTER (INPATIENT)
Facility: HOSPITAL | Age: 49
LOS: 1 days | Discharge: HOME | DRG: 897 | End: 2024-03-02
Attending: EMERGENCY MEDICINE | Admitting: HOSPITALIST
Payer: COMMERCIAL

## 2024-03-01 DIAGNOSIS — F10.10 ALCOHOL ABUSE: ICD-10-CM

## 2024-03-01 DIAGNOSIS — F10.29 ALCOHOL DEPENDENCE WITH UNSPECIFIED ALCOHOL-INDUCED DISORDER: Primary | ICD-10-CM

## 2024-03-01 DIAGNOSIS — F10.939 ALCOHOL WITHDRAWAL SYNDROME WITH COMPLICATION: ICD-10-CM

## 2024-03-01 DIAGNOSIS — E87.29 ALCOHOLIC KETOACIDOSIS: ICD-10-CM

## 2024-03-01 LAB
ALBUMIN SERPL-MCNC: 4.7 G/DL (ref 3.5–5.2)
ALBUMIN/GLOB SERPL: 1.4 G/DL
ALP SERPL-CCNC: 78 U/L (ref 39–117)
ALT SERPL W P-5'-P-CCNC: 36 U/L (ref 1–41)
AMPHET+METHAMPHET UR QL: NEGATIVE
ANION GAP SERPL CALCULATED.3IONS-SCNC: 26.5 MMOL/L (ref 5–15)
AST SERPL-CCNC: 54 U/L (ref 1–40)
BACTERIA UR QL AUTO: NORMAL /HPF
BARBITURATES UR QL SCN: NEGATIVE
BASE EXCESS BLDV CALC-SCNC: -3.6 MMOL/L (ref -2–2)
BASOPHILS # BLD AUTO: 0.04 10*3/MM3 (ref 0–0.2)
BASOPHILS NFR BLD AUTO: 0.6 % (ref 0–1.5)
BDY SITE: ABNORMAL
BENZODIAZ UR QL SCN: NEGATIVE
BILIRUB SERPL-MCNC: 1 MG/DL (ref 0–1.2)
BILIRUB UR QL STRIP: NEGATIVE
BUN SERPL-MCNC: 10 MG/DL (ref 6–20)
BUN/CREAT SERPL: 9.3 (ref 7–25)
CA-I BLDA-SCNC: 1.08 MMOL/L (ref 1.13–1.32)
CALCIUM SPEC-SCNC: 9.3 MG/DL (ref 8.6–10.5)
CANNABINOIDS SERPL QL: NEGATIVE
CHLORIDE BLDV-SCNC: 99 MMOL/L (ref 98–106)
CHLORIDE SERPL-SCNC: 90 MMOL/L (ref 98–107)
CK SERPL-CCNC: 279 U/L (ref 20–200)
CLARITY UR: CLEAR
CO2 SERPL-SCNC: 17.5 MMOL/L (ref 22–29)
COCAINE UR QL: NEGATIVE
COHGB MFR BLD: 1.2 % (ref 0–1.5)
COLOR UR: ABNORMAL
CREAT SERPL-MCNC: 1.07 MG/DL (ref 0.76–1.27)
DEPRECATED RDW RBC AUTO: 37.7 FL (ref 37–54)
EGFRCR SERPLBLD CKD-EPI 2021: 84.5 ML/MIN/1.73
EOSINOPHIL # BLD AUTO: 0.03 10*3/MM3 (ref 0–0.4)
EOSINOPHIL NFR BLD AUTO: 0.5 % (ref 0.3–6.2)
ERYTHROCYTE [DISTWIDTH] IN BLOOD BY AUTOMATED COUNT: 12.3 % (ref 12.3–15.4)
ETHANOL BLD-MCNC: 230 MG/DL (ref 0–10)
ETHANOL UR QL: 0.23 %
FENTANYL UR-MCNC: NEGATIVE NG/ML
FHHB: 5.5 % (ref 0–5)
FLUAV SUBTYP SPEC NAA+PROBE: NOT DETECTED
FLUBV RNA ISLT QL NAA+PROBE: NOT DETECTED
GEN 5 2HR TROPONIN T REFLEX: 15 NG/L
GLOBULIN UR ELPH-MCNC: 3.3 GM/DL
GLUCOSE BLDA-MCNC: 146 MG/DL (ref 70–99)
GLUCOSE BLDC GLUCOMTR-MCNC: 198 MG/DL (ref 70–99)
GLUCOSE SERPL-MCNC: 251 MG/DL (ref 65–99)
GLUCOSE UR STRIP-MCNC: ABNORMAL MG/DL
HCO3 BLDV-SCNC: 21.3 MMOL/L (ref 22–26)
HCT VFR BLD AUTO: 44 % (ref 37.5–51)
HGB BLD-MCNC: 15.6 G/DL (ref 13–17.7)
HGB BLDA-MCNC: 14.2 G/DL (ref 13.8–16.4)
HGB UR QL STRIP.AUTO: ABNORMAL
HOLD SPECIMEN: NORMAL
HOLD SPECIMEN: NORMAL
HYALINE CASTS UR QL AUTO: NORMAL /LPF
IMM GRANULOCYTES # BLD AUTO: 0.01 10*3/MM3 (ref 0–0.05)
IMM GRANULOCYTES NFR BLD AUTO: 0.2 % (ref 0–0.5)
INHALED O2 CONCENTRATION: 21 %
KETONES UR QL STRIP: ABNORMAL
LACTATE BLDA-SCNC: 3.23 MMOL/L (ref 0.5–2)
LEUKOCYTE ESTERASE UR QL STRIP.AUTO: NEGATIVE
LIPASE SERPL-CCNC: 7 U/L (ref 13–60)
LYMPHOCYTES # BLD AUTO: 1.57 10*3/MM3 (ref 0.7–3.1)
LYMPHOCYTES NFR BLD AUTO: 25.1 % (ref 19.6–45.3)
MAGNESIUM SERPL-MCNC: 1.6 MG/DL (ref 1.6–2.6)
MCH RBC QN AUTO: 30.1 PG (ref 26.6–33)
MCHC RBC AUTO-ENTMCNC: 35.5 G/DL (ref 31.5–35.7)
MCV RBC AUTO: 84.8 FL (ref 79–97)
METHADONE UR QL SCN: NEGATIVE
METHGB BLD QL: 0.4 % (ref 0–1.5)
MODALITY: ABNORMAL
MONOCYTES # BLD AUTO: 0.48 10*3/MM3 (ref 0.1–0.9)
MONOCYTES NFR BLD AUTO: 7.7 % (ref 5–12)
NEUTROPHILS NFR BLD AUTO: 4.12 10*3/MM3 (ref 1.7–7)
NEUTROPHILS NFR BLD AUTO: 65.9 % (ref 42.7–76)
NITRITE UR QL STRIP: NEGATIVE
NOTE: ABNORMAL
NRBC BLD AUTO-RTO: 0 /100 WBC (ref 0–0.2)
NT-PROBNP SERPL-MCNC: 43.4 PG/ML (ref 0–900)
OPIATES UR QL: NEGATIVE
OXYCODONE UR QL SCN: NEGATIVE
OXYHGB MFR BLDV: 92.9 % (ref 94–99)
PCO2 BLDV: 37.8 MM HG (ref 41–51)
PH BLDV: 7.37 PH UNITS (ref 7.31–7.41)
PH UR STRIP.AUTO: 5.5 [PH] (ref 5–8)
PLATELET # BLD AUTO: 375 10*3/MM3 (ref 140–450)
PMV BLD AUTO: 8.5 FL (ref 6–12)
PO2 BLDV: 80.3 MM HG (ref 35–42)
POTASSIUM BLDV-SCNC: 3.9 MMOL/L (ref 3.5–5)
POTASSIUM SERPL-SCNC: 3.8 MMOL/L (ref 3.5–5.2)
PROT SERPL-MCNC: 8 G/DL (ref 6–8.5)
PROT UR QL STRIP: ABNORMAL
QT INTERVAL: 331 MS
QT INTERVAL: 333 MS
QTC INTERVAL: 459 MS
QTC INTERVAL: 465 MS
RBC # BLD AUTO: 5.19 10*6/MM3 (ref 4.14–5.8)
RBC # UR STRIP: NORMAL /HPF
REF LAB TEST METHOD: NORMAL
RSV RNA NPH QL NAA+NON-PROBE: NOT DETECTED
SAO2 % BLDCOV: 94.4 % (ref 45–75)
SARS-COV-2 RNA RESP QL NAA+PROBE: NOT DETECTED
SODIUM BLDV-SCNC: 135.3 MMOL/L (ref 136–146)
SODIUM SERPL-SCNC: 134 MMOL/L (ref 136–145)
SP GR UR STRIP: 1.02 (ref 1–1.03)
SQUAMOUS #/AREA URNS HPF: NORMAL /HPF
T4 FREE SERPL-MCNC: 1.3 NG/DL (ref 0.93–1.7)
TROPONIN T DELTA: -2 NG/L
TROPONIN T SERPL HS-MCNC: 17 NG/L
TROPONIN T SERPL HS-MCNC: 17 NG/L
TSH SERPL DL<=0.05 MIU/L-ACNC: 1.9 UIU/ML (ref 0.27–4.2)
TSH SERPL DL<=0.05 MIU/L-ACNC: 1.98 UIU/ML (ref 0.27–4.2)
UROBILINOGEN UR QL STRIP: ABNORMAL
WBC # UR STRIP: NORMAL /HPF
WBC NRBC COR # BLD AUTO: 6.25 10*3/MM3 (ref 3.4–10.8)
WHOLE BLOOD HOLD COAG: NORMAL
WHOLE BLOOD HOLD SPECIMEN: NORMAL

## 2024-03-01 PROCEDURE — 25810000003 SODIUM CHLORIDE 0.9 % SOLUTION: Performed by: HOSPITALIST

## 2024-03-01 PROCEDURE — 83690 ASSAY OF LIPASE: CPT

## 2024-03-01 PROCEDURE — 25810000003 SODIUM CHLORIDE 0.9 % SOLUTION: Performed by: EMERGENCY MEDICINE

## 2024-03-01 PROCEDURE — 83735 ASSAY OF MAGNESIUM: CPT | Performed by: EMERGENCY MEDICINE

## 2024-03-01 PROCEDURE — 84484 ASSAY OF TROPONIN QUANT: CPT

## 2024-03-01 PROCEDURE — 80307 DRUG TEST PRSMV CHEM ANLYZR: CPT | Performed by: EMERGENCY MEDICINE

## 2024-03-01 PROCEDURE — 99291 CRITICAL CARE FIRST HOUR: CPT | Performed by: HOSPITALIST

## 2024-03-01 PROCEDURE — 84484 ASSAY OF TROPONIN QUANT: CPT | Performed by: EMERGENCY MEDICINE

## 2024-03-01 PROCEDURE — 82948 REAGENT STRIP/BLOOD GLUCOSE: CPT

## 2024-03-01 PROCEDURE — 82550 ASSAY OF CK (CPK): CPT | Performed by: EMERGENCY MEDICINE

## 2024-03-01 PROCEDURE — 25810000003 LACTATED RINGERS SOLUTION: Performed by: HOSPITALIST

## 2024-03-01 PROCEDURE — 25010000002 THIAMINE PER 100 MG: Performed by: HOSPITALIST

## 2024-03-01 PROCEDURE — 82820 HEMOGLOBIN-OXYGEN AFFINITY: CPT | Performed by: EMERGENCY MEDICINE

## 2024-03-01 PROCEDURE — 87637 SARSCOV2&INF A&B&RSV AMP PRB: CPT

## 2024-03-01 PROCEDURE — 82077 ASSAY SPEC XCP UR&BREATH IA: CPT | Performed by: EMERGENCY MEDICINE

## 2024-03-01 PROCEDURE — 84443 ASSAY THYROID STIM HORMONE: CPT | Performed by: HOSPITALIST

## 2024-03-01 PROCEDURE — 93005 ELECTROCARDIOGRAM TRACING: CPT

## 2024-03-01 PROCEDURE — 25810000003 LACTATED RINGERS SOLUTION: Performed by: EMERGENCY MEDICINE

## 2024-03-01 PROCEDURE — 84439 ASSAY OF FREE THYROXINE: CPT | Performed by: EMERGENCY MEDICINE

## 2024-03-01 PROCEDURE — 25010000002 ENOXAPARIN PER 10 MG: Performed by: HOSPITALIST

## 2024-03-01 PROCEDURE — 71045 X-RAY EXAM CHEST 1 VIEW: CPT

## 2024-03-01 PROCEDURE — 82805 BLOOD GASES W/O2 SATURATION: CPT | Performed by: EMERGENCY MEDICINE

## 2024-03-01 PROCEDURE — 36415 COLL VENOUS BLD VENIPUNCTURE: CPT

## 2024-03-01 PROCEDURE — 93005 ELECTROCARDIOGRAM TRACING: CPT | Performed by: EMERGENCY MEDICINE

## 2024-03-01 PROCEDURE — 25010000002 DIAZEPAM PER 5 MG: Performed by: EMERGENCY MEDICINE

## 2024-03-01 PROCEDURE — 99291 CRITICAL CARE FIRST HOUR: CPT

## 2024-03-01 PROCEDURE — 81001 URINALYSIS AUTO W/SCOPE: CPT | Performed by: EMERGENCY MEDICINE

## 2024-03-01 PROCEDURE — 25010000002 CALCIUM GLUCONATE-NACL 1-0.675 GM/50ML-% SOLUTION: Performed by: HOSPITALIST

## 2024-03-01 PROCEDURE — 80050 GENERAL HEALTH PANEL: CPT

## 2024-03-01 PROCEDURE — 25010000002 ONDANSETRON PER 1 MG: Performed by: EMERGENCY MEDICINE

## 2024-03-01 PROCEDURE — 83880 ASSAY OF NATRIURETIC PEPTIDE: CPT

## 2024-03-01 RX ORDER — LORAZEPAM 2 MG/1
2 TABLET ORAL EVERY 6 HOURS
Status: DISCONTINUED | OUTPATIENT
Start: 2024-03-01 | End: 2024-03-02 | Stop reason: HOSPADM

## 2024-03-01 RX ORDER — DIAZEPAM 5 MG/ML
5 INJECTION, SOLUTION INTRAMUSCULAR; INTRAVENOUS ONCE
Status: COMPLETED | OUTPATIENT
Start: 2024-03-01 | End: 2024-03-01

## 2024-03-01 RX ORDER — SODIUM CHLORIDE 0.9 % (FLUSH) 0.9 %
10 SYRINGE (ML) INJECTION AS NEEDED
Status: DISCONTINUED | OUTPATIENT
Start: 2024-03-01 | End: 2024-03-02 | Stop reason: HOSPADM

## 2024-03-01 RX ORDER — ASPIRIN 81 MG/1
324 TABLET, CHEWABLE ORAL ONCE
Status: COMPLETED | OUTPATIENT
Start: 2024-03-01 | End: 2024-03-01

## 2024-03-01 RX ORDER — CHLORDIAZEPOXIDE HYDROCHLORIDE 25 MG/1
50 CAPSULE, GELATIN COATED ORAL EVERY 8 HOURS SCHEDULED
Status: DISCONTINUED | OUTPATIENT
Start: 2024-03-01 | End: 2024-03-02 | Stop reason: HOSPADM

## 2024-03-01 RX ORDER — SODIUM CHLORIDE 9 MG/ML
150 INJECTION, SOLUTION INTRAVENOUS CONTINUOUS
Status: DISCONTINUED | OUTPATIENT
Start: 2024-03-01 | End: 2024-03-02

## 2024-03-01 RX ORDER — CLONIDINE HYDROCHLORIDE 0.1 MG/1
0.1 TABLET ORAL EVERY 6 HOURS
Status: DISCONTINUED | OUTPATIENT
Start: 2024-03-01 | End: 2024-03-02 | Stop reason: HOSPADM

## 2024-03-01 RX ORDER — DILTIAZEM HYDROCHLORIDE 180 MG/1
180 CAPSULE, COATED, EXTENDED RELEASE ORAL DAILY
Status: DISCONTINUED | OUTPATIENT
Start: 2024-03-02 | End: 2024-03-02 | Stop reason: HOSPADM

## 2024-03-01 RX ORDER — FAMOTIDINE 10 MG/ML
20 INJECTION, SOLUTION INTRAVENOUS EVERY 12 HOURS
Status: DISCONTINUED | OUTPATIENT
Start: 2024-03-01 | End: 2024-03-02 | Stop reason: HOSPADM

## 2024-03-01 RX ORDER — INSULIN LISPRO 100 [IU]/ML
1-200 INJECTION, SOLUTION INTRAVENOUS; SUBCUTANEOUS AS NEEDED
Status: DISCONTINUED | OUTPATIENT
Start: 2024-03-01 | End: 2024-03-02 | Stop reason: HOSPADM

## 2024-03-01 RX ORDER — DIAZEPAM 5 MG/ML
2.5 INJECTION, SOLUTION INTRAMUSCULAR; INTRAVENOUS
Status: DISCONTINUED | OUTPATIENT
Start: 2024-03-01 | End: 2024-03-02 | Stop reason: HOSPADM

## 2024-03-01 RX ORDER — ESCITALOPRAM OXALATE 10 MG/1
20 TABLET ORAL DAILY
Status: DISCONTINUED | OUTPATIENT
Start: 2024-03-02 | End: 2024-03-02 | Stop reason: HOSPADM

## 2024-03-01 RX ORDER — FAMOTIDINE 10 MG/ML
20 INJECTION, SOLUTION INTRAVENOUS ONCE
Status: COMPLETED | OUTPATIENT
Start: 2024-03-01 | End: 2024-03-01

## 2024-03-01 RX ORDER — DIAZEPAM 5 MG/ML
5 INJECTION, SOLUTION INTRAMUSCULAR; INTRAVENOUS
Status: DISCONTINUED | OUTPATIENT
Start: 2024-03-01 | End: 2024-03-02 | Stop reason: HOSPADM

## 2024-03-01 RX ORDER — NICOTINE POLACRILEX 4 MG
15 LOZENGE BUCCAL
Status: DISCONTINUED | OUTPATIENT
Start: 2024-03-01 | End: 2024-03-02 | Stop reason: HOSPADM

## 2024-03-01 RX ORDER — PANTOPRAZOLE SODIUM 40 MG/1
40 TABLET, DELAYED RELEASE ORAL
Status: DISCONTINUED | OUTPATIENT
Start: 2024-03-02 | End: 2024-03-02 | Stop reason: HOSPADM

## 2024-03-01 RX ORDER — ONDANSETRON 2 MG/ML
4 INJECTION INTRAMUSCULAR; INTRAVENOUS ONCE
Status: COMPLETED | OUTPATIENT
Start: 2024-03-01 | End: 2024-03-01

## 2024-03-01 RX ORDER — SODIUM CHLORIDE 0.9 % (FLUSH) 0.9 %
10 SYRINGE (ML) INJECTION AS NEEDED
Status: DISCONTINUED | OUTPATIENT
Start: 2024-03-01 | End: 2024-03-01

## 2024-03-01 RX ORDER — METHION/INOS/CHOL BT/B COM/LIV 110MG-86MG
100 CAPSULE ORAL DAILY
Status: DISCONTINUED | OUTPATIENT
Start: 2024-03-07 | End: 2024-03-02 | Stop reason: HOSPADM

## 2024-03-01 RX ORDER — INSULIN LISPRO 100 [IU]/ML
1-200 INJECTION, SOLUTION INTRAVENOUS; SUBCUTANEOUS
Status: DISCONTINUED | OUTPATIENT
Start: 2024-03-01 | End: 2024-03-02 | Stop reason: HOSPADM

## 2024-03-01 RX ORDER — ENOXAPARIN SODIUM 100 MG/ML
40 INJECTION SUBCUTANEOUS EVERY 24 HOURS
Status: DISCONTINUED | OUTPATIENT
Start: 2024-03-01 | End: 2024-03-02 | Stop reason: HOSPADM

## 2024-03-01 RX ORDER — CALCIUM GLUCONATE 20 MG/ML
1000 INJECTION, SOLUTION INTRAVENOUS ONCE
Status: COMPLETED | OUTPATIENT
Start: 2024-03-01 | End: 2024-03-01

## 2024-03-01 RX ORDER — LORAZEPAM 0.5 MG/1
1 TABLET ORAL EVERY 6 HOURS
Status: DISCONTINUED | OUTPATIENT
Start: 2024-03-02 | End: 2024-03-02 | Stop reason: HOSPADM

## 2024-03-01 RX ORDER — FOLIC ACID 1 MG/1
1 TABLET ORAL DAILY
Status: DISCONTINUED | OUTPATIENT
Start: 2024-03-02 | End: 2024-03-02 | Stop reason: HOSPADM

## 2024-03-01 RX ORDER — IBUPROFEN 600 MG/1
1 TABLET ORAL
Status: DISCONTINUED | OUTPATIENT
Start: 2024-03-01 | End: 2024-03-02 | Stop reason: HOSPADM

## 2024-03-01 RX ORDER — DEXTROSE MONOHYDRATE 25 G/50ML
10-50 INJECTION, SOLUTION INTRAVENOUS
Status: DISCONTINUED | OUTPATIENT
Start: 2024-03-01 | End: 2024-03-02 | Stop reason: HOSPADM

## 2024-03-01 RX ORDER — MONTELUKAST SODIUM 10 MG/1
10 TABLET ORAL NIGHTLY
Status: DISCONTINUED | OUTPATIENT
Start: 2024-03-01 | End: 2024-03-02 | Stop reason: HOSPADM

## 2024-03-01 RX ORDER — LEVOTHYROXINE SODIUM 0.12 MG/1
125 TABLET ORAL
Status: DISCONTINUED | OUTPATIENT
Start: 2024-03-03 | End: 2024-03-02

## 2024-03-01 RX ORDER — LORAZEPAM 0.5 MG/1
1 TABLET ORAL
Status: DISCONTINUED | OUTPATIENT
Start: 2024-03-01 | End: 2024-03-02 | Stop reason: HOSPADM

## 2024-03-01 RX ORDER — LORAZEPAM 2 MG/1
2 TABLET ORAL
Status: DISCONTINUED | OUTPATIENT
Start: 2024-03-01 | End: 2024-03-02 | Stop reason: HOSPADM

## 2024-03-01 RX ORDER — THIAMINE HYDROCHLORIDE 100 MG/ML
200 INJECTION, SOLUTION INTRAMUSCULAR; INTRAVENOUS EVERY 8 HOURS SCHEDULED
Status: DISCONTINUED | OUTPATIENT
Start: 2024-03-01 | End: 2024-03-02 | Stop reason: HOSPADM

## 2024-03-01 RX ORDER — MULTIPLE VITAMINS W/ MINERALS TAB 9MG-400MCG
1 TAB ORAL DAILY
Status: DISCONTINUED | OUTPATIENT
Start: 2024-03-02 | End: 2024-03-02 | Stop reason: HOSPADM

## 2024-03-01 RX ORDER — FLUTICASONE PROPIONATE 50 MCG
2 SPRAY, SUSPENSION (ML) NASAL DAILY
Status: DISCONTINUED | OUTPATIENT
Start: 2024-03-02 | End: 2024-03-02 | Stop reason: HOSPADM

## 2024-03-01 RX ORDER — METOPROLOL SUCCINATE 50 MG/1
50 TABLET, EXTENDED RELEASE ORAL DAILY
Status: DISCONTINUED | OUTPATIENT
Start: 2024-03-02 | End: 2024-03-02 | Stop reason: HOSPADM

## 2024-03-01 RX ADMIN — SODIUM CHLORIDE, POTASSIUM CHLORIDE, SODIUM LACTATE AND CALCIUM CHLORIDE 1000 ML: 600; 310; 30; 20 INJECTION, SOLUTION INTRAVENOUS at 21:34

## 2024-03-01 RX ADMIN — FAMOTIDINE 20 MG: 10 INJECTION INTRAVENOUS at 16:35

## 2024-03-01 RX ADMIN — THIAMINE HYDROCHLORIDE 200 MG: 100 INJECTION, SOLUTION INTRAMUSCULAR; INTRAVENOUS at 23:40

## 2024-03-01 RX ADMIN — LORAZEPAM 2 MG: 2 TABLET ORAL at 23:36

## 2024-03-01 RX ADMIN — ENOXAPARIN SODIUM 40 MG: 100 INJECTION SUBCUTANEOUS at 23:41

## 2024-03-01 RX ADMIN — FAMOTIDINE 20 MG: 10 INJECTION INTRAVENOUS at 23:40

## 2024-03-01 RX ADMIN — ONDANSETRON 4 MG: 2 INJECTION INTRAMUSCULAR; INTRAVENOUS at 16:35

## 2024-03-01 RX ADMIN — ONDANSETRON 4 MG: 2 INJECTION INTRAMUSCULAR; INTRAVENOUS at 18:22

## 2024-03-01 RX ADMIN — ASPIRIN 324 MG: 81 TABLET, CHEWABLE ORAL at 15:51

## 2024-03-01 RX ADMIN — CLONIDINE HYDROCHLORIDE 0.1 MG: 0.1 TABLET ORAL at 21:34

## 2024-03-01 RX ADMIN — CALCIUM GLUCONATE 1000 MG: 20 INJECTION, SOLUTION INTRAVENOUS at 20:14

## 2024-03-01 RX ADMIN — CHLORDIAZEPOXIDE HYDROCHLORIDE 50 MG: 25 CAPSULE ORAL at 21:34

## 2024-03-01 RX ADMIN — SODIUM CHLORIDE 2000 ML: 9 INJECTION, SOLUTION INTRAVENOUS at 16:34

## 2024-03-01 RX ADMIN — SODIUM CHLORIDE 125 ML/HR: 9 INJECTION, SOLUTION INTRAVENOUS at 20:14

## 2024-03-01 RX ADMIN — SODIUM CHLORIDE, POTASSIUM CHLORIDE, SODIUM LACTATE AND CALCIUM CHLORIDE 1000 ML: 600; 310; 30; 20 INJECTION, SOLUTION INTRAVENOUS at 18:20

## 2024-03-01 RX ADMIN — DIAZEPAM 5 MG: 10 INJECTION, SOLUTION INTRAMUSCULAR; INTRAVENOUS at 16:36

## 2024-03-01 RX ADMIN — DIAZEPAM 5 MG: 10 INJECTION, SOLUTION INTRAMUSCULAR; INTRAVENOUS at 18:22

## 2024-03-01 NOTE — Clinical Note
Level of Care: Progressive Care [20]   Diagnosis: Alcohol abuse [217750]   Admitting Physician: EZ HOFFMAN [[id number]]   Attending Physician: EZ HOFFMAN [[id number]]   Certification: I Certify That Inpatient Hospital Services Are Medically Necessary For Greater Than 2 Midnights

## 2024-03-01 NOTE — ED PROVIDER NOTES
Time: 4:13 PM EST  Date of encounter:  3/1/2024  Independent Historian/Clinical History and Information was obtained by:   Patient, Family, and EMS    History is limited by: N/A    Chief Complaint: Chest discomfort, vomiting, shortness of breath and palpitation      History of Present Illness:  Patient is a 48 y.o. year old male who presents to the emergency department for evaluation of chest discomfort, vomiting, shortness of breath and palpitations.  This patient presents to the emergency department stating that he started developing palpitations, vomiting and chest discomfort which started earlier in the day.  The patient has had no fever chills.  Patient has no severe abdominal pain.  He does have a history of alcoholism and states that he started drinking recently due to anxiety.  His last drink was earlier this morning.  The patient presents to the emergency room with persistent vomiting, anxious affect, and a heart rate of 167 bpm.    HPI    Patient Care Team  Primary Care Provider: Lyla Guerrero APRN    Past Medical History:     Allergies   Allergen Reactions    Jardiance [Empagliflozin] Palpitations     Past Medical History:   Diagnosis Date    Alcoholism 01/16/2021    Allergies     Aneurysm     Anxiety 07/15/2022    Atrial fibrillation     Bipolar 2 disorder 07/20/2021    Cancer     Cervical stenosis of spinal canal 11/30/2018    Formatting of this note might be different from the original. Added automatically from request for surgery [id number]    Colon polyp 11/1/2003 Benign    Depression     Diabetes mellitus     GERD (gastroesophageal reflux disease)     History of colonic polyps 05/23/2017    Formatting of this note might be different from the original. Added automatically from request for surgery [id number]    Hyperlipidemia     Hypertension     Irritable bowel syndrome 2004    Lumbosacral spondylosis without myelopathy 08/29/2018    Pancreatitis 2008    Papillary thyroid carcinoma 04/01/2020    SVT  (supraventricular tachycardia) 01/23/2022     Past Surgical History:   Procedure Laterality Date    ABLATION OF DYSRHYTHMIC FOCUS  9/9/2022    APPENDECTOMY  1983    CARDIAC ABLATION  09/09/2022    CARDIAC ELECTROPHYSIOLOGY PROCEDURE N/A 09/09/2022    Procedure: Ablation SVT HOLD Metoprolol 5 days;  Surgeon: Tai Lisa MD;  Location:  [facility];  Service: Cardiovascular;  Laterality: N/A;    CERVICAL DISC SURGERY      COLONOSCOPY      2021 [facility]    THYROID SURGERY       Family History   Problem Relation Age of Onset    Arthritis Mother         rheumatoid    Diabetes Mother     Hyperlipidemia Mother     Hypertension Mother     Osteoporosis Mother     Rashes / Skin problems Mother     Anemia Mother     Arrhythmia Mother     Heart attack Father     Hyperlipidemia Father     Hypertension Father     Diabetes Father     Alcohol abuse Father     Stroke Father     Hypertension Sister     Heart disease Maternal Uncle     Hypertension Maternal Uncle     Mental illness Paternal Aunt     Heart disease Paternal Aunt     Diabetes Paternal Aunt     COPD Paternal Uncle     Stroke Maternal Grandmother     Thyroid disease Maternal Grandmother     COPD Maternal Grandmother     Hypertension Maternal Grandmother     Heart disease Maternal Grandmother     Hypertension Maternal Grandfather     Diabetes Maternal Grandfather     Cancer Maternal Grandfather         throat    Heart disease Maternal Grandfather     Heart disease Paternal Grandmother     Hypertension Paternal Grandmother     Colon cancer Paternal Grandfather     Cancer Paternal Grandfather         colon       Home Medications:  Prior to Admission medications    Medication Sig Start Date End Date Taking? Authorizing Provider   Continuous Blood Gluc Sensor (FreeStyle Cooper 14 Day Sensor) misc  4/1/22   Provider, MD Kishore   dilTIAZem CD (CARDIZEM CD) 180 MG 24 hr capsule Take 1 capsule by mouth Daily. 2/21/24   Yao Keen MD   escitalopram  (Lexapro) 20 MG tablet Take 1 tablet by mouth Daily. 12/12/23   Lyla Guerrero APRN   fenofibrate (TRICOR) 145 MG tablet TAKE 1 TABLET BY MOUTH EVERY DAY 1/2/24   Lyla Guerrero APRN   fluticasone (FLONASE) 50 MCG/ACT nasal spray 2 sprays into the nostril(s) as directed by provider Daily.    Kishore Galo MD   folic acid (FOLVITE) 1 MG tablet Take 1 tablet by mouth Daily. 12/19/23   Lyla Guerrero APRN   glucose blood (Accu-Chek Guide) test strip USE AS INSTRUCTED 10/5/23   Lyla Guerrero APRN   glycopyrrolate (ROBINUL) 2 MG tablet Take 1 tablet by mouth Daily. 10/27/23   Lyla Guerrero APRN   HumaLOG KwikPen 100 UNIT/ML solution pen-injector  12/13/23   Kishore Galo MD   levocetirizine (XYZAL) 5 MG tablet Take 1 tablet by mouth Daily As Needed. 4/1/23   Kishore Galo MD   levothyroxine sodium (Tirosint) 125 MCG capsule capsule Take 2 capsules by mouth Take As Directed. PATIENT TAKES 250MCG ON MON,TUES,WED,THURS,FRI  PATIENT TAKES 125MCG ON SAT  PATIENT TAKES NO LEVOTHYROXINE ON SUN    Kishore Galo MD   metoprolol succinate XL (TOPROL-XL) 50 MG 24 hr tablet Take 1 tablet by mouth Daily. 2/20/24   Crissy Light APRN   montelukast (Singulair) 10 MG tablet Take 1 tablet by mouth Every Night. 2/17/23   Lyla Guerrero APRN   omeprazole (priLOSEC) 20 MG capsule Take 1 capsule by mouth Daily.    Kishore Galo MD Toujeo Max SoloStar 300 UNIT/ML solution pen-injector injection  12/13/23   Kishore Galo MD   Vitamin D, Cholecalciferol, (CHOLECALCIFEROL) 10 MCG (400 UNIT) tablet Take 1 tablet by mouth Daily.    Kishore Galo MD        Social History:   Social History     Tobacco Use    Smoking status: Former     Packs/day: 1.00     Years: 11.00     Additional pack years: 0.00     Total pack years: 11.00     Types: Cigarettes     Quit date: 7/19/2008     Years since quitting: 15.6    Smokeless tobacco: Never   Vaping Use    Vaping Use:  "Never used   Substance Use Topics    Alcohol use: Not Currently     Comment: drinks 2-3 drinks 2-3 times/week.    Drug use: Never         Review of Systems:  Review of Systems   Constitutional:  Negative for chills and fever.   HENT:  Negative for congestion, ear pain and sore throat.    Eyes:  Negative for pain.   Respiratory:  Positive for shortness of breath. Negative for cough and chest tightness.    Cardiovascular:  Positive for chest pain and palpitations.   Gastrointestinal:  Positive for nausea and vomiting. Negative for abdominal pain and diarrhea.   Genitourinary:  Negative for flank pain and hematuria.   Musculoskeletal:  Negative for joint swelling.   Skin:  Negative for pallor.   Neurological:  Positive for weakness. Negative for seizures and headaches.   Psychiatric/Behavioral:  The patient is nervous/anxious.    All other systems reviewed and are negative.       Physical Exam:  /74   Pulse (!) 122   Temp 98.8 °F (37.1 °C) (Oral)   Resp 16   Ht 185.4 cm (73\")   Wt 122 kg (268 lb 1.3 oz)   SpO2 99%   BMI 35.37 kg/m²     Physical Exam  Vitals and nursing note reviewed.   Constitutional:       General: He is in acute distress.      Appearance: Normal appearance. He is ill-appearing. He is not toxic-appearing.   HENT:      Head: Normocephalic and atraumatic.      Mouth/Throat:      Mouth: Mucous membranes are moist.   Eyes:      General: No scleral icterus.  Cardiovascular:      Rate and Rhythm: Regular rhythm. Tachycardia present.      Pulses: Normal pulses.      Heart sounds: Normal heart sounds.   Pulmonary:      Effort: Pulmonary effort is normal. No respiratory distress.      Breath sounds: Normal breath sounds.   Abdominal:      General: Abdomen is flat.      Palpations: Abdomen is soft.      Tenderness: There is no abdominal tenderness.   Musculoskeletal:         General: Normal range of motion.      Cervical back: Normal range of motion and neck supple.   Skin:     General: Skin is " warm and dry.      Capillary Refill: Capillary refill takes less than 2 seconds.   Neurological:      General: No focal deficit present.      Mental Status: He is alert and oriented to person, place, and time. Mental status is at baseline.   Psychiatric:      Comments: Anxious affect.                  Procedures:  Procedures      Medical Decision Making:      Comorbidities that affect care:    Substance Abuse    External Notes reviewed:    Previous Clinic Note: Clinic note for diabetes.      The following orders were placed and all results were independently analyzed by me:  Orders Placed This Encounter   Procedures    COVID PRE-OP / PRE-PROCEDURE SCREENING ORDER (NO ISOLATION) - Swab, Nasopharynx    COVID-19, FLU A/B, RSV PCR 1 HR TAT - Swab, Nasopharynx    XR Chest 1 View    [place] Draw    Urinalysis With Microscopic If Indicated (No Culture) - Urine, Clean Catch    Comprehensive Metabolic Panel    Single High Sensitivity Troponin T    High Sensitivity Troponin T    Lipase    BNP    Magnesium    CBC Auto Differential    High Sensitivity Troponin T 2Hr    TSH    T4, Free    CK    Ethanol    Urine Drug Screen - Urine, Clean Catch    VBG with Co-Ox and Electrolytes    NPO Diet NPO Type: Strict NPO    Undress & Gown    Vital Signs    Orthostatic Blood Pressure    Undress & Gown    Vital Signs    Undress & Gown    Continuous Pulse Oximetry    Hospitalist (on-call MD unless specified)    Oxygen Therapy- Nasal Cannula; Titrate 1-6 LPM Per SpO2; 90 - 95%    Oxygen Therapy- Nasal Cannula; Titrate 1-6 LPM Per SpO2; 90 - 95%    Oxygen Therapy- Nasal Cannula; Titrate 1-6 LPM Per SpO2; 90 - 95%    POC Glucose Once    ECG 12 Lead ED Triage Standing Order; Weak / Dizzy / AMS    ECG 12 Lead ED Triage Standing Order; SOA    ECG 12 Lead ED Triage Standing Order; Chest Pain    ECG 12 Lead ED Triage Standing Order; Chest Pain    Insert Peripheral IV    Insert Peripheral IV    Insert Peripheral IV    Fall Precautions    CBC &  Differential    Green Top (Gel)    Lavender Top    Gold Top - SST    Light Blue Top       Medications Given in the Emergency Department:  Medications   sodium chloride 0.9 % flush 10 mL (has no administration in time range)   sodium chloride 0.9 % flush 10 mL (has no administration in time range)   sodium chloride 0.9 % flush 10 mL (has no administration in time range)   aspirin chewable tablet 324 mg (324 mg Oral Given 3/1/24 1551)   sodium chloride 0.9 % bolus 2,000 mL (0 mL Intravenous Stopped 3/1/24 1751)   ondansetron (ZOFRAN) injection 4 mg (4 mg Intravenous Given 3/1/24 1635)   famotidine (PEPCID) injection 20 mg (20 mg Intravenous Given 3/1/24 1635)   diazePAM (VALIUM) injection 5 mg (5 mg Intravenous Given 3/1/24 1636)   ondansetron (ZOFRAN) injection 4 mg (4 mg Intravenous Given 3/1/24 1822)   lactated ringers bolus 1,000 mL (0 mL Intravenous Stopped 3/1/24 1931)   diazePAM (VALIUM) injection 5 mg (5 mg Intravenous Given 3/1/24 1822)        ED Course:       EKG: Sinus tachycardia with a rate of 118 bpm  No acute ischemia.  Labs:    Lab Results (last 24 hours)       Procedure Component Value Units Date/Time    CBC & Differential [[id number]]  (Normal) Collected: 03/01/24 1531    Specimen: Blood Updated: 03/01/24 1544    Narrative:      The following orders were created for panel order CBC & Differential.  Procedure                               Abnormality         Status                     ---------                               -----------         ------                     CBC Auto Differential[[id number]]        Normal              Final result                 Please view results for these tests on the individual orders.    Comprehensive Metabolic Panel [[id number]]  (Abnormal) Collected: 03/01/24 1531    Specimen: Blood Updated: 03/01/24 1711     Glucose 251 mg/dL      BUN 10 mg/dL      Creatinine 1.07 mg/dL      Sodium 134 mmol/L      Potassium 3.8 mmol/L      Comment: Slight hemolysis detected by  analyzer. Result may be falsely elevated.        Chloride 90 mmol/L      CO2 17.5 mmol/L      Calcium 9.3 mg/dL      Total Protein 8.0 g/dL      Albumin 4.7 g/dL      ALT (SGPT) 36 U/L      AST (SGOT) 54 U/L      Alkaline Phosphatase 78 U/L      Total Bilirubin 1.0 mg/dL      Globulin 3.3 gm/dL      A/G Ratio 1.4 g/dL      BUN/Creatinine Ratio 9.3     Anion Gap 26.5 mmol/L      eGFR 84.5 mL/min/1.73     Narrative:      GFR Normal >60  Chronic Kidney Disease <60  Kidney Failure <15      Single High Sensitivity Troponin T [[id number]]  (Normal) Collected: 03/01/24 1531    Specimen: Blood Updated: 03/01/24 1610     HS Troponin T 17 ng/L     Narrative:      High Sensitive Troponin T Reference Range:  <14.0 ng/L- Negative Female for AMI  <22.0 ng/L- Negative Male for AMI  >=14 - Abnormal Female indicating possible myocardial injury.  >=22 - Abnormal Male indicating possible myocardial injury.   Clinicians would have to utilize clinical acumen, EKG, Troponin, and serial changes to determine if it is an Acute Myocardial Infarction or myocardial injury due to an underlying chronic condition.         High Sensitivity Troponin T [[id number]]  (Normal) Collected: 03/01/24 1531    Specimen: Blood Updated: 03/01/24 1610     HS Troponin T 17 ng/L     Narrative:      High Sensitive Troponin T Reference Range:  <14.0 ng/L- Negative Female for AMI  <22.0 ng/L- Negative Male for AMI  >=14 - Abnormal Female indicating possible myocardial injury.  >=22 - Abnormal Male indicating possible myocardial injury.   Clinicians would have to utilize clinical acumen, EKG, Troponin, and serial changes to determine if it is an Acute Myocardial Infarction or myocardial injury due to an underlying chronic condition.         Lipase [[id number]]  (Abnormal) Collected: 03/01/24 1531    Specimen: Blood Updated: 03/01/24 1610     Lipase 7 U/L     BNP [[id number]]  (Normal) Collected: 03/01/24 1531    Specimen: Blood Updated: 03/01/24 1605     proBNP 43.4  pg/mL     Narrative:      This assay is used as an aid in the diagnosis of individuals suspected of having heart failure. It can be used as an aid in the diagnosis of acute decompensated heart failure (ADHF) in patients presenting with signs and symptoms of ADHF to the emergency department (ED). In addition, NT-proBNP of <300 pg/mL indicates ADHF is not likely.    Age Range Result Interpretation  NT-proBNP Concentration (pg/mL:      <50             Positive            >450                   Gray                 300-450                    Negative             <300    50-75           Positive            >900                  Gray                300-900                  Negative            <300      >75             Positive            >1800                  Gray                300-1800                  Negative            <300    Magnesium [[id number]]  (Normal) Collected: 03/01/24 1531    Specimen: Blood Updated: 03/01/24 1612     Magnesium 1.6 mg/dL     CBC Auto Differential [[id number]]  (Normal) Collected: 03/01/24 1531    Specimen: Blood Updated: 03/01/24 1544     WBC 6.25 10*3/mm3      RBC 5.19 10*6/mm3      Hemoglobin 15.6 g/dL      Hematocrit 44.0 %      MCV 84.8 fL      MCH 30.1 pg      MCHC 35.5 g/dL      RDW 12.3 %      RDW-SD 37.7 fl      MPV 8.5 fL      Platelets 375 10*3/mm3      Neutrophil % 65.9 %      Lymphocyte % 25.1 %      Monocyte % 7.7 %      Eosinophil % 0.5 %      Basophil % 0.6 %      Immature Grans % 0.2 %      Neutrophils, Absolute 4.12 10*3/mm3      Lymphocytes, Absolute 1.57 10*3/mm3      Monocytes, Absolute 0.48 10*3/mm3      Eosinophils, Absolute 0.03 10*3/mm3      Basophils, Absolute 0.04 10*3/mm3      Immature Grans, Absolute 0.01 10*3/mm3      nRBC 0.0 /100 WBC     COVID PRE-OP / PRE-PROCEDURE SCREENING ORDER (NO ISOLATION) - Swab, Nasopharynx [[id number]]  (Normal) Collected: 03/01/24 1531    Specimen: Swab from Nasopharynx Updated: 03/01/24 1620    Narrative:      The following orders  were created for panel order COVID PRE-OP / PRE-PROCEDURE SCREENING ORDER (NO ISOLATION) - Swab, Nasopharynx.  Procedure                               Abnormality         Status                     ---------                               -----------         ------                     COVID-19, FLU A/B, RSV P...[[id number]]  Normal              Final result                 Please view results for these tests on the individual orders.    COVID-19, FLU A/B, RSV PCR 1 HR TAT - Swab, Nasopharynx [[id number]]  (Normal) Collected: 03/01/24 1531    Specimen: Swab from Nasopharynx Updated: 03/01/24 1620     COVID19 Not Detected     Influenza A PCR Not Detected     Influenza B PCR Not Detected     RSV, PCR Not Detected    Narrative:      Fact sheet for providers: https://www.fda.gov/media/385186/download    Fact sheet for patients: https://www.fda.gov/media/502079/download    Test performed by PCR.    TSH [[id number]]  (Normal) Collected: 03/01/24 1531    Specimen: Blood Updated: 03/01/24 1652     TSH 1.980 uIU/mL     T4, Free [[id number]]  (Normal) Collected: 03/01/24 1531    Specimen: Blood Updated: 03/01/24 1652     Free T4 1.30 ng/dL     Narrative:      Results may be falsely increased if patient taking Biotin.      CK [[id number]]  (Abnormal) Collected: 03/01/24 1531    Specimen: Blood Updated: 03/01/24 1645     Creatine Kinase 279 U/L     Ethanol [[id number]]  (Abnormal) Collected: 03/01/24 1531    Specimen: Blood Updated: 03/01/24 1645     Ethanol 230 mg/dL      Ethanol % 0.230 %     Narrative:      Ethanol (Plasma)  <10 Essentially Negative    Toxic Concentrations           mg/dL    Flushing, slowing of reflexes    Impaired visual activity         Depression of CNS              >100  Possible Coma                  >300       High Sensitivity Troponin T 2Hr [[id number]]  (Normal) Collected: 03/01/24 1736    Specimen: Blood Updated: 03/01/24 1827     HS Troponin T 15 ng/L      Troponin T Delta -2 ng/L      Narrative:      High Sensitive Troponin T Reference Range:  <14.0 ng/L- Negative Female for AMI  <22.0 ng/L- Negative Male for AMI  >=14 - Abnormal Female indicating possible myocardial injury.  >=22 - Abnormal Male indicating possible myocardial injury.   Clinicians would have to utilize clinical acumen, EKG, Troponin, and serial changes to determine if it is an Acute Myocardial Infarction or myocardial injury due to an underlying chronic condition.                  Imaging:    XR Chest 1 View    Result Date: 3/1/2024  PROCEDURE: XR CHEST 1 VW  COMPARISON: [facility], CT, CT CHEST W CONTRAST DIAGNOSTIC, 2/10/2023, 10:09.  [facility], CR, XR CHEST 1 VW, 12/04/2023, 16:36.  INDICATIONS: SOA Triage Protocol  vomiting chest pain and short of breath  FINDINGS:  Heart is not enlarged.  There is some prominence of markings in the lower lungs which has been suggested.  This could reflect some chronic change.  A superimposed acute on chronic process not excluded in the proper clinical setting.  There are no pleural effusions.  The upper lung zones are clear.  Patient has had previous cervical spine surgery.        1. Prominence of markings within the interstitium of the lower lungs that may be reflective of some chronic change.  A more acute on chronic process could not be excluded in the proper clinical setting.       CORI CLIFFORD MD       Electronically Signed and Approved By: CORI CLIFFORD MD on 3/01/2024 at 15:36                Differential Diagnosis and Discussion:    Palpitations: Differential diagnosis includes but is not limited to anxiety, atrioventricular blocks, mitral valve disease, hypoxia, coronary artery disease, hypokalemia, anemia, fever, COPD, congestive heart failure, pericarditis, Reuben-Parkinson-White syndrome, pulmonary embolism, SVT, atrial fibrillation, atrial flutter, sinus tachycardia, thyrotoxicosis, and pheochromocytoma.  Vomiting: Differential diagnosis includes but is  not limited to migraine, labyrinthine disorders, psychogenic, metabolic and endocrine causes, peptic ulcer, gastric outlet obstruction, gastritis, gastroenteritis, appendicitis, intestinal obstruction, paralytic ileus, food poisoning, cholecystitis, acute hepatitis, acute pancreatitis, acute febrile illness, and myocardial infarction.  Weakness: Based on the patient's history, signs, and symptoms, the diffential diagnosis includes but is not limited to meningitis, stroke, sepsis, subarachnoid hemorrhage, intracranial bleeding, encephalitis, acute uti, dehydration, MS, myasthenia gravis, Guillan Hammond, migraine variant, neuromuscular disorders vertigo, electrolyte imbalance, and metabolic disorders.    All labs were reviewed and interpreted by me.  EKG was interpreted by me.    MDM  Number of Diagnoses or Management Options  Alcohol dependence with unspecified alcohol-induced disorder  Alcohol withdrawal syndrome with complication  Alcoholic ketoacidosis  Diagnosis management comments: Patient was given IV fluids and antiemetics in the emergency department along with Valium.  The patient remains tachycardic and anxious.  I am concerned that he is either going through withdrawal and or alcoholic ketoacidosis given the patient has a CO2 of 17 and an anion gap of 26.5 on his complete metabolic profile.  The patient was given 3 L of fluids however remains tachycardic subsequently he will be admitted for further evaluation and treatment.       Amount and/or Complexity of Data Reviewed  Clinical lab tests: reviewed  Tests in the radiology section of CPT®: reviewed  Tests in the medicine section of CPT®: reviewed    Critical Care  Total time providing critical care: 45 minutes (Including direct patient care, review of medical records, discussion with family, discussion with consultant, and reevaluation of patient.)                 Patient Care Considerations:    CT ABDOMEN AND PELVIS: I considered ordering a CT scan of the  abdomen and pelvis however the patient has no significant abdominal pain or tenderness at this time.      Consultants/Shared Management Plan:    Hospitalist: I have discussed the case with hospitalist who agrees to accept the patient for admission.    Social Determinants of Health:    Patient is unable to carry out activities of daily life. Escalation of care is necessary.       Disposition and Care Coordination:    Admit:   Through independent evaluation of the patient's history, physical, and imperical data, the patient meets criteria for inpatient admission to the hospital.        Final diagnoses:   Alcohol dependence with unspecified alcohol-induced disorder   Alcohol withdrawal syndrome with complication   Alcoholic ketoacidosis        ED Disposition       ED Disposition   Decision to Admit    Condition   --    Comment   --               This medical record created using voice recognition software.             Alex Mcmanus,   03/01/24 1937       Alex Mcmanus,   03/01/24 1937

## 2024-03-02 ENCOUNTER — READMISSION MANAGEMENT (OUTPATIENT)
Dept: CALL CENTER | Facility: HOSPITAL | Age: 49
End: 2024-03-02
Payer: COMMERCIAL

## 2024-03-02 VITALS
HEIGHT: 73 IN | RESPIRATION RATE: 18 BRPM | TEMPERATURE: 98.2 F | HEART RATE: 91 BPM | DIASTOLIC BLOOD PRESSURE: 75 MMHG | WEIGHT: 268.08 LBS | OXYGEN SATURATION: 95 % | SYSTOLIC BLOOD PRESSURE: 126 MMHG | BODY MASS INDEX: 35.53 KG/M2

## 2024-03-02 LAB
ANION GAP SERPL CALCULATED.3IONS-SCNC: 16.1 MMOL/L (ref 5–15)
ANION GAP SERPL CALCULATED.3IONS-SCNC: 17.7 MMOL/L (ref 5–15)
ANION GAP SERPL CALCULATED.3IONS-SCNC: 18.6 MMOL/L (ref 5–15)
BUN SERPL-MCNC: 8 MG/DL (ref 6–20)
BUN/CREAT SERPL: 7.3 (ref 7–25)
BUN/CREAT SERPL: 8 (ref 7–25)
BUN/CREAT SERPL: 9.2 (ref 7–25)
CALCIUM SPEC-SCNC: 8.6 MG/DL (ref 8.6–10.5)
CALCIUM SPEC-SCNC: 8.6 MG/DL (ref 8.6–10.5)
CALCIUM SPEC-SCNC: 8.7 MG/DL (ref 8.6–10.5)
CHLORIDE SERPL-SCNC: 94 MMOL/L (ref 98–107)
CHLORIDE SERPL-SCNC: 96 MMOL/L (ref 98–107)
CHLORIDE SERPL-SCNC: 96 MMOL/L (ref 98–107)
CO2 SERPL-SCNC: 19.9 MMOL/L (ref 22–29)
CO2 SERPL-SCNC: 20.3 MMOL/L (ref 22–29)
CO2 SERPL-SCNC: 20.4 MMOL/L (ref 22–29)
CREAT SERPL-MCNC: 0.87 MG/DL (ref 0.76–1.27)
CREAT SERPL-MCNC: 1 MG/DL (ref 0.76–1.27)
CREAT SERPL-MCNC: 1.1 MG/DL (ref 0.76–1.27)
EGFRCR SERPLBLD CKD-EPI 2021: 105.1 ML/MIN/1.73
EGFRCR SERPLBLD CKD-EPI 2021: 81.8 ML/MIN/1.73
EGFRCR SERPLBLD CKD-EPI 2021: 91.7 ML/MIN/1.73
GLUCOSE BLDC GLUCOMTR-MCNC: 145 MG/DL (ref 70–99)
GLUCOSE BLDC GLUCOMTR-MCNC: 232 MG/DL (ref 70–99)
GLUCOSE SERPL-MCNC: 127 MG/DL (ref 65–99)
GLUCOSE SERPL-MCNC: 175 MG/DL (ref 65–99)
GLUCOSE SERPL-MCNC: 231 MG/DL (ref 65–99)
MAGNESIUM SERPL-MCNC: 1.5 MG/DL (ref 1.6–2.6)
POTASSIUM SERPL-SCNC: 3.6 MMOL/L (ref 3.5–5.2)
POTASSIUM SERPL-SCNC: 3.7 MMOL/L (ref 3.5–5.2)
POTASSIUM SERPL-SCNC: 3.7 MMOL/L (ref 3.5–5.2)
POTASSIUM SERPL-SCNC: 3.9 MMOL/L (ref 3.5–5.2)
SODIUM SERPL-SCNC: 130 MMOL/L (ref 136–145)
SODIUM SERPL-SCNC: 134 MMOL/L (ref 136–145)
SODIUM SERPL-SCNC: 135 MMOL/L (ref 136–145)

## 2024-03-02 PROCEDURE — 25010000002 MAGNESIUM SULFATE 2 GM/50ML SOLUTION: Performed by: HOSPITALIST

## 2024-03-02 PROCEDURE — 84132 ASSAY OF SERUM POTASSIUM: CPT | Performed by: HOSPITALIST

## 2024-03-02 PROCEDURE — 63710000001 INSULIN LISPRO (HUMAN) PER 5 UNITS: Performed by: HOSPITALIST

## 2024-03-02 PROCEDURE — 82948 REAGENT STRIP/BLOOD GLUCOSE: CPT

## 2024-03-02 PROCEDURE — 99239 HOSP IP/OBS DSCHRG MGMT >30: CPT | Performed by: INTERNAL MEDICINE

## 2024-03-02 PROCEDURE — 80048 BASIC METABOLIC PNL TOTAL CA: CPT | Performed by: HOSPITALIST

## 2024-03-02 PROCEDURE — 99223 1ST HOSP IP/OBS HIGH 75: CPT | Performed by: INTERNAL MEDICINE

## 2024-03-02 PROCEDURE — 25010000002 THIAMINE PER 100 MG: Performed by: HOSPITALIST

## 2024-03-02 PROCEDURE — 36415 COLL VENOUS BLD VENIPUNCTURE: CPT | Performed by: HOSPITALIST

## 2024-03-02 PROCEDURE — 83735 ASSAY OF MAGNESIUM: CPT | Performed by: HOSPITALIST

## 2024-03-02 PROCEDURE — 25810000003 SODIUM CHLORIDE 0.9 % SOLUTION: Performed by: HOSPITALIST

## 2024-03-02 RX ORDER — POTASSIUM CHLORIDE 20 MEQ/1
40 TABLET, EXTENDED RELEASE ORAL EVERY 4 HOURS
Status: COMPLETED | OUTPATIENT
Start: 2024-03-02 | End: 2024-03-02

## 2024-03-02 RX ORDER — MAGNESIUM SULFATE HEPTAHYDRATE 40 MG/ML
2 INJECTION, SOLUTION INTRAVENOUS
Status: COMPLETED | OUTPATIENT
Start: 2024-03-02 | End: 2024-03-02

## 2024-03-02 RX ORDER — CHLORDIAZEPOXIDE HYDROCHLORIDE 25 MG/1
CAPSULE, GELATIN COATED ORAL
Qty: 24 CAPSULE | Refills: 0 | Status: SHIPPED | OUTPATIENT
Start: 2024-03-02 | End: 2024-03-02

## 2024-03-02 RX ORDER — LEVOTHYROXINE SODIUM 0.12 MG/1
250 TABLET ORAL
Status: DISCONTINUED | OUTPATIENT
Start: 2024-03-04 | End: 2024-03-02 | Stop reason: HOSPADM

## 2024-03-02 RX ORDER — CHLORDIAZEPOXIDE HYDROCHLORIDE 25 MG/1
CAPSULE, GELATIN COATED ORAL
Qty: 24 CAPSULE | Refills: 0 | Status: SHIPPED | OUTPATIENT
Start: 2024-03-02 | End: 2024-03-10

## 2024-03-02 RX ORDER — LEVOTHYROXINE SODIUM 0.12 MG/1
125 TABLET ORAL
Status: DISCONTINUED | OUTPATIENT
Start: 2024-03-02 | End: 2024-03-02 | Stop reason: HOSPADM

## 2024-03-02 RX ADMIN — POTASSIUM CHLORIDE 40 MEQ: 1500 TABLET, EXTENDED RELEASE ORAL at 08:45

## 2024-03-02 RX ADMIN — ESCITALOPRAM OXALATE 20 MG: 10 TABLET ORAL at 08:45

## 2024-03-02 RX ADMIN — FOLIC ACID 1 MG: 1 TABLET ORAL at 08:45

## 2024-03-02 RX ADMIN — LORAZEPAM 2 MG: 2 TABLET ORAL at 06:41

## 2024-03-02 RX ADMIN — CHLORDIAZEPOXIDE HYDROCHLORIDE 50 MG: 25 CAPSULE ORAL at 06:31

## 2024-03-02 RX ADMIN — METOPROLOL SUCCINATE 50 MG: 50 TABLET, EXTENDED RELEASE ORAL at 08:45

## 2024-03-02 RX ADMIN — POTASSIUM CHLORIDE 40 MEQ: 1500 TABLET, EXTENDED RELEASE ORAL at 04:09

## 2024-03-02 RX ADMIN — CLONIDINE HYDROCHLORIDE 0.1 MG: 0.1 TABLET ORAL at 08:45

## 2024-03-02 RX ADMIN — INSULIN LISPRO 13 UNITS: 100 INJECTION, SOLUTION INTRAVENOUS; SUBCUTANEOUS at 12:28

## 2024-03-02 RX ADMIN — LEVOTHYROXINE SODIUM 125 MCG: 125 TABLET ORAL at 06:31

## 2024-03-02 RX ADMIN — MAGNESIUM SULFATE HEPTAHYDRATE 2 G: 2 INJECTION, SOLUTION INTRAVENOUS at 04:09

## 2024-03-02 RX ADMIN — THIAMINE HYDROCHLORIDE 200 MG: 100 INJECTION, SOLUTION INTRAMUSCULAR; INTRAVENOUS at 06:32

## 2024-03-02 RX ADMIN — MAGNESIUM SULFATE HEPTAHYDRATE 2 G: 2 INJECTION, SOLUTION INTRAVENOUS at 06:37

## 2024-03-02 RX ADMIN — Medication 1 TABLET: at 08:45

## 2024-03-02 RX ADMIN — SODIUM CHLORIDE 150 ML/HR: 9 INJECTION, SOLUTION INTRAVENOUS at 04:09

## 2024-03-02 RX ADMIN — CLONIDINE HYDROCHLORIDE 0.1 MG: 0.1 TABLET ORAL at 04:09

## 2024-03-02 RX ADMIN — PANTOPRAZOLE SODIUM 40 MG: 40 TABLET, DELAYED RELEASE ORAL at 06:32

## 2024-03-02 RX ADMIN — FLUTICASONE PROPIONATE 2 SPRAY: 50 SPRAY, METERED NASAL at 08:45

## 2024-03-02 RX ADMIN — MAGNESIUM SULFATE HEPTAHYDRATE 2 G: 2 INJECTION, SOLUTION INTRAVENOUS at 08:45

## 2024-03-02 RX ADMIN — DILTIAZEM HYDROCHLORIDE 180 MG: 180 CAPSULE, EXTENDED RELEASE ORAL at 08:45

## 2024-03-02 RX ADMIN — LORAZEPAM 2 MG: 2 TABLET ORAL at 10:40

## 2024-03-02 NOTE — H&P
[facility] HISTORY AND PHYSICAL  Date: 3/1/2024   Patient Name: Wayne Guan  : 1975  MRN: [id number]  Primary Care Physician:  Lyla Guerrero APRN  Date of admission: 3/1/2024    Subjective Chest discomfort, vomiting, shortness of breath, palpitations  Subjective   Chief Complaint: Chest discomfort, vomiting, shortness of breath, palpitations    HPI: Patient is a 48-year-old male who presents to the emergency room for evaluation of chest discomfort, vomiting, shortness of breath and palpitations.  Patient has a history of alcoholism and has been drinking excessively lately due to anxiety.  His last drink was earlier this morning.  The patient presents to the emergency room with persistent vomiting, anxiousness, and a heart rate of 167.    On arrival to the ED, patient has a temperature of 98.8, pulse of 117, respiratory rate of 16, blood pressure 148/69, and he saturating 96% on room air.    Patient's white blood cell count was 6.25, hemoglobin was 15.6, platelets were 375, MCV was 84.8, patient's sodium was 134, bicarb was 17.5, anion gap was 26.5, glucose is 251, ionized calcium is 1.08.  Patient's lipase is 7.    Patient's ethanol level is 230.  He has ketones in his urine.    Chest x-ray shows chronic changes.  Personal History     Past Medical History:  Past Medical History:   Diagnosis Date   • Alcoholism 2021   • Allergies    • Aneurysm    • Anxiety 07/15/2022   • Atrial fibrillation    • Bipolar 2 disorder 2021   • Cancer    • Cervical stenosis of spinal canal 2018    Formatting of this note might be different from the original. Added automatically from request for surgery [id number]   • Colon polyp 2003 Benign   • Depression    • Diabetes mellitus    • GERD (gastroesophageal reflux disease)    • History of colonic polyps 2017    Formatting of this note might be different from the original. Added automatically from request for surgery [id number]   •  Hyperlipidemia    • Hypertension    • Irritable bowel syndrome 2004   • Lumbosacral spondylosis without myelopathy 08/29/2018   • Pancreatitis 2008   • Papillary thyroid carcinoma 04/01/2020   • SVT (supraventricular tachycardia) 01/23/2022       Past Surgical History:  Past Surgical History:   Procedure Laterality Date   • ABLATION OF DYSRHYTHMIC FOCUS  9/9/2022   • APPENDECTOMY  1983   • CARDIAC ABLATION  09/09/2022   • CARDIAC ELECTROPHYSIOLOGY PROCEDURE N/A 09/09/2022    Procedure: Ablation SVT HOLD Metoprolol 5 days;  Surgeon: Tai Lisa MD;  Location: [facility] INVASIVE LOCATION;  Service: Cardiovascular;  Laterality: N/A;   • CERVICAL DISC SURGERY     • COLONOSCOPY      2021 [facility]   • THYROID SURGERY         Family History:   Family History   Problem Relation Age of Onset   • Arthritis Mother         rheumatoid   • Diabetes Mother    • Hyperlipidemia Mother    • Hypertension Mother    • Osteoporosis Mother    • Rashes / Skin problems Mother    • Anemia Mother    • Arrhythmia Mother    • Heart attack Father    • Hyperlipidemia Father    • Hypertension Father    • Diabetes Father    • Alcohol abuse Father    • Stroke Father    • Hypertension Sister    • Heart disease Maternal Uncle    • Hypertension Maternal Uncle    • Mental illness Paternal Aunt    • Heart disease Paternal Aunt    • Diabetes Paternal Aunt    • COPD Paternal Uncle    • Stroke Maternal Grandmother    • Thyroid disease Maternal Grandmother    • COPD Maternal Grandmother    • Hypertension Maternal Grandmother    • Heart disease Maternal Grandmother    • Hypertension Maternal Grandfather    • Diabetes Maternal Grandfather    • Cancer Maternal Grandfather         throat   • Heart disease Maternal Grandfather    • Heart disease Paternal Grandmother    • Hypertension Paternal Grandmother    • Colon cancer Paternal Grandfather    • Cancer Paternal Grandfather         colon       Social History:   Social History     Socioeconomic History   •  Marital status: Single   Tobacco Use   • Smoking status: Former     Packs/day: 1.00     Years: 11.00     Additional pack years: 0.00     Total pack years: 11.00     Types: Cigarettes     Quit date: 7/19/2008     Years since quitting: 15.6   • Smokeless tobacco: Never   Vaping Use   • Vaping Use: Never used   Substance and Sexual Activity   • Alcohol use: Not Currently     Comment: drinks 2-3 drinks 2-3 times/week.   • Drug use: Never   • Sexual activity: Yes     Partners: Female     Birth control/protection: Hysterectomy       Home Medications:  FreeStyle Cooper 14 Day Sensor, Insulin Glargine (2 Unit Dial), Insulin Lispro (1 Unit Dial), Vitamin D (Cholecalciferol), dilTIAZem CD, escitalopram, fenofibrate, fluticasone, folic acid, glucose blood, glycopyrrolate, levocetirizine, levothyroxine sodium, metoprolol succinate XL, montelukast, and omeprazole    Allergies:  Allergies   Allergen Reactions   • Jardiance [Empagliflozin] Palpitations       Review of Systems   All systems were reviewed and negative except for: Anxiety, palpitations    Objective   Objective     Vitals:   Temp:  [98.8 °F (37.1 °C)] 98.8 °F (37.1 °C)  Heart Rate:  [109-167] 122  Resp:  [16] 16  BP: (130-153)/(69-95) 130/74    Physical Exam    Constitutional: Awake, alert, no acute distress   Eyes: Pupils equal, sclerae anicteric, no conjunctival injection   HENT: NCAT, mucous membranes moist   Neck: Supple, no thyromegaly, no lymphadenopathy, trachea midline   Respiratory: Clear to auscultation bilaterally, nonlabored respirations    Cardiovascular: Tachycardia   Gastrointestinal: Positive bowel sounds, soft, nontender, nondistended   Musculoskeletal: No bilateral ankle edema, no clubbing or cyanosis to extremities   Psychiatric: Appropriate affect, cooperative   Neurologic: Oriented x 3, strength symmetric in all extremities, Cranial Nerves grossly intact to confrontation, speech clear   Skin: No rashes     Result Review    Result Review:  I have  personally reviewed the results from the time of this admission to 3/1/2024 20:53 EST and agree with these findings:  [x]  Laboratory  []  Microbiology  [x]  Radiology  []  EKG/Telemetry   []  Cardiology/Vascular   []  Pathology  [x]  Old records  []  Other:      Assessment & Plan   Assessment / Plan   #1 alcohol withdrawal  -patient's alcohol level is 230.    -Scheduled Librium, CIWA protocol ordered.  -Thiamine, folate.  -Aggressive hydration.  -Psychiatry consulted.    #2 possible DKA versus alcoholic ketosis   -  -Will continue aggressive hydration, Glucomander.  Check BMP every 6 hours to ensure gap closes.     #3 Tachycardia  -Patient has a history of atrial fibrillation.  Continue home Cardizem, metoprolol. Not on OAC.  [facility].  Consider cardiology consult.  -Will check TSH and magnesium.    #4  Hypothyroidism continue Tirosint    #5 depression and anxiety continue Lexapro    #6 allergic rhinitis continue montelukast, Zyrtec    #7 correct electrolytes      Critical Care time spent in direct care: 30 minutes (excluding procedure time, if applicable) including high complexity decision making to access, manipulate, and support vital organ system failure in this individual who has impairment of one or more of vital organ systems such that there is a high probability of imminent or life threatening deterioration in the patient condition.      DVT prophylaxis:  Lovenox    CODE STATUS:    Level Of Support Discussed With: Patient  Code Status (Patient has no pulse and is not breathing): CPR (Attempt to Resuscitate)  Medical Interventions (Patient has pulse or is breathing): Full Support      Admission Status:  I believe this patient meets inpatient status.    Electronically signed by Carlene Arellano DO, 03/01/24, 8:28 PM EST.

## 2024-03-02 NOTE — PLAN OF CARE
Goal Outcome Evaluation:  Plan of Care Reviewed With: patient        Progress: improving     Pt. Arrived with EtOH withdrawal.  Required ativan PO.

## 2024-03-02 NOTE — DISCHARGE SUMMARY
[facility]         HOSPITALIST  DISCHARGE SUMMARY    Patient Name: Wayne Guan  : 1975  MRN: [id number]    Date of Admission: 3/1/2024  Date of Discharge:  Primary Care Physician: Lyla Guerrero APRN    Consults       Date and Time Order Name Status Description    3/1/2024 10:05 PM Inpatient Psychiatrist Consult      3/1/2024  6:48 PM Hospitalist (on-call MD unless specified)              Active and Resolved Hospital Problems:  Active Hospital Problems    Diagnosis POA   • **Alcohol abuse [F10.10] Yes   Acute alcohol intoxication  Alcoholic ketoacidosis  Uncontrolled type 2 diabetes with hyperglycemia  Hypomagnesia  Sinus tachycardia  Obesity last 2 with BMI 35.3  Ongoing tobacco use  Bipolar disorder  Anxiety   Resolved Hospital Problems   No resolved problems to display.       Hospital Course     Hospital Course:  Wayne Guan is a 48 y.o. male with history of alcohol abuse, type 2 diabetes, thyroid cancer s/p thyroidectomy, SVT, hypertension, hyperlipidemia presented to the ED with chest discomfort.  In the ER patient reported feeling anxious, some chest palpitations, ethanol level was 230, CK2 79, TSH 1.9, free T41.3, WBC was normal, creatinine 1.0, sodium 134, potassium 3.8, CO2 17, AG was 26.  UDS negative, did have 2+ ketones in urine.  EKG was negative for acute abnormalities, patient was sinus tachycardia.  Patient was given IV fluids, initiated on CIWA protocol.  On exam this morning patient is awake, denies nausea vomiting or abdominal pain, he is tolerating oral diet.  Denies any chest pain, or palpitations at this time.     Anion gap and blood glucoses improved with subcu insulin regimen via Glucomander.  He has been initiated on a regular diet which she is tolerating well.  Has been monitored on CIWA protocol and has been on scheduled Ativan.  Patient admits that he has been drinking heavily over the past week.  States that prior to this he had stopped for about 2  years he has been encouraged to abstain from ongoing alcohol use.        DISCHARGE Follow Up Recommendations for labs and diagnostics: BMP in 1 week      Day of Discharge     Vital Signs:  Temp:  [97.8 °F (36.6 °C)-98.9 °F (37.2 °C)] 98.2 °F (36.8 °C)  Heart Rate:  [] 91  Resp:  [16-18] 18  BP: (109-153)/() 126/75  Flow (L/min):  [2] 2  Physical Exam:               Constitutional: Awake, alert, no acute distress              Respiratory: Clear to auscultation bilaterally, nonlabored respirations               Cardiovascular: Tachycardia              Gastrointestinal: Positive bowel sounds, soft, nontender, nondistended              Musculoskeletal: No bilateral ankle edema, no clubbing or cyanosis to extremities              Psychiatric: Appropriate affect, cooperative              Neurologic: Oriented x 3, strength symmetric in all extremities, Cranial Nerves grossly intact to confrontation, speech clear              Skin: No rashes     Discharge Details        Discharge Medications        New Medications        Instructions Start Date   chlordiazePOXIDE 25 MG capsule  Commonly known as: LIBRIUM   Take 2 capsules by mouth Every 8 (Eight) Hours for 2 days, THEN 1 capsule Every 8 (Eight) Hours for 2 days, THEN 1 capsule Every 12 (Twelve) Hours for 2 days, THEN 1 capsule Daily for 2 days. To assist with alcohol cessation   Start Date: March 2, 2024            Continue These Medications        Instructions Start Date   Accu-Chek Guide test strip  Generic drug: glucose blood   USE AS INSTRUCTED      dilTIAZem  MG 24 hr capsule  Commonly known as: CARDIZEM CD   180 mg, Oral, Daily      escitalopram 20 MG tablet  Commonly known as: Lexapro   20 mg, Oral, Daily      fenofibrate 145 MG tablet  Commonly known as: TRICOR   TAKE 1 TABLET BY MOUTH EVERY DAY      fluticasone 50 MCG/ACT nasal spray  Commonly known as: FLONASE   2 sprays, Nasal, Daily PRN      folic acid 1 MG tablet  Commonly known as: FOLVITE    1 mg, Oral, Daily      FreeStyle Cooper 14 Day Sensor misc       glycopyrrolate 2 MG tablet  Commonly known as: ROBINUL   2 mg, Oral, Daily      HumaLOG KwikPen 100 UNIT/ML solution pen-injector  Generic drug: Insulin Lispro (1 Unit Dial)       levocetirizine 5 MG tablet  Commonly known as: XYZAL   Take 1 tablet by mouth Daily As Needed.      metoprolol succinate XL 50 MG 24 hr tablet  Commonly known as: TOPROL-XL   50 mg, Oral, Daily      montelukast 10 MG tablet  Commonly known as: Singulair   10 mg, Oral, Nightly      omeprazole 20 MG capsule  Commonly known as: priLOSEC   20 mg, Oral, Daily      Tirosint 125 MCG capsule capsule  Generic drug: levothyroxine sodium   250 mcg, Oral, Take As Directed, PATIENT TAKES 250MCG ON MON,TUES,WED,THURS,FRI PATIENT TAKES 125MCG ON SAT PATIENT TAKES NO LEVOTHYROXINE ON SUN      Toujeo Max SoloStar 300 UNIT/ML solution pen-injector injection  Generic drug: Insulin Glargine (2 Unit Dial)   Inject 50 Units under the skin into the appropriate area as directed Daily.      Vitamin D (Cholecalciferol) 10 MCG (400 UNIT) tablet  Commonly known as: CHOLECALCIFEROL   400 Units, Oral, Daily               Allergies   Allergen Reactions   • Jardiance [Empagliflozin] Palpitations       Discharge Disposition:  Home or Self Care    Diet:  Hospital:  Diet Order   Procedures   • Diet: Regular/House Diet; Texture: Regular Texture (IDDSI 7); Fluid Consistency: Thin (IDDSI 0)       Discharge Activity:       CODE STATUS:  Code Status and Medical Interventions:   Ordered at: 03/01/24 2048     Level Of Support Discussed With:    Patient     Code Status (Patient has no pulse and is not breathing):    CPR (Attempt to Resuscitate)     Medical Interventions (Patient has pulse or is breathing):    Full Support         Future Appointments   Date Time Provider Department Center   4/24/2024  4:00 PM Lyla Guerrero APRN [facility]   5/28/2024 12:30 PM Gonzales Chen DO [facility]   6/28/2024 11:00  AM Yao Keen MD [facility] CD [facility]   1/17/2025  9:00 AM Stu Jean DPM [facility] POD [facility]           Pertinent  and/or Most Recent Results     PROCEDURES:   None    LAB RESULTS:      Lab 03/01/24 1933 03/01/24  1531   WBC  --  6.25   HEMOGLOBIN  --  15.6   HEMATOCRIT  --  44.0   PLATELETS  --  375   NEUTROS ABS  --  4.12   IMMATURE GRANS (ABS)  --  0.01   LYMPHS ABS  --  1.57   MONOS ABS  --  0.48   EOS ABS  --  0.03   MCV  --  84.8   LACTATE, ARTERIAL 3.23*  --          Lab 03/02/24  1144 03/02/24  0617 03/02/24  0020 03/01/24 1933 03/01/24  1736 03/01/24  1531   SODIUM 130* 135* 134*  --   --  134*   SODIUM, VENOUS  --   --   --  135.3*  --   --    POTASSIUM 3.9 3.7  3.7 3.6  --   --  3.8   POTASSIUM, VENOUS  --   --   --  3.9  --   --    CHLORIDE 94* 96* 96*  --   --  90*   CHLORIDE, VENOUS  --   --   --  99  --   --    CO2 19.9* 20.4* 20.3*  --   --  17.5*   ANION GAP 16.1* 18.6* 17.7*  --   --  26.5*   BUN 8 8 8  --   --  10   CREATININE 1.10 1.00 0.87  --   --  1.07   EGFR 81.8 91.7 105.1  --   --  84.5   GLUCOSE 231* 127* 175*  --   --  251*   GLUCOSE, ARTERIAL  --   --   --  146*  --   --    CALCIUM 8.7 8.6 8.6  --   --  9.3   IONIZED CALCIUM  --   --   --  1.08*  --   --    MAGNESIUM  --   --  1.5*  --   --  1.6   TSH  --   --   --   --  1.900 1.980         Lab 03/01/24  1531   TOTAL PROTEIN 8.0   ALBUMIN 4.7   GLOBULIN 3.3   ALT (SGPT) 36   AST (SGOT) 54*   BILIRUBIN 1.0   ALK PHOS 78   LIPASE 7*         Lab 03/01/24  1736 03/01/24  1531   PROBNP  --  43.4   HSTROP T 15 17  17                 Lab 03/01/24  1933   FIO2 21   CARBOXYHEMOGLOBIN 1.2     Brief Urine Lab Results  (Last result in the past 365 days)        Color   Clarity   Blood   Leuk Est   Nitrite   Protein   CREAT   Urine HCG        03/01/24 1934 Dark Yellow   Clear   Trace   Negative   Negative   100 mg/dL (2+)                 Microbiology Results (last 10 days)       Procedure Component Value - Date/Time    COVID PRE-OP /  PRE-PROCEDURE SCREENING ORDER (NO ISOLATION) - Swab, Nasopharynx [[id number]]  (Normal) Collected: 03/01/24 1531    Lab Status: Final result Specimen: Swab from Nasopharynx Updated: 03/01/24 1620    Narrative:      The following orders were created for panel order COVID PRE-OP / PRE-PROCEDURE SCREENING ORDER (NO ISOLATION) - Swab, Nasopharynx.  Procedure                               Abnormality         Status                     ---------                               -----------         ------                     COVID-19, FLU A/B, RSV P...[[id number]]  Normal              Final result                 Please view results for these tests on the individual orders.    COVID-19, FLU A/B, RSV PCR 1 HR TAT - Swab, Nasopharynx [[id number]]  (Normal) Collected: 03/01/24 1531    Lab Status: Final result Specimen: Swab from Nasopharynx Updated: 03/01/24 1620     COVID19 Not Detected     Influenza A PCR Not Detected     Influenza B PCR Not Detected     RSV, PCR Not Detected    Narrative:      Fact sheet for providers: https://www.fda.gov/media/163014/download    Fact sheet for patients: https://www.fda.gov/media/257146/download    Test performed by PCR.            XR Chest 1 View    Result Date: 3/1/2024  Impression:   1. Prominence of markings within the interstitium of the lower lungs that may be reflective of some chronic change.  A more acute on chronic process could not be excluded in the proper clinical setting.       CORI CLIFFORD MD       Electronically Signed and Approved By: CORI CLIFFORD MD on 3/01/2024 at 15:36                      Results for orders placed during the hospital encounter of 02/10/23    Adult Transthoracic Echo Complete w/ Color, Spectral and Contrast if necessary per protocol    Interpretation Summary  •  Left ventricular systolic function is normal. Left ventricular ejection fraction appears to be 56 - 60%.  •  Left ventricular diastolic function is consistent with (grade I) impaired relaxation.  •   There is borderline dilation of aortic root measuring 3.7 cm in diameter.  •  There are no significant valvular abnormalities.  •  Estimated right ventricular systolic pressure from tricuspid regurgitation is normal (<35 mmHg).      Labs Pending at Discharge: None        Time spent on Discharge including face to face service: 35 minutes    Electronically signed by Elkin Ramey MD, 03/02/24, 2:47 PM EST.

## 2024-03-02 NOTE — PLAN OF CARE
Goal Outcome Evaluation:  Plan of Care Reviewed With: patient        Progress: improving     Pt. Admitted for EtOH withdrawal.  Pt scored 16 on CIWA.  Ativan given.  Pt. Is agreeable.  Pt. VSS.

## 2024-03-02 NOTE — CONSULTS
Pulmonary / Critical Care Consult Note      Patient Name: Wayne Guan  : 1975  MRN: [id number]  Primary Care Physician:  Lyla Guerrero APRN  Referring Physician: Elkin Ramey, *  Date of admission: 3/1/2024    Subjective   Subjective     Reason for Consult/ Chief Complaint: Shortness of breath    HPI:  Wayne Guan is a 48 y.o. male with history of alcohol abuse, type 2 diabetes, thyroid cancer s/p thyroidectomy, SVT, hypertension, hyperlipidemia presented to the ED with chest discomfort.  In the ER patient reported feeling anxious, some chest palpitations, ethanol level was 230, CK2 79, TSH 1.9, free T41.3, WBC was normal, creatinine 1.0, sodium 134, potassium 3.8, CO2 17, AG was 26.  UDS negative, did have 2+ ketones in urine.  EKG was negative for acute abnormalities, patient was sinus tachycardia.  Patient was given IV fluids, initiated on CIWA protocol.  On exam this morning patient is awake, denies nausea vomiting or abdominal pain, he is tolerating oral diet.  Denies any chest pain, or palpitations at this time.    Review of Systems  Constitutional symptoms:  Denied complaints   Cardiovascular:  Denied complaints  Respiratory: Denied complaints  Gastrointestinal: Denied complaints  Musculoskeletal: Denied complaints  Genitourinary: Denied complaints  Neurologic: Denied complaints        Personal History     Past Medical History:   Diagnosis Date    Alcoholism 2021    Allergies     Aneurysm     Anxiety 07/15/2022    Atrial fibrillation     Bipolar 2 disorder 2021    Cancer     Cervical stenosis of spinal canal 2018    Formatting of this note might be different from the original. Added automatically from request for surgery [id number]    Colon polyp 2003 Benign    Depression     Diabetes mellitus     GERD (gastroesophageal reflux disease)     History of colonic polyps 2017    Formatting of this note might be different from the original. Added automatically from  request for surgery [id number]    Hyperlipidemia     Hypertension     Irritable bowel syndrome 2004    Lumbosacral spondylosis without myelopathy 08/29/2018    Pancreatitis 2008    Papillary thyroid carcinoma 04/01/2020    SVT (supraventricular tachycardia) 01/23/2022       Past Surgical History:   Procedure Laterality Date    ABLATION OF DYSRHYTHMIC FOCUS  9/9/2022    APPENDECTOMY  1983    CARDIAC ABLATION  09/09/2022    CARDIAC ELECTROPHYSIOLOGY PROCEDURE N/A 09/09/2022    Procedure: Ablation SVT HOLD Metoprolol 5 days;  Surgeon: Tai Lisa MD;  Location: [facility] INVASIVE LOCATION;  Service: Cardiovascular;  Laterality: N/A;    CERVICAL DISC SURGERY      COLONOSCOPY      2021 [facility]    THYROID SURGERY         Family History: family history includes Alcohol abuse in his father; Anemia in his mother; Arrhythmia in his mother; Arthritis in his mother; COPD in his maternal grandmother and paternal uncle; Cancer in his maternal grandfather and paternal grandfather; Colon cancer in his paternal grandfather; Diabetes in his father, maternal grandfather, mother, and paternal aunt; Heart attack in his father; Heart disease in his maternal grandfather, maternal grandmother, maternal uncle, paternal aunt, and paternal grandmother; Hyperlipidemia in his father and mother; Hypertension in his father, maternal grandfather, maternal grandmother, maternal uncle, mother, paternal grandmother, and sister; Mental illness in his paternal aunt; Osteoporosis in his mother; Rashes / Skin problems in his mother; Stroke in his father and maternal grandmother; Thyroid disease in his maternal grandmother. Otherwise pertinent FHx was reviewed and not pertinent to current issue.    Social History:  reports that he quit smoking about 15 years ago. His smoking use included cigarettes. He started smoking about 26 years ago. He has a 11 pack-year smoking history. He has never used smokeless tobacco. He reports current alcohol use of about  10.0 standard drinks of alcohol per week. He reports that he does not use drugs.    Home Medications:  FreeStyle Cooper 14 Day Sensor, Insulin Glargine (2 Unit Dial), Insulin Lispro (1 Unit Dial), Vitamin D (Cholecalciferol), dilTIAZem CD, escitalopram, fenofibrate, fluticasone, folic acid, glucose blood, glycopyrrolate, levocetirizine, levothyroxine sodium, metoprolol succinate XL, montelukast, and omeprazole    Allergies:  Allergies   Allergen Reactions    Jardiance [Empagliflozin] Palpitations       Objective    Objective     Vitals:   Temp:  [97.8 °F (36.6 °C)-98.9 °F (37.2 °C)] 97.8 °F (36.6 °C)  Heart Rate:  [] 96  Resp:  [16-18] 18  BP: (109-153)/() 153/94  Flow (L/min):  [2] 2    Physical Exam:  Vital Signs Reviewed   General:  WDWN, Alert, NAD.    HEENT:  PERRL, EOMI.  OP, nares clear  Neck:  Supple, no JVD, no thyromegaly  Chest:  good aeration, clear to auscultation bilaterally, tympanic to percussion bilaterally, no work of breathing noted  CV: RRR, no MGR, pulses 2+, equal.  Abd:  Soft, NT, ND, + BS, no HSM  EXT:  no clubbing, no cyanosis, no edema  Neuro:  A&Ox3, CN grossly intact, no focal deficits.  Skin: No rashes or lesions noted      Result Review    Result Review:  I have personally reviewed the results from the time of this admission to 3/2/2024 10:41 EST and agree with these findings:  [x]  Laboratory  []  Microbiology  [x]  Radiology  []  EKG/Telemetry   []  Cardiology/Vascular   []  Pathology  []  Old records  []  Other:  Most notable findings include:       Lab 03/02/24  1144 03/02/24  0617 03/02/24  0020 03/01/24  1933 03/01/24  1531   WBC  --   --   --   --  6.25   HEMOGLOBIN  --   --   --   --  15.6   HEMATOCRIT  --   --   --   --  44.0   PLATELETS  --   --   --   --  375   SODIUM 130* 135* 134*  --  134*   SODIUM, VENOUS  --   --   --  135.3*  --    POTASSIUM 3.9 3.7  3.7 3.6  --  3.8   POTASSIUM, VENOUS  --   --   --  3.9  --    CHLORIDE 94* 96* 96*  --  90*   CHLORIDE,  VENOUS  --   --   --  99  --    CO2 19.9* 20.4* 20.3*  --  17.5*   BUN 8 8 8  --  10   CREATININE 1.10 1.00 0.87  --  1.07   GLUCOSE 231* 127* 175*  --  251*   GLUCOSE, ARTERIAL  --   --   --  146*  --    CALCIUM 8.7 8.6 8.6  --  9.3   TOTAL PROTEIN  --   --   --   --  8.0   ALBUMIN  --   --   --   --  4.7   GLOBULIN  --   --   --   --  3.3         Assessment & Plan   Assessment / Plan     Active Hospital Problems:  Active Hospital Problems    Diagnosis     **Alcohol abuse     Alcohol withdrawal      Impression:  Acute alcohol intoxication  Alcoholic keto acidosis  Type 2 diabetes with hyperglycemia  Hypomagnesia  Sinus tachycardia  Obesity with BMI 35.3  Ongoing tobacco use  Bipolar disorder  Anxiety     Plan:  -Patient on room air  -Continue CIWA protocol  -Continue multivitamins, thiamine and folic acid  -Continue Lexapro  -Psychiatry evaluated patient  -Continue home clonidine  -Continue GILBERT/SSI per Glucomander  -Continue home Synthroid  -Trend renal function, monitor replace electrolytes as needed replace potassium orally magnesium IV.  -Advance diet, carb consistent  -Discontinue IV fluids  -Okay to transfer patient out of ICU     DVT prophylaxis:  Medical DVT prophylaxis orders are present.      Code Status and Medical Interventions:   Ordered at: 03/01/24 2048     Level Of Support Discussed With:    Patient     Code Status (Patient has no pulse and is not breathing):    CPR (Attempt to Resuscitate)     Medical Interventions (Patient has pulse or is breathing):    Full Support        Labs, imaging, microbiology, notes and medications personally reviewed  Discussed with primary    I, Dr. Abilio Vazquez, have spent more than 50% of the total time managing the patient in this encounter today.  This included personally reviewing all pertinent labs, imaging, microbiology and documentation. Also discussing the case with the patient and any available family, the admitting physician and any available ancillary  staff.    Electronically signed by DELL Marcum, 03/02/24, 2:36 PM EST.  Electronically signed by Abilio Vazquez MD, 03/02/24, 2:57 PM EST.

## 2024-03-04 ENCOUNTER — TRANSITIONAL CARE MANAGEMENT TELEPHONE ENCOUNTER (OUTPATIENT)
Dept: CALL CENTER | Facility: HOSPITAL | Age: 49
End: 2024-03-04
Payer: COMMERCIAL

## 2024-03-04 NOTE — OUTREACH NOTE
Call Center TCM Note      Flowsheet Row Responses   [facility] patient discharged from? [facility]   Does the patient have one of the following disease processes/diagnoses(primary or secondary)? Other   TCM attempt successful? No   Unsuccessful attempts Attempt 2            Maite Mayers RN    3/4/2024, 14:57 EST

## 2024-03-04 NOTE — OUTREACH NOTE
Call Center TCM Note      Flowsheet Row Responses   [facility] patient discharged from? [facility]   Does the patient have one of the following disease processes/diagnoses(primary or secondary)? Other   TCM attempt successful? No  [no updated verbal release]   Unsuccessful attempts Attempt 1   Call Status Left message            Maite Mayers RN    3/4/2024, 14:17 EST

## 2024-03-05 ENCOUNTER — TRANSITIONAL CARE MANAGEMENT TELEPHONE ENCOUNTER (OUTPATIENT)
Dept: CALL CENTER | Facility: HOSPITAL | Age: 49
End: 2024-03-05
Payer: COMMERCIAL

## 2024-03-05 NOTE — OUTREACH NOTE
Call Center TCM Note      Flowsheet Row Responses   [facility] patient discharged from? [facility]   Does the patient have one of the following disease processes/diagnoses(primary or secondary)? Other   TCM attempt successful? No   Unsuccessful attempts Attempt 3  [No updated verbal release on file from PCP group]            Bee Clark RN    3/5/2024, 08:39 EST

## 2024-03-11 LAB
QT INTERVAL: 331 MS
QT INTERVAL: 333 MS
QTC INTERVAL: 459 MS
QTC INTERVAL: 465 MS

## 2024-03-20 RX ORDER — MONTELUKAST SODIUM 10 MG/1
10 TABLET ORAL NIGHTLY
Qty: 90 TABLET | Refills: 3 | Status: SHIPPED | OUTPATIENT
Start: 2024-03-20

## 2024-03-22 ENCOUNTER — TELEPHONE (OUTPATIENT)
Dept: INTERNAL MEDICINE | Age: 49
End: 2024-03-22

## 2024-03-22 NOTE — TELEPHONE ENCOUNTER
Caller: TY CLARKEE    Relationship: Emergency Contact    Best call back number: [phone]    What medication are you requesting: SOMETHING FOR COVID    What are your current symptoms: N/A    How long have you been experiencing symptoms: N/A    Have you had these symptoms before:    [] Yes  [] No    Have you been treated for these symptoms before:   [] Yes  [] No    If a prescription is needed, what is your preferred pharmacy and phone number: [facility] - [place], [place] - [address]  [phone] [facility] [phone]      Additional notes:PATIENT'S SIGNIFICANT OTHER CALLED BECAUSE HE ALSO IS COVID POSITIVE AND HAS BEEN SINCE AROUND 03/12/2024. HE HAS ALSO BEEN TAKING OTC REMEDIES AND THIS IS NOT GOING AWAY AND GETTING WORSE. PLEASE SEND NEW PRESCRIPTIONS INTO PHARMACY ASAP. IF THIS IS NOT POSSIBLE WITHOUT AN APPOINTMENT, PLEASE CALL PATIENT BACK AND ADVISE. HIS SIGNIFICANT OTHER IS ON HIS  VERBAL.

## 2024-03-22 NOTE — TELEPHONE ENCOUNTER
Called and spoke to the patient. He is out of the 5 day window for paxlovid. Informed him to increase fluids, rest and take vitamin D,C and zinc. He voiced understanding.

## 2024-04-02 ENCOUNTER — TELEPHONE (OUTPATIENT)
Dept: INTERNAL MEDICINE | Age: 49
End: 2024-04-02
Payer: COMMERCIAL

## 2024-04-02 RX ORDER — MONTELUKAST SODIUM 10 MG/1
10 TABLET ORAL NIGHTLY
Qty: 90 TABLET | Refills: 3 | Status: SHIPPED | OUTPATIENT
Start: 2024-04-02

## 2024-04-02 RX ORDER — FENOFIBRATE 145 MG/1
145 TABLET, COATED ORAL DAILY
Qty: 90 TABLET | Refills: 0 | Status: SHIPPED | OUTPATIENT
Start: 2024-04-02

## 2024-04-02 RX ORDER — FOLIC ACID 1 MG/1
1 TABLET ORAL DAILY
Qty: 90 TABLET | Refills: 1 | Status: SHIPPED | OUTPATIENT
Start: 2024-04-02

## 2024-04-02 RX ORDER — ESCITALOPRAM OXALATE 20 MG/1
20 TABLET ORAL DAILY
Qty: 90 TABLET | Refills: 3 | Status: SHIPPED | OUTPATIENT
Start: 2024-04-02

## 2024-04-02 RX ORDER — GLYCOPYRROLATE 2 MG/1
2 TABLET ORAL DAILY
Qty: 90 TABLET | Refills: 1 | Status: SHIPPED | OUTPATIENT
Start: 2024-04-02

## 2024-04-02 NOTE — TELEPHONE ENCOUNTER
----- Message from Wayne Guan sent at 4/2/2024 10:19 AM EDT -----  Regarding: [facility]  Contact: [phone]  Good morning - [facility] (forcing me to use their service) is requesting prescriptions for:  FOLIC ACID TAB 1MG  MONTELUKAST TAB 10MG  ESCITALOPRAM TAB 20MG  FENOFIBRATE TAB 145MG  GLYCOPYRROLATE TAB 2MG  They sent a message saying they've reached out with no response. They do not seem very motivated to say the least. Would you please see that the scripts are transferred? I may still need to fill locally as well. Their shipping speeds are also terrible.  Thanks,  Wayne

## 2024-04-23 NOTE — PROGRESS NOTES
Chief Complaint  Follow-up (6 month follow up. The patient has no concerns. )  Subjective    History of Present Illness  Wayne Guan is a 48 y.o. male  presents to [facility] for follow-up hypertension, hyperlipidemia, anxiety, depression, IBS, allergies and diabetes. Cardiology refills BP med; endocrinology refills thyroid med-tirosint and insulin     Specialists include: Endocrinology for hypothyroidism and status post thyroidectomy due to thyroid cancer and diabetes management. Dr. Keen  for SVT, palpitations, status postcardiac ablation and monitoring AAA.       Past Medical History:   Diagnosis Date    Alcoholism 01/16/2021    Allergies     Aneurysm     Anxiety 07/15/2022    Atrial fibrillation     Bipolar 2 disorder 07/20/2021    Cancer     Cervical stenosis of spinal canal 11/30/2018    Formatting of this note might be different from the original. Added automatically from request for surgery [id number]    Colon polyp 11/1/2003 Benign    Depression     Diabetes mellitus     GERD (gastroesophageal reflux disease)     History of colonic polyps 05/23/2017    Formatting of this note might be different from the original. Added automatically from request for surgery [id number]    Hyperlipidemia     Hypertension     Irritable bowel syndrome 2004    Lumbosacral spondylosis without myelopathy 08/29/2018    Pancreatitis 2008    Papillary thyroid carcinoma 04/01/2020    SVT (supraventricular tachycardia) 01/23/2022        Past Surgical History:   Procedure Laterality Date    ABLATION OF DYSRHYTHMIC FOCUS  9/9/2022    APPENDECTOMY  1983    CARDIAC ABLATION  09/09/2022    CARDIAC ELECTROPHYSIOLOGY PROCEDURE N/A 09/09/2022    Procedure: Ablation SVT HOLD Metoprolol 5 days;  Surgeon: Tai Lisa MD;  Location: [facility] INVASIVE LOCATION;  Service: Cardiovascular;  Laterality: N/A;    CERVICAL DISC SURGERY      COLONOSCOPY      2021 [facility]    THYROID SURGERY          Allergies  "  Allergen Reactions    Jardiance [Empagliflozin] Palpitations          Current Outpatient Medications:     dilTIAZem CD (CARDIZEM CD) 180 MG 24 hr capsule, Take 1 capsule by mouth Daily., Disp: 90 capsule, Rfl: 1    escitalopram (Lexapro) 20 MG tablet, Take 1 tablet by mouth Daily., Disp: 90 tablet, Rfl: 3    fenofibrate (TRICOR) 145 MG tablet, Take 1 tablet by mouth Daily., Disp: 90 tablet, Rfl: 3    folic acid (FOLVITE) 1 MG tablet, Take 1 tablet by mouth Daily., Disp: 90 tablet, Rfl: 1    glucose blood (Accu-Chek Guide) test strip, USE AS INSTRUCTED, Disp: 1 each, Rfl: 5    glycopyrrolate (ROBINUL) 2 MG tablet, Take 1 tablet by mouth Daily., Disp: 90 tablet, Rfl: 3    HumaLOG KwikPen 100 UNIT/ML solution pen-injector, , Disp: , Rfl:     levocetirizine (XYZAL) 5 MG tablet, Take 1 tablet by mouth Daily As Needed for Allergies., Disp: 90 tablet, Rfl: 3    levothyroxine sodium (Tirosint) 125 MCG capsule capsule, Take 2 capsules by mouth Take As Directed. PATIENT TAKES 250MCG ON MON,TUES,WED,THURS,FRI PATIENT TAKES 125MCG ON SAT PATIENT TAKES NO LEVOTHYROXINE ON SUN, Disp: , Rfl:     metoprolol succinate XL (TOPROL-XL) 50 MG 24 hr tablet, Take 1 tablet by mouth Daily., Disp: 90 tablet, Rfl: 1    montelukast (SINGULAIR) 10 MG tablet, Take 1 tablet by mouth Every Night., Disp: 90 tablet, Rfl: 3    omeprazole (priLOSEC) 20 MG capsule, Take 1 capsule by mouth Daily., Disp: , Rfl:     Toujeo Max SoloStar 300 UNIT/ML solution pen-injector injection, Inject 50 Units under the skin into the appropriate area as directed Daily., Disp: , Rfl:     Vitamin D, Cholecalciferol, (CHOLECALCIFEROL) 10 MCG (400 UNIT) tablet, Take 1 tablet by mouth Daily., Disp: , Rfl:     Objective   /90 (BP Location: Right arm, Patient Position: Sitting, Cuff Size: Adult)   Pulse 66   Temp 97.3 °F (36.3 °C) (Temporal)   Ht 185.4 cm (73\")   Wt 130 kg (287 lb 3.2 oz)   SpO2 95%   BMI 37.89 kg/m²    Estimated body mass index is 37.89 kg/m² as " "calculated from the following:    Height as of this encounter: 185.4 cm (73\").    Weight as of this encounter: 130 kg (287 lb 3.2 oz).   Physical Exam  Vitals reviewed.   Constitutional:       General: He is not in acute distress.  HENT:      Head: Normocephalic and atraumatic.   Cardiovascular:      Rate and Rhythm: Normal rate and regular rhythm.   Pulmonary:      Effort: Pulmonary effort is normal.      Breath sounds: Normal breath sounds. No wheezing, rhonchi or rales.   Musculoskeletal:      Right lower leg: No edema.      Left lower leg: No edema.   Skin:     General: Skin is warm and dry.   Neurological:      General: No focal deficit present.      Mental Status: He is alert.   Psychiatric:         Thought Content: Thought content normal.        Result Review :  The following data was reviewed by: DELL Morrison on 04/24/2024:  Common labs          12/12/2023    14:58 3/1/2024    15:31 3/1/2024    19:33 3/2/2024    00:20 3/2/2024    06:17 3/2/2024    11:44   Common Labs   Glucose 102  251  146  175  127  231    BUN 8  10   8  8  8    Creatinine 1.46  1.07   0.87  1.00  1.10    Sodium 137  134  135.3  134  135  130    Potassium 3.9  3.8  3.9  3.6  3.7     3.7  3.9    Chloride 98  90  99  96  96  94    Calcium 10.0  9.3   8.6  8.6  8.7    Albumin  4.7        Total Bilirubin  1.0        Alkaline Phosphatase  78        AST (SGOT)  54        ALT (SGPT)  36        WBC 4.46  6.25        Hemoglobin 13.7  15.6        Hematocrit 39.6  44.0        Platelets 384  375                      Assessment and Plan   Diagnoses and all orders for this visit:    1. Essential hypertension (Primary)  -     Comprehensive Metabolic Panel; Future    2. Hyperlipidemia, unspecified hyperlipidemia type  -     Lipid Panel; Future    3. Folic acid deficiency  -     Vitamin B12; Future  -     Folate; Future    Other orders  -     fenofibrate (TRICOR) 145 MG tablet; Take 1 tablet by mouth Daily.  Dispense: 90 tablet; Refill: 3  -     " escitalopram (Lexapro) 20 MG tablet; Take 1 tablet by mouth Daily.  Dispense: 90 tablet; Refill: 3  -     glycopyrrolate (ROBINUL) 2 MG tablet; Take 1 tablet by mouth Daily.  Dispense: 90 tablet; Refill: 3  -     montelukast (SINGULAIR) 10 MG tablet; Take 1 tablet by mouth Every Night.  Dispense: 90 tablet; Refill: 3  -     levocetirizine (XYZAL) 5 MG tablet; Take 1 tablet by mouth Daily As Needed for Allergies.  Dispense: 90 tablet; Refill: 3       Hypertension: Blood pressure well controlled at home on Cardizem  mg mg daily and metoprolol XL 50 mg daily. Continue current treatment plan.     Hyperlipidemia:  Stable on fenofibrate 145 mg daily and to continue. Check labs.      Anxiety and depression: Stable on Lexapro 20 mg daily to continue.     Irritable bowel syndrome:  Stable on Robinul 2 mg daily.  Continue current treatment plan.     Allergic rhinitis: Stable on Singulair 10 mg daily and XYZAL 5 mg daily and  to continue.     Type 2 diabetes mellitus:  Taking Toujeo 50 units daily,  Humlog sliding scale three times a day before meals. Follows with endocrinologist for diabetes and s/p thyroid cancer.      Patient was given instructions and counseling regarding his condition or for health maintenance advice. Please see specific information pulled into the AVS if appropriate.     Follow Up   Return in about 6 months (around 10/28/2024) for Annual physical.    Dictated Utilizing Dragon Dictation.  Please note that portions of this note were completed with a voice recognition program.  Part of this note may be an electronic transcription/translation of spoken language to printed text using the Dragon Dictation System.    DELL Morrison

## 2024-04-24 ENCOUNTER — OFFICE VISIT (OUTPATIENT)
Dept: INTERNAL MEDICINE | Age: 49
End: 2024-04-24
Payer: COMMERCIAL

## 2024-04-24 VITALS
OXYGEN SATURATION: 95 % | HEART RATE: 66 BPM | SYSTOLIC BLOOD PRESSURE: 140 MMHG | TEMPERATURE: 97.3 F | WEIGHT: 287.2 LBS | BODY MASS INDEX: 38.06 KG/M2 | HEIGHT: 73 IN | DIASTOLIC BLOOD PRESSURE: 90 MMHG

## 2024-04-24 DIAGNOSIS — E53.8 FOLIC ACID DEFICIENCY: ICD-10-CM

## 2024-04-24 DIAGNOSIS — I10 ESSENTIAL HYPERTENSION: Primary | ICD-10-CM

## 2024-04-24 DIAGNOSIS — E78.5 HYPERLIPIDEMIA, UNSPECIFIED HYPERLIPIDEMIA TYPE: ICD-10-CM

## 2024-04-24 PROCEDURE — 99214 OFFICE O/P EST MOD 30 MIN: CPT | Performed by: NURSE PRACTITIONER

## 2024-04-24 RX ORDER — LEVOCETIRIZINE DIHYDROCHLORIDE 5 MG/1
5 TABLET, FILM COATED ORAL DAILY PRN
Qty: 90 TABLET | Refills: 3 | Status: SHIPPED | OUTPATIENT
Start: 2024-04-24

## 2024-04-24 RX ORDER — GLYCOPYRROLATE 2 MG/1
2 TABLET ORAL DAILY
Qty: 90 TABLET | Refills: 3 | Status: SHIPPED | OUTPATIENT
Start: 2024-04-24

## 2024-04-24 RX ORDER — MONTELUKAST SODIUM 10 MG/1
10 TABLET ORAL NIGHTLY
Qty: 90 TABLET | Refills: 3 | Status: SHIPPED | OUTPATIENT
Start: 2024-04-24

## 2024-04-24 RX ORDER — FENOFIBRATE 145 MG/1
145 TABLET, COATED ORAL DAILY
Qty: 90 TABLET | Refills: 3 | Status: SHIPPED | OUTPATIENT
Start: 2024-04-24

## 2024-04-24 RX ORDER — ESCITALOPRAM OXALATE 20 MG/1
20 TABLET ORAL DAILY
Qty: 90 TABLET | Refills: 3 | Status: SHIPPED | OUTPATIENT
Start: 2024-04-24

## 2024-04-25 ENCOUNTER — TELEPHONE (OUTPATIENT)
Dept: INTERNAL MEDICINE | Age: 49
End: 2024-04-25
Payer: COMMERCIAL

## 2024-04-26 RX ORDER — AZELASTINE 1 MG/ML
2 SPRAY, METERED NASAL 2 TIMES DAILY
Qty: 3 EACH | Refills: 3 | Status: SHIPPED | OUTPATIENT
Start: 2024-04-26

## 2024-04-29 ENCOUNTER — LAB (OUTPATIENT)
Dept: LAB | Facility: HOSPITAL | Age: 49
End: 2024-04-29
Payer: COMMERCIAL

## 2024-04-29 DIAGNOSIS — E53.8 FOLIC ACID DEFICIENCY: ICD-10-CM

## 2024-04-29 DIAGNOSIS — E78.5 HYPERLIPIDEMIA, UNSPECIFIED HYPERLIPIDEMIA TYPE: Chronic | ICD-10-CM

## 2024-04-29 DIAGNOSIS — E11.65 TYPE 2 DIABETES MELLITUS WITH HYPERGLYCEMIA, UNSPECIFIED WHETHER LONG TERM INSULIN USE: Chronic | ICD-10-CM

## 2024-04-29 DIAGNOSIS — I10 ESSENTIAL HYPERTENSION: ICD-10-CM

## 2024-04-29 LAB
ALBUMIN SERPL-MCNC: 4.4 G/DL (ref 3.5–5.2)
ALBUMIN/GLOB SERPL: 1.7 G/DL
ALP SERPL-CCNC: 53 U/L (ref 39–117)
ALT SERPL W P-5'-P-CCNC: 19 U/L (ref 1–41)
ANION GAP SERPL CALCULATED.3IONS-SCNC: 12.1 MMOL/L (ref 5–15)
AST SERPL-CCNC: 21 U/L (ref 1–40)
BASOPHILS # BLD AUTO: 0.04 10*3/MM3 (ref 0–0.2)
BASOPHILS NFR BLD AUTO: 0.6 % (ref 0–1.5)
BILIRUB SERPL-MCNC: 0.4 MG/DL (ref 0–1.2)
BUN SERPL-MCNC: 13 MG/DL (ref 6–20)
BUN/CREAT SERPL: 11.4 (ref 7–25)
CALCIUM SPEC-SCNC: 9.6 MG/DL (ref 8.6–10.5)
CHLORIDE SERPL-SCNC: 101 MMOL/L (ref 98–107)
CHOLEST SERPL-MCNC: 184 MG/DL (ref 0–200)
CO2 SERPL-SCNC: 26.9 MMOL/L (ref 22–29)
CREAT SERPL-MCNC: 1.14 MG/DL (ref 0.76–1.27)
DEPRECATED RDW RBC AUTO: 41.8 FL (ref 37–54)
EGFRCR SERPLBLD CKD-EPI 2021: 78.4 ML/MIN/1.73
EOSINOPHIL # BLD AUTO: 0.18 10*3/MM3 (ref 0–0.4)
EOSINOPHIL NFR BLD AUTO: 2.8 % (ref 0.3–6.2)
ERYTHROCYTE [DISTWIDTH] IN BLOOD BY AUTOMATED COUNT: 12.9 % (ref 12.3–15.4)
FOLATE SERPL-MCNC: >20 NG/ML (ref 4.78–24.2)
GLOBULIN UR ELPH-MCNC: 2.6 GM/DL
GLUCOSE SERPL-MCNC: 178 MG/DL (ref 65–99)
HBA1C MFR BLD: 8.7 % (ref 4.8–5.6)
HCT VFR BLD AUTO: 44.3 % (ref 37.5–51)
HDLC SERPL-MCNC: 41 MG/DL (ref 40–60)
HGB BLD-MCNC: 15 G/DL (ref 13–17.7)
IMM GRANULOCYTES # BLD AUTO: 0.03 10*3/MM3 (ref 0–0.05)
IMM GRANULOCYTES NFR BLD AUTO: 0.5 % (ref 0–0.5)
LDLC SERPL CALC-MCNC: 112 MG/DL (ref 0–100)
LDLC/HDLC SERPL: 2.63 {RATIO}
LYMPHOCYTES # BLD AUTO: 2.11 10*3/MM3 (ref 0.7–3.1)
LYMPHOCYTES NFR BLD AUTO: 32.9 % (ref 19.6–45.3)
MCH RBC QN AUTO: 30.3 PG (ref 26.6–33)
MCHC RBC AUTO-ENTMCNC: 33.9 G/DL (ref 31.5–35.7)
MCV RBC AUTO: 89.5 FL (ref 79–97)
MONOCYTES # BLD AUTO: 0.53 10*3/MM3 (ref 0.1–0.9)
MONOCYTES NFR BLD AUTO: 8.3 % (ref 5–12)
NEUTROPHILS NFR BLD AUTO: 3.52 10*3/MM3 (ref 1.7–7)
NEUTROPHILS NFR BLD AUTO: 54.9 % (ref 42.7–76)
NRBC BLD AUTO-RTO: 0 /100 WBC (ref 0–0.2)
PLATELET # BLD AUTO: 416 10*3/MM3 (ref 140–450)
PMV BLD AUTO: 9 FL (ref 6–12)
POTASSIUM SERPL-SCNC: 4.1 MMOL/L (ref 3.5–5.2)
PROT SERPL-MCNC: 7 G/DL (ref 6–8.5)
RBC # BLD AUTO: 4.95 10*6/MM3 (ref 4.14–5.8)
SODIUM SERPL-SCNC: 140 MMOL/L (ref 136–145)
TRIGL SERPL-MCNC: 175 MG/DL (ref 0–150)
VIT B12 BLD-MCNC: 359 PG/ML (ref 211–946)
VLDLC SERPL-MCNC: 31 MG/DL (ref 5–40)
WBC NRBC COR # BLD AUTO: 6.41 10*3/MM3 (ref 3.4–10.8)

## 2024-04-29 PROCEDURE — 85025 COMPLETE CBC W/AUTO DIFF WBC: CPT

## 2024-04-29 PROCEDURE — 82607 VITAMIN B-12: CPT

## 2024-04-29 PROCEDURE — 80061 LIPID PANEL: CPT

## 2024-04-29 PROCEDURE — 36415 COLL VENOUS BLD VENIPUNCTURE: CPT

## 2024-04-29 PROCEDURE — 80053 COMPREHEN METABOLIC PANEL: CPT

## 2024-04-29 PROCEDURE — 83036 HEMOGLOBIN GLYCOSYLATED A1C: CPT

## 2024-04-29 PROCEDURE — 82746 ASSAY OF FOLIC ACID SERUM: CPT

## 2024-04-30 DIAGNOSIS — I10 ESSENTIAL HYPERTENSION: Primary | ICD-10-CM

## 2024-04-30 DIAGNOSIS — Z12.5 SCREENING FOR MALIGNANT NEOPLASM OF PROSTATE: ICD-10-CM

## 2024-04-30 DIAGNOSIS — E55.9 VITAMIN D DEFICIENCY: ICD-10-CM

## 2024-04-30 DIAGNOSIS — E53.8 FOLIC ACID DEFICIENCY: ICD-10-CM

## 2024-04-30 DIAGNOSIS — E11.65 TYPE 2 DIABETES MELLITUS WITH HYPERGLYCEMIA, UNSPECIFIED WHETHER LONG TERM INSULIN USE: ICD-10-CM

## 2024-04-30 DIAGNOSIS — E78.5 HYPERLIPIDEMIA, UNSPECIFIED HYPERLIPIDEMIA TYPE: ICD-10-CM

## 2024-05-28 ENCOUNTER — OFFICE VISIT (OUTPATIENT)
Dept: CARDIOLOGY | Facility: CLINIC | Age: 49
End: 2024-05-28
Payer: COMMERCIAL

## 2024-05-28 VITALS
DIASTOLIC BLOOD PRESSURE: 80 MMHG | WEIGHT: 278 LBS | HEART RATE: 85 BPM | OXYGEN SATURATION: 96 % | HEIGHT: 73 IN | BODY MASS INDEX: 36.84 KG/M2 | SYSTOLIC BLOOD PRESSURE: 120 MMHG

## 2024-05-28 DIAGNOSIS — I10 ESSENTIAL HYPERTENSION: ICD-10-CM

## 2024-05-28 DIAGNOSIS — I47.10 SVT (SUPRAVENTRICULAR TACHYCARDIA): Primary | ICD-10-CM

## 2024-05-28 PROBLEM — E11.10 DKA (DIABETIC KETOACIDOSIS): Status: RESOLVED | Noted: 2023-12-04 | Resolved: 2024-05-28

## 2024-05-28 PROBLEM — F10.939 ALCOHOL WITHDRAWAL: Status: RESOLVED | Noted: 2024-03-01 | Resolved: 2024-05-28

## 2024-05-28 NOTE — PROGRESS NOTES
Encounter Date:05/28/2024      Patient ID: Wayne Guan is a 48 y.o. male.    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      Essential hypertension 07/19/2021     Priority: Low    Alcohol abuse 03/01/2024    Aortic ectasia, thoracic 03/06/2023    Hepatitis 02/10/2023    Anxiety 07/15/2022    Bipolar 2 disorder 07/20/2021    Postoperative hypothyroidism 07/19/2021    GERD (gastroesophageal reflux disease) 07/19/2021    Alcoholism 01/16/2021    Papillary thyroid carcinoma 04/01/2020    Cervical stenosis of spinal canal 11/30/2018    DM (diabetes mellitus) 11/14/2018    Hyperlipidemia 11/14/2018    Lumbosacral spondylosis without myelopathy 08/29/2018    History of colonic polyps 05/23/2017    Family history of malignant neoplasm of gastrointestinal tract 05/23/2017               History of Present Illness  Patient presents today for follow-up with a history of SVT status post ablation and hypertension.  He returns today for scheduled electrophysiology follow-up.  He continues to have no awareness of palpitations, no dizziness no syncope no heart racing.  He is exercising.  Blood pressure typically runs 120 mmHg systolic on his current medical regimen.  States compliance current medical regimen reports no significant adverse side effects.    Allergies   Allergen Reactions    Jardiance [Empagliflozin] Palpitations       Current Outpatient Medications   Medication Instructions    dilTIAZem CD (CARDIZEM CD) 180 mg, Oral, Daily    escitalopram (LEXAPRO) 20 mg, Oral, Daily    fenofibrate (TRICOR) 145 mg, Oral, Daily    folic acid (FOLVITE) 1 mg, Oral, Daily    glucose blood (Accu-Chek Guide) test strip USE AS INSTRUCTED    glycopyrrolate (ROBINUL) 2 mg, Oral, Daily                                       "                                                                   HumaLOG KwikPen 100 UNIT/ML solution pen-injector     levocetirizine (XYZAL) 5 mg, Oral, Daily PRN    levothyroxine sodium (TIROSINT) 250 mcg, Oral, Take As Directed, PATIENT TAKES 250MCG ON MON,TUES,WED,THURS,FRI<BR>PATIENT TAKES 125MCG ON SAT<BR>PATIENT TAKES NO LEVOTHYROXINE ON SUN    metoprolol succinate XL (TOPROL-XL) 50 mg, Oral, Daily    montelukast (SINGULAIR) 10 mg, Oral, Nightly    omeprazole (PRILOSEC) 20 mg, Oral, Daily    Toujeo Max SoloStar 300 UNIT/ML solution pen-injector injection Inject 50 Units under the skin into the appropriate area as directed Daily.    Vitamin D (Cholecalciferol) (CHOLECALCIFEROL) 400 Units, Oral, Daily       .    Objective:     /80 (BP Location: Left arm, Patient Position: Sitting)   Pulse 85   Ht 185.4 cm (73\")   Wt 126 kg (278 lb)   SpO2 96%   BMI 36.68 kg/m²    Body mass index is 36.68 kg/m².     Vitals reviewed.   Constitutional:       Appearance: Well-developed.   Pulmonary:      Effort: Pulmonary effort is normal. No respiratory distress.      Breath sounds: Normal breath sounds. No wheezing. No rales.      Comments: Bases clear  Chest:      Chest wall: Not tender to palpatation.   Cardiovascular:      Normal rate. Regular rhythm.      Murmurs: There is no murmur.      No gallop.  No click. No rub.   Pulses:     Intact distal pulses.   Edema:     Peripheral edema absent.   Musculoskeletal: Normal range of motion.       Lab Review:     Lab Results   Component Value Date    GLUCOSE 178 (H) 04/29/2024    BUN 13 04/29/2024    CREATININE 1.14 04/29/2024    EGFR 78.4 04/29/2024    BCR 11.4 04/29/2024    K 4.1 04/29/2024    CO2 26.9 04/29/2024    CALCIUM 9.6 04/29/2024    ALBUMIN 4.4 04/29/2024    BILITOT 0.4 04/29/2024    AST 21 04/29/2024    ALT 19 04/29/2024     Lab Results   Component Value Date    WBC 6.41 04/29/2024    HGB 15.0 04/29/2024    HCT 44.3 04/29/2024    MCV 89.5 04/29/2024     " 04/29/2024     Lab Results   Component Value Date    TSH 1.900 03/01/2024           Procedures               Assessment:      Diagnosis Plan   1. SVT (supraventricular tachycardia)  No known reoccurrence since ablation      2. Essential hypertension  Well-managed on current medical regimen        Plan:     Advance Care Planning   ACP discussion was declined by the patient. Patient has an advance directive (not in EMR), copy requested.           Stable cardiac status.  Continue current medications.   Follow-up as needed.  Continue follow-up with primary cardiology  Thank you for allowing us to participate in the care of your patient.     Electronically signed by NESHA Serrano, 05/28/24, 1:08 PM EDT.

## 2024-05-28 NOTE — LETTER
May 28, 2024     DELL Morrison  [address] [facility]  [address]  [place] [address]    Patient: Wayne Guan   YOB: 1975   Date of Visit: 5/28/2024     Dear DELL Morrison:       Thank you for referring Wayne Guan to me for evaluation. Below are the relevant portions of my assessment and plan of care.    If you have questions, please do not hesitate to call me. I look forward to following Wayne along with you.         Sincerely,        Gonzales Chen,         CC: No Recipients    Pedro Silverio PA  05/28/24 1308  Sign when Signing Visit          Encounter Date:05/28/2024      Patient ID: Wayne Guan is a 48 y.o. male.    Lyla Guerrero APRN    Cheif Complaint EP: SVT    PROBLEM LIST:  Patient Active Problem List    Diagnosis Date Noted   • SVT (supraventricular tachycardia) 01/23/2022     Priority: High     Note Last Updated: 2/28/2023     EP study with RFA of AVNRT, 9/9/2022     • Essential hypertension 07/19/2021     Priority: Low   • Alcohol abuse 03/01/2024   • Aortic ectasia, thoracic 03/06/2023   • Hepatitis 02/10/2023   • Anxiety 07/15/2022   • Bipolar 2 disorder 07/20/2021   • Postoperative hypothyroidism 07/19/2021   • GERD (gastroesophageal reflux disease) 07/19/2021   • Alcoholism 01/16/2021   • Papillary thyroid carcinoma 04/01/2020   • Cervical stenosis of spinal canal 11/30/2018   • DM (diabetes mellitus) 11/14/2018   • Hyperlipidemia 11/14/2018   • Lumbosacral spondylosis without myelopathy 08/29/2018   • History of colonic polyps 05/23/2017   • Family history of malignant neoplasm of gastrointestinal tract 05/23/2017               History of Present Illness  Patient presents today for follow-up with a history of SVT status post ablation and hypertension.  He returns today for scheduled electrophysiology follow-up.  He continues to have no awareness of palpitations, no dizziness no syncope no heart racing.  He is exercising.  Blood pressure typically runs 120 mmHg  "systolic on his current medical regimen.  States compliance current medical regimen reports no significant adverse side effects.    Allergies   Allergen Reactions   • Jardiance [Empagliflozin] Palpitations       Current Outpatient Medications   Medication Instructions   • dilTIAZem CD (CARDIZEM CD) 180 mg, Oral, Daily   • escitalopram (LEXAPRO) 20 mg, Oral, Daily   • fenofibrate (TRICOR) 145 mg, Oral, Daily   • folic acid (FOLVITE) 1 mg, Oral, Daily   • glucose blood (Accu-Chek Guide) test strip USE AS INSTRUCTED   • glycopyrrolate (ROBINUL) 2 mg, Oral, Daily                                                                                                        • HumaLOG KwikPen 100 UNIT/ML solution pen-injector    • levocetirizine (XYZAL) 5 mg, Oral, Daily PRN   • levothyroxine sodium (TIROSINT) 250 mcg, Oral, Take As Directed, PATIENT TAKES 250MCG ON MON,TUES,WED,THURS,FRI<BR>PATIENT TAKES 125MCG ON SAT<BR>PATIENT TAKES NO LEVOTHYROXINE ON SUN   • metoprolol succinate XL (TOPROL-XL) 50 mg, Oral, Daily   • montelukast (SINGULAIR) 10 mg, Oral, Nightly   • omeprazole (PRILOSEC) 20 mg, Oral, Daily   • Toujeo Max SoloStar 300 UNIT/ML solution pen-injector injection Inject 50 Units under the skin into the appropriate area as directed Daily.   • Vitamin D (Cholecalciferol) (CHOLECALCIFEROL) 400 Units, Oral, Daily       .    Objective:     /80 (BP Location: Left arm, Patient Position: Sitting)   Pulse 85   Ht 185.4 cm (73\")   Wt 126 kg (278 lb)   SpO2 96%   BMI 36.68 kg/m²    Body mass index is 36.68 kg/m².     Vitals reviewed.   Constitutional:       Appearance: Well-developed.   Pulmonary:      Effort: Pulmonary effort is normal. No respiratory distress.      Breath sounds: Normal breath sounds. No wheezing. No rales.      Comments: Bases clear  Chest:      Chest wall: Not tender to palpatation.   Cardiovascular:      Normal rate. Regular rhythm.      Murmurs: There is no murmur.      No gallop.  No click. No " rub.   Pulses:     Intact distal pulses.   Edema:     Peripheral edema absent.   Musculoskeletal: Normal range of motion.       Lab Review:     Lab Results   Component Value Date    GLUCOSE 178 (H) 04/29/2024    BUN 13 04/29/2024    CREATININE 1.14 04/29/2024    EGFR 78.4 04/29/2024    BCR 11.4 04/29/2024    K 4.1 04/29/2024    CO2 26.9 04/29/2024    CALCIUM 9.6 04/29/2024    ALBUMIN 4.4 04/29/2024    BILITOT 0.4 04/29/2024    AST 21 04/29/2024    ALT 19 04/29/2024     Lab Results   Component Value Date    WBC 6.41 04/29/2024    HGB 15.0 04/29/2024    HCT 44.3 04/29/2024    MCV 89.5 04/29/2024     04/29/2024     Lab Results   Component Value Date    TSH 1.900 03/01/2024           Procedures               Assessment:      Diagnosis Plan   1. SVT (supraventricular tachycardia)  No known reoccurrence since ablation      2. Essential hypertension  Well-managed on current medical regimen        Plan:     Advance Care Planning  ACP discussion was declined by the patient. Patient has an advance directive (not in EMR), copy requested.           Stable cardiac status.  Continue current medications.   Follow-up as needed.  Continue follow-up with primary cardiology  Thank you for allowing us to participate in the care of your patient.     Electronically signed by NESHA Serrano, 05/28/24, 1:08 PM EDT.

## 2024-06-12 RX ORDER — DILTIAZEM HYDROCHLORIDE 180 MG/1
180 CAPSULE, COATED, EXTENDED RELEASE ORAL DAILY
Qty: 90 CAPSULE | Refills: 3 | Status: SHIPPED | OUTPATIENT
Start: 2024-06-12

## 2024-07-19 ENCOUNTER — APPOINTMENT (OUTPATIENT)
Dept: GENERAL RADIOLOGY | Facility: HOSPITAL | Age: 49
End: 2024-07-19
Payer: COMMERCIAL

## 2024-07-19 ENCOUNTER — HOSPITAL ENCOUNTER (INPATIENT)
Facility: HOSPITAL | Age: 49
LOS: 2 days | Discharge: HOME | End: 2024-07-22
Attending: EMERGENCY MEDICINE | Admitting: STUDENT IN AN ORGANIZED HEALTH CARE EDUCATION/TRAINING PROGRAM
Payer: COMMERCIAL

## 2024-07-19 DIAGNOSIS — F10.939 ALCOHOL WITHDRAWAL SYNDROME WITH COMPLICATION: Primary | ICD-10-CM

## 2024-07-19 LAB
ALBUMIN SERPL-MCNC: 4.7 G/DL (ref 3.5–5.2)
ALBUMIN/GLOB SERPL: 1.6 G/DL
ALP SERPL-CCNC: 82 U/L (ref 39–117)
ALT SERPL W P-5'-P-CCNC: 22 U/L (ref 1–41)
ANION GAP SERPL CALCULATED.3IONS-SCNC: 33.6 MMOL/L (ref 5–15)
AST SERPL-CCNC: 26 U/L (ref 1–40)
BASOPHILS # BLD AUTO: 0.04 10*3/MM3 (ref 0–0.2)
BASOPHILS NFR BLD AUTO: 0.4 % (ref 0–1.5)
BILIRUB SERPL-MCNC: 0.5 MG/DL (ref 0–1.2)
BUN SERPL-MCNC: 12 MG/DL (ref 6–20)
BUN/CREAT SERPL: 9.7 (ref 7–25)
CALCIUM SPEC-SCNC: 8.7 MG/DL (ref 8.6–10.5)
CHLORIDE SERPL-SCNC: 96 MMOL/L (ref 98–107)
CLUMPED PLATELETS: PRESENT
CO2 SERPL-SCNC: 9.4 MMOL/L (ref 22–29)
CREAT SERPL-MCNC: 1.24 MG/DL (ref 0.76–1.27)
DEPRECATED RDW RBC AUTO: 42.2 FL (ref 37–54)
EGFRCR SERPLBLD CKD-EPI 2021: 70.8 ML/MIN/1.73
EOSINOPHIL # BLD AUTO: 0 10*3/MM3 (ref 0–0.4)
EOSINOPHIL NFR BLD AUTO: 0 % (ref 0.3–6.2)
ERYTHROCYTE [DISTWIDTH] IN BLOOD BY AUTOMATED COUNT: 12.9 % (ref 12.3–15.4)
GLOBULIN UR ELPH-MCNC: 3 GM/DL
GLUCOSE SERPL-MCNC: 203 MG/DL (ref 65–99)
HCT VFR BLD AUTO: 47 % (ref 37.5–51)
HGB BLD-MCNC: 15.8 G/DL (ref 13–17.7)
HOLD SPECIMEN: NORMAL
HOLD SPECIMEN: NORMAL
IMM GRANULOCYTES # BLD AUTO: 0.05 10*3/MM3 (ref 0–0.05)
IMM GRANULOCYTES NFR BLD AUTO: 0.4 % (ref 0–0.5)
LYMPHOCYTES # BLD AUTO: 0.97 10*3/MM3 (ref 0.7–3.1)
LYMPHOCYTES NFR BLD AUTO: 8.7 % (ref 19.6–45.3)
MAGNESIUM SERPL-MCNC: 1.7 MG/DL (ref 1.6–2.6)
MCH RBC QN AUTO: 30.4 PG (ref 26.6–33)
MCHC RBC AUTO-ENTMCNC: 33.6 G/DL (ref 31.5–35.7)
MCV RBC AUTO: 90.6 FL (ref 79–97)
MONOCYTES # BLD AUTO: 0.24 10*3/MM3 (ref 0.1–0.9)
MONOCYTES NFR BLD AUTO: 2.2 % (ref 5–12)
NEUTROPHILS NFR BLD AUTO: 88.3 % (ref 42.7–76)
NEUTROPHILS NFR BLD AUTO: 9.82 10*3/MM3 (ref 1.7–7)
NRBC BLD AUTO-RTO: 0 /100 WBC (ref 0–0.2)
PLATELET # BLD AUTO: 486 10*3/MM3 (ref 140–450)
PMV BLD AUTO: 8.8 FL (ref 6–12)
POTASSIUM SERPL-SCNC: 4.2 MMOL/L (ref 3.5–5.2)
PROT SERPL-MCNC: 7.7 G/DL (ref 6–8.5)
RBC # BLD AUTO: 5.19 10*6/MM3 (ref 4.14–5.8)
RBC MORPH BLD: NORMAL
SMALL PLATELETS BLD QL SMEAR: ADEQUATE
SODIUM SERPL-SCNC: 139 MMOL/L (ref 136–145)
TROPONIN T SERPL HS-MCNC: 11 NG/L
WBC MORPH BLD: NORMAL
WBC NRBC COR # BLD AUTO: 11.12 10*3/MM3 (ref 3.4–10.8)
WHOLE BLOOD HOLD COAG: NORMAL
WHOLE BLOOD HOLD SPECIMEN: NORMAL

## 2024-07-19 PROCEDURE — 25010000002 ONDANSETRON PER 1 MG: Performed by: EMERGENCY MEDICINE

## 2024-07-19 PROCEDURE — 80053 COMPREHEN METABOLIC PANEL: CPT

## 2024-07-19 PROCEDURE — 84484 ASSAY OF TROPONIN QUANT: CPT

## 2024-07-19 PROCEDURE — 83735 ASSAY OF MAGNESIUM: CPT

## 2024-07-19 PROCEDURE — 82009 KETONE BODYS QUAL: CPT | Performed by: PHYSICIAN ASSISTANT

## 2024-07-19 PROCEDURE — 85007 BL SMEAR W/DIFF WBC COUNT: CPT

## 2024-07-19 PROCEDURE — 71045 X-RAY EXAM CHEST 1 VIEW: CPT

## 2024-07-19 PROCEDURE — 25010000002 LORAZEPAM PER 2 MG: Performed by: EMERGENCY MEDICINE

## 2024-07-19 PROCEDURE — 81001 URINALYSIS AUTO W/SCOPE: CPT | Performed by: EMERGENCY MEDICINE

## 2024-07-19 PROCEDURE — 25810000003 SODIUM CHLORIDE 0.9 % SOLUTION: Performed by: EMERGENCY MEDICINE

## 2024-07-19 PROCEDURE — 85025 COMPLETE CBC W/AUTO DIFF WBC: CPT

## 2024-07-19 PROCEDURE — 99285 EMERGENCY DEPT VISIT HI MDM: CPT

## 2024-07-19 RX ORDER — ONDANSETRON 2 MG/ML
4 INJECTION INTRAMUSCULAR; INTRAVENOUS ONCE
Status: COMPLETED | OUTPATIENT
Start: 2024-07-19 | End: 2024-07-19

## 2024-07-19 RX ORDER — LORAZEPAM 2 MG/ML
1 INJECTION INTRAMUSCULAR ONCE
Status: COMPLETED | OUTPATIENT
Start: 2024-07-19 | End: 2024-07-19

## 2024-07-19 RX ORDER — SODIUM CHLORIDE 0.9 % (FLUSH) 0.9 %
10 SYRINGE (ML) INJECTION AS NEEDED
Status: DISCONTINUED | OUTPATIENT
Start: 2024-07-19 | End: 2024-07-22 | Stop reason: HOSPADM

## 2024-07-19 RX ADMIN — LORAZEPAM 1 MG: 2 INJECTION, SOLUTION INTRAMUSCULAR; INTRAVENOUS at 23:16

## 2024-07-19 RX ADMIN — ONDANSETRON HYDROCHLORIDE 4 MG: 2 SOLUTION INTRAMUSCULAR; INTRAVENOUS at 21:55

## 2024-07-19 RX ADMIN — SODIUM CHLORIDE 1000 ML: 9 INJECTION, SOLUTION INTRAVENOUS at 23:16

## 2024-07-19 RX ADMIN — ONDANSETRON 4 MG: 2 INJECTION INTRAMUSCULAR; INTRAVENOUS at 23:16

## 2024-07-19 NOTE — Clinical Note
Level of Care: Critical Care [6]   Diagnosis: Alcohol withdrawal [291.81.ICD-9-CM]   Certification: I Certify That Inpatient Hospital Services Are Medically Necessary For Greater Than 2 Midnights

## 2024-07-20 PROBLEM — F10.939 ALCOHOL WITHDRAWAL: Status: ACTIVE | Noted: 2024-07-20

## 2024-07-20 PROBLEM — E87.20 METABOLIC ACIDOSIS: Status: ACTIVE | Noted: 2024-07-20

## 2024-07-20 LAB
ACETONE BLD QL: NEGATIVE
ANION GAP SERPL CALCULATED.3IONS-SCNC: 24.1 MMOL/L (ref 5–15)
BACTERIA UR QL AUTO: NORMAL /HPF
BASOPHILS # BLD AUTO: 0.02 10*3/MM3 (ref 0–0.2)
BASOPHILS NFR BLD AUTO: 0.2 % (ref 0–1.5)
BILIRUB UR QL STRIP: NEGATIVE
BUN SERPL-MCNC: 13 MG/DL (ref 6–20)
BUN/CREAT SERPL: 10.8 (ref 7–25)
CALCIUM SPEC-SCNC: 8.5 MG/DL (ref 8.6–10.5)
CHLORIDE SERPL-SCNC: 96 MMOL/L (ref 98–107)
CLARITY UR: CLEAR
CO2 SERPL-SCNC: 14.9 MMOL/L (ref 22–29)
COLOR UR: YELLOW
CREAT SERPL-MCNC: 1.2 MG/DL (ref 0.76–1.27)
D-LACTATE SERPL-SCNC: 1.9 MMOL/L (ref 0.5–2)
D-LACTATE SERPL-SCNC: 2.2 MMOL/L (ref 0.5–2)
D-LACTATE SERPL-SCNC: 2.7 MMOL/L (ref 0.5–2)
DEPRECATED RDW RBC AUTO: 41.8 FL (ref 37–54)
EGFRCR SERPLBLD CKD-EPI 2021: 73.7 ML/MIN/1.73
EOSINOPHIL # BLD AUTO: 0 10*3/MM3 (ref 0–0.4)
EOSINOPHIL NFR BLD AUTO: 0 % (ref 0.3–6.2)
ERYTHROCYTE [DISTWIDTH] IN BLOOD BY AUTOMATED COUNT: 13 % (ref 12.3–15.4)
GLUCOSE BLDC GLUCOMTR-MCNC: 202 MG/DL (ref 70–99)
GLUCOSE BLDC GLUCOMTR-MCNC: 234 MG/DL (ref 70–99)
GLUCOSE BLDC GLUCOMTR-MCNC: 241 MG/DL (ref 70–99)
GLUCOSE BLDC GLUCOMTR-MCNC: 246 MG/DL (ref 70–99)
GLUCOSE BLDC GLUCOMTR-MCNC: 253 MG/DL (ref 70–99)
GLUCOSE BLDC GLUCOMTR-MCNC: 262 MG/DL (ref 70–99)
GLUCOSE SERPL-MCNC: 262 MG/DL (ref 65–99)
GLUCOSE UR STRIP-MCNC: ABNORMAL MG/DL
HCT VFR BLD AUTO: 41.5 % (ref 37.5–51)
HGB BLD-MCNC: 14.3 G/DL (ref 13–17.7)
HGB UR QL STRIP.AUTO: NEGATIVE
HYALINE CASTS UR QL AUTO: NORMAL /LPF
IMM GRANULOCYTES # BLD AUTO: 0.03 10*3/MM3 (ref 0–0.05)
IMM GRANULOCYTES NFR BLD AUTO: 0.2 % (ref 0–0.5)
KETONES UR QL STRIP: ABNORMAL
LEUKOCYTE ESTERASE UR QL STRIP.AUTO: NEGATIVE
LYMPHOCYTES # BLD AUTO: 1.09 10*3/MM3 (ref 0.7–3.1)
LYMPHOCYTES NFR BLD AUTO: 8.4 % (ref 19.6–45.3)
MCH RBC QN AUTO: 30.2 PG (ref 26.6–33)
MCHC RBC AUTO-ENTMCNC: 34.5 G/DL (ref 31.5–35.7)
MCV RBC AUTO: 87.7 FL (ref 79–97)
MONOCYTES # BLD AUTO: 0.53 10*3/MM3 (ref 0.1–0.9)
MONOCYTES NFR BLD AUTO: 4.1 % (ref 5–12)
NEUTROPHILS NFR BLD AUTO: 11.24 10*3/MM3 (ref 1.7–7)
NEUTROPHILS NFR BLD AUTO: 87.1 % (ref 42.7–76)
NITRITE UR QL STRIP: NEGATIVE
NRBC BLD AUTO-RTO: 0 /100 WBC (ref 0–0.2)
PH UR STRIP.AUTO: <=5 [PH] (ref 5–8)
PLATELET # BLD AUTO: 434 10*3/MM3 (ref 140–450)
PMV BLD AUTO: 8.4 FL (ref 6–12)
POTASSIUM SERPL-SCNC: 4.6 MMOL/L (ref 3.5–5.2)
PROT UR QL STRIP: ABNORMAL
RBC # BLD AUTO: 4.73 10*6/MM3 (ref 4.14–5.8)
RBC # UR STRIP: NORMAL /HPF
REF LAB TEST METHOD: NORMAL
SODIUM SERPL-SCNC: 135 MMOL/L (ref 136–145)
SP GR UR STRIP: 1.02 (ref 1–1.03)
SQUAMOUS #/AREA URNS HPF: NORMAL /HPF
UROBILINOGEN UR QL STRIP: ABNORMAL
WBC # UR STRIP: NORMAL /HPF
WBC NRBC COR # BLD AUTO: 12.91 10*3/MM3 (ref 3.4–10.8)

## 2024-07-20 PROCEDURE — 25810000003 LACTATED RINGERS PER 1000 ML: Performed by: INTERNAL MEDICINE

## 2024-07-20 PROCEDURE — 25010000002 LORAZEPAM PER 2 MG: Performed by: STUDENT IN AN ORGANIZED HEALTH CARE EDUCATION/TRAINING PROGRAM

## 2024-07-20 PROCEDURE — 25010000002 HEPARIN (PORCINE) PER 1000 UNITS: Performed by: STUDENT IN AN ORGANIZED HEALTH CARE EDUCATION/TRAINING PROGRAM

## 2024-07-20 PROCEDURE — 82948 REAGENT STRIP/BLOOD GLUCOSE: CPT

## 2024-07-20 PROCEDURE — 99222 1ST HOSP IP/OBS MODERATE 55: CPT | Performed by: STUDENT IN AN ORGANIZED HEALTH CARE EDUCATION/TRAINING PROGRAM

## 2024-07-20 PROCEDURE — 25010000002 ONDANSETRON PER 1 MG: Performed by: STUDENT IN AN ORGANIZED HEALTH CARE EDUCATION/TRAINING PROGRAM

## 2024-07-20 PROCEDURE — 83605 ASSAY OF LACTIC ACID: CPT | Performed by: STUDENT IN AN ORGANIZED HEALTH CARE EDUCATION/TRAINING PROGRAM

## 2024-07-20 PROCEDURE — 25810000003 SODIUM CHLORIDE 0.9 % SOLUTION: Performed by: STUDENT IN AN ORGANIZED HEALTH CARE EDUCATION/TRAINING PROGRAM

## 2024-07-20 PROCEDURE — 85025 COMPLETE CBC W/AUTO DIFF WBC: CPT | Performed by: STUDENT IN AN ORGANIZED HEALTH CARE EDUCATION/TRAINING PROGRAM

## 2024-07-20 PROCEDURE — 80048 BASIC METABOLIC PNL TOTAL CA: CPT | Performed by: STUDENT IN AN ORGANIZED HEALTH CARE EDUCATION/TRAINING PROGRAM

## 2024-07-20 PROCEDURE — 63710000001 INSULIN REGULAR HUMAN PER 5 UNITS: Performed by: STUDENT IN AN ORGANIZED HEALTH CARE EDUCATION/TRAINING PROGRAM

## 2024-07-20 PROCEDURE — 25810000003 SODIUM CHLORIDE 0.9 % SOLUTION: Performed by: EMERGENCY MEDICINE

## 2024-07-20 PROCEDURE — 36415 COLL VENOUS BLD VENIPUNCTURE: CPT | Performed by: STUDENT IN AN ORGANIZED HEALTH CARE EDUCATION/TRAINING PROGRAM

## 2024-07-20 PROCEDURE — 25010000002 THIAMINE PER 100 MG: Performed by: STUDENT IN AN ORGANIZED HEALTH CARE EDUCATION/TRAINING PROGRAM

## 2024-07-20 PROCEDURE — 99291 CRITICAL CARE FIRST HOUR: CPT | Performed by: INTERNAL MEDICINE

## 2024-07-20 PROCEDURE — 25010000002 CALCIUM GLUCONATE-NACL 1-0.675 GM/50ML-% SOLUTION: Performed by: INTERNAL MEDICINE

## 2024-07-20 RX ORDER — AMOXICILLIN 250 MG
2 CAPSULE ORAL 2 TIMES DAILY
Status: DISCONTINUED | OUTPATIENT
Start: 2024-07-20 | End: 2024-07-22 | Stop reason: HOSPADM

## 2024-07-20 RX ORDER — ASCORBIC ACID 500 MG
500 TABLET ORAL DAILY
COMMUNITY

## 2024-07-20 RX ORDER — METOPROLOL SUCCINATE 50 MG/1
50 TABLET, EXTENDED RELEASE ORAL DAILY
Status: DISCONTINUED | OUTPATIENT
Start: 2024-07-20 | End: 2024-07-22 | Stop reason: HOSPADM

## 2024-07-20 RX ORDER — LORAZEPAM 0.5 MG/1
1 TABLET ORAL
Status: DISCONTINUED | OUTPATIENT
Start: 2024-07-20 | End: 2024-07-22

## 2024-07-20 RX ORDER — LORAZEPAM 2 MG/1
2 TABLET ORAL
Status: DISCONTINUED | OUTPATIENT
Start: 2024-07-20 | End: 2024-07-22

## 2024-07-20 RX ORDER — CALCIUM GLUCONATE 20 MG/ML
1000 INJECTION, SOLUTION INTRAVENOUS ONCE
Status: COMPLETED | OUTPATIENT
Start: 2024-07-20 | End: 2024-07-20

## 2024-07-20 RX ORDER — NITROGLYCERIN 0.4 MG/1
0.4 TABLET SUBLINGUAL
Status: DISCONTINUED | OUTPATIENT
Start: 2024-07-20 | End: 2024-07-22 | Stop reason: HOSPADM

## 2024-07-20 RX ORDER — LEVOTHYROXINE SODIUM 125 UG/1
125 CAPSULE ORAL WEEKLY
COMMUNITY

## 2024-07-20 RX ORDER — LORAZEPAM 2 MG/ML
1 INJECTION INTRAMUSCULAR EVERY 6 HOURS
Status: DISCONTINUED | OUTPATIENT
Start: 2024-07-21 | End: 2024-07-22

## 2024-07-20 RX ORDER — NICOTINE POLACRILEX 4 MG
15 LOZENGE BUCCAL
Status: DISCONTINUED | OUTPATIENT
Start: 2024-07-20 | End: 2024-07-22 | Stop reason: HOSPADM

## 2024-07-20 RX ORDER — DEXTROSE MONOHYDRATE 25 G/50ML
25 INJECTION, SOLUTION INTRAVENOUS
Status: DISCONTINUED | OUTPATIENT
Start: 2024-07-20 | End: 2024-07-22 | Stop reason: HOSPADM

## 2024-07-20 RX ORDER — ALUMINA, MAGNESIA, AND SIMETHICONE 2400; 2400; 240 MG/30ML; MG/30ML; MG/30ML
15 SUSPENSION ORAL EVERY 6 HOURS PRN
Status: DISCONTINUED | OUTPATIENT
Start: 2024-07-20 | End: 2024-07-22 | Stop reason: HOSPADM

## 2024-07-20 RX ORDER — LORAZEPAM 2 MG/ML
2 INJECTION INTRAMUSCULAR EVERY 6 HOURS
Status: COMPLETED | OUTPATIENT
Start: 2024-07-20 | End: 2024-07-20

## 2024-07-20 RX ORDER — INSULIN DEGLUDEC 100 U/ML
INJECTION, SOLUTION SUBCUTANEOUS
Status: ON HOLD | COMMUNITY
End: 2024-07-20

## 2024-07-20 RX ORDER — GINSENG 100 MG
50 CAPSULE ORAL DAILY
COMMUNITY

## 2024-07-20 RX ORDER — SODIUM CHLORIDE, SODIUM LACTATE, POTASSIUM CHLORIDE, CALCIUM CHLORIDE 600; 310; 30; 20 MG/100ML; MG/100ML; MG/100ML; MG/100ML
75 INJECTION, SOLUTION INTRAVENOUS CONTINUOUS
Status: DISCONTINUED | OUTPATIENT
Start: 2024-07-20 | End: 2024-07-22

## 2024-07-20 RX ORDER — HEPARIN SODIUM 5000 [USP'U]/ML
5000 INJECTION, SOLUTION INTRAVENOUS; SUBCUTANEOUS EVERY 12 HOURS SCHEDULED
Status: DISCONTINUED | OUTPATIENT
Start: 2024-07-20 | End: 2024-07-22 | Stop reason: HOSPADM

## 2024-07-20 RX ORDER — BISACODYL 10 MG
10 SUPPOSITORY, RECTAL RECTAL DAILY PRN
Status: DISCONTINUED | OUTPATIENT
Start: 2024-07-20 | End: 2024-07-22 | Stop reason: HOSPADM

## 2024-07-20 RX ORDER — SODIUM CHLORIDE 0.9 % (FLUSH) 0.9 %
10 SYRINGE (ML) INJECTION EVERY 12 HOURS SCHEDULED
Status: DISCONTINUED | OUTPATIENT
Start: 2024-07-20 | End: 2024-07-22 | Stop reason: HOSPADM

## 2024-07-20 RX ORDER — BISACODYL 5 MG/1
5 TABLET, DELAYED RELEASE ORAL DAILY PRN
Status: DISCONTINUED | OUTPATIENT
Start: 2024-07-20 | End: 2024-07-22 | Stop reason: HOSPADM

## 2024-07-20 RX ORDER — PANTOPRAZOLE SODIUM 40 MG/1
40 TABLET, DELAYED RELEASE ORAL
Status: DISCONTINUED | OUTPATIENT
Start: 2024-07-20 | End: 2024-07-22 | Stop reason: HOSPADM

## 2024-07-20 RX ORDER — THIAMINE HYDROCHLORIDE 100 MG/ML
200 INJECTION, SOLUTION INTRAMUSCULAR; INTRAVENOUS EVERY 8 HOURS SCHEDULED
Status: DISCONTINUED | OUTPATIENT
Start: 2024-07-23 | End: 2024-07-22

## 2024-07-20 RX ORDER — LORAZEPAM 2 MG/ML
2 INJECTION INTRAMUSCULAR
Status: DISCONTINUED | OUTPATIENT
Start: 2024-07-20 | End: 2024-07-22

## 2024-07-20 RX ORDER — FAMOTIDINE 10 MG/ML
20 INJECTION, SOLUTION INTRAVENOUS ONCE
Status: COMPLETED | OUTPATIENT
Start: 2024-07-20 | End: 2024-07-20

## 2024-07-20 RX ORDER — FLUTICASONE PROPIONATE 50 MCG
2 SPRAY, SUSPENSION (ML) NASAL DAILY PRN
COMMUNITY

## 2024-07-20 RX ORDER — POLYETHYLENE GLYCOL 3350 17 G/17G
17 POWDER, FOR SOLUTION ORAL DAILY PRN
Status: DISCONTINUED | OUTPATIENT
Start: 2024-07-20 | End: 2024-07-22 | Stop reason: HOSPADM

## 2024-07-20 RX ORDER — DILTIAZEM HYDROCHLORIDE 180 MG/1
180 CAPSULE, COATED, EXTENDED RELEASE ORAL DAILY
Status: DISCONTINUED | OUTPATIENT
Start: 2024-07-20 | End: 2024-07-22 | Stop reason: HOSPADM

## 2024-07-20 RX ORDER — LORAZEPAM 2 MG/ML
4 INJECTION INTRAMUSCULAR
Status: DISCONTINUED | OUTPATIENT
Start: 2024-07-20 | End: 2024-07-22

## 2024-07-20 RX ORDER — ACETAMINOPHEN 500 MG
500 TABLET ORAL EVERY 6 HOURS PRN
COMMUNITY

## 2024-07-20 RX ORDER — SODIUM CHLORIDE 9 MG/ML
40 INJECTION, SOLUTION INTRAVENOUS AS NEEDED
Status: DISCONTINUED | OUTPATIENT
Start: 2024-07-20 | End: 2024-07-22 | Stop reason: HOSPADM

## 2024-07-20 RX ORDER — ESCITALOPRAM OXALATE 10 MG/1
20 TABLET ORAL DAILY
Status: DISCONTINUED | OUTPATIENT
Start: 2024-07-20 | End: 2024-07-22 | Stop reason: HOSPADM

## 2024-07-20 RX ORDER — LORAZEPAM 2 MG/1
4 TABLET ORAL
Status: DISCONTINUED | OUTPATIENT
Start: 2024-07-20 | End: 2024-07-22

## 2024-07-20 RX ORDER — SODIUM CHLORIDE 9 MG/ML
150 INJECTION, SOLUTION INTRAVENOUS CONTINUOUS
Status: DISCONTINUED | OUTPATIENT
Start: 2024-07-20 | End: 2024-07-20

## 2024-07-20 RX ORDER — ONDANSETRON 2 MG/ML
4 INJECTION INTRAMUSCULAR; INTRAVENOUS EVERY 6 HOURS PRN
Status: DISCONTINUED | OUTPATIENT
Start: 2024-07-20 | End: 2024-07-22 | Stop reason: HOSPADM

## 2024-07-20 RX ORDER — SODIUM CHLORIDE 0.9 % (FLUSH) 0.9 %
10 SYRINGE (ML) INJECTION AS NEEDED
Status: DISCONTINUED | OUTPATIENT
Start: 2024-07-20 | End: 2024-07-22 | Stop reason: HOSPADM

## 2024-07-20 RX ORDER — LORAZEPAM 2 MG/ML
1 INJECTION INTRAMUSCULAR
Status: DISCONTINUED | OUTPATIENT
Start: 2024-07-20 | End: 2024-07-22

## 2024-07-20 RX ORDER — IBUPROFEN 600 MG/1
1 TABLET ORAL
Status: DISCONTINUED | OUTPATIENT
Start: 2024-07-20 | End: 2024-07-22 | Stop reason: HOSPADM

## 2024-07-20 RX ADMIN — METOPROLOL SUCCINATE 50 MG: 50 TABLET, EXTENDED RELEASE ORAL at 16:16

## 2024-07-20 RX ADMIN — INSULIN HUMAN 5 UNITS: 100 INJECTION, SOLUTION PARENTERAL at 05:49

## 2024-07-20 RX ADMIN — ESCITALOPRAM OXALATE 20 MG: 10 TABLET ORAL at 16:16

## 2024-07-20 RX ADMIN — SENNOSIDES AND DOCUSATE SODIUM 2 TABLET: 50; 8.6 TABLET ORAL at 08:32

## 2024-07-20 RX ADMIN — ONDANSETRON 4 MG: 2 INJECTION INTRAMUSCULAR; INTRAVENOUS at 15:36

## 2024-07-20 RX ADMIN — DILTIAZEM HYDROCHLORIDE 180 MG: 180 CAPSULE, COATED, EXTENDED RELEASE ORAL at 16:16

## 2024-07-20 RX ADMIN — LORAZEPAM 2 MG: 2 INJECTION INTRAMUSCULAR; INTRAVENOUS at 20:45

## 2024-07-20 RX ADMIN — Medication 10 ML: at 08:10

## 2024-07-20 RX ADMIN — Medication 10 ML: at 02:58

## 2024-07-20 RX ADMIN — LORAZEPAM 2 MG: 2 INJECTION INTRAMUSCULAR; INTRAVENOUS at 02:59

## 2024-07-20 RX ADMIN — HEPARIN SODIUM 5000 UNITS: 5000 INJECTION INTRAVENOUS; SUBCUTANEOUS at 08:10

## 2024-07-20 RX ADMIN — LORAZEPAM 2 MG: 2 INJECTION INTRAMUSCULAR; INTRAVENOUS at 13:56

## 2024-07-20 RX ADMIN — LORAZEPAM 2 MG: 2 INJECTION INTRAMUSCULAR; INTRAVENOUS at 08:10

## 2024-07-20 RX ADMIN — THIAMINE HYDROCHLORIDE 500 MG: 100 INJECTION, SOLUTION INTRAMUSCULAR; INTRAVENOUS at 13:56

## 2024-07-20 RX ADMIN — THIAMINE HYDROCHLORIDE 500 MG: 100 INJECTION, SOLUTION INTRAMUSCULAR; INTRAVENOUS at 22:51

## 2024-07-20 RX ADMIN — Medication 10 ML: at 20:46

## 2024-07-20 RX ADMIN — INSULIN HUMAN 8 UNITS: 100 INJECTION, SOLUTION PARENTERAL at 17:08

## 2024-07-20 RX ADMIN — PANTOPRAZOLE SODIUM 40 MG: 40 TABLET, DELAYED RELEASE ORAL at 16:16

## 2024-07-20 RX ADMIN — THIAMINE HYDROCHLORIDE 500 MG: 100 INJECTION, SOLUTION INTRAMUSCULAR; INTRAVENOUS at 08:32

## 2024-07-20 RX ADMIN — INSULIN HUMAN 5 UNITS: 100 INJECTION, SOLUTION PARENTERAL at 23:06

## 2024-07-20 RX ADMIN — HEPARIN SODIUM 5000 UNITS: 5000 INJECTION INTRAVENOUS; SUBCUTANEOUS at 02:58

## 2024-07-20 RX ADMIN — SODIUM CHLORIDE, POTASSIUM CHLORIDE, SODIUM LACTATE AND CALCIUM CHLORIDE 120 ML/HR: 600; 310; 30; 20 INJECTION, SOLUTION INTRAVENOUS at 20:45

## 2024-07-20 RX ADMIN — FAMOTIDINE 20 MG: 10 INJECTION INTRAVENOUS at 12:53

## 2024-07-20 RX ADMIN — INSULIN HUMAN 5 UNITS: 100 INJECTION, SOLUTION PARENTERAL at 12:19

## 2024-07-20 RX ADMIN — CALCIUM GLUCONATE 1000 MG: 20 INJECTION, SOLUTION INTRAVENOUS at 12:53

## 2024-07-20 RX ADMIN — ONDANSETRON 4 MG: 2 INJECTION INTRAMUSCULAR; INTRAVENOUS at 05:48

## 2024-07-20 RX ADMIN — FOLIC ACID 1 MG: 5 INJECTION, SOLUTION INTRAMUSCULAR; INTRAVENOUS; SUBCUTANEOUS at 09:54

## 2024-07-20 RX ADMIN — SODIUM CHLORIDE 150 ML/HR: 9 INJECTION, SOLUTION INTRAVENOUS at 02:58

## 2024-07-20 RX ADMIN — SODIUM CHLORIDE, POTASSIUM CHLORIDE, SODIUM LACTATE AND CALCIUM CHLORIDE 120 ML/HR: 600; 310; 30; 20 INJECTION, SOLUTION INTRAVENOUS at 11:18

## 2024-07-20 RX ADMIN — SENNOSIDES AND DOCUSATE SODIUM 2 TABLET: 50; 8.6 TABLET ORAL at 22:50

## 2024-07-20 RX ADMIN — HEPARIN SODIUM 5000 UNITS: 5000 INJECTION INTRAVENOUS; SUBCUTANEOUS at 22:50

## 2024-07-20 RX ADMIN — SODIUM CHLORIDE 1000 ML: 9 INJECTION, SOLUTION INTRAVENOUS at 01:09

## 2024-07-20 NOTE — ED PROVIDER NOTES
Time: 1:36 AM EDT  Date of encounter:  7/19/2024  Independent Historian/Clinical History and Information was obtained by:   Patient    History is limited by: N/A    Chief Complaint: Alcohol withdrawal      History of Present Illness:  Patient is a 48 y.o. year old male who presents to the emergency department for evaluation of alcohol withdrawal.  The patient has had vomiting and abdominal pain.  Patient also reports some mild shortness of breath.  Patient has no cough hemoptysis.  Patient denies seizure-like activity.  Patient has no subjective neurological doves including numbness or tingling.  Patient has no fever or chills.  Patient has no leg pain or swelling.    HPI    Patient Care Team  Primary Care Provider: Lyla Guerrero APRN    Past Medical History:     Allergies   Allergen Reactions    Jardiance [Empagliflozin] Palpitations     Past Medical History:   Diagnosis Date    Alcoholism 01/16/2021    Allergies     Aneurysm     Anxiety 07/15/2022    Atrial fibrillation     Bipolar 2 disorder 07/20/2021    Cancer     Cervical stenosis of spinal canal 11/30/2018    Formatting of this note might be different from the original. Added automatically from request for surgery [id number]    Colon polyp 11/1/2003 Benign    Depression     Diabetes mellitus     GERD (gastroesophageal reflux disease)     History of colonic polyps 05/23/2017    Formatting of this note might be different from the original. Added automatically from request for surgery [id number]    Hyperlipidemia     Hypertension     Irritable bowel syndrome 2004    Lumbosacral spondylosis without myelopathy 08/29/2018    Pancreatitis 2008    Papillary thyroid carcinoma 04/01/2020    SVT (supraventricular tachycardia) 01/23/2022     Past Surgical History:   Procedure Laterality Date    ABLATION OF DYSRHYTHMIC FOCUS  9/9/2022    APPENDECTOMY  1983    CARDIAC ABLATION  09/09/2022    CARDIAC ELECTROPHYSIOLOGY PROCEDURE N/A 09/09/2022    Procedure: Ablation SVT HOLD  Metoprolol 5 days;  Surgeon: Tai Lisa MD;  Location: [facility] INVASIVE LOCATION;  Service: Cardiovascular;  Laterality: N/A;    CERVICAL DISC SURGERY      COLONOSCOPY      2021 [facility]    THYROID SURGERY       Family History   Problem Relation Age of Onset    Arthritis Mother         rheumatoid    Diabetes Mother     Hyperlipidemia Mother     Hypertension Mother     Osteoporosis Mother     Rashes / Skin problems Mother     Anemia Mother     Arrhythmia Mother     Heart attack Father     Hyperlipidemia Father     Hypertension Father     Diabetes Father     Alcohol abuse Father     Stroke Father     Hypertension Sister     Heart disease Maternal Uncle     Hypertension Maternal Uncle     Mental illness Paternal Aunt     Heart disease Paternal Aunt     Diabetes Paternal Aunt     COPD Paternal Uncle     Stroke Maternal Grandmother     Thyroid disease Maternal Grandmother     COPD Maternal Grandmother     Hypertension Maternal Grandmother     Heart disease Maternal Grandmother     Hypertension Maternal Grandfather     Diabetes Maternal Grandfather     Cancer Maternal Grandfather         throat    Heart disease Maternal Grandfather     Heart disease Paternal Grandmother     Hypertension Paternal Grandmother     Colon cancer Paternal Grandfather     Cancer Paternal Grandfather         colon       Home Medications:  Prior to Admission medications    Medication Sig Start Date End Date Taking? Authorizing Provider   dilTIAZem CD (CARDIZEM CD) 180 MG 24 hr capsule TAKE 1 CAPSULE BY MOUTH DAILY 6/12/24   Yao Keen MD   escitalopram (Lexapro) 20 MG tablet Take 1 tablet by mouth Daily. 4/24/24   Lyla Guerrero APRN   fenofibrate (TRICOR) 145 MG tablet Take 1 tablet by mouth Daily. 4/24/24   Lyla Guerrero APRN   folic acid (FOLVITE) 1 MG tablet Take 1 tablet by mouth Daily. 4/2/24   Lyla Guerrero APRN   glucose blood (Accu-Chek Guide) test strip USE AS INSTRUCTED 10/5/23   Lyla Guerrero APRN    glycopyrrolate (ROBINUL) 2 MG tablet Take 1 tablet by mouth Daily. 24   Lyla Guerrero APRN   HumaLOG KwikPen 100 UNIT/ML solution pen-injector  23   Kishore Galo MD   levocetirizine (XYZAL) 5 MG tablet Take 1 tablet by mouth Daily As Needed for Allergies. 24   Lyla Guerrero APRN   levothyroxine sodium (Tirosint) 125 MCG capsule capsule Take 2 capsules by mouth Take As Directed. PATIENT TAKES 250MCG ON MON,TU,WED,TH,FRI  PATIENT TAKES 125MCG ON SAT  PATIENT TAKES NO LEVOTHYROXINE ON SUN    Kishore Galo MD   metoprolol succinate XL (TOPROL-XL) 50 MG 24 hr tablet Take 1 tablet by mouth Daily. 24   Crissy Light APRN   montelukast (SINGULAIR) 10 MG tablet Take 1 tablet by mouth Every Night. 24   Lyla Guerrero APRN   omeprazole (priLOSEC) 20 MG capsule Take 1 capsule by mouth Daily.    Provider, Historical, MD   Toujeo Max SoloStar 300 UNIT/ML solution pen-injector injection Inject 50 Units under the skin into the appropriate area as directed Daily. 23   Kishore Galo MD   Vitamin D, Cholecalciferol, (CHOLECALCIFEROL) 10 MCG (400 UNIT) tablet Take 1 tablet by mouth Daily.    Kishore Galo MD        Social History:   Social History     Tobacco Use    Smoking status: Former     Current packs/day: 0.00     Average packs/day: 1 pack/day for 11.0 years (11.0 ttl pk-yrs)     Types: Cigarettes     Start date: 1997     Quit date: 2008     Years since quittin.0    Smokeless tobacco: Never   Vaping Use    Vaping status: Never Used   Substance Use Topics    Alcohol use: Not Currently     Alcohol/week: 10.0 standard drinks of alcohol     Comment: Two 1.75mL in 5 days    Drug use: Never         Review of Systems:  Review of Systems   Constitutional:  Negative for chills and fever.   HENT:  Negative for congestion, rhinorrhea and sore throat.    Eyes:  Negative for pain and visual disturbance.   Respiratory:  Negative for  "apnea, cough, chest tightness and shortness of breath.    Cardiovascular:  Negative for chest pain and palpitations.   Gastrointestinal:  Negative for abdominal pain, diarrhea, nausea and vomiting.   Genitourinary:  Negative for difficulty urinating and dysuria.   Musculoskeletal:  Negative for joint swelling and myalgias.   Skin:  Negative for color change.   Neurological:  Negative for seizures and headaches.   Psychiatric/Behavioral: Negative.     All other systems reviewed and are negative.       Physical Exam:  BP (!) 157/105   Pulse (!) 134   Temp 98 °F (36.7 °C) (Oral)   Resp 20   Ht 185.4 cm (73\")   Wt 122 kg (269 lb 13.5 oz)   SpO2 90%   BMI 35.60 kg/m²     Physical Exam  Vitals and nursing note reviewed.   Constitutional:       General: He is not in acute distress.     Appearance: Normal appearance. He is not toxic-appearing.   HENT:      Head: Normocephalic and atraumatic.      Jaw: There is normal jaw occlusion.   Eyes:      General: Lids are normal.      Extraocular Movements: Extraocular movements intact.      Conjunctiva/sclera: Conjunctivae normal.      Pupils: Pupils are equal, round, and reactive to light.   Cardiovascular:      Rate and Rhythm: Normal rate and regular rhythm.      Pulses: Normal pulses.      Heart sounds: Normal heart sounds.   Pulmonary:      Effort: Pulmonary effort is normal. No respiratory distress.      Breath sounds: Normal breath sounds. No wheezing or rhonchi.   Abdominal:      General: Abdomen is flat.      Palpations: Abdomen is soft.      Tenderness: There is no abdominal tenderness. There is no guarding or rebound.   Musculoskeletal:         General: Normal range of motion.      Cervical back: Normal range of motion and neck supple.      Right lower leg: No edema.      Left lower leg: No edema.   Skin:     General: Skin is warm and dry.   Neurological:      Mental Status: He is alert and oriented to person, place, and time. Mental status is at baseline. "   Psychiatric:         Mood and Affect: Mood normal.                  Procedures:  Procedures      Medical Decision Making:      Comorbidities that affect care:    Alcoholism    External Notes reviewed:    Hospital Discharge Summary: Alcoholism      The following orders were placed and all results were independently analyzed by me:  Orders Placed This Encounter   Procedures    XR Chest 1 View    [place] Draw    Comprehensive Metabolic Panel    Single High Sensitivity Troponin T    Magnesium    Urinalysis With Microscopic If Indicated (No Culture) - Urine, Clean Catch    CBC Auto Differential    Scan Slide    Urinalysis, Microscopic Only - Urine, Clean Catch    Lactic Acid, Plasma    NPO Diet NPO Type: Strict NPO    Undress & Gown    Continuous Pulse Oximetry    Vital Signs    Orthostatic Blood Pressure    Inpatient Hospitalist Consult    Oxygen Therapy- Nasal Cannula; Titrate 1-6 LPM Per SpO2; 90 - 95%    POC Glucose Once    POC Glucose Q6H    ECG 12 Lead ED Triage Standing Order; Weak / Dizzy / AMS    Insert Peripheral IV    Inpatient Admission    Fall Precautions    CBC & Differential    Green Top (Gel)    Lavender Top    Gold Top - SST    Light Blue Top       Medications Given in the Emergency Department:  Medications   sodium chloride 0.9 % flush 10 mL (has no administration in time range)   dextrose (GLUTOSE) oral gel 15 g (has no administration in time range)   dextrose (D50W) (25 g/50 mL) IV injection 25 g (has no administration in time range)   glucagon (GLUCAGEN) injection 1 mg (has no administration in time range)   insulin regular (humuLIN R,novoLIN R) injection 3-14 Units (has no administration in time range)   ondansetron (ZOFRAN) injection 4 mg (4 mg Intravenous Given 7/19/24 2155)   LORazepam (ATIVAN) injection 1 mg (1 mg Intravenous Given 7/19/24 2316)   ondansetron (ZOFRAN) injection 4 mg (4 mg Intravenous Given 7/19/24 2316)   sodium chloride 0.9 % bolus 1,000 mL (1,000 mL Intravenous New Bag  7/19/24 7616)   sodium chloride 0.9 % bolus 1,000 mL (1,000 mL Intravenous New Bag 7/20/24 0109)        ED Course:         Labs:    Lab Results (last 24 hours)       Procedure Component Value Units Date/Time    CBC & Differential [[id number]]  (Abnormal) Collected: 07/19/24 2008    Specimen: Blood Updated: 07/19/24 2051    Narrative:      The following orders were created for panel order CBC & Differential.  Procedure                               Abnormality         Status                     ---------                               -----------         ------                     CBC Auto Differential[[id number]]        Abnormal            Final result               Scan Slide[[id number]]                                       Final result                 Please view results for these tests on the individual orders.    Comprehensive Metabolic Panel [[id number]]  (Abnormal) Collected: 07/19/24 2008    Specimen: Blood Updated: 07/19/24 2150     Glucose 203 mg/dL      BUN 12 mg/dL      Creatinine 1.24 mg/dL      Sodium 139 mmol/L      Potassium 4.2 mmol/L      Chloride 96 mmol/L      CO2 9.4 mmol/L      Calcium 8.7 mg/dL      Total Protein 7.7 g/dL      Albumin 4.7 g/dL      ALT (SGPT) 22 U/L      AST (SGOT) 26 U/L      Alkaline Phosphatase 82 U/L      Total Bilirubin 0.5 mg/dL      Globulin 3.0 gm/dL      A/G Ratio 1.6 g/dL      BUN/Creatinine Ratio 9.7     Anion Gap 33.6 mmol/L      eGFR 70.8 mL/min/1.73     Narrative:      GFR Normal >60  Chronic Kidney Disease <60  Kidney Failure <15      Single High Sensitivity Troponin T [[id number]]  (Normal) Collected: 07/19/24 2008    Specimen: Blood Updated: 07/19/24 2106     HS Troponin T 11 ng/L     Narrative:      High Sensitive Troponin T Reference Range:  <14.0 ng/L- Negative Female for AMI  <22.0 ng/L- Negative Male for AMI  >=14 - Abnormal Female indicating possible myocardial injury.  >=22 - Abnormal Male indicating possible myocardial injury.   Clinicians would have to  utilize clinical acumen, EKG, Troponin, and serial changes to determine if it is an Acute Myocardial Infarction or myocardial injury due to an underlying chronic condition.         Magnesium [[id number]]  (Normal) Collected: 07/19/24 2008    Specimen: Blood Updated: 07/19/24 2111     Magnesium 1.7 mg/dL     CBC Auto Differential [[id number]]  (Abnormal) Collected: 07/19/24 2008    Specimen: Blood Updated: 07/19/24 2051     WBC 11.12 10*3/mm3      RBC 5.19 10*6/mm3      Hemoglobin 15.8 g/dL      Hematocrit 47.0 %      MCV 90.6 fL      MCH 30.4 pg      MCHC 33.6 g/dL      RDW 12.9 %      RDW-SD 42.2 fl      MPV 8.8 fL      Platelets 486 10*3/mm3      Neutrophil % 88.3 %      Lymphocyte % 8.7 %      Monocyte % 2.2 %      Eosinophil % 0.0 %      Basophil % 0.4 %      Immature Grans % 0.4 %      Neutrophils, Absolute 9.82 10*3/mm3      Lymphocytes, Absolute 0.97 10*3/mm3      Monocytes, Absolute 0.24 10*3/mm3      Eosinophils, Absolute 0.00 10*3/mm3      Basophils, Absolute 0.04 10*3/mm3      Immature Grans, Absolute 0.05 10*3/mm3      nRBC 0.0 /100 WBC     Scan Slide [[id number]] Collected: 07/19/24 2008    Specimen: Blood Updated: 07/19/24 2051     RBC Morphology Normal     WBC Morphology Normal     Platelet Estimate Adequate     Clumped Platelets Present    Urinalysis With Microscopic If Indicated (No Culture) - Urine, Clean Catch [[id number]]  (Abnormal) Collected: 07/19/24 2352    Specimen: Urine, Clean Catch Updated: 07/20/24 0011     Color, UA Yellow     Appearance, UA Clear     pH, UA <=5.0     Specific Gravity, UA 1.023     Glucose,  mg/dL (Trace)     Ketones, UA 40 mg/dL (2+)     Bilirubin, UA Negative     Blood, UA Negative     Protein,  mg/dL (2+)     Leuk Esterase, UA Negative     Nitrite, UA Negative     Urobilinogen, UA 1.0 E.U./dL    Urinalysis, Microscopic Only - Urine, Clean Catch [[id number]] Collected: 07/19/24 2352    Specimen: Urine, Clean Catch Updated: 07/20/24 0020     RBC, UA None Seen  /HPF      WBC, UA 0-2 /HPF      Bacteria, UA None Seen /HPF      Squamous Epithelial Cells, UA 0-2 /HPF      Hyaline Casts, UA 0-2 /LPF      Methodology Manual Light Microscopy    Lactic Acid, Plasma [[id number]] Collected: 07/20/24 0105    Specimen: Blood from Arm, Right Updated: 07/20/24 0108             Imaging:    XR Chest 1 View    Result Date: 7/19/2024  XR CHEST 1 VW Date of Exam: 7/19/2024 8:38 PM EDT Indication: Weak/Dizzy/AMS triage protocol Comparison: 3/1/2024 Findings: Patient is status post cervical spinal fusion. Cardiac and mediastinal contours are normal. The lungs are clear. Pulmonary vascularity is normal. No pneumothorax.     No acute cardiopulmonary findings. Electronically Signed: Myron Saenz MD  7/19/2024 9:02 PM EDT  Workstation ID: [id number]       Differential Diagnosis and Discussion:    Metabolic: Differential diagnosis includes but is not limited to hypertension, hyperglycemia, hyperkalemia, hypocalcemia, metabolic acidosis, hypokalemia, hypoglycemia, malnutrition, hypothyroidism, hyperthyroidism, and adrenal insufficiency.     All labs were reviewed and interpreted by me.    MDM     The patient´s CBC that was reviewed and interpreted by me shows no abnormalities of critical concern. Of note, there is no anemia requiring a blood transfusion and the platelet count is acceptable.  CMP shows an elevated anion gap and decreased bicarb.  The patient was given Ativan and a 1 L normal saline bolus emergency department.    Patient was placed on the cardiac monitor after given Ativan.  They were monitored for ventricular ectopy, arrhythmia, tachycardia, hypoxia, and changes in blood pressure.  Patient was rechecked several times throughout their stay.  Critical Care Note: Total Critical Care time of 50 minutes. Total critical care time documented does not include time spent on separately billed procedures for services of nurses or physician assistants. I personally saw and examined the patient.  I have reviewed all diagnostic interpretations and treatment plans as written. I was present for the key portions of any procedures performed and the inclusive time noted in any critical care statement. Critical care time includes patient management by me, time spent at the patients bedside,  time to review lab and imaging results, discussing patient care, documentation in the medical record, and time spent with family or caregiver.        Patient Care Considerations:    None      Consultants/Shared Management Plan:    Case was discussed with Dr. Garvey who agrees with admission.    Social Determinants of Health:    Patient is independent, reliable, and has access to care.       Disposition and Care Coordination:    Admit:   Through independent evaluation of the patient's history, physical, and imperical data, the patient meets criteria for inpatient admission to the hospital.        Final diagnoses:   Alcohol withdrawal syndrome with complication        ED Disposition       ED Disposition   Decision to Admit    Condition   --    Comment   Level of Care: Critical Care [6]  Diagnosis: Alcohol withdrawal [291.81.ICD-9-CM]  Certification: I Certify That Inpatient Hospital Services Are Medically Necessary For Greater Than 2 Midnights                 This medical record created using voice recognition software.             Michele Vivas MD  07/20/24 0140

## 2024-07-20 NOTE — H&P
Patient Care Team:  Lyla Guerrero APRN as PCP - General (Nurse Practitioner)  Myron Jimenez MD (Internal Medicine)  Gonzales Chen DO as Consulting Physician (Cardiology)    Chief complaint vomiting, abdominal pain, alcohol withdrawal    Subjective     Patient is a 48 y.o. male presents with vomiting, abdominal pain and mild shortness of breath.  Patient does have history of alcohol abuse.  He has not had fevers or chills, numbness tingling nor focal deficits.  He has been tremulous, tachycardic    Patient was found to be withdrawing from alcohol and was given Ativan in the emergency department.    Review of Systems   Pertinent items are noted in HPI    History  Past Medical History:   Diagnosis Date    Alcoholism 01/16/2021    Allergies     Aneurysm     Anxiety 07/15/2022    Atrial fibrillation     Bipolar 2 disorder 07/20/2021    Cancer     Cervical stenosis of spinal canal 11/30/2018    Formatting of this note might be different from the original. Added automatically from request for surgery [id number]    Colon polyp 11/1/2003 Benign    Depression     Diabetes mellitus     GERD (gastroesophageal reflux disease)     History of colonic polyps 05/23/2017    Formatting of this note might be different from the original. Added automatically from request for surgery [id number]    Hyperlipidemia     Hypertension     Irritable bowel syndrome 2004    Lumbosacral spondylosis without myelopathy 08/29/2018    Pancreatitis 2008    Papillary thyroid carcinoma 04/01/2020    SVT (supraventricular tachycardia) 01/23/2022     Past Surgical History:   Procedure Laterality Date    ABLATION OF DYSRHYTHMIC FOCUS  9/9/2022    APPENDECTOMY  1983    CARDIAC ABLATION  09/09/2022    CARDIAC ELECTROPHYSIOLOGY PROCEDURE N/A 09/09/2022    Procedure: Ablation SVT HOLD Metoprolol 5 days;  Surgeon: Tai Lisa MD;  Location: [facility] INVASIVE LOCATION;  Service: Cardiovascular;  Laterality: N/A;    CERVICAL DISC SURGERY       COLONOSCOPY       Cortés's    THYROID SURGERY       Family History   Problem Relation Age of Onset    Arthritis Mother         rheumatoid    Diabetes Mother     Hyperlipidemia Mother     Hypertension Mother     Osteoporosis Mother     Rashes / Skin problems Mother     Anemia Mother     Arrhythmia Mother     Heart attack Father     Hyperlipidemia Father     Hypertension Father     Diabetes Father     Alcohol abuse Father     Stroke Father     Hypertension Sister     Heart disease Maternal Uncle     Hypertension Maternal Uncle     Mental illness Paternal Aunt     Heart disease Paternal Aunt     Diabetes Paternal Aunt     COPD Paternal Uncle     Stroke Maternal Grandmother     Thyroid disease Maternal Grandmother     COPD Maternal Grandmother     Hypertension Maternal Grandmother     Heart disease Maternal Grandmother     Hypertension Maternal Grandfather     Diabetes Maternal Grandfather     Cancer Maternal Grandfather         throat    Heart disease Maternal Grandfather     Heart disease Paternal Grandmother     Hypertension Paternal Grandmother     Colon cancer Paternal Grandfather     Cancer Paternal Grandfather         colon     Social History     Tobacco Use    Smoking status: Former     Current packs/day: 0.00     Average packs/day: 1 pack/day for 11.0 years (11.0 ttl pk-yrs)     Types: Cigarettes     Start date: 1997     Quit date: 2008     Years since quittin.0    Smokeless tobacco: Never   Vaping Use    Vaping status: Never Used   Substance Use Topics    Alcohol use: Not Currently     Alcohol/week: 10.0 standard drinks of alcohol     Comment: Two 1.75mL in 5 days    Drug use: Never     Medications Prior to Admission   Medication Sig Dispense Refill Last Dose    dilTIAZem CD (CARDIZEM CD) 180 MG 24 hr capsule TAKE 1 CAPSULE BY MOUTH DAILY 90 capsule 3     escitalopram (Lexapro) 20 MG tablet Take 1 tablet by mouth Daily. 90 tablet 3     fenofibrate (TRICOR) 145 MG tablet Take 1 tablet by  mouth Daily. 90 tablet 3     folic acid (FOLVITE) 1 MG tablet Take 1 tablet by mouth Daily. 90 tablet 1     glucose blood (Accu-Chek Guide) test strip USE AS INSTRUCTED 1 each 5     glycopyrrolate (ROBINUL) 2 MG tablet Take 1 tablet by mouth Daily. 90 tablet 3     HumaLOG KwikPen 100 UNIT/ML solution pen-injector        levocetirizine (XYZAL) 5 MG tablet Take 1 tablet by mouth Daily As Needed for Allergies. 90 tablet 3     levothyroxine sodium (Tirosint) 125 MCG capsule capsule Take 2 capsules by mouth Take As Directed. PATIENT TAKES 250MCG ON MON,TUES,WED,THURS,FRI  PATIENT TAKES 125MCG ON SAT  PATIENT TAKES NO LEVOTHYROXINE ON SUN       metoprolol succinate XL (TOPROL-XL) 50 MG 24 hr tablet Take 1 tablet by mouth Daily. 90 tablet 1     montelukast (SINGULAIR) 10 MG tablet Take 1 tablet by mouth Every Night. 90 tablet 3     omeprazole (priLOSEC) 20 MG capsule Take 1 capsule by mouth Daily.       Toujeo Max SoloStar 300 UNIT/ML solution pen-injector injection Inject 50 Units under the skin into the appropriate area as directed Daily.       Vitamin D, Cholecalciferol, (CHOLECALCIFEROL) 10 MCG (400 UNIT) tablet Take 1 tablet by mouth Daily.        Allergies:  Jardiance [empagliflozin]    Objective     Vital Signs  Temp:  [98 °F (36.7 °C)] 98 °F (36.7 °C)  Heart Rate:  [119-141] 141  Resp:  [20] 20  BP: (130-149)/(76-98) 130/98    Physical Exam:      General Appearance:  Alert, cooperative, in no acute distress   Head:  Normocephalic, without obvious abnormality, atraumatic   Eyes:  Lids and lashes normal, conjunctivae and sclerae normal, no icterus, no pallor, corneas clear, PERRLA   Ears:  Ears appear intact with no abnormalities noted   Throat:  No oral lesions, no thrush, oral mucosa moist   Neck:  No adenopathy, supple, trachea midline, no thyromegaly, no carotid bruit, no JVD   Back:  No kyphosis present, no scoliosis present, no skin lesions, erythema or scars, no tenderness to percussion or palpation, range of  motion normal   Lungs:  Clear to auscultation, respirations regular, even and unlabored    Heart:  Regular rhythm and normal rate, normal S1 and S2, no murmur, no gallop, no rub, no click   Chest Wall:  No abnormalities observed   Abdomen:  Normal bowel sounds, no masses, no organomegaly, soft non-tender, non-distended, no guarding, no rebound tenderness   Rectal:  Deferred   Extremities:  Moves all extremities well, no edema, no cyanosis, no redness   Pulses:  Pulses palpable and equal bilaterally   Skin:  No bleeding, bruising or rash   Lymph nodes:  No palpable adenopathy   Neurologic:  Cranial nerves 2 - 12 grossly intact, sensation intact, DTR present and equal bilaterally           Results Review:    I reviewed the patient's new clinical results.  I reviewed the patient's new imaging results and agree with the interpretation.  I reviewed the patient's other test results and agree with the interpretation  I personally viewed and interpreted the patient's EKG/Telemetry data    Assessment & Plan       DM (diabetes mellitus)    Essential hypertension    Alcohol withdrawal    Metabolic acidosis      Admit to ICU for close monitoring  Monitor hemodynamics  CIWA protocol with Ativan  Aggressive IV fluid resuscitation  Monitor BMP, anion gap and electrolytes  Lactic acid  Regular diet  Full code      Jose Angel Guadalupe MD  07/20/24  00:31 EDT

## 2024-07-20 NOTE — CONSULTS
Pulmonary / Critical Care Consult Note      Patient Name: Wayne Guan  : 1975  MRN: [id number]  Primary Care Physician:  Lyla Guerrero APRN  Referring Physician: Jose Angel Cameron MD  Date of admission: 2024    Subjective   Subjective     Reason for Consult/ Chief Complaint:   Alcohol withdrawal    HPI:  Wayne Guan is a 48 y.o. male came in with nausea, vomiting and abdominal pain.  Has history of alcohol abuse and alcohol drawl.  No fevers or chills.  He is tachycardic and agitated.  He has been receiving benzodiazepines throughout the night.  He does respond well to this.  No fevers or chills.  He is rather tremulous and occasionally agitated.  Mental status has improved.      Personal History     Past Medical History:   Diagnosis Date   • Alcoholism 2021   • Allergies    • Aneurysm    • Anxiety 07/15/2022   • Atrial fibrillation    • Bipolar 2 disorder 2021   • Cancer    • Cervical stenosis of spinal canal 2018    Formatting of this note might be different from the original. Added automatically from request for surgery [id number]   • Colon polyp 2003 Benign   • Depression    • Diabetes mellitus    • GERD (gastroesophageal reflux disease)    • History of colonic polyps 2017    Formatting of this note might be different from the original. Added automatically from request for surgery [id number]   • Hyperlipidemia    • Hypertension    • Irritable bowel syndrome    • Lumbosacral spondylosis without myelopathy 2018   • Pancreatitis    • Papillary thyroid carcinoma 2020   • SVT (supraventricular tachycardia) 2022       Past Surgical History:   Procedure Laterality Date   • ABLATION OF DYSRHYTHMIC FOCUS  2022   • APPENDECTOMY     • CARDIAC ABLATION  2022   • CARDIAC ELECTROPHYSIOLOGY PROCEDURE N/A 2022    Procedure: Ablation SVT HOLD Metoprolol 5 days;  Surgeon: Tai Lisa MD;  Location: [facility] INVASIVE LOC[facility];  Service:  Cardiovascular;  Laterality: N/A;   • CERVICAL DISC SURGERY     • COLONOSCOPY      2021 Moo's   • THYROID SURGERY         Family History: family history includes Alcohol abuse in his father; Anemia in his mother; Arrhythmia in his mother; Arthritis in his mother; COPD in his maternal grandmother and paternal uncle; Cancer in his maternal grandfather and paternal grandfather; Colon cancer in his paternal grandfather; Diabetes in his father, maternal grandfather, mother, and paternal aunt; Heart attack in his father; Heart disease in his maternal grandfather, maternal grandmother, maternal uncle, paternal aunt, and paternal grandmother; Hyperlipidemia in his father and mother; Hypertension in his father, maternal grandfather, maternal grandmother, maternal uncle, mother, paternal grandmother, and sister; Mental illness in his paternal aunt; Osteoporosis in his mother; Rashes / Skin problems in his mother; Stroke in his father and maternal grandmother; Thyroid disease in his maternal grandmother. Otherwise pertinent FHx was reviewed and not pertinent to current issue.    Social History:  reports that he quit smoking about 16 years ago. His smoking use included cigarettes. He started smoking about 27 years ago. He has a 11 pack-year smoking history. He has never used smokeless tobacco. He reports that he does not currently use alcohol after a past usage of about 10.0 standard drinks of alcohol per week. He reports that he does not use drugs.    Home Medications:  Insulin Glargine (2 Unit Dial), Insulin Lispro (1 Unit Dial), Vitamin D (Cholecalciferol), dilTIAZem CD, escitalopram, fenofibrate, folic acid, glucose blood, glycopyrrolate, levocetirizine, levothyroxine sodium, metoprolol succinate XL, montelukast, and omeprazole    Allergies:  Allergies   Allergen Reactions   • Jardiance [Empagliflozin] Palpitations       Objective    Objective     Vitals:   Temp:  [97.9 °F (36.6 °C)-99 °F (37.2 °C)] 97.9 °F (36.6  °C)  Heart Rate:  [117-141] 117  Resp:  [20-28] 28  BP: (130-168)/() 156/130    Physical Exam:  Vital Signs Reviewed   General:  WDWN, Alert, NAD.    HEENT:  PERRL, EOMI.  OP, nares clear  Neck:  Supple, no JVD, no thyromegaly  Chest:  good aeration, clear to auscultation bilaterally, tympanic to percussion bilaterally, no work of breathing noted  CV: RRR, no MGR, pulses 2+, equal.  Abd:  Soft, NT, ND, + BS, no HSM  EXT:  no clubbing, no cyanosis, no edema  Neuro:  A&Ox3, CN grossly intact, no focal deficits.  Skin: No rashes or lesions noted      Result Review    Result Review:  I have personally reviewed the results from the time of this admission to 7/20/2024 10:31 EDT and agree with these findings:  [x]  Laboratory  []  Microbiology  [x]  Radiology  [x]  EKG/Telemetry   [x]  Cardiology/Vascular   []  Pathology  []  Old records  []  Other:  Most notable findings include:       Lab 07/20/24  0358 07/19/24 2008   WBC 12.91* 11.12*   HEMOGLOBIN 14.3 15.8   HEMATOCRIT 41.5 47.0   PLATELETS 434 486*   SODIUM 135* 139   POTASSIUM 4.6 4.2   CHLORIDE 96* 96*   CO2 14.9* 9.4*   BUN 13 12   CREATININE 1.20 1.24   GLUCOSE 262* 203*   CALCIUM 8.5* 8.7   TOTAL PROTEIN  --  7.7   ALBUMIN  --  4.7   GLOBULIN  --  3.0     Lactic acid elevated  UA with ketones and protein    Chest x-ray with clear lung fields        Assessment & Plan   Assessment / Plan     Active Hospital Problems:  Active Hospital Problems    Diagnosis    • Alcohol withdrawal    • Metabolic acidosis    • Essential hypertension    • DM (diabetes mellitus)      Impression:  Alcohol withdrawal, alcohol abuse  Metabolic acidosis  Lactic acidosis, clinically significant  Hyponatremia, clinically insignificant  Type 2 diabetes with hyperglycemia  Hypocalcemia  Leukocytosis  Toxic encephalopathy/altered mental status  Tobacco use cigarettes in remission  Class II obesity with BMI 36.9    Plan:  Change IV fluids to LR at 120 mL/h  Continue CIWA protocol with  scheduled and as needed benzodiazepines  Continue multivitamin/thiamine/folic acid  Trend lactic acid until cleared  Trend renal panel and electrolytes.  Replace calcium IV  Monitor CBC  Aspiration precautions  Continue sliding scale insulin    Okay to move out of ICU    VTE Prophylaxis:  Pharmacologic VTE prophylaxis orders are present.      Code Status and Medical Interventions:   Ordered at: 07/20/24 0146     Code Status (Patient has no pulse and is not breathing):    CPR (Attempt to Resuscitate)     Medical Interventions (Patient has pulse or is breathing):    Full Support        The patient is critically ill in the ICU with alcohol withdrawal, multiple electrolyte disturbances, altered mental status. Multidisciplinary bedside critical care rounds were performed with nursing staff, respiratory therapy, pharmacy, nutritional services, social work. I have personally reviewed the chart, labs and any pertinent imaging available.  I have spent 30 minutes of critical care time, excluding procedures, in the care of this patient.    Electronically signed by Abilio Vazquez MD, 07/20/24, 12:18 PM EDT.

## 2024-07-21 LAB
ANION GAP SERPL CALCULATED.3IONS-SCNC: 10.5 MMOL/L (ref 5–15)
BASOPHILS # BLD AUTO: 0.04 10*3/MM3 (ref 0–0.2)
BASOPHILS NFR BLD AUTO: 0.7 % (ref 0–1.5)
BUN SERPL-MCNC: 7 MG/DL (ref 6–20)
BUN/CREAT SERPL: 7 (ref 7–25)
CALCIUM SPEC-SCNC: 8.9 MG/DL (ref 8.6–10.5)
CHLORIDE SERPL-SCNC: 99 MMOL/L (ref 98–107)
CO2 SERPL-SCNC: 25.5 MMOL/L (ref 22–29)
CREAT SERPL-MCNC: 1 MG/DL (ref 0.76–1.27)
DEPRECATED RDW RBC AUTO: 40.2 FL (ref 37–54)
EGFRCR SERPLBLD CKD-EPI 2021: 91.7 ML/MIN/1.73
EOSINOPHIL # BLD AUTO: 0.05 10*3/MM3 (ref 0–0.4)
EOSINOPHIL NFR BLD AUTO: 0.9 % (ref 0.3–6.2)
ERYTHROCYTE [DISTWIDTH] IN BLOOD BY AUTOMATED COUNT: 12.4 % (ref 12.3–15.4)
GLUCOSE BLDC GLUCOMTR-MCNC: 183 MG/DL (ref 70–99)
GLUCOSE BLDC GLUCOMTR-MCNC: 246 MG/DL (ref 70–99)
GLUCOSE BLDC GLUCOMTR-MCNC: 257 MG/DL (ref 70–99)
GLUCOSE BLDC GLUCOMTR-MCNC: 281 MG/DL (ref 70–99)
GLUCOSE SERPL-MCNC: 165 MG/DL (ref 65–99)
HCT VFR BLD AUTO: 38.4 % (ref 37.5–51)
HGB BLD-MCNC: 13.1 G/DL (ref 13–17.7)
IMM GRANULOCYTES # BLD AUTO: 0.01 10*3/MM3 (ref 0–0.05)
IMM GRANULOCYTES NFR BLD AUTO: 0.2 % (ref 0–0.5)
LYMPHOCYTES # BLD AUTO: 1.5 10*3/MM3 (ref 0.7–3.1)
LYMPHOCYTES NFR BLD AUTO: 26.8 % (ref 19.6–45.3)
MCH RBC QN AUTO: 30 PG (ref 26.6–33)
MCHC RBC AUTO-ENTMCNC: 34.1 G/DL (ref 31.5–35.7)
MCV RBC AUTO: 87.9 FL (ref 79–97)
MONOCYTES # BLD AUTO: 0.45 10*3/MM3 (ref 0.1–0.9)
MONOCYTES NFR BLD AUTO: 8.1 % (ref 5–12)
NEUTROPHILS NFR BLD AUTO: 3.54 10*3/MM3 (ref 1.7–7)
NEUTROPHILS NFR BLD AUTO: 63.3 % (ref 42.7–76)
NRBC BLD AUTO-RTO: 0 /100 WBC (ref 0–0.2)
PLATELET # BLD AUTO: 288 10*3/MM3 (ref 140–450)
PMV BLD AUTO: 8.7 FL (ref 6–12)
POTASSIUM SERPL-SCNC: 3.7 MMOL/L (ref 3.5–5.2)
RBC # BLD AUTO: 4.37 10*6/MM3 (ref 4.14–5.8)
SODIUM SERPL-SCNC: 135 MMOL/L (ref 136–145)
WBC NRBC COR # BLD AUTO: 5.59 10*3/MM3 (ref 3.4–10.8)

## 2024-07-21 PROCEDURE — 63710000001 INSULIN REGULAR HUMAN PER 5 UNITS: Performed by: STUDENT IN AN ORGANIZED HEALTH CARE EDUCATION/TRAINING PROGRAM

## 2024-07-21 PROCEDURE — 82948 REAGENT STRIP/BLOOD GLUCOSE: CPT

## 2024-07-21 PROCEDURE — 82948 REAGENT STRIP/BLOOD GLUCOSE: CPT | Performed by: STUDENT IN AN ORGANIZED HEALTH CARE EDUCATION/TRAINING PROGRAM

## 2024-07-21 PROCEDURE — 99232 SBSQ HOSP IP/OBS MODERATE 35: CPT | Performed by: INTERNAL MEDICINE

## 2024-07-21 PROCEDURE — 36415 COLL VENOUS BLD VENIPUNCTURE: CPT | Performed by: STUDENT IN AN ORGANIZED HEALTH CARE EDUCATION/TRAINING PROGRAM

## 2024-07-21 PROCEDURE — 25010000002 HEPARIN (PORCINE) PER 1000 UNITS: Performed by: STUDENT IN AN ORGANIZED HEALTH CARE EDUCATION/TRAINING PROGRAM

## 2024-07-21 PROCEDURE — 85025 COMPLETE CBC W/AUTO DIFF WBC: CPT | Performed by: STUDENT IN AN ORGANIZED HEALTH CARE EDUCATION/TRAINING PROGRAM

## 2024-07-21 PROCEDURE — 25010000002 THIAMINE PER 100 MG: Performed by: STUDENT IN AN ORGANIZED HEALTH CARE EDUCATION/TRAINING PROGRAM

## 2024-07-21 PROCEDURE — 25810000003 LACTATED RINGERS PER 1000 ML: Performed by: INTERNAL MEDICINE

## 2024-07-21 PROCEDURE — 80048 BASIC METABOLIC PNL TOTAL CA: CPT | Performed by: STUDENT IN AN ORGANIZED HEALTH CARE EDUCATION/TRAINING PROGRAM

## 2024-07-21 PROCEDURE — 25010000002 LORAZEPAM PER 2 MG: Performed by: STUDENT IN AN ORGANIZED HEALTH CARE EDUCATION/TRAINING PROGRAM

## 2024-07-21 RX ORDER — POTASSIUM CHLORIDE 750 MG/1
40 CAPSULE, EXTENDED RELEASE ORAL ONCE
Status: COMPLETED | OUTPATIENT
Start: 2024-07-21 | End: 2024-07-21

## 2024-07-21 RX ADMIN — METOPROLOL SUCCINATE 50 MG: 50 TABLET, EXTENDED RELEASE ORAL at 08:32

## 2024-07-21 RX ADMIN — SODIUM CHLORIDE, POTASSIUM CHLORIDE, SODIUM LACTATE AND CALCIUM CHLORIDE 75 ML/HR: 600; 310; 30; 20 INJECTION, SOLUTION INTRAVENOUS at 16:02

## 2024-07-21 RX ADMIN — LORAZEPAM 1 MG: 2 INJECTION INTRAMUSCULAR; INTRAVENOUS at 14:27

## 2024-07-21 RX ADMIN — Medication 10 ML: at 21:59

## 2024-07-21 RX ADMIN — ESCITALOPRAM OXALATE 20 MG: 10 TABLET ORAL at 08:32

## 2024-07-21 RX ADMIN — LORAZEPAM 1 MG: 2 INJECTION INTRAMUSCULAR; INTRAVENOUS at 03:19

## 2024-07-21 RX ADMIN — INSULIN HUMAN 5 UNITS: 100 INJECTION, SOLUTION PARENTERAL at 12:22

## 2024-07-21 RX ADMIN — DILTIAZEM HYDROCHLORIDE 180 MG: 180 CAPSULE, COATED, EXTENDED RELEASE ORAL at 08:32

## 2024-07-21 RX ADMIN — HEPARIN SODIUM 5000 UNITS: 5000 INJECTION INTRAVENOUS; SUBCUTANEOUS at 21:58

## 2024-07-21 RX ADMIN — THIAMINE HYDROCHLORIDE 500 MG: 100 INJECTION, SOLUTION INTRAMUSCULAR; INTRAVENOUS at 14:27

## 2024-07-21 RX ADMIN — HEPARIN SODIUM 5000 UNITS: 5000 INJECTION INTRAVENOUS; SUBCUTANEOUS at 08:33

## 2024-07-21 RX ADMIN — Medication 10 ML: at 08:34

## 2024-07-21 RX ADMIN — LORAZEPAM 1 MG: 2 INJECTION INTRAMUSCULAR; INTRAVENOUS at 22:06

## 2024-07-21 RX ADMIN — INSULIN HUMAN 3 UNITS: 100 INJECTION, SOLUTION PARENTERAL at 05:57

## 2024-07-21 RX ADMIN — INSULIN HUMAN 8 UNITS: 100 INJECTION, SOLUTION PARENTERAL at 18:22

## 2024-07-21 RX ADMIN — LORAZEPAM 1 MG: 2 INJECTION INTRAMUSCULAR; INTRAVENOUS at 08:34

## 2024-07-21 RX ADMIN — POTASSIUM CHLORIDE 40 MEQ: 750 CAPSULE, EXTENDED RELEASE ORAL at 08:32

## 2024-07-21 RX ADMIN — FOLIC ACID 1 MG: 5 INJECTION, SOLUTION INTRAMUSCULAR; INTRAVENOUS; SUBCUTANEOUS at 08:33

## 2024-07-21 RX ADMIN — THIAMINE HYDROCHLORIDE 500 MG: 100 INJECTION, SOLUTION INTRAMUSCULAR; INTRAVENOUS at 21:58

## 2024-07-21 RX ADMIN — THIAMINE HYDROCHLORIDE 500 MG: 100 INJECTION, SOLUTION INTRAMUSCULAR; INTRAVENOUS at 09:22

## 2024-07-21 RX ADMIN — SODIUM CHLORIDE, POTASSIUM CHLORIDE, SODIUM LACTATE AND CALCIUM CHLORIDE 120 ML/HR: 600; 310; 30; 20 INJECTION, SOLUTION INTRAVENOUS at 05:22

## 2024-07-21 RX ADMIN — PANTOPRAZOLE SODIUM 40 MG: 40 TABLET, DELAYED RELEASE ORAL at 05:57

## 2024-07-21 NOTE — PLAN OF CARE
Goal Outcome Evaluation:  Plan of Care Reviewed With: patient           Outcome Evaluation: Pt's CIWA have been 3 and most recently a 1. No unscheduled Ativan needed at this point. LR infusing per order. Blood glucose tx with insulin as needed. Pt able to make needs known. NAD noted at this time. Pt currently resting in bed with eyes closed. Will CTM

## 2024-07-21 NOTE — PROGRESS NOTES
Pulmonary / Critical Care Progress Note      Patient Name: Wayne Guan  : 1975  MRN: [id number]  Attending:  Jose Angel Cameron MD  Date of admission: 2024    Subjective   Subjective   Follow-up for alcohol withdrawal    Over past 24 hours: Out of ICU    No acute events overnight    This morning,  On room air  Lying in bed  Reports feeling well today  Denies any chest pain or chest tightness  No fever or chills  No nausea or vomiting  Heart rate and BP slowly improving    Objective   Objective     Vitals:   Temp:  [97.9 °F (36.6 °C)-98.6 °F (37 °C)] 97.9 °F (36.6 °C)  Heart Rate:  [] 82  Resp:  [20-22] 20  BP: (128-173)/() 139/91    Physical Exam   Vital Signs Reviewed   General:  WDWN, Alert, NAD.  Male, lying in bed  HEENT:  PERRL, EOMI.  OP, nares clear  Chest:  good aeration, clear to auscultation bilaterally, no work of breathing on room air  CV: RRR, no MGR, pulses 2+, equal.  Abd:  Soft, NT, ND, + BS, no HSM  EXT:  no clubbing, no cyanosis, no edema  Neuro:  A&Ox3, CN grossly intact, no focal deficits.  Skin: No rashes or lesions noted    Result Review    Result Review:  I have personally reviewed the results from the time of this admission to 2024 07:30 EDT and agree with these findings:  [x]  Laboratory  [x]  Microbiology  [x]  Radiology  []  EKG/Telemetry   []  Cardiology/Vascular   []  Pathology  []  Old records  []  Other:  Most notable findings include:         Lab 24  0436 24  0358 24   WBC 5.59 12.91* 11.12*   HEMOGLOBIN 13.1 14.3 15.8   HEMATOCRIT 38.4 41.5 47.0   PLATELETS 288 434 486*   SODIUM 135* 135* 139   POTASSIUM 3.7 4.6 4.2   CHLORIDE 99 96* 96*   CO2 25.5 14.9* 9.4*   BUN 7 13 12   CREATININE 1.00 1.20 1.24   GLUCOSE 165* 262* 203*   CALCIUM 8.9 8.5* 8.7   TOTAL PROTEIN  --   --  7.7   ALBUMIN  --   --  4.7   GLOBULIN  --   --  3.0       Assessment & Plan   Assessment / Plan     Active Hospital Problems:  Active Hospital Problems     Diagnosis    • Alcohol withdrawal    • Metabolic acidosis    • Essential hypertension    • DM (diabetes mellitus)      Impression:  Alcohol withdrawal, alcohol abuse  Metabolic acidosis  Lactic acidosis, clinically significant  Hyponatremia, clinically insignificant  Type 2 diabetes with hyperglycemia  Hypocalcemia  Hypokalemia  Leukocytosis  Toxic encephalopathy/altered mental status  Tobacco use cigarettes in remission  Class II obesity with BMI 36.9     Plan:  -Remain on room air.  Maintain SpO2 greater than 90%  -Drop IV fluids down to LR at 75 mL/h  -Continue CIWA protocol  -Continue aspiration cautions  -Continue seizure precautions  -Encourage activity as tolerated and incentive spirometer use  -Monitor renal panel and electrolytes.  Replace potassium orally    VTE Prophylaxis:  Pharmacologic VTE prophylaxis orders are present.    CODE STATUS:   Code Status (Patient has no pulse and is not breathing): CPR (Attempt to Resuscitate)  Medical Interventions (Patient has pulse or is breathing): Full Support      Labs, imaging, and medications personally reviewed.  Discussed with primary    I, Dr. Abilio Vazquez, have spent more than 50% of the total time managing the patient in this encounter today.  This included personally reviewing all pertinent labs, imaging, microbiology and documentation. Also discussing the case with the patient and any available family, the admitting physician and any available ancillary staff.    Electronically signed by DELL Alexander, 07/21/24, 3:26 PM EDT.  Electronically signed by Abilio Vazquez MD, 07/21/24, 3:56 PM EDT.

## 2024-07-21 NOTE — PROGRESS NOTES
[facility]   Hospitalist Progress Note  Date: 2024  Patient Name: Wayne Guan  : 1975  MRN: [id number]  Date of admission: 2024  Room/Bed: 218/2      Subjective   Subjective     Chief Complaint: Alcohol withdrawal    Summary:Wayne Guan is a pleasant 48 y.o. male presented with vomiting, abdominal pain, and alcohol withdrawal.  He was placed in the ICU initially, transferred out the following day.  On Ativan treatment protocol.  Suspect he could discharge on Monday or Tuesday if he continues to improve.    Interval Followup: Doing better, wants to try real food, has not needed as needed Ativan just to schedule        Objective   Objective     Vitals:   Temp:  [97.9 °F (36.6 °C)-98.1 °F (36.7 °C)] 98.1 °F (36.7 °C)  Heart Rate:  [] 75  Resp:  [20-22] 20  BP: (128-167)/() 148/91    Physical Exam   General: Awake, alert, NAD  HENT: NCAT, MMM  Eyes: pupils equal, no scleral icterus  Cardiovascular: RRR, no murmurs   Pulmonary: CTA bilaterally; no wheezes; no conversational dyspnea  Gastrointestinal: S/ND/NT, +BS  Musculoskeletal: No gross deformities  Skin: No jaundice, no rash on exposed skin appreciated  Neuro: CN II through XII grossly intact; speech clear; no tremor  Psych: Mood and affect appropriate      Result Review    Result Review:  I have personally reviewed these results:  [x]  Laboratory      Lab 24  0436 24  0651 24  0358 24  0105 24   WBC 5.59  --  12.91*  --  11.12*   HEMOGLOBIN 13.1  --  14.3  --  15.8   HEMATOCRIT 38.4  --  41.5  --  47.0   PLATELETS 288  --  434  --  486*   NEUTROS ABS 3.54  --  11.24*  --  9.82*   IMMATURE GRANS (ABS) 0.01  --  0.03  --  0.05   LYMPHS ABS 1.50  --  1.09  --  0.97   MONOS ABS 0.45  --  0.53  --  0.24   EOS ABS 0.05  --  0.00  --  0.00   MCV 87.9  --  87.7  --  90.6   LACTATE  --  1.9 2.2* 2.7*  --          Lab 24  0436 24  0358 24   SODIUM 135* 135* 139   POTASSIUM 3.7 4.6  4.2   CHLORIDE 99 96* 96*   CO2 25.5 14.9* 9.4*   ANION GAP 10.5 24.1* 33.6*   BUN 7 13 12   CREATININE 1.00 1.20 1.24   EGFR 91.7 73.7 70.8   GLUCOSE 165* 262* 203*   CALCIUM 8.9 8.5* 8.7   MAGNESIUM  --   --  1.7         Lab 07/19/24 2008   TOTAL PROTEIN 7.7   ALBUMIN 4.7   GLOBULIN 3.0   ALT (SGPT) 22   AST (SGOT) 26   BILIRUBIN 0.5   ALK PHOS 82         Lab 07/19/24 2008   HSTROP T 11                 Brief Urine Lab Results  (Last result in the past 365 days)        Color   Clarity   Blood   Leuk Est   Nitrite   Protein   CREAT   Urine HCG        07/19/24 2352 Yellow   Clear   Negative   Negative   Negative   100 mg/dL (2+)                 [x]  Microbiology   Microbiology Results (last 10 days)       ** No results found for the last 240 hours. **          [x]  Radiology  XR Chest 1 View    Result Date: 7/19/2024  No acute cardiopulmonary findings. Electronically Signed: Myron Saenz MD  7/19/2024 9:02 PM EDT  Workstation ID: [id number]   []  EKG/Telemetry   []  Cardiology/Vascular   []  Pathology  []  Old records  []  Other:    Assessment & Plan   Assessment / Plan     Assessment:  Alcohol withdrawal  Hypertension  Diabetes    Plan:  Admitted to medicine  Transferred out of ICU on 7/20/2024  Continue CIWA protocol with Ativan  Resumed antihypertensives  PPI  Continue insulin  Can go to a regular bed.       Discussed with RN.    VTE Prophylaxis:  Pharmacologic VTE prophylaxis orders are present.        CODE STATUS:   Code Status (Patient has no pulse and is not breathing): CPR (Attempt to Resuscitate)  Medical Interventions (Patient has pulse or is breathing): Full Support      Electronically signed by Jose Angel Cameron MD, 7/21/2024, 12:09 EDT.

## 2024-07-22 ENCOUNTER — READMISSION MANAGEMENT (OUTPATIENT)
Dept: CALL CENTER | Facility: HOSPITAL | Age: 49
End: 2024-07-22
Payer: COMMERCIAL

## 2024-07-22 VITALS
BODY MASS INDEX: 36.35 KG/M2 | WEIGHT: 274.25 LBS | HEIGHT: 73 IN | HEART RATE: 64 BPM | SYSTOLIC BLOOD PRESSURE: 141 MMHG | DIASTOLIC BLOOD PRESSURE: 97 MMHG | OXYGEN SATURATION: 95 % | RESPIRATION RATE: 16 BRPM | TEMPERATURE: 98.1 F

## 2024-07-22 LAB
ALBUMIN SERPL-MCNC: 3.8 G/DL (ref 3.5–5.2)
ANION GAP SERPL CALCULATED.3IONS-SCNC: 11.2 MMOL/L (ref 5–15)
BASOPHILS # BLD AUTO: 0.03 10*3/MM3 (ref 0–0.2)
BASOPHILS NFR BLD AUTO: 0.6 % (ref 0–1.5)
BUN SERPL-MCNC: 8 MG/DL (ref 6–20)
BUN/CREAT SERPL: 8.4 (ref 7–25)
CALCIUM SPEC-SCNC: 8.8 MG/DL (ref 8.6–10.5)
CHLORIDE SERPL-SCNC: 97 MMOL/L (ref 98–107)
CO2 SERPL-SCNC: 24.8 MMOL/L (ref 22–29)
CREAT SERPL-MCNC: 0.95 MG/DL (ref 0.76–1.27)
DEPRECATED RDW RBC AUTO: 38.7 FL (ref 37–54)
EGFRCR SERPLBLD CKD-EPI 2021: 97.5 ML/MIN/1.73
EOSINOPHIL # BLD AUTO: 0.24 10*3/MM3 (ref 0–0.4)
EOSINOPHIL NFR BLD AUTO: 4.5 % (ref 0.3–6.2)
ERYTHROCYTE [DISTWIDTH] IN BLOOD BY AUTOMATED COUNT: 12.1 % (ref 12.3–15.4)
GLUCOSE BLDC GLUCOMTR-MCNC: 247 MG/DL (ref 70–99)
GLUCOSE BLDC GLUCOMTR-MCNC: 271 MG/DL (ref 70–99)
GLUCOSE SERPL-MCNC: 220 MG/DL (ref 65–99)
HCT VFR BLD AUTO: 37.8 % (ref 37.5–51)
HGB BLD-MCNC: 13.1 G/DL (ref 13–17.7)
IMM GRANULOCYTES # BLD AUTO: 0.01 10*3/MM3 (ref 0–0.05)
IMM GRANULOCYTES NFR BLD AUTO: 0.2 % (ref 0–0.5)
LYMPHOCYTES # BLD AUTO: 1.24 10*3/MM3 (ref 0.7–3.1)
LYMPHOCYTES NFR BLD AUTO: 23.4 % (ref 19.6–45.3)
MAGNESIUM SERPL-MCNC: 1.6 MG/DL (ref 1.6–2.6)
MCH RBC QN AUTO: 30 PG (ref 26.6–33)
MCHC RBC AUTO-ENTMCNC: 34.7 G/DL (ref 31.5–35.7)
MCV RBC AUTO: 86.5 FL (ref 79–97)
MONOCYTES # BLD AUTO: 0.33 10*3/MM3 (ref 0.1–0.9)
MONOCYTES NFR BLD AUTO: 6.2 % (ref 5–12)
NEUTROPHILS NFR BLD AUTO: 3.44 10*3/MM3 (ref 1.7–7)
NEUTROPHILS NFR BLD AUTO: 65.1 % (ref 42.7–76)
NRBC BLD AUTO-RTO: 0 /100 WBC (ref 0–0.2)
PHOSPHATE SERPL-MCNC: 2.5 MG/DL (ref 2.5–4.5)
PLATELET # BLD AUTO: 299 10*3/MM3 (ref 140–450)
PMV BLD AUTO: 9.2 FL (ref 6–12)
POTASSIUM SERPL-SCNC: 3.9 MMOL/L (ref 3.5–5.2)
RBC # BLD AUTO: 4.37 10*6/MM3 (ref 4.14–5.8)
SODIUM SERPL-SCNC: 133 MMOL/L (ref 136–145)
WBC NRBC COR # BLD AUTO: 5.29 10*3/MM3 (ref 3.4–10.8)

## 2024-07-22 PROCEDURE — 25010000002 HEPARIN (PORCINE) PER 1000 UNITS: Performed by: STUDENT IN AN ORGANIZED HEALTH CARE EDUCATION/TRAINING PROGRAM

## 2024-07-22 PROCEDURE — 99239 HOSP IP/OBS DSCHRG MGMT >30: CPT | Performed by: INTERNAL MEDICINE

## 2024-07-22 PROCEDURE — 85025 COMPLETE CBC W/AUTO DIFF WBC: CPT | Performed by: INTERNAL MEDICINE

## 2024-07-22 PROCEDURE — 80069 RENAL FUNCTION PANEL: CPT | Performed by: INTERNAL MEDICINE

## 2024-07-22 PROCEDURE — 63710000001 INSULIN REGULAR HUMAN PER 5 UNITS: Performed by: STUDENT IN AN ORGANIZED HEALTH CARE EDUCATION/TRAINING PROGRAM

## 2024-07-22 PROCEDURE — 83735 ASSAY OF MAGNESIUM: CPT | Performed by: INTERNAL MEDICINE

## 2024-07-22 PROCEDURE — 25010000002 LORAZEPAM PER 2 MG: Performed by: STUDENT IN AN ORGANIZED HEALTH CARE EDUCATION/TRAINING PROGRAM

## 2024-07-22 PROCEDURE — 99232 SBSQ HOSP IP/OBS MODERATE 35: CPT | Performed by: INTERNAL MEDICINE

## 2024-07-22 PROCEDURE — 82948 REAGENT STRIP/BLOOD GLUCOSE: CPT

## 2024-07-22 RX ORDER — BLOOD-GLUCOSE SENSOR
1 EACH MISCELLANEOUS
Qty: 2 EACH | Refills: 0 | Status: SHIPPED | OUTPATIENT
Start: 2024-07-22 | End: 2024-08-19

## 2024-07-22 RX ADMIN — DILTIAZEM HYDROCHLORIDE 180 MG: 180 CAPSULE, COATED, EXTENDED RELEASE ORAL at 11:25

## 2024-07-22 RX ADMIN — ESCITALOPRAM OXALATE 20 MG: 10 TABLET ORAL at 10:21

## 2024-07-22 RX ADMIN — LORAZEPAM 1 MG: 2 INJECTION INTRAMUSCULAR; INTRAVENOUS at 03:09

## 2024-07-22 RX ADMIN — INSULIN HUMAN 8 UNITS: 100 INJECTION, SOLUTION PARENTERAL at 01:14

## 2024-07-22 RX ADMIN — METOPROLOL SUCCINATE 50 MG: 50 TABLET, EXTENDED RELEASE ORAL at 10:22

## 2024-07-22 RX ADMIN — Medication 10 ML: at 10:22

## 2024-07-22 RX ADMIN — INSULIN HUMAN 5 UNITS: 100 INJECTION, SOLUTION PARENTERAL at 05:26

## 2024-07-22 RX ADMIN — HEPARIN SODIUM 5000 UNITS: 5000 INJECTION INTRAVENOUS; SUBCUTANEOUS at 10:21

## 2024-07-22 RX ADMIN — PANTOPRAZOLE SODIUM 40 MG: 40 TABLET, DELAYED RELEASE ORAL at 05:26

## 2024-07-22 NOTE — CONSULTS
Discharge Planning Assessment   Minda     Patient Name: Wayne Guan  MRN: [id number]  Today's Date: 7/22/2024    Admit Date: 7/19/2024  Plan: Pt admitted due to alcohol withdrawal. Pt lives at home with his fiance and is independent in ADLs. Pt to discharge home today. Reports having needed resources for substance abuse treatment. No additional needs noted at this time.   Discharge Needs Assessment       Row Name 07/22/24 1149       Living Environment    People in Home significant other    Name(s) of People in Home Pt lives in [place] with his fitamara    Current Living Arrangements home    Potentially Unsafe Housing Conditions none    In the past 12 months has the electric, gas, oil, or water company threatened to shut off services in your home? No    Primary Care Provided by self    Provides Primary Care For no one    Family Caregiver if Needed significant other    Family Caregiver Names [place] JackSebastian Alf    Quality of Family Relationships supportive;involved;helpful    Able to Return to Prior Arrangements yes       Resource/Environmental Concerns    Resource/Environmental Concerns none       Transportation Needs    In the past 12 months, has lack of transportation kept you from medical appointments or from getting medications? no    In the past 12 months, has lack of transportation kept you from meetings, work, or from getting things needed for daily living? No       Food Insecurity    Within the past 12 months, you worried that your food would run out before you got the money to buy more. Never true    Within the past 12 months, the food you bought just didn't last and you didn't have money to get more. Never true       Transition Planning    Patient/Family Anticipates Transition to home    Patient/Family Anticipated Services at Transition none    Transportation Anticipated family or friend will provide       Discharge Needs Assessment    Concerns to be Addressed denies needs/concerns at this time     Anticipated Changes Related to Illness none    Equipment Needed After Discharge none    Provided Post Acute Provider List? N/A    Provided Post Acute Provider Quality & Resource List? N/A              Discharge Plan       Row Name 07/22/24 1150       Plan    Plan Pt admitted due to alcohol withdrawal. Pt lives at home with his fiance and is independent in ADLs. Pt to discharge home today. Reports having needed resources for substance abuse treatment. No additional needs noted at this time.    Patient/Family in Agreement with Plan yes    Final Discharge Disposition Code 01 - home or self-care             Expected Discharge Date and Time       Expected Discharge Date Expected Discharge Time    Jul 22, 2024            Demographic Summary       Row Name 07/22/24 1148       General Information    Admission Type inpatient    Arrived From emergency department    Referral Source admission list    Reason for Consult discharge planning    Preferred Language English       Contact Information    Permission Granted to Share Info With family/designee              Functional Status       Row Name 07/22/24 1148       Functional Status    Usual Activity Tolerance good    Current Activity Tolerance good       Functional Status, IADL    Medications independent    Meal Preparation independent    Housekeeping independent    Laundry independent    Shopping independent       Mental Status Summary    Recent Changes in Mental Status/Cognitive Functioning no changes              Psychosocial       Row Name 07/22/24 114       Coping/Stress    Sources of Support spouse    Understanding of Condition and Treatment adequate understanding of medical condition       Developmental Stage (Eriksson's)    Developmental Stage Stage 7 (35-65 years/Middle Adulthood) Generativity vs. Stagnation             MALAIKA Chen

## 2024-07-22 NOTE — PLAN OF CARE
Goal Outcome Evaluation:  Plan of Care Reviewed With: patient        Progress: no change  Outcome Evaluation: Patient A/Ox4. On room air. Up ad casandra. CIWA 0. No other issues/needs at this time. Discharging home.

## 2024-07-22 NOTE — CONSULTS
Met with patient at bedside. Patient takes insulin at home as prescribed. He used to wear a CGM, but with insurance change, has gone back to finger pricks. Patient qualifies for free Freestyle Cooper 3, will provide with voucher.      Discussed most recent A1c of 8.5% from May 2024 with an estimated average glucose of 197 mg/dl. Encouraged patient to call [facility] to see if he can  his medications locally instead of via mail order, as that is what he prefers. Patient understating. No further needs or questions at this time.     Recommend on discharge:  Rx for Freestyle Cooper 3 sensor (pended)

## 2024-07-22 NOTE — OUTREACH NOTE
Prep Survey      Flowsheet Row Responses   [facility] patient discharged from? [facility]   Is LACE score < 7 ? Yes   Eligibility [facility] [place]   Date of Admission 07/19/24   Date of Discharge 07/22/24   Discharge Disposition Home or Self Care   Discharge diagnosis Alcohol withdrawal   Does the patient have one of the following disease processes/diagnoses(primary or secondary)? Other   Prep survey completed? Yes            Kathie VALLE - Registered Nurse

## 2024-07-22 NOTE — PROGRESS NOTES
Pulmonary / Critical Care Progress Note      Patient Name: Wayne Guan  : 1975  MRN: [id number]  Attending:  Jin Reyna DO  Date of admission: 2024    Subjective   Subjective   Follow-up for alcohol withdrawal.    Over past 24 hours: Patient remained on Cardizem CD, metoprolol.  Remained on room air.    No acute events overnight    This morning,  On room air.   Lying in bed.   Feels better.  No tremors or shakiness.  Denies any chest pain or chest tightness  No fever or chills  No nausea or vomiting  Heart rate and BP stable.    Objective   Objective     Vitals:   Temp:  [97.3 °F (36.3 °C)-98.3 °F (36.8 °C)] 98.1 °F (36.7 °C)  Heart Rate:  [63-91] 74  Resp:  [16-20] 16  BP: (116-148)/(68-93) 140/93    Physical Exam   Vital Signs Reviewed   General:  WDWN, Alert, NAD.  Male, lying in bed  HEENT:  PERRL, EOMI.  OP, nares clear  Chest:  good aeration, clear to auscultation bilaterally, no work of breathing on room air  CV: RRR, no MGR, pulses 2+, equal.  Abd:  Soft, NT, ND, + BS, no HSM  EXT:  no clubbing, no cyanosis, no edema  Neuro:  A&Ox3, CN grossly intact, no focal deficits.  Skin: No rashes or lesions noted    Result Review    Result Review:  I have personally reviewed the results from the time of this admission to 2024 08:05 EDT and agree with these findings:  [x]  Laboratory  [x]  Microbiology  [x]  Radiology  []  EKG/Telemetry   []  Cardiology/Vascular   []  Pathology  []  Old records  []  Other:  Most notable findings include:         Lab 24  0456 24  0436 24  0358 24   WBC 5.29 5.59 12.91* 11.12*   HEMOGLOBIN 13.1 13.1 14.3 15.8   HEMATOCRIT 37.8 38.4 41.5 47.0   PLATELETS 299 288 434 486*   SODIUM 133* 135* 135* 139   POTASSIUM 3.9 3.7 4.6 4.2   CHLORIDE 97* 99 96* 96*   CO2 24.8 25.5 14.9* 9.4*   BUN 8 7 13 12   CREATININE 0.95 1.00 1.20 1.24   GLUCOSE 220* 165* 262* 203*   CALCIUM 8.8 8.9 8.5* 8.7   PHOSPHORUS 2.5  --   --   --    TOTAL PROTEIN  --   --    --  7.7   ALBUMIN 3.8  --   --  4.7   GLOBULIN  --   --   --  3.0       Assessment & Plan   Assessment / Plan     Active Hospital Problems:  Active Hospital Problems    Diagnosis     Alcohol withdrawal     Metabolic acidosis     Essential hypertension     DM (diabetes mellitus)      Impression:  Alcohol withdrawal, alcohol abuse  Metabolic acidosis  Lactic acidosis, clinically significant  Hyponatremia, clinically insignificant  Type 2 diabetes with hyperglycemia  Hypocalcemia  Hypokalemia  Leukocytosis  Toxic encephalopathy/altered mental status  Tobacco use cigarettes in remission  Class II obesity with BMI 36.9     Plan:  -Remain on room air.  Maintain SpO2 greater than 90%  -Discontinue IV fluids  -Continue CIWA protocol  -Continue aspiration cautions  -Continue seizure precautions  -Encourage activity as tolerated and incentive spirometer use  -Monitor renal panel and electrolytes.    Clinically stable, can likely be discharged home.    VTE Prophylaxis:  Pharmacologic VTE prophylaxis orders are present.    CODE STATUS:   Code Status (Patient has no pulse and is not breathing): CPR (Attempt to Resuscitate)  Medical Interventions (Patient has pulse or is breathing): Full Support    I have reviewed labs, imaging, pertinent clinical data and provider notes.   I have discussed with bedside nurse and primary service.     Electronically signed by Dat Jacobs MD, 07/22/24, 8:05 AM EDT.

## 2024-07-22 NOTE — PLAN OF CARE
Goal Outcome Evaluation:  Plan of Care Reviewed With: patient        Progress: improving  Outcome Evaluation: Patient a&ox4. Sleeping most of the shift, arouses easily. CIWA 0. Continuous IV fluids maintained. No c/o pain or discomfort.

## 2024-07-22 NOTE — DISCHARGE SUMMARY
[facility]         HOSPITALIST  DISCHARGE SUMMARY    Patient Name: Wayne Guan  : 1975  MRN: [id number]    Date of Admission: 2024  Date of Discharge:  2024    Primary Care Physician: Lyla Guerrero APRN    Consults       Date and Time Order Name Status Description    2024 12:11 PM Inpatient Pulmonology Consult Completed             Active and Resolved Hospital Problems:  Active Hospital Problems    Diagnosis POA   • Alcohol withdrawal [F10.939] Yes   • Metabolic acidosis [E87.20] Unknown   • Essential hypertension [I10] Yes   • DM (diabetes mellitus) [E11.9] Yes      Resolved Hospital Problems   No resolved problems to display.       Hospital Course     Hospital Course:  Wayne Guan is a 48 y.o. male with nausea, vomiting and abdominal pain.  He has a history of alcohol abuse and alcohol withdrawal.  No fevers or chills.  He required ICU admission initially. Patient was on the CIWA ativan treatment protocol. Patients withdrawal symptoms have resolved. He is feeling better and tolerating diet. He has no confusion. Patient is not interested in inpatient rehab at this time and wants to go home with outpatient follow up. Patient will discharge home today in stable condition.     DISCHARGE Follow Up Recommendations for labs and diagnostics:   Follow up with pcp in 1 week       Day of Discharge     Vital Signs:  Temp:  [97.3 °F (36.3 °C)-98.3 °F (36.8 °C)] 98.1 °F (36.7 °C)  Heart Rate:  [63-91] 64  Resp:  [16-20] 16  BP: (116-147)/(68-97) 141/97  Physical Exam:   General: Awake, alert, NAD resting in bed with wife at the bedside   HENT: NCAT, MMM  Eyes: pupils equal, no scleral icterus  Cardiovascular: RRR, no murmurs   Pulmonary: CTA bilaterally; no wheezes; no conversational dyspnea  Gastrointestinal: S/ND/NT, +BS  Musculoskeletal: No gross deformities  Skin: No jaundice, no rash on exposed skin appreciated  Neuro: CN II through XII grossly intact; speech clear; no  tremor  Psych: Mood and affect appropriate      Discharge Details        Discharge Medications        New Medications        Instructions Start Date   FreeStyle Cooper 3 Sensor misc   1 each, Does not apply, Every 14 Days             Continue These Medications        Instructions Start Date   acetaminophen 500 MG tablet  Commonly known as: TYLENOL   500 mg, Oral, Every 6 Hours PRN      ascorbic acid 500 MG tablet  Commonly known as: VITAMIN C   500 mg, Oral, Daily      dilTIAZem  MG 24 hr capsule  Commonly known as: CARDIZEM CD   180 mg, Oral, Daily      escitalopram 20 MG tablet  Commonly known as: Lexapro   20 mg, Oral, Daily      fenofibrate 145 MG tablet  Commonly known as: TRICOR   145 mg, Oral, Daily      fluticasone 50 MCG/ACT nasal spray  Commonly known as: FLONASE   2 sprays, Nasal, Daily PRN      folic acid 1 MG tablet  Commonly known as: FOLVITE   1 mg, Oral, Daily      glycopyrrolate 2 MG tablet  Commonly known as: ROBINUL   2 mg, Oral, Daily      HumaLOG KwikPen 100 UNIT/ML solution pen-injector  Generic drug: Insulin Lispro (1 Unit Dial)   Inject 0-40 Units under the skin into the appropriate area as directed 3 (Three) Times a Day As Needed (Based on sliding scale).      levocetirizine 5 MG tablet  Commonly known as: XYZAL   5 mg, Oral, Daily PRN      metoprolol succinate XL 50 MG 24 hr tablet  Commonly known as: TOPROL-XL   50 mg, Oral, Daily      montelukast 10 MG tablet  Commonly known as: SINGULAIR   10 mg, Oral, Nightly      omeprazole 20 MG capsule  Commonly known as: priLOSEC   20 mg, Oral, Daily      thiamine 100 MG tablet  tablet  Commonly known as: VITAMIN B-1   100 mg, Oral, Daily      Tirosint 125 MCG capsule capsule  Generic drug: levothyroxine sodium   250 mcg, Oral, Daily (Monday-Friday)      Tirosint 125 MCG capsule capsule  Generic drug: levothyroxine sodium   125 mcg, Oral, Weekly, Saturday      Togutierrez Max SoloStar 300 UNIT/ML solution pen-injector injection  Generic drug:  Insulin Glargine (2 Unit Dial)   50 Units, Subcutaneous, 2 Times Daily      Vitamin D (Cholecalciferol) 10 MCG (400 UNIT) tablet  Commonly known as: CHOLECALCIFEROL   400 Units, Oral, Daily      Zinc 50 MG tablet   50 mg, Oral, Daily               Allergies   Allergen Reactions   • Jardiance [Empagliflozin] Palpitations       Discharge Disposition:  Home or Self Care    Diet:  Hospital:  No active diet order      Discharge Activity: as tolerated       CODE STATUS:  Code Status and Medical Interventions:   Ordered at: 07/20/24 0146     Code Status (Patient has no pulse and is not breathing):    CPR (Attempt to Resuscitate)     Medical Interventions (Patient has pulse or is breathing):    Full Support         Future Appointments   Date Time Provider Department Center   10/30/2024  3:45 PM Lyla Guerrero APRN [facility]   1/17/2025  9:00 AM Stu Jean DPM [facility]       Additional Instructions for the Follow-ups that You Need to Schedule       Discharge Follow-up with PCP   As directed       Currently Documented PCP:    Lyla Guerrero APRN    PCP Phone Number:    [phone]     Follow Up Details: in 1 week                Pertinent  and/or Most Recent Results     PROCEDURES:   None    LAB RESULTS:      Lab 07/22/24  0456 07/21/24  0436 07/20/24  0651 07/20/24  0358 07/20/24  0105 07/19/24 2008   WBC 5.29 5.59  --  12.91*  --  11.12*   HEMOGLOBIN 13.1 13.1  --  14.3  --  15.8   HEMATOCRIT 37.8 38.4  --  41.5  --  47.0   PLATELETS 299 288  --  434  --  486*   NEUTROS ABS 3.44 3.54  --  11.24*  --  9.82*   IMMATURE GRANS (ABS) 0.01 0.01  --  0.03  --  0.05   LYMPHS ABS 1.24 1.50  --  1.09  --  0.97   MONOS ABS 0.33 0.45  --  0.53  --  0.24   EOS ABS 0.24 0.05  --  0.00  --  0.00   MCV 86.5 87.9  --  87.7  --  90.6   LACTATE  --   --  1.9 2.2* 2.7*  --          Lab 07/22/24  0456 07/21/24  0436 07/20/24  0358 07/19/24 2008   SODIUM 133* 135* 135* 139   POTASSIUM 3.9 3.7 4.6 4.2    CHLORIDE 97* 99 96* 96*   CO2 24.8 25.5 14.9* 9.4*   ANION GAP 11.2 10.5 24.1* 33.6*   BUN 8 7 13 12   CREATININE 0.95 1.00 1.20 1.24   EGFR 97.5 91.7 73.7 70.8   GLUCOSE 220* 165* 262* 203*   CALCIUM 8.8 8.9 8.5* 8.7   MAGNESIUM 1.6  --   --  1.7   PHOSPHORUS 2.5  --   --   --          Lab 07/22/24  0456 07/19/24 2008   TOTAL PROTEIN  --  7.7   ALBUMIN 3.8 4.7   GLOBULIN  --  3.0   ALT (SGPT)  --  22   AST (SGOT)  --  26   BILIRUBIN  --  0.5   ALK PHOS  --  82         Lab 07/19/24 2008   HSTROP T 11                 Brief Urine Lab Results  (Last result in the past 365 days)        Color   Clarity   Blood   Leuk Est   Nitrite   Protein   CREAT   Urine HCG        07/19/24 2352 Yellow   Clear   Negative   Negative   Negative   100 mg/dL (2+)                 Microbiology Results (last 10 days)       ** No results found for the last 240 hours. **            XR Chest 1 View    Result Date: 7/19/2024  No acute cardiopulmonary findings. Electronically Signed: Myron Saenz MD  7/19/2024 9:02 PM EDT  Workstation ID: [id number]                  Labs Pending at Discharge:        Time spent on Discharge including face to face service:  more than 35 minutes    Electronically signed by Jin Reyna DO, 07/22/24, 11:45 AM EDT.

## 2024-07-23 ENCOUNTER — TRANSITIONAL CARE MANAGEMENT TELEPHONE ENCOUNTER (OUTPATIENT)
Dept: CALL CENTER | Facility: HOSPITAL | Age: 49
End: 2024-07-23
Payer: COMMERCIAL

## 2024-07-23 NOTE — OUTREACH NOTE
Call Center TCM Note      Flowsheet Row Responses   [facility] patient discharged from? [facility]   Does the patient have one of the following disease processes/diagnoses(primary or secondary)? Other   TCM attempt successful? Yes  [Verbal for Venice Jack and Madeleine Hwang]   Call start time 1414   Call end time 1416   Discharge diagnosis Alcohol withdrawal   Person spoke with today (if not patient) and relationship Venice, SO   Meds reviewed with patient/caregiver? Yes   Does the patient have all medications ordered at discharge? Yes   Prescription comments SO to  benito sensor today   Is the patient taking all medications as directed (includes completed medication regime)? Yes   Does the patient have an appointment with their PCP within 7-14 days of discharge? No   Nursing Interventions Patient declined scheduling/rescheduling appointment at this time, Routed TCM call to PCP office  [S.O. will call to schedule appt]   Has home health visited the patient within 72 hours of discharge? N/A   Psychosocial issues? No   Did the patient receive a copy of their discharge instructions? Yes   Nursing interventions Reviewed instructions with patient   What is the patient's perception of their health status since discharge? Improving  [Reports abdominal pain decreasing, no vomiting and tolerating po intake.  NO needs or concerns at time of call.]   Is the patient/caregiver able to teach back signs and symptoms related to disease process for when to call PCP? Yes   Is the patient/caregiver able to teach back signs and symptoms related to disease process for when to call 911? Yes   TCM call completed? Yes   Call end time 1416            Maite Mayers RN    7/23/2024, 14:18 EDT

## 2024-07-24 ENCOUNTER — NURSE TRIAGE (OUTPATIENT)
Dept: CALL CENTER | Facility: HOSPITAL | Age: 49
End: 2024-07-24
Payer: COMMERCIAL

## 2024-07-24 NOTE — TELEPHONE ENCOUNTER
"He was d/c [facility] 07/22, he was going thru D/T, has started drinking again,  tand saying he needs help, and does not know if he wants to continue like this. What to DO? He wants to be helped, where do I take him? He is saying he dose not want to go on, he has meds, and access to guns not threatening, but does have access. . She is worried. Triage done, Told her to call 911. Gave her suicide hotline number. She verbalized understanding. Asked if she has friends or family that can come,says not right now and he does not have children. She is not scared for herself, he is not threatening, but is stating, he is  not  sure he want to go on.    Reason for Disposition   Sounds like a life-threatening emergency to the triager    Additional Information   Negative: Patient attempted suicide   Negative: Patient is threatening suicide now   Negative: Violent behavior, or threatening to physically hurt or kill someone   Negative: [1] Patient is very confused (disoriented, slurred speech) AND [2] no other adult (e.g., friend or family member) available   Negative: [1] Difficult to awaken or acting very confused (disoriented, slurred speech) AND [2] new-onset   Negative: Depression is main symptom and is not threatening suicide   Negative: Threatening to physically hurt or kill someone   Negative: [1] Depression symptoms (sadness, hopelessness, decreased energy) AND [2] unable to do any normal activities (e.g., self care, school, work; in comparison to baseline).   Negative: Patient sounds very sick or weak to the triager   Negative: [1] Patient is not threatening suicide now BUT [2] has a suicide PLAN (e.g., overdose, gunshot) and ACCESS (e.g., collecting pills, gun in house)    Answer Assessment - Initial Assessment Questions  1. MAIN CONCERN: \"What happened that made you call today?\"      He says does not want to go on  2. RISK OF HARM - SUICIDAL IDEATION:  \"Do you ever have thoughts of hurting or killing yourself?\"  (e.g., " "yes, no, no but preoccupation with thoughts about death)    - WISH TO BE DEAD:  \"Have you wished you were dead or wished you could go to sleep and not wake up?\"    - INTENT:  \"Have you had any thoughts of hurting or killing yourself?\" (e.g., yes, no, N/A) If Yes, ask: \"Are you having these thoughts about killing yourself right NOW?\"    - PLAN: \"Have you thought about how you might do this?\" \"Do you have a specific plan for how you would do this?\" (e.g., gun, knife, overdose, no plan, N/A)    - ACCESS: If yes to PLAN, \"Do you have access to yes?\" (e.g., pills, gun in house, knife in kitchen)      Pills, guns in his room in safe  3. RISK OF HARM - SUICIDE ATTEMPT: \"Have you tried to harm yourself recently?\" If Yes, ask: When was this?\"  \"What type of harm was tried?\"      no  4. RISK OF HARM - SUICIDAL BEHAVIOR: \"Have you ever done anything, started to do anything, or prepared to do anything to end your life?\" (e.g., collected pills, bought a gun, wrote a suicide note, cut yourself, started but changed your mind)      no  5. EVENTS AND STRESSORS: \"Has there been any new stress or recent changes in your life?\" (e.g., death of loved one, homelessness, negative event, relationship breakup, work)      drinking  6. FUNCTIONAL IMPAIRMENT: \"How have things been going for you overall? Have you had more difficulty than usual doing your normal daily activities?\"  (e.g., better, same, worse; self-care, school, work, interactions)       Medication changes  7. SUPPORT: \"Who is with you now?\" \"Who do you live with?\" \"Do you have family or friends who you can talk to?\"       Wife is there  8. THERAPIST: \"Do you have a counselor or therapist? What is their name?\"      no  9. ALCOHOL USE OR SUBSTANCE USE (DRUG USE): \"Do you drink alcohol or use any illegal drugs (or prescription drugs in ways other than prescribed)?\"      Drinks and is drinking  10. OTHER: \"Do you have any other physical symptoms right now?\" (e.g., fever)        " "no  11. PREGNANCY or POSTPARTUM: \"Is there any chance you are pregnant?\" \"When was your last menstrual period?\" \"Were you recently pregnant?\" \"When did you give birth?\"        no    Protocols used: Suicide Concerns-ADULT-AH    "

## 2024-08-01 RX ORDER — BLOOD-GLUCOSE METER
1 EACH MISCELLANEOUS DAILY
Qty: 1 KIT | Refills: 0 | Status: SHIPPED | OUTPATIENT
Start: 2024-08-01

## 2024-08-05 NOTE — PROGRESS NOTES
Transitional Care Follow Up Visit  Subjective     Wayne Guan is a 49 y.o. male who presents with his wife for a transitional care management visit.    Within 48 business hours after discharge our office contacted him via telephone to coordinate his care and needs.      I reviewed and discussed the details of that call along with the discharge summary, hospital problems, inpatient lab results, inpatient diagnostic studies, and consultation reports with Wayne.     Current outpatient and discharge medications have been reconciled for the patient.  Reviewed by: DELL Morrison          7/22/2024     6:37 PM   Date of TCM Phone Call   [facility]   Date of Admission 7/19/2024   Date of Discharge 7/22/2024   Discharge Disposition Home or Self Care     Risk for Readmission (LACE) Score: 12 (7/22/2024  6:00 AM)      History of Present Illness   Course During Hospital Stay:  48 y.o. male with nausea, vomiting and abdominal pain.  He has a history of alcohol abuse and alcohol withdrawal.  No fevers or chills.  He required ICU admission initially. Patient was on the [facility] ativan treatment protocol. Patients withdrawal symptoms have resolved. He is feeling better and tolerating diet. He has no confusion. Patient is not interested in inpatient rehab at this time and wants to go home with outpatient follow up. Patient will discharge home today in stable condition.     He then went to [place] for treatment and had a relapse and was in the hospital for 2-3 days there with DKA.  Patient states that he has not had any alcohol since the hospitalization.  He has a few capsules left of likely Librium and takes those as needed.  He has not started an outpatient treatment program.    Blood sugars have been low in the mornings. He has an endocrinology.      The following portions of the patient's history were reviewed and updated as appropriate: allergies, current medications, past family history, past  "medical history, past social history, past surgical history, and problem list.    Review of Systems   Constitutional:  Negative for chills and fever.   Respiratory:  Negative for cough.    Cardiovascular:  Negative for chest pain and leg swelling.   Gastrointestinal:  Positive for abdominal pain (intermittent lower quadrants cramps improving). Negative for blood in stool, constipation, diarrhea and nausea.   Genitourinary:  Negative for difficulty urinating.   Musculoskeletal:  Positive for back pain.   Psychiatric/Behavioral:  Positive for agitation. Negative for confusion and suicidal ideas. The patient is nervous/anxious.    All other systems reviewed and are negative.      Objective   /78 (BP Location: Left arm, Patient Position: Sitting, Cuff Size: Large Adult)   Pulse 78   Temp 97.4 °F (36.3 °C)   Resp 16   Ht 185.4 cm (72.99\")   Wt 126 kg (278 lb 9.6 oz)   SpO2 96%   BMI 36.76 kg/m²   Physical Exam  Vitals reviewed.   Constitutional:       General: He is not in acute distress.  HENT:      Head: Normocephalic and atraumatic.   Cardiovascular:      Rate and Rhythm: Normal rate and regular rhythm.      Heart sounds: Normal heart sounds.   Pulmonary:      Effort: Pulmonary effort is normal.      Breath sounds: Normal breath sounds. No wheezing, rhonchi or rales.   Abdominal:      Palpations: Abdomen is soft. There is no mass.      Tenderness: There is no abdominal tenderness.   Musculoskeletal:      Right lower leg: No edema.      Left lower leg: No edema.   Neurological:      General: No focal deficit present.      Mental Status: He is alert.      Comments: No hand tremors noted today.   Psychiatric:         Attention and Perception: Attention normal.         Mood and Affect: Mood normal.         Speech: Speech normal.         Behavior: Behavior normal.         Thought Content: Thought content normal. Thought content does not include homicidal or suicidal ideation.         Assessment & Plan "   Problems Addressed this Visit          Cardiac and Vasculature    Essential hypertension       Endocrine and Metabolic    DM (diabetes mellitus)       Mental Health    Anxiety    Alcoholism    Relevant Orders    Ambulatory Referral to Psychiatry     Other Visit Diagnoses       Hospital discharge follow-up    -  Primary          Diagnoses         Codes Comments    Hospital discharge follow-up    -  Primary ICD-10-CM: Z09  ICD-9-CM: V67.59     Alcoholism     ICD-10-CM: F10.20  ICD-9-CM: 303.90     Anxiety     ICD-10-CM: F41.9  ICD-9-CM: 300.00     Type 2 diabetes mellitus with hyperglycemia, unspecified whether long term insulin use     ICD-10-CM: E11.65  ICD-9-CM: 250.00     Essential hypertension     ICD-10-CM: I10  ICD-9-CM: 401.9           Patient was in the hospital at [facility] for alcoholism and was discharged 2 weeks ago.  He was then hospitalized in [facility] for DKA due to alcoholism.  The patient understands that this is serious and he could die from his alcoholism as well as DKA.  The patient reports sugars are not consistant on Toujeo 50 mg twice a day and Humalog TID. The patient sees an endocrinologist in [place] for diabetes and status post papillary thyroid carcinoma.  The patient was advised to follow-up as soon as possible with his endocrinologist.  The patient is to continue his Lexapro 20 mg daily.  He was treated at [facility] with Ativan protocol for withdrawal and treated with Librium protocol for withdrawal in [place].  The patient was advised he needs to be an outpatient treatment program.  I have advised him to call [facility] to discuss outpatient treatment and I have placed a referral with psychiatry for treatment of alcoholism.  In the interim I advised the patient to seek AA meetings.  Blood pressure today well-controlled on Toprol 50 mg daily and to continue.

## 2024-08-06 ENCOUNTER — OFFICE VISIT (OUTPATIENT)
Dept: INTERNAL MEDICINE | Age: 49
End: 2024-08-06
Payer: COMMERCIAL

## 2024-08-06 VITALS
HEART RATE: 78 BPM | TEMPERATURE: 97.4 F | HEIGHT: 73 IN | DIASTOLIC BLOOD PRESSURE: 78 MMHG | WEIGHT: 278.6 LBS | RESPIRATION RATE: 16 BRPM | BODY MASS INDEX: 36.92 KG/M2 | OXYGEN SATURATION: 96 % | SYSTOLIC BLOOD PRESSURE: 115 MMHG

## 2024-08-06 DIAGNOSIS — F41.9 ANXIETY: Chronic | ICD-10-CM

## 2024-08-06 DIAGNOSIS — I10 ESSENTIAL HYPERTENSION: Chronic | ICD-10-CM

## 2024-08-06 DIAGNOSIS — E11.65 TYPE 2 DIABETES MELLITUS WITH HYPERGLYCEMIA, UNSPECIFIED WHETHER LONG TERM INSULIN USE: Chronic | ICD-10-CM

## 2024-08-06 DIAGNOSIS — Z09 HOSPITAL DISCHARGE FOLLOW-UP: Primary | ICD-10-CM

## 2024-08-06 DIAGNOSIS — F10.20 ALCOHOLISM: ICD-10-CM

## 2024-08-06 PROBLEM — I71.21 ANEURYSM OF ASCENDING AORTA WITHOUT RUPTURE: Status: ACTIVE | Noted: 2024-08-06

## 2024-08-06 PROBLEM — K70.10 ALCOHOLIC HEPATITIS: Status: ACTIVE | Noted: 2023-02-22

## 2024-08-06 PROCEDURE — 99214 OFFICE O/P EST MOD 30 MIN: CPT | Performed by: NURSE PRACTITIONER

## 2024-08-12 RX ORDER — FOLIC ACID 1 MG/1
1 TABLET ORAL DAILY
Qty: 90 TABLET | Refills: 3 | Status: SHIPPED | OUTPATIENT
Start: 2024-08-12

## 2024-08-12 RX ORDER — METOPROLOL SUCCINATE 50 MG/1
50 TABLET, EXTENDED RELEASE ORAL DAILY
Qty: 90 TABLET | Refills: 3 | Status: SHIPPED | OUTPATIENT
Start: 2024-08-12

## 2024-08-15 ENCOUNTER — OFFICE VISIT (OUTPATIENT)
Dept: PSYCHIATRY | Facility: CLINIC | Age: 49
End: 2024-08-15
Payer: COMMERCIAL

## 2024-08-15 VITALS
BODY MASS INDEX: 36.98 KG/M2 | SYSTOLIC BLOOD PRESSURE: 124 MMHG | WEIGHT: 279 LBS | HEART RATE: 90 BPM | DIASTOLIC BLOOD PRESSURE: 91 MMHG | HEIGHT: 73 IN

## 2024-08-15 DIAGNOSIS — F10.21 ALCOHOL USE DISORDER, SEVERE, IN EARLY REMISSION: Primary | ICD-10-CM

## 2024-08-15 DIAGNOSIS — F41.1 GAD (GENERALIZED ANXIETY DISORDER): ICD-10-CM

## 2024-08-15 DIAGNOSIS — F43.10 POST TRAUMATIC STRESS DISORDER (PTSD): ICD-10-CM

## 2024-08-15 RX ORDER — CLONIDINE HYDROCHLORIDE 0.1 MG/1
0.1 TABLET ORAL 2 TIMES DAILY
Qty: 60 TABLET | Refills: 1 | Status: SHIPPED | OUTPATIENT
Start: 2024-08-15 | End: 2024-10-14

## 2024-08-15 NOTE — TREATMENT PLAN
Multi-Disciplinary Problems (from Behavioral Health Treatment Plan)      Active Problems       Problem: Substance Abuse Issues  Start Date: 08/15/24      Problem Details: The patient self-scales this problem as a 3 with 10 being the worst.          Goal Priority Start Date Expected End Date End Date    Patient will abstain from mood altering substances. -- 08/15/24 02/13/25 --    Goal Details: Progress toward goal:  The patient self-scales their progress related to this goal as a 3 with 10 being the worst.          Goal Intervention Frequency Start Date End Date    Provide education to increase patients understanding of addiction and relapse processes. Weekly 08/15/24 --    Intervention Details: Duration of treatment until remission of symptoms.          Goal Priority Start Date Expected End Date End Date    Patient will develop insight into how to improve their relationships. -- 08/15/24 02/13/25 --    Goal Details: Progress toward goal:  The patient self-scales their progress related to this goal as a 3 with 10 being the worst.          Goal Intervention Frequency Start Date End Date    Educate about 12 step recovery programs. Weekly 08/15/24 --    Intervention Details: Duration of treatment until remission of symptoms.          Goal Priority Start Date Expected End Date End Date    Patient will improve positive self regard. -- 08/15/24 02/13/25 --    Goal Details: Progress toward goal:  The patient self-scales their progress related to this goal as a 3 with 10 being the worst.          Goal Intervention Frequency Start Date End Date    Educate patient about how substance use affects their relationships. Weekly 08/15/24 --    Intervention Details: Duration of treatment until remission of symptoms.                          Reviewed By       Kassandra Wilson APRN 08/15/24 [id number]    Kassandra Wilson APRN 08/15/24 [id number]                     I have discussed and reviewed this treatment plan with the patient.  It has been  printed for signatures.

## 2024-08-15 NOTE — PROGRESS NOTES
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 Final    Total Bilirubin 04/29/2024 0.4  0.0 - 1.2 mg/dL Final    Globulin 04/29/2024 2.6  gm/dL Final    A/G Ratio 04/29/2024 1.7  g/dL Final    BUN/Creatinine Ratio 04/29/2024 11.4  7.0 - 25.0 Final    Anion Gap 04/29/2024 12.1  5.0 - 15.0 mmol/L Final    eGFR 04/29/2024 78.4  >60.0 mL/min/1.73 Final    Total Cholesterol 04/29/2024 184  0 - 200 mg/dL Final    Triglycerides 04/29/2024 175 (H)  0 - 150 mg/dL Final    HDL Cholesterol 04/29/2024 41  40 - 60 mg/dL Final    LDL Cholesterol  04/29/2024 112 (H)  0 - 100 mg/dL Final    VLDL Cholesterol 04/29/2024 31  5 - 40 mg/dL Final    LDL/HDL Ratio 04/29/2024 2.63   Final    Hemoglobin A1C 04/29/2024 8.70 (H)  4.80 - 5.60 % Final    Vitamin B-12 04/29/2024 359  211 - 946 pg/mL Final    WBC 04/29/2024 6.41  3.40 - 10.80 10*3/mm3 Final    RBC 04/29/2024 4.95  4.14 - 5.80 10*6/mm3 Final    Hemoglobin 04/29/2024 15.0  13.0 - 17.7 g/dL Final    Hematocrit 04/29/2024 44.3  37.5 - 51.0 % Final    MCV 04/29/2024 89.5  79.0 - 97.0 fL Final    MCH 04/29/2024 30.3  26.6 - 33.0 pg Final    MCHC 04/29/2024 33.9  31.5 - 35.7 g/dL Final    RDW 04/29/2024 12.9  12.3 - 15.4 % Final    RDW-SD 04/29/2024 41.8  37.0 - 54.0 fl Final    MPV 04/29/2024 9.0  6.0 - 12.0 fL Final     Platelets 04/29/2024 416  140 - 450 10*3/mm3 Final    Neutrophil % 04/29/2024 54.9  42.7 - 76.0 % Final    Lymphocyte % 04/29/2024 32.9  19.6 - 45.3 % Final    Monocyte % 04/29/2024 8.3  5.0 - 12.0 % Final    Eosinophil % 04/29/2024 2.8  0.3 - 6.2 % Final    Basophil % 04/29/2024 0.6  0.0 - 1.5 % Final    Immature Grans % 04/29/2024 0.5  0.0 - 0.5 % Final    Neutrophils, Absolute 04/29/2024 3.52  1.70 - 7.00 10*3/mm3 Final    Lymphocytes, Absolute 04/29/2024 2.11  0.70 - 3.10 10*3/mm3 Final    Monocytes, Absolute 04/29/2024 0.53  0.10 - 0.90 10*3/mm3 Final    Eosinophils, Absolute 04/29/2024 0.18  0.00 - 0.40 10*3/mm3 Final    Basophils, Absolute 04/29/2024 0.04  0.00 - 0.20 10*3/mm3 Final    Immature Grans, Absolute 04/29/2024 0.03  0.00 - 0.05 10*3/mm3 Final    nRBC 04/29/2024 0.0  0.0 - 0.2 /100 WBC Final   Admission on 03/01/2024, Discharged on 03/02/2024   Component Date Value Ref Range Status    QT Interval 03/01/2024 331  ms Final    QTC Interval 03/01/2024 459  ms Final    Color, UA 03/01/2024 Dark Yellow (A)  Yellow, Straw Final    Appearance, UA 03/01/2024 Clear  Clear Final    pH, UA 03/01/2024 5.5  5.0 - 8.0 Final    Specific Gravity, UA 03/01/2024 1.020  1.005 - 1.030 Final    Glucose, UA 03/01/2024 500 mg/dL (2+) (A)  Negative Final    Ketones, UA 03/01/2024 40 mg/dL (2+) (A)  Negative Final    Bilirubin, UA 03/01/2024 Negative  Negative Final    Blood, UA 03/01/2024 Trace (A)  Negative Final    Protein, UA 03/01/2024 100 mg/dL (2+) (A)  Negative Final    Leuk Esterase, UA 03/01/2024 Negative  Negative Final    Nitrite, UA 03/01/2024 Negative  Negative Final    Urobilinogen, UA 03/01/2024 1.0 E.U./dL  0.2 - 1.0 E.U./dL Final    Glucose 03/01/2024 251 (H)  65 - 99 mg/dL Final    BUN 03/01/2024 10  6 - 20 mg/dL Final    Creatinine 03/01/2024 1.07  0.76 - 1.27 mg/dL Final    Sodium 03/01/2024 134 (L)  136 - 145 mmol/L Final    Potassium 03/01/2024 3.8  3.5 - 5.2 mmol/L Final    Slight hemolysis detected  by analyzer. Result may be falsely elevated.    Chloride 03/01/2024 90 (L)  98 - 107 mmol/L Final    CO2 03/01/2024 17.5 (L)  22.0 - 29.0 mmol/L Final    Calcium 03/01/2024 9.3  8.6 - 10.5 mg/dL Final    Total Protein 03/01/2024 8.0  6.0 - 8.5 g/dL Final    Albumin 03/01/2024 4.7  3.5 - 5.2 g/dL Final    ALT (SGPT) 03/01/2024 36  1 - 41 U/L Final    AST (SGOT) 03/01/2024 54 (H)  1 - 40 U/L Final    Alkaline Phosphatase 03/01/2024 78  39 - 117 U/L Final    Total Bilirubin 03/01/2024 1.0  0.0 - 1.2 mg/dL Final    Globulin 03/01/2024 3.3  gm/dL Final    A/G Ratio 03/01/2024 1.4  g/dL Final    BUN/Creatinine Ratio 03/01/2024 9.3  7.0 - 25.0 Final    Anion Gap 03/01/2024 26.5 (H)  5.0 - 15.0 mmol/L Final    eGFR 03/01/2024 84.5  >60.0 mL/min/1.73 Final    HS Troponin T 03/01/2024 17  <22 ng/L Final    QT Interval 03/01/2024 333  ms Final    QTC Interval 03/01/2024 465  ms Final    HS Troponin T 03/01/2024 17  <22 ng/L Final    Lipase 03/01/2024 7 (L)  13 - 60 U/L Final    proBNP 03/01/2024 43.4  0.0 - 900.0 pg/mL Final    Magnesium 03/01/2024 1.6  1.6 - 2.6 mg/dL Final    Extra Tube 03/01/2024 Hold for add-ons.   Final    Auto resulted.    Extra Tube 03/01/2024 hold for add-on   Final    Auto resulted    Extra Tube 03/01/2024 Hold for add-ons.   Final    Auto resulted.    Extra Tube 03/01/2024 Hold for add-ons.   Final    Auto resulted    WBC 03/01/2024 6.25  3.40 - 10.80 10*3/mm3 Final    RBC 03/01/2024 5.19  4.14 - 5.80 10*6/mm3 Final    Hemoglobin 03/01/2024 15.6  13.0 - 17.7 g/dL Final    Hematocrit 03/01/2024 44.0  37.5 - 51.0 % Final    MCV 03/01/2024 84.8  79.0 - 97.0 fL Final    MCH 03/01/2024 30.1  26.6 - 33.0 pg Final    MCHC 03/01/2024 35.5  31.5 - 35.7 g/dL Final    RDW 03/01/2024 12.3  12.3 - 15.4 % Final    RDW-SD 03/01/2024 37.7  37.0 - 54.0 fl Final    MPV 03/01/2024 8.5  6.0 - 12.0 fL Final    Platelets 03/01/2024 375  140 - 450 10*3/mm3 Final    Neutrophil % 03/01/2024 65.9  42.7 - 76.0 % Final     Lymphocyte % 03/01/2024 25.1  19.6 - 45.3 % Final    Monocyte % 03/01/2024 7.7  5.0 - 12.0 % Final    Eosinophil % 03/01/2024 0.5  0.3 - 6.2 % Final    Basophil % 03/01/2024 0.6  0.0 - 1.5 % Final    Immature Grans % 03/01/2024 0.2  0.0 - 0.5 % Final    Neutrophils, Absolute 03/01/2024 4.12  1.70 - 7.00 10*3/mm3 Final    Lymphocytes, Absolute 03/01/2024 1.57  0.70 - 3.10 10*3/mm3 Final    Monocytes, Absolute 03/01/2024 0.48  0.10 - 0.90 10*3/mm3 Final    Eosinophils, Absolute 03/01/2024 0.03  0.00 - 0.40 10*3/mm3 Final    Basophils, Absolute 03/01/2024 0.04  0.00 - 0.20 10*3/mm3 Final    Immature Grans, Absolute 03/01/2024 0.01  0.00 - 0.05 10*3/mm3 Final    nRBC 03/01/2024 0.0  0.0 - 0.2 /100 WBC Final    COVID19 03/01/2024 Not Detected  Not Detected - Ref. Range Final    Influenza A PCR 03/01/2024 Not Detected  Not Detected Final    Influenza B PCR 03/01/2024 Not Detected  Not Detected Final    RSV, PCR 03/01/2024 Not Detected  Not Detected Final    HS Troponin T 03/01/2024 15  <22 ng/L Final    Troponin T Delta 03/01/2024 -2  >=-4 - <+4 ng/L Final    TSH 03/01/2024 1.980  0.270 - 4.200 uIU/mL Final    Free T4 03/01/2024 1.30  0.93 - 1.70 ng/dL Final    Creatine Kinase 03/01/2024 279 (H)  20 - 200 U/L Final    Ethanol 03/01/2024 230 (H)  0 - 10 mg/dL Final    Ethanol % 03/01/2024 0.230  % Final    Amphet/Methamphet, Screen 03/01/2024 Negative  Negative Final    Barbiturates Screen, Urine 03/01/2024 Negative  Negative Final    Benzodiazepine Screen, Urine 03/01/2024 Negative  Negative Final    Cocaine Screen, Urine 03/01/2024 Negative  Negative Final    Opiate Screen 03/01/2024 Negative  Negative Final    THC, Screen, Urine 03/01/2024 Negative  Negative Final    Methadone Screen, Urine 03/01/2024 Negative  Negative Final    Oxycodone Screen, Urine 03/01/2024 Negative  Negative Final    Fentanyl, Urine 03/01/2024 Negative  Negative Final    pH, Venous 03/01/2024 7.368  7.310 - 7.410 pH Units Final    pCO2, Venous  03/01/2024 37.8 (L)  41.0 - 51.0 mm Hg Final    pO2, Venous 03/01/2024 80.3 (H)  35.0 - 42.0 mm Hg Final    HCO3, Venous 03/01/2024 21.3 (L)  22.0 - 26.0 mmol/L Final    Base Excess, Venous 03/01/2024 -3.6 (L)  -2.0 - 2.0 mmol/L Final    O2 Saturation, Venous 03/01/2024 94.4 (H)  45.0 - 75.0 % Final    Hemoglobin, Blood Gas 03/01/2024 14.2  13.8 - 16.4 g/dL Final    Carboxyhemoglobin 03/01/2024 1.2  0 - 1.5 % Final    Methemoglobin 03/01/2024 0.40  0.00 - 1.50 % Final    Oxyhemoglobin 03/01/2024 92.9 (L)  94 - 99 % Final    FHHB 03/01/2024 5.5 (H)  0.0 - 5.0 % Final    Note 03/01/2024 VBG w/lytes   Final    Site 03/01/2024 Drawn by RN   Final    Modality 03/01/2024 Room Air   Final    FIO2 03/01/2024 21  % Final    Sodium, Venous 03/01/2024 135.3 (L)  136 - 146 mmol/L Final    Potassium, Venous 03/01/2024 3.9  3.5 - 5.0 mmol/L Final    Ionized Calcium, Arterial 03/01/2024 1.08 (L)  1.13 - 1.32 mmol/L Final    Chloride, Venous  03/01/2024 99  98 - 106 mmol/L Final    Glucose, Arterial 03/01/2024 146 (H)  70 - 99 mg/dL Final    Lactate, Arterial 03/01/2024 3.23 (H)  0.5 - 2 mmol/L Final    RBC, UA 03/01/2024 0-2  None Seen, 0-2 /HPF Final    WBC, UA 03/01/2024 0-2  None Seen, 0-2 /HPF Final    Bacteria, UA 03/01/2024 None Seen  None Seen /HPF Final    Squamous Epithelial Cells, UA 03/01/2024 0-2  None Seen, 0-2 /HPF Final    Hyaline Casts, UA 03/01/2024 3-6  None Seen /LPF Final    Methodology 03/01/2024 Automated Microscopy   Final    TSH 03/01/2024 1.900  0.270 - 4.200 uIU/mL Final    Magnesium 03/02/2024 1.5 (L)  1.6 - 2.6 mg/dL Final    Glucose 03/02/2024 175 (H)  65 - 99 mg/dL Final    BUN 03/02/2024 8  6 - 20 mg/dL Final    Creatinine 03/02/2024 0.87  0.76 - 1.27 mg/dL Final    Sodium 03/02/2024 134 (L)  136 - 145 mmol/L Final    Potassium 03/02/2024 3.6  3.5 - 5.2 mmol/L Final    Chloride 03/02/2024 96 (L)  98 - 107 mmol/L Final    CO2 03/02/2024 20.3 (L)  22.0 - 29.0 mmol/L Final    Calcium 03/02/2024 8.6  8.6  - 10.5 mg/dL Final    BUN/Creatinine Ratio 03/02/2024 9.2  7.0 - 25.0 Final    Anion Gap 03/02/2024 17.7 (H)  5.0 - 15.0 mmol/L Final    eGFR 03/02/2024 105.1  >60.0 mL/min/1.73 Final    Glucose 03/02/2024 127 (H)  65 - 99 mg/dL Final    BUN 03/02/2024 8  6 - 20 mg/dL Final    Creatinine 03/02/2024 1.00  0.76 - 1.27 mg/dL Final    Sodium 03/02/2024 135 (L)  136 - 145 mmol/L Final    Potassium 03/02/2024 3.7  3.5 - 5.2 mmol/L Final    Chloride 03/02/2024 96 (L)  98 - 107 mmol/L Final    CO2 03/02/2024 20.4 (L)  22.0 - 29.0 mmol/L Final    Calcium 03/02/2024 8.6  8.6 - 10.5 mg/dL Final    BUN/Creatinine Ratio 03/02/2024 8.0  7.0 - 25.0 Final    Anion Gap 03/02/2024 18.6 (H)  5.0 - 15.0 mmol/L Final    eGFR 03/02/2024 91.7  >60.0 mL/min/1.73 Final    Glucose 03/01/2024 198 (H)  70 - 99 mg/dL Final    Serial Number: [id number]:  [id number]    Glucose 03/02/2024 231 (H)  65 - 99 mg/dL Final    BUN 03/02/2024 8  6 - 20 mg/dL Final    Creatinine 03/02/2024 1.10  0.76 - 1.27 mg/dL Final    Sodium 03/02/2024 130 (L)  136 - 145 mmol/L Final    Potassium 03/02/2024 3.9  3.5 - 5.2 mmol/L Final    Chloride 03/02/2024 94 (L)  98 - 107 mmol/L Final    CO2 03/02/2024 19.9 (L)  22.0 - 29.0 mmol/L Final    Calcium 03/02/2024 8.7  8.6 - 10.5 mg/dL Final    BUN/Creatinine Ratio 03/02/2024 7.3  7.0 - 25.0 Final    Anion Gap 03/02/2024 16.1 (H)  5.0 - 15.0 mmol/L Final    eGFR 03/02/2024 81.8  >60.0 mL/min/1.73 Final    Potassium 03/02/2024 3.7  3.5 - 5.2 mmol/L Final    Glucose 03/02/2024 232 (H)  70 - 99 mg/dL Final    Serial Number: [id number]:  [id number]    Glucose 03/02/2024 145 (H)  70 - 99 mg/dL Final    Serial Number: [id number]:  [id number]       ASSESSMENT AND PLAN:    ICD-10-CM ICD-9-CM   1. Post traumatic stress disorder (PTSD)  F43.10 309.81       Wayne who presents today for initial evaluation regarding medication management.. We have discussed the history and interpreted diagnoses as above as well as the  treatment plan below, including potential R/B/SE of the recommended regimen of which the patient demonstrates understanding. Patient is agreeable to call 911 or go to the nearest ER should he become concerned for his own safety and/or the safety of those around him. There are not overt indices of acute kristen/psychosis on evaluation today.     Medication regimen: Add clonidine 0.1 mg po BID prn, anxiety, continue escitalopram 20 mg po q day ; patient is advised not to misuse prescribed medications or to use any exogenous substances that aren't disclosed to this provider as they may interact with the regimen to his detriment.   Risk Assessment: protracted risk is moderate, imminent risk is moderate.  Risk factors include: anxiety disorder, mood disorder, and recent/ongoing psychosocial stressors. Protective factors include: no known family history of suicidality, intact reality testing, no stated history of suicide attempts or self-harm, patient's exhibited future-orientation, strong social support, and patient's cooperation with care. Do note that this is subject to change with the Gnosticism of new stressors, treatment non-adherence, use of substances, and/or new medical ails.  Monitoring: reviewed labs/imaging as populated above, yes UDS ordered; PHQ-9 today is PHQ-9 Total Score: 5 /27, TERA-7 today is 8/21  Therapy: referral made to [facility]  Follow-up: two weeks  Communications: N/A    TREATMENT PLAN/GOALS: challenge patterns of living conducive to symptom burden, implement recommended regimen as above with augmentative, intermittent supportive psychotherapy to reduce symptom burden. Patient acknowledged and verbally consented to begin treatment as above. The importance of adherence to the recommended treatment and interval follow-up appointments was emphasized today. Patient was today advised to limit daily caffeine intake, hydrate appropriately, eat healthy and nutritious foods, engage sleep hygiene  measures, engage appropriate exposure to sunlight, engage with hobbies in balance with life necessities, and exercise appropriate to their capacity to do so.     Billing: I have seen the patient today and considered his psychiatric complaints, rendered a diagnosis, and discussed treatment with the patient as above with which he consents.    Parts of this note are electronic transcriptions/translations of spoken language to printed text using the Dragon Dictation system.    Electronically signed by DELL Leslie, 08/15/24,

## 2024-08-21 ENCOUNTER — TELEPHONE (OUTPATIENT)
Dept: PSYCHIATRY | Facility: CLINIC | Age: 49
End: 2024-08-21
Payer: COMMERCIAL

## 2024-08-21 DIAGNOSIS — F10.21 ALCOHOL USE DISORDER, SEVERE, IN EARLY REMISSION: Primary | ICD-10-CM

## 2024-08-21 NOTE — TELEPHONE ENCOUNTER
ATTEMPTED TO CONTACT PT(PATIENT) PER PROVIDER'S INSTRUCTIONS     Kassandra Wilson APRN  [facility] [place] [facility] Clinical Pool5 minutes ago (12:45 PM)     Please call patient, and let him know I reviewed his history with my supervising physician, and that we believe at this time acamprosate will be a safer option than naltrexone due to his history of cirrhosis. This medicine has the propensity for  to cause diarrhea, nausea, and worsening anxiety/depression with the possibility of eliciting acute SI. It is not known to be sedating and is relativity weight neutral. Dosing is three times daily, and is fine to take with meals. I will plan to start medication once his approval is received. GABBY      NO ANSWER      LEFT VOICEMAIL WITH INSTRUCTIONS TO RETURN CALL TO OFFICE AT [phone]

## 2024-08-22 NOTE — TELEPHONE ENCOUNTER
ATTEMPTED TO CONTACT PT(PATIENT) PER PROVIDER'S INSTRUCTIONS     Kassandra Wilson APRN  [facility] [place] [facility] Clinical Pool5 minutes ago (12:45 PM)     Please call patient, and let him know I reviewed his history with my supervising physician, and that we believe at this time acamprosate will be a safer option than naltrexone due to his history of cirrhosis. This medicine has the propensity for  to cause diarrhea, nausea, and worsening anxiety/depression with the possibility of eliciting acute SI. It is not known to be sedating and is relativity weight neutral. Dosing is three times daily, and is fine to take with meals. I will plan to start medication once his approval is received. GABBY     UNABLE TO LEAVE A VOICEMAIL WITH INSTRUCTIONS TO RETURN CALL TO OFFICE AT [phone]

## 2024-08-23 RX ORDER — ACAMPROSATE CALCIUM 333 MG/1
666 TABLET, DELAYED RELEASE ORAL 3 TIMES DAILY
Qty: 180 TABLET | Refills: 2 | Status: SHIPPED | OUTPATIENT
Start: 2024-08-23

## 2024-08-23 NOTE — TELEPHONE ENCOUNTER
PT(PATIENT) VERIFIED     CONTACTED PT(PATIENT) PER PROVIDER'S INSTRUCTIONS     PT(PATIENT) STATES HE IS OPEN TO TRYING THE NEW MEDICATION     PLEASE ADVISE

## 2024-08-23 NOTE — TELEPHONE ENCOUNTER
ATTEMPTED TO CONTACT PT(PATIENT) PER PROVIDER'S INSTRUCTIONS     Kassandra Wilson APRN  [facility] Clinical Pool5 minutes ago (12:45 PM)     Please call patient, and let him know I reviewed his history with my supervising physician, and that we believe at this time acamprosate will be a safer option than naltrexone due to his history of cirrhosis. This medicine has the propensity for  to cause diarrhea, nausea, and worsening anxiety/depression with the possibility of eliciting acute SI. It is not known to be sedating and is relativity weight neutral. Dosing is three times daily, and is fine to take with meals. I will plan to start medication once his approval is received. GABBY     UNABLE TO LEAVE A VOICEMAIL WITH INSTRUCTIONS TO RETURN CALL TO OFFICE AT [phone]

## 2024-08-28 ENCOUNTER — OFFICE VISIT (OUTPATIENT)
Dept: PSYCHIATRY | Facility: CLINIC | Age: 49
End: 2024-08-28
Payer: COMMERCIAL

## 2024-08-28 VITALS
HEIGHT: 73 IN | SYSTOLIC BLOOD PRESSURE: 114 MMHG | DIASTOLIC BLOOD PRESSURE: 84 MMHG | WEIGHT: 292.4 LBS | BODY MASS INDEX: 38.75 KG/M2 | HEART RATE: 71 BPM

## 2024-08-28 DIAGNOSIS — F43.10 POST TRAUMATIC STRESS DISORDER (PTSD): ICD-10-CM

## 2024-08-28 DIAGNOSIS — F10.21 ALCOHOL USE DISORDER, SEVERE, IN EARLY REMISSION: Primary | ICD-10-CM

## 2024-08-28 DIAGNOSIS — F41.1 GAD (GENERALIZED ANXIETY DISORDER): ICD-10-CM

## 2024-08-28 RX ORDER — ONDANSETRON 4 MG/1
TABLET, FILM COATED ORAL
COMMUNITY
Start: 2024-07-28

## 2024-08-28 RX ORDER — CHLORDIAZEPOXIDE HYDROCHLORIDE 10 MG/1
CAPSULE, GELATIN COATED ORAL
COMMUNITY
Start: 2024-07-28

## 2024-08-28 RX ORDER — BLOOD SUGAR DIAGNOSTIC
1 STRIP MISCELLANEOUS 3 TIMES DAILY
COMMUNITY
Start: 2024-08-01

## 2024-08-28 RX ORDER — PROCHLORPERAZINE 25 MG/1
SUPPOSITORY RECTAL
COMMUNITY
Start: 2023-01-01

## 2024-08-28 RX ORDER — DILTIAZEM HYDROCHLORIDE 30 MG/1
TABLET, FILM COATED ORAL EVERY 8 HOURS
COMMUNITY
Start: 2024-07-02

## 2024-08-28 NOTE — PROGRESS NOTES
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 04/29/2024 12.1  5.0 - 15.0 mmol/L Final    eGFR 04/29/2024 78.4  >60.0 mL/min/1.73 Final    Total Cholesterol 04/29/2024 184  0 - 200 mg/dL Final    Triglycerides 04/29/2024 175 (H)  0 - 150 mg/dL Final    HDL Cholesterol 04/29/2024 41  40 - 60 mg/dL Final    LDL Cholesterol  04/29/2024 112 (H)  0 - 100 mg/dL Final    VLDL Cholesterol 04/29/2024 31  5 - 40 mg/dL Final    LDL/HDL Ratio 04/29/2024 2.63   Final    Hemoglobin A1C 04/29/2024 8.70 (H)  4.80 - 5.60 % Final    Vitamin B-12 04/29/2024 359  211 - 946 pg/mL Final    WBC 04/29/2024 6.41  3.40 - 10.80 10*3/mm3 Final    RBC 04/29/2024 4.95  4.14 - 5.80 10*6/mm3 Final    Hemoglobin 04/29/2024 15.0  13.0 - 17.7 g/dL Final    Hematocrit 04/29/2024 44.3  37.5 - 51.0 % Final    MCV 04/29/2024 89.5  79.0 - 97.0 fL Final    MCH 04/29/2024 30.3  26.6 - 33.0 pg Final    MCHC 04/29/2024 33.9  31.5 - 35.7 g/dL Final    RDW 04/29/2024 12.9  12.3 - 15.4 % Final    RDW-SD 04/29/2024 41.8  37.0 - 54.0 fl Final    MPV 04/29/2024 9.0  6.0 - 12.0 fL Final    Platelets 04/29/2024 416  140 - 450 10*3/mm3 Final    Neutrophil % 04/29/2024 54.9  42.7 - 76.0 % Final    Lymphocyte % 04/29/2024 32.9  19.6 - 45.3 % Final    Monocyte % 04/29/2024 8.3  5.0 - 12.0 % Final    Eosinophil % 04/29/2024 2.8  0.3 - 6.2 % Final     Basophil % 04/29/2024 0.6  0.0 - 1.5 % Final    Immature Grans % 04/29/2024 0.5  0.0 - 0.5 % Final    Neutrophils, Absolute 04/29/2024 3.52  1.70 - 7.00 10*3/mm3 Final    Lymphocytes, Absolute 04/29/2024 2.11  0.70 - 3.10 10*3/mm3 Final    Monocytes, Absolute 04/29/2024 0.53  0.10 - 0.90 10*3/mm3 Final    Eosinophils, Absolute 04/29/2024 0.18  0.00 - 0.40 10*3/mm3 Final    Basophils, Absolute 04/29/2024 0.04  0.00 - 0.20 10*3/mm3 Final    Immature Grans, Absolute 04/29/2024 0.03  0.00 - 0.05 10*3/mm3 Final    nRBC 04/29/2024 0.0  0.0 - 0.2 /100 WBC Final   Admission on 03/01/2024, Discharged on 03/02/2024   Component Date Value Ref Range Status    QT Interval 03/01/2024 331  ms Final    QTC Interval 03/01/2024 459  ms Final    Color, UA 03/01/2024 Dark Yellow (A)  Yellow, Straw Final    Appearance, UA 03/01/2024 Clear  Clear Final    pH, UA 03/01/2024 5.5  5.0 - 8.0 Final    Specific Gravity, UA 03/01/2024 1.020  1.005 - 1.030 Final    Glucose, UA 03/01/2024 500 mg/dL (2+) (A)  Negative Final    Ketones, UA 03/01/2024 40 mg/dL (2+) (A)  Negative Final    Bilirubin, UA 03/01/2024 Negative  Negative Final    Blood, UA 03/01/2024 Trace (A)  Negative Final    Protein, UA 03/01/2024 100 mg/dL (2+) (A)  Negative Final    Leuk Esterase, UA 03/01/2024 Negative  Negative Final    Nitrite, UA 03/01/2024 Negative  Negative Final    Urobilinogen, UA 03/01/2024 1.0 E.U./dL  0.2 - 1.0 E.U./dL Final    Glucose 03/01/2024 251 (H)  65 - 99 mg/dL Final    BUN 03/01/2024 10  6 - 20 mg/dL Final    Creatinine 03/01/2024 1.07  0.76 - 1.27 mg/dL Final    Sodium 03/01/2024 134 (L)  136 - 145 mmol/L Final    Potassium 03/01/2024 3.8  3.5 - 5.2 mmol/L Final    Slight hemolysis detected by analyzer. Result may be falsely elevated.    Chloride 03/01/2024 90 (L)  98 - 107 mmol/L Final    CO2 03/01/2024 17.5 (L)  22.0 - 29.0 mmol/L Final    Calcium 03/01/2024 9.3  8.6 - 10.5 mg/dL Final    Total Protein 03/01/2024 8.0  6.0 - 8.5 g/dL Final     Albumin 03/01/2024 4.7  3.5 - 5.2 g/dL Final    ALT (SGPT) 03/01/2024 36  1 - 41 U/L Final    AST (SGOT) 03/01/2024 54 (H)  1 - 40 U/L Final    Alkaline Phosphatase 03/01/2024 78  39 - 117 U/L Final    Total Bilirubin 03/01/2024 1.0  0.0 - 1.2 mg/dL Final    Globulin 03/01/2024 3.3  gm/dL Final    A/G Ratio 03/01/2024 1.4  g/dL Final    BUN/Creatinine Ratio 03/01/2024 9.3  7.0 - 25.0 Final    Anion Gap 03/01/2024 26.5 (H)  5.0 - 15.0 mmol/L Final    eGFR 03/01/2024 84.5  >60.0 mL/min/1.73 Final    HS Troponin T 03/01/2024 17  <22 ng/L Final    QT Interval 03/01/2024 333  ms Final    QTC Interval 03/01/2024 465  ms Final    HS Troponin T 03/01/2024 17  <22 ng/L Final    Lipase 03/01/2024 7 (L)  13 - 60 U/L Final    proBNP 03/01/2024 43.4  0.0 - 900.0 pg/mL Final    Magnesium 03/01/2024 1.6  1.6 - 2.6 mg/dL Final    Extra Tube 03/01/2024 Hold for add-ons.   Final    Auto resulted.    Extra Tube 03/01/2024 hold for add-on   Final    Auto resulted    Extra Tube 03/01/2024 Hold for add-ons.   Final    Auto resulted.    Extra Tube 03/01/2024 Hold for add-ons.   Final    Auto resulted    WBC 03/01/2024 6.25  3.40 - 10.80 10*3/mm3 Final    RBC 03/01/2024 5.19  4.14 - 5.80 10*6/mm3 Final    Hemoglobin 03/01/2024 15.6  13.0 - 17.7 g/dL Final    Hematocrit 03/01/2024 44.0  37.5 - 51.0 % Final    MCV 03/01/2024 84.8  79.0 - 97.0 fL Final    MCH 03/01/2024 30.1  26.6 - 33.0 pg Final    MCHC 03/01/2024 35.5  31.5 - 35.7 g/dL Final    RDW 03/01/2024 12.3  12.3 - 15.4 % Final    RDW-SD 03/01/2024 37.7  37.0 - 54.0 fl Final    MPV 03/01/2024 8.5  6.0 - 12.0 fL Final    Platelets 03/01/2024 375  140 - 450 10*3/mm3 Final    Neutrophil % 03/01/2024 65.9  42.7 - 76.0 % Final    Lymphocyte % 03/01/2024 25.1  19.6 - 45.3 % Final    Monocyte % 03/01/2024 7.7  5.0 - 12.0 % Final    Eosinophil % 03/01/2024 0.5  0.3 - 6.2 % Final    Basophil % 03/01/2024 0.6  0.0 - 1.5 % Final    Immature Grans % 03/01/2024 0.2  0.0 - 0.5 % Final     Neutrophils, Absolute 03/01/2024 4.12  1.70 - 7.00 10*3/mm3 Final    Lymphocytes, Absolute 03/01/2024 1.57  0.70 - 3.10 10*3/mm3 Final    Monocytes, Absolute 03/01/2024 0.48  0.10 - 0.90 10*3/mm3 Final    Eosinophils, Absolute 03/01/2024 0.03  0.00 - 0.40 10*3/mm3 Final    Basophils, Absolute 03/01/2024 0.04  0.00 - 0.20 10*3/mm3 Final    Immature Grans, Absolute 03/01/2024 0.01  0.00 - 0.05 10*3/mm3 Final    nRBC 03/01/2024 0.0  0.0 - 0.2 /100 WBC Final    COVID19 03/01/2024 Not Detected  Not Detected - Ref. Range Final    Influenza A PCR 03/01/2024 Not Detected  Not Detected Final    Influenza B PCR 03/01/2024 Not Detected  Not Detected Final    RSV, PCR 03/01/2024 Not Detected  Not Detected Final    HS Troponin T 03/01/2024 15  <22 ng/L Final    Troponin T Delta 03/01/2024 -2  >=-4 - <+4 ng/L Final    TSH 03/01/2024 1.980  0.270 - 4.200 uIU/mL Final    Free T4 03/01/2024 1.30  0.93 - 1.70 ng/dL Final    Creatine Kinase 03/01/2024 279 (H)  20 - 200 U/L Final    Ethanol 03/01/2024 230 (H)  0 - 10 mg/dL Final    Ethanol % 03/01/2024 0.230  % Final    Amphet/Methamphet, Screen 03/01/2024 Negative  Negative Final    Barbiturates Screen, Urine 03/01/2024 Negative  Negative Final    Benzodiazepine Screen, Urine 03/01/2024 Negative  Negative Final    Cocaine Screen, Urine 03/01/2024 Negative  Negative Final    Opiate Screen 03/01/2024 Negative  Negative Final    THC, Screen, Urine 03/01/2024 Negative  Negative Final    Methadone Screen, Urine 03/01/2024 Negative  Negative Final    Oxycodone Screen, Urine 03/01/2024 Negative  Negative Final    Fentanyl, Urine 03/01/2024 Negative  Negative Final    pH, Venous 03/01/2024 7.368  7.310 - 7.410 pH Units Final    pCO2, Venous 03/01/2024 37.8 (L)  41.0 - 51.0 mm Hg Final    pO2, Venous 03/01/2024 80.3 (H)  35.0 - 42.0 mm Hg Final    HCO3, Venous 03/01/2024 21.3 (L)  22.0 - 26.0 mmol/L Final    Base Excess, Venous 03/01/2024 -3.6 (L)  -2.0 - 2.0 mmol/L Final    O2 Saturation,  Venous 03/01/2024 94.4 (H)  45.0 - 75.0 % Final    Hemoglobin, Blood Gas 03/01/2024 14.2  13.8 - 16.4 g/dL Final    Carboxyhemoglobin 03/01/2024 1.2  0 - 1.5 % Final    Methemoglobin 03/01/2024 0.40  0.00 - 1.50 % Final    Oxyhemoglobin 03/01/2024 92.9 (L)  94 - 99 % Final    FHHB 03/01/2024 5.5 (H)  0.0 - 5.0 % Final    Note 03/01/2024 VBG w/lytes   Final    Site 03/01/2024 Drawn by RN   Final    Modality 03/01/2024 Room Air   Final    FIO2 03/01/2024 21  % Final    Sodium, Venous 03/01/2024 135.3 (L)  136 - 146 mmol/L Final    Potassium, Venous 03/01/2024 3.9  3.5 - 5.0 mmol/L Final    Ionized Calcium, Arterial 03/01/2024 1.08 (L)  1.13 - 1.32 mmol/L Final    Chloride, Venous  03/01/2024 99  98 - 106 mmol/L Final    Glucose, Arterial 03/01/2024 146 (H)  70 - 99 mg/dL Final    Lactate, whole blood 03/01/2024 3.23 (H)  0.5 - 2 mmol/L Final    RBC, UA 03/01/2024 0-2  None Seen, 0-2 /HPF Final    WBC, UA 03/01/2024 0-2  None Seen, 0-2 /HPF Final    Bacteria, UA 03/01/2024 None Seen  None Seen /HPF Final    Squamous Epithelial Cells, UA 03/01/2024 0-2  None Seen, 0-2 /HPF Final    Hyaline Casts, UA 03/01/2024 3-6  None Seen /LPF Final    Methodology 03/01/2024 Automated Microscopy   Final    TSH 03/01/2024 1.900  0.270 - 4.200 uIU/mL Final    Magnesium 03/02/2024 1.5 (L)  1.6 - 2.6 mg/dL Final    Glucose 03/02/2024 175 (H)  65 - 99 mg/dL Final    BUN 03/02/2024 8  6 - 20 mg/dL Final    Creatinine 03/02/2024 0.87  0.76 - 1.27 mg/dL Final    Sodium 03/02/2024 134 (L)  136 - 145 mmol/L Final    Potassium 03/02/2024 3.6  3.5 - 5.2 mmol/L Final    Chloride 03/02/2024 96 (L)  98 - 107 mmol/L Final    CO2 03/02/2024 20.3 (L)  22.0 - 29.0 mmol/L Final    Calcium 03/02/2024 8.6  8.6 - 10.5 mg/dL Final    BUN/Creatinine Ratio 03/02/2024 9.2  7.0 - 25.0 Final    Anion Gap 03/02/2024 17.7 (H)  5.0 - 15.0 mmol/L Final    eGFR 03/02/2024 105.1  >60.0 mL/min/1.73 Final    Glucose 03/02/2024 127 (H)  65 - 99 mg/dL Final    BUN  03/02/2024 8  6 - 20 mg/dL Final    Creatinine 03/02/2024 1.00  0.76 - 1.27 mg/dL Final    Sodium 03/02/2024 135 (L)  136 - 145 mmol/L Final    Potassium 03/02/2024 3.7  3.5 - 5.2 mmol/L Final    Chloride 03/02/2024 96 (L)  98 - 107 mmol/L Final    CO2 03/02/2024 20.4 (L)  22.0 - 29.0 mmol/L Final    Calcium 03/02/2024 8.6  8.6 - 10.5 mg/dL Final    BUN/Creatinine Ratio 03/02/2024 8.0  7.0 - 25.0 Final    Anion Gap 03/02/2024 18.6 (H)  5.0 - 15.0 mmol/L Final    eGFR 03/02/2024 91.7  >60.0 mL/min/1.73 Final    Glucose 03/01/2024 198 (H)  70 - 99 mg/dL Final    Serial Number: [id number]:  [id number]    Glucose 03/02/2024 231 (H)  65 - 99 mg/dL Final    BUN 03/02/2024 8  6 - 20 mg/dL Final    Creatinine 03/02/2024 1.10  0.76 - 1.27 mg/dL Final    Sodium 03/02/2024 130 (L)  136 - 145 mmol/L Final    Potassium 03/02/2024 3.9  3.5 - 5.2 mmol/L Final    Chloride 03/02/2024 94 (L)  98 - 107 mmol/L Final    CO2 03/02/2024 19.9 (L)  22.0 - 29.0 mmol/L Final    Calcium 03/02/2024 8.7  8.6 - 10.5 mg/dL Final    BUN/Creatinine Ratio 03/02/2024 7.3  7.0 - 25.0 Final    Anion Gap 03/02/2024 16.1 (H)  5.0 - 15.0 mmol/L Final    eGFR 03/02/2024 81.8  >60.0 mL/min/1.73 Final    Potassium 03/02/2024 3.7  3.5 - 5.2 mmol/L Final    Glucose 03/02/2024 232 (H)  70 - 99 mg/dL Final    Serial Number: [id number]:  [id number]    Glucose 03/02/2024 145 (H)  70 - 99 mg/dL Final    Serial Number: [id number]:  [id number]       ASSESSMENT AND PLAN:  Clonidine 0.1 mg po BID  2. Acamprosate 333 mg po 2 tabs TID  Wayne is a 49 y.o. male who presents today for follow-up regarding medication. We have discussed the interval history and the treatment plan below, including potential R/B/SE of the recommended regimen of which the patient demonstrates understanding. Patient is agreeable to call 911 or go to the nearest ER should he become concerned for his own safety and/or the safety of those around him. There are are no overt indices of  acute kristen/psychosis on exam today. MU reviewed and is as expected.    Medication regimen: Patient to start acamprosate once available from [facility] patient states this has been ordered he is to take 2 tablets 3 times daily continue clonidine 0.1 mg po  patient is advised not to misuse prescribed medications or to use them with any exogenous substances that aren't disclosed to this provider as they may interact with the regimen to the patient's detriment.   Risk Assessment: protracted risk is moderate, imminent risk is moderate do note that this is subject to change with the Jehovah's witness of new stressors, treatment non-adherence, use of substances, and/or new medical ails.   Monitoring: reviewed labs/imaging as populated above; ordered  Therapy: Glenn elizabeth on Friday  Follow-up: one month  Communications: Patient to fax [place] paper to our office    TREATMENT PLAN/GOALS: challenge patterns of living conducive to symptom burden, implement recommended regimen as above with augmentative, intermittent supportive psychotherapy to reduce symptom burden. Patient acknowledged and verbally consented to continue treatment. The importance of adherence to the recommended treatment and interval follow-up appointments was again emphasized today: patient has good treatment adherence per given history. Patient was today reminded to limit daily caffeine intake, hydrate appropriately, eat healthy and nutritious foods, engage sleep hygiene measures, engage appropriate exposure to sunlight, engage with hobbies in balance with life necessities, and exercise appropriate to their capacity to do so.     Parts of this note are electronic transcriptions/translations of spoken language to printed text using the Dragon Dictation system.    Electronically signed by DELL Leslie, 08/28/24

## 2024-08-29 ENCOUNTER — OFFICE VISIT (OUTPATIENT)
Dept: CARDIOLOGY | Facility: CLINIC | Age: 49
End: 2024-08-29
Payer: COMMERCIAL

## 2024-08-29 VITALS
SYSTOLIC BLOOD PRESSURE: 113 MMHG | BODY MASS INDEX: 39.57 KG/M2 | HEIGHT: 73 IN | HEART RATE: 73 BPM | DIASTOLIC BLOOD PRESSURE: 77 MMHG | WEIGHT: 298.6 LBS

## 2024-08-29 DIAGNOSIS — I10 ESSENTIAL HYPERTENSION: ICD-10-CM

## 2024-08-29 DIAGNOSIS — I47.10 SVT (SUPRAVENTRICULAR TACHYCARDIA): Primary | ICD-10-CM

## 2024-08-29 PROCEDURE — 99213 OFFICE O/P EST LOW 20 MIN: CPT

## 2024-08-29 NOTE — PROGRESS NOTES
Chief Complaint  Hypertension, Hyperlipidemia, and Follow-up (1 year f/u. )    Subjective        History of Present Illness  Wayne Guan presents to [facility] CARDIOLOGY for follow up.   Patient is a 49-year-old male with past medical history outlined below, significant for paroxysmal SVT, hypertension who presents for routine annual follow-up.  He is doing very well from a cardiac standpoint.  He denies any palpitations.  He has no chest pain, shortness of breath, edema, syncope.    Past Medical History:   Diagnosis Date    Alcoholism 01/16/2021    Allergies     Aneurysm     Anxiety 07/15/2022    Atrial fibrillation     Bipolar 2 disorder 07/20/2021    Cancer     Cervical stenosis of spinal canal 11/30/2018    Formatting of this note might be different from the original. Added automatically from request for surgery [id number]    Chronic pain disorder 2006    Neck/Shoulder    Colon polyp 11/1/2003 Benign    Depression     Diabetes mellitus     GERD (gastroesophageal reflux disease)     History of colonic polyps 05/23/2017    Formatting of this note might be different from the original. Added automatically from request for surgery [id number]    Hyperlipidemia     Hypertension     Irritable bowel syndrome 2004    Lumbosacral spondylosis without myelopathy 08/29/2018    Pancreatitis 2008    Papillary thyroid carcinoma 04/01/2020    SVT (supraventricular tachycardia) 01/23/2022    Withdrawal symptoms, alcohol        ALLERGY  Allergies   Allergen Reactions    Jardiance [Empagliflozin] Palpitations        Past Surgical History:   Procedure Laterality Date    ABLATION OF DYSRHYTHMIC FOCUS  9/9/2022    APPENDECTOMY  1983    CARDIAC ABLATION  09/09/2022    CARDIAC ELECTROPHYSIOLOGY PROCEDURE N/A 09/09/2022    Procedure: Ablation SVT HOLD Metoprolol 5 days;  Surgeon: Tai Lisa MD;  Location: [facility] INVASIVE LOCATION;  Service: Cardiovascular;  Laterality: N/A;    CERVICAL DISC SURGERY      COLONOSCOPY        [facility]    THYROID SURGERY          Social History     Socioeconomic History    Marital status: Single   Tobacco Use    Smoking status: Former     Current packs/day: 0.00     Average packs/day: 1 pack/day for 11.0 years (11.0 ttl pk-yrs)     Types: Cigarettes     Start date: 1997     Quit date: 2008     Years since quittin.1    Smokeless tobacco: Never   Vaping Use    Vaping status: Never Used   Substance and Sexual Activity    Alcohol use: Not Currently     Alcohol/week: 10.0 standard drinks of alcohol     Comment: Two 1.75mL in 5 days    Drug use: Never    Sexual activity: Yes     Partners: Female     Birth control/protection: Hysterectomy       Family History   Problem Relation Age of Onset    Arthritis Mother         rheumatoid    Diabetes Mother     Hyperlipidemia Mother     Hypertension Mother     Osteoporosis Mother     Rashes / Skin problems Mother     Anemia Mother     Arrhythmia Mother     Heart attack Father     Hyperlipidemia Father     Hypertension Father     Diabetes Father     Alcohol abuse Father     Stroke Father     Hypertension Sister     ADD / ADHD Sister     Heart disease Maternal Uncle     Hypertension Maternal Uncle     Mental illness Paternal Aunt     Heart disease Paternal Aunt     Diabetes Paternal Aunt     Heart attack Paternal Aunt     COPD Paternal Uncle     Stroke Maternal Grandmother     Thyroid disease Maternal Grandmother     COPD Maternal Grandmother     Hypertension Maternal Grandmother     Heart disease Maternal Grandmother     Hypertension Maternal Grandfather     Diabetes Maternal Grandfather     Cancer Maternal Grandfather         throat    Heart disease Maternal Grandfather     Heart disease Paternal Grandmother     Hypertension Paternal Grandmother     Heart attack Paternal Grandmother     Colon cancer Paternal Grandfather     Cancer Paternal Grandfather         colon        Current Outpatient Medications on File Prior to Visit   Medication Sig     acamprosate (CAMPRAL) 333 MG EC tablet Take 2 tablets by mouth 3 (Three) Times a Day.    Accu-Chek Guide test strip 1 each by Other route 3 (Three) Times a Day.    ascorbic acid (VITAMIN C) 500 MG tablet Take 1 tablet by mouth Daily.    Blood Glucose Monitoring Suppl (Accu-Chek Guide Me) w/Device kit USE TO TEST BLOOD SUGAR EVERY DAY    chlordiazePOXIDE (LIBRIUM) 10 MG capsule TAKE 1 CAPSULE BY MOUTH 3 TIMES DAILY AS NEEDED FOR ANXIETY FOR UP TO 3 DAYS    cloNIDine (Catapres) 0.1 MG tablet Take 1 tablet by mouth 2 (Two) Times a Day for 60 days.    Continuous Glucose Transmitter (Dexcom G6 Transmitter) misc     dilTIAZem (CARDIZEM) 30 MG tablet Take  by mouth Every 8 (Eight) Hours.    escitalopram (Lexapro) 20 MG tablet Take 1 tablet by mouth Daily.    fenofibrate (TRICOR) 145 MG tablet Take 1 tablet by mouth Daily.    folic acid (FOLVITE) 1 MG tablet TAKE 1 TABLET BY MOUTH DAILY    glycopyrrolate (ROBINUL) 2 MG tablet Take 1 tablet by mouth Daily.    HumaLOG KwikPen 100 UNIT/ML solution pen-injector Inject 0-40 Units under the skin into the appropriate area as directed 3 (Three) Times a Day As Needed (Based on sliding scale).    Insulin Glargine, 2 Unit Dial, (Toujeo Max SoloStar) 300 UNIT/ML solution pen-injector injection Inject 50 Units under the skin into the appropriate area as directed 2 (Two) Times a Day.    levocetirizine (XYZAL) 5 MG tablet Take 1 tablet by mouth Daily As Needed for Allergies. (Patient taking differently: Take 1 tablet by mouth Daily.)    levothyroxine sodium (Tirosint) 125 MCG capsule capsule Take 2 capsules by mouth Daily (Monday-Friday).    metoprolol succinate XL (TOPROL-XL) 50 MG 24 hr tablet TAKE 1 TABLET BY MOUTH DAILY    montelukast (SINGULAIR) 10 MG tablet Take 1 tablet by mouth Every Night.    omeprazole (priLOSEC) 20 MG capsule Take 1 capsule by mouth Daily.    ondansetron (ZOFRAN) 4 MG tablet TAKE 1 TABLET (4MG) BY MOUTH EVERY 8 HOURS AS NEEDED FOR NAUSEA-VOMITING FOR UP TO 3  "DAYS    thiamine (VITAMIN B-1) 100 MG tablet  tablet Take 1 tablet by mouth Daily.    Vitamin D, Cholecalciferol, (CHOLECALCIFEROL) 10 MCG (400 UNIT) tablet Take 1 tablet by mouth Daily.    Zinc 50 MG tablet Take 1 tablet by mouth Daily.     No current facility-administered medications on file prior to visit.       Objective   Vitals:    08/29/24 1429   BP: 113/77   Pulse: 73   Weight: 135 kg (298 lb 9.6 oz)   Height: 185.4 cm (73\")       Physical Exam  Constitutional:       General: He is awake. He is not in acute distress.     Appearance: Normal appearance.   HENT:      Head: Normocephalic.      Nose: Nose normal. No congestion.   Eyes:      Extraocular Movements: Extraocular movements intact.      Conjunctiva/sclera: Conjunctivae normal.      Pupils: Pupils are equal, round, and reactive to light.   Neck:      Thyroid: No thyromegaly.      Vascular: No JVD.   Cardiovascular:      Rate and Rhythm: Normal rate and regular rhythm.      Chest Wall: PMI is not displaced.      Pulses: Normal pulses.      Heart sounds: Normal heart sounds, S1 normal and S2 normal. No murmur heard.     No friction rub. No gallop. No S3 or S4 sounds.   Pulmonary:      Effort: Pulmonary effort is normal.      Breath sounds: Normal breath sounds. No wheezing, rhonchi or rales.   Abdominal:      General: Bowel sounds are normal.      Palpations: Abdomen is soft.      Tenderness: There is no abdominal tenderness.   Musculoskeletal:      Cervical back: No tenderness.      Right lower leg: No edema.      Left lower leg: No edema.   Lymphadenopathy:      Cervical: No cervical adenopathy.   Skin:     General: Skin is warm and dry.      Capillary Refill: Capillary refill takes less than 2 seconds.      Coloration: Skin is not cyanotic.      Findings: No petechiae or rash.      Nails: There is no clubbing.   Neurological:      Mental Status: He is alert.   Psychiatric:         Mood and Affect: Mood normal.         Behavior: Behavior is cooperative. "           Result Review     The following data was reviewed by DELL Clarke on 08/29/24.      CMP          7/20/2024    03:58 7/21/2024    04:36 7/22/2024    04:56   CMP   Glucose 262  165  220    BUN 13  7  8    Creatinine 1.20  1.00  0.95    EGFR 73.7  91.7  97.5    Sodium 135  135  133    Potassium 4.6  3.7  3.9    Chloride 96  99  97    Calcium 8.5  8.9  8.8    Albumin   3.8    BUN/Creatinine Ratio 10.8  7.0  8.4    Anion Gap 24.1  10.5  11.2      CBC w/diff          7/20/2024    03:58 7/21/2024    04:36 7/22/2024    04:56   CBC w/Diff   WBC 12.91  5.59  5.29    RBC 4.73  4.37  4.37    Hemoglobin 14.3  13.1  13.1    Hematocrit 41.5  38.4  37.8    MCV 87.7  87.9  86.5    MCH 30.2  30.0  30.0    MCHC 34.5  34.1  34.7    RDW 13.0  12.4  12.1    Platelets 434  288  299    Neutrophil Rel % 87.1  63.3  65.1    Immature Granulocyte Rel % 0.2  0.2  0.2    Lymphocyte Rel % 8.4  26.8  23.4    Monocyte Rel % 4.1  8.1  6.2    Eosinophil Rel % 0.0  0.9  4.5    Basophil Rel % 0.2  0.7  0.6       Lipid Panel          10/26/2023    08:53 4/29/2024    09:29   Lipid Panel   Total Cholesterol 203  184    Triglycerides 217  175    HDL Cholesterol 54  41    VLDL Cholesterol 37  31    LDL Cholesterol  112  112    LDL/HDL Ratio 1.96  2.63        Results for orders placed during the hospital encounter of 02/10/23    Adult Transthoracic Echo Complete w/ Color, Spectral and Contrast if necessary per protocol    Interpretation Summary    Left ventricular systolic function is normal. Left ventricular ejection fraction appears to be 56 - 60%.    Left ventricular diastolic function is consistent with (grade I) impaired relaxation.    There is borderline dilation of aortic root measuring 3.7 cm in diameter.    There are no significant valvular abnormalities.    Estimated right ventricular systolic pressure from tricuspid regurgitation is normal (<35 mmHg).    Results for orders placed during the hospital encounter of  01/23/22    Stress Test With Myocardial Perfusion One Day    Interpretation Summary  Patient received Lexiscan 0.4 mg IV infusion over 10 seconds.  At peak stress, no ischemic ST-T changes were noted.  Isolated PVCs were noted.  No sustained arrhythmias.  Diaphragmatic attenuation artifact was present.  Gated SPECT images were reviewed.  There is a small area of mild perfusion defect in the inferoapical segment which improves on stress images more likely related to attenuation artifact.  No reversible myocardial ischemia was noted.  The left ventricle was normal in size with a calculated ejection fraction of 57%.  Overall this represents a low risk myocardial perfusion study.    No results found for this or any previous visit.          Procedures    Assessment & Plan  Diagnoses and all orders for this visit:    1. SVT (supraventricular tachycardia) (Primary)    2. Essential hypertension            1.  Status post ablation.  No recurrence of symptoms.  Continue metoprolol.  2.  Well-controlled.  Continue current regimen.      The medical services provided during this encounter are part of ongoing care related to this patient's single serious condition or complex condition.      Follow Up   Return in about 1 year (around 8/29/2025) for With Dr. Solares.    Patient was given instructions and counseling regarding his condition or for health maintenance advice. Please see specific information pulled into the AVS if appropriate.     Crissy Light, DELL  08/29/24  14:37 EDT    Dictated Utilizing Dragon Dictation

## 2024-09-10 ENCOUNTER — TELEPHONE (OUTPATIENT)
Dept: PSYCHIATRY | Facility: CLINIC | Age: 49
End: 2024-09-10
Payer: COMMERCIAL

## 2024-09-19 ENCOUNTER — TELEPHONE (OUTPATIENT)
Dept: PSYCHIATRY | Facility: CLINIC | Age: 49
End: 2024-09-19
Payer: COMMERCIAL

## 2024-09-23 ENCOUNTER — TELEPHONE (OUTPATIENT)
Dept: PSYCHIATRY | Facility: CLINIC | Age: 49
End: 2024-09-23
Payer: COMMERCIAL

## 2024-09-23 DIAGNOSIS — F43.10 POST TRAUMATIC STRESS DISORDER (PTSD): ICD-10-CM

## 2024-09-23 RX ORDER — CLONIDINE HYDROCHLORIDE 0.1 MG/1
0.1 TABLET ORAL 2 TIMES DAILY
Qty: 180 TABLET | Refills: 0 | Status: SHIPPED | OUTPATIENT
Start: 2024-09-23 | End: 2024-12-22

## 2024-09-25 ENCOUNTER — OFFICE VISIT (OUTPATIENT)
Dept: PSYCHIATRY | Facility: CLINIC | Age: 49
End: 2024-09-25
Payer: COMMERCIAL

## 2024-09-25 VITALS
SYSTOLIC BLOOD PRESSURE: 136 MMHG | HEIGHT: 73 IN | HEART RATE: 84 BPM | DIASTOLIC BLOOD PRESSURE: 92 MMHG | WEIGHT: 288.8 LBS | BODY MASS INDEX: 38.28 KG/M2

## 2024-09-25 DIAGNOSIS — F43.10 POST TRAUMATIC STRESS DISORDER (PTSD): Primary | ICD-10-CM

## 2024-09-25 DIAGNOSIS — F41.1 GAD (GENERALIZED ANXIETY DISORDER): ICD-10-CM

## 2024-09-25 DIAGNOSIS — F10.21 ALCOHOL USE DISORDER, SEVERE, IN EARLY REMISSION: ICD-10-CM

## 2024-09-27 ENCOUNTER — TELEPHONE (OUTPATIENT)
Dept: PSYCHIATRY | Facility: CLINIC | Age: 49
End: 2024-09-27
Payer: COMMERCIAL

## 2024-09-27 NOTE — TELEPHONE ENCOUNTER
Patient called in looking for a return to work letter from provider, did not know it was needed and plans to return to work on Monday 9/30    Please advise

## 2024-11-13 NOTE — PROGRESS NOTES
Chief Complaint  Annual Exam (The patient is coming in for an annual physical, labs done. )    Subjective      History of Present Illness  The patient is a 49-year-old male who presents for a follow-up visit.    He is due for a wellness exam and is considering receiving the influenza vaccine. He is uncertain about his previous shingles vaccination status. His last visit was in 08/2024 following a hospital discharge.    He is under the care of a cardiologist for hypertension and hyperlipidemia, and an endocrinologist for thyroid and diabetes management. His cholesterol levels have been checked, and he has not experienced any weight changes.    His anxiety has improved, and he has been abstinent from alcohol for 116 days. He is currently seeing a counselor weekly.    He reports no abdominal pain but experiences occasional constipation. He is on montelukast 10 mg daily for allergies, which is effective. He also takes over-the-counter Xyzal daily, which has replaced Claritin-D. He continues to take folic acid and over-the-counter Prilosec daily for reflux.       Past Medical History:   Diagnosis Date    Alcoholism 01/16/2021    Allergies     Aneurysm     Anxiety 07/15/2022    Atrial fibrillation     Bipolar 2 disorder 07/20/2021    Cancer     Cervical stenosis of spinal canal 11/30/2018    Formatting of this note might be different from the original. Added automatically from request for surgery [id number]    Chronic pain disorder 2006    Neck/Shoulder    Colon polyp 11/1/2003 Benign    Depression     Diabetes mellitus     GERD (gastroesophageal reflux disease)     History of colonic polyps 05/23/2017    Formatting of this note might be different from the original. Added automatically from request for surgery [id number]    Hyperlipidemia     Hypertension     Irritable bowel syndrome 2004    Lumbosacral spondylosis without myelopathy 08/29/2018    Pancreatitis 2008    Papillary thyroid carcinoma 04/01/2020    PTSD (post-traumatic  stress disorder)     SVT (supraventricular tachycardia) 2022    Withdrawal symptoms, alcohol         Past Surgical History:   Procedure Laterality Date    ABLATION OF DYSRHYTHMIC FOCUS  2022    APPENDECTOMY  1983    CARDIAC ABLATION  2022    CARDIAC ELECTROPHYSIOLOGY PROCEDURE N/A 2022    Procedure: Ablation SVT HOLD Metoprolol 5 days;  Surgeon: Tai Lisa MD;  Location: [facility] INVASIVE LOCATION;  Service: Cardiovascular;  Laterality: N/A;    CERVICAL DISC SURGERY      COLONOSCOPY       [facility]'s    THYROID SURGERY          Social History     Tobacco Use   Smoking Status Former    Current packs/day: 0.00    Average packs/day: 1 pack/day for 11.0 years (11.0 ttl pk-yrs)    Types: Cigarettes    Start date: 1997    Quit date: 2008    Years since quittin.3   Smokeless Tobacco Never        Patient Care Team:  Lyla Guerrero APRN as PCP - General (Nurse Practitioner)  Kassandra Wilson APRN as Nurse Practitioner (Behavioral Health)  Carter Solares MD as Consulting Physician (Cardiology)  Agus Boone MD as Consulting Physician (Endocrinology)    Allergies   Allergen Reactions    Jardiance [Empagliflozin] Palpitations          Current Outpatient Medications:     Accu-Chek Guide test strip, 1 each by Other route 3 (Three) Times a Day., Disp: , Rfl:     ascorbic acid (VITAMIN C) 500 MG tablet, Take 1 tablet by mouth Daily., Disp: , Rfl:     cloNIDine (Catapres) 0.1 MG tablet, Take 1 tablet by mouth 2 (Two) Times a Day for 90 days., Disp: 180 tablet, Rfl: 0    Continuous Glucose Sensor (Dexcom G7 Sensor) misc, Use 1 each Every 10 (Ten) Days., Disp: , Rfl:     Continuous Glucose Transmitter (Dexcom G6 Transmitter) misc, , Disp: , Rfl:     dilTIAZem CD (CARDIZEM CD) 180 MG 24 hr capsule, Take 1 capsule by mouth Daily., Disp: , Rfl:     escitalopram (Lexapro) 20 MG tablet, Take 1 tablet by mouth Daily., Disp: 90 tablet, Rfl: 3    fenofibrate (TRICOR) 145 MG  "tablet, Take 1 tablet by mouth Daily., Disp: 90 tablet, Rfl: 3    glycopyrrolate (ROBINUL) 2 MG tablet, Take 1 tablet by mouth Daily., Disp: 90 tablet, Rfl: 3    HumaLOG KwikPen 100 UNIT/ML solution pen-injector, Inject 0-40 Units under the skin into the appropriate area as directed 3 (Three) Times a Day As Needed (Based on sliding scale)., Disp: , Rfl:     Insulin Glargine, 2 Unit Dial, (Toujeo Max SoloStar) 300 UNIT/ML solution pen-injector injection, Inject 50 Units under the skin into the appropriate area as directed 2 (Two) Times a Day., Disp: , Rfl:     levocetirizine (XYZAL) 5 MG tablet, Take 1 tablet by mouth Daily As Needed for Allergies. (Patient taking differently: Take 1 tablet by mouth Daily.), Disp: 90 tablet, Rfl: 3    levothyroxine sodium (Tirosint) 125 MCG capsule capsule, Take 2 capsules by mouth Daily (Monday-Friday)., Disp: , Rfl:     metoprolol succinate XL (TOPROL-XL) 50 MG 24 hr tablet, TAKE 1 TABLET BY MOUTH DAILY, Disp: 90 tablet, Rfl: 3    montelukast (SINGULAIR) 10 MG tablet, Take 1 tablet by mouth Every Night., Disp: 90 tablet, Rfl: 3    omeprazole (priLOSEC) 20 MG capsule, Take 1 capsule by mouth Daily., Disp: , Rfl:     thiamine (VITAMIN B-1) 100 MG tablet  tablet, Take 1 tablet by mouth Daily., Disp: , Rfl:     Zinc 50 MG tablet, Take 1 tablet by mouth Daily., Disp: , Rfl:     Objective     Vitals:    11/19/24 1347   BP: 118/70   BP Location: Left arm   Patient Position: Sitting   Cuff Size: Large Adult   Pulse: 70   Temp: 97.7 °F (36.5 °C)   TempSrc: Temporal   SpO2: 95%   Weight: 135 kg (297 lb 9.6 oz)   Height: 185.4 cm (73\")        Wt Readings from Last 3 Encounters:   11/19/24 135 kg (297 lb 9.6 oz)   09/25/24 131 kg (288 lb 12.8 oz)   08/29/24 135 kg (298 lb 9.6 oz)        BP Readings from Last 3 Encounters:   11/19/24 118/70   09/25/24 136/92   08/29/24 113/77          Physical Exam  Vitals reviewed.   Constitutional:       General: He is not in acute distress.  HENT:      Head: " 1. The severity of signs/symptoms.(See ED/admit documents) Normocephalic and atraumatic.   Eyes:      Conjunctiva/sclera: Conjunctivae normal.   Cardiovascular:      Rate and Rhythm: Normal rate and regular rhythm.      Heart sounds: Normal heart sounds.   Pulmonary:      Effort: Pulmonary effort is normal.      Breath sounds: Normal breath sounds. No wheezing, rhonchi or rales.   Abdominal:      General: There is no distension.      Palpations: Abdomen is soft. There is no mass.      Tenderness: There is no abdominal tenderness.   Musculoskeletal:      Right lower leg: No edema.      Left lower leg: No edema.   Lymphadenopathy:      Cervical: No cervical adenopathy.   Skin:     General: Skin is warm and dry.   Neurological:      General: No focal deficit present.      Mental Status: He is alert.   Psychiatric:         Mood and Affect: Mood normal.         Thought Content: Thought content normal.                Result Review   The following data was reviewed by: DELL Morrison on 11/19/2024:  [x]  Tests & Results  []  Hospitalization/Emergency Department/Urgent Care  [x]  Internal/External Consultant Notes       Assessment and Plan     Diagnoses and all orders for this visit:    1. Annual physical exam (Primary)    2. Hyperlipidemia, unspecified hyperlipidemia type    3. Essential hypertension  -     Comprehensive Metabolic Panel; Future    4. Type 2 diabetes mellitus with hyperglycemia, unspecified whether long term insulin use  -     Cancel: Hemoglobin A1c; Future    5. Anxiety    6. Depression, unspecified depression type    7. Allergic rhinitis, unspecified seasonality, unspecified trigger    8. Gastroesophageal reflux disease, unspecified whether esophagitis present    9. Vitamin D deficiency    10. Folic acid deficiency  -     Vitamin B12; Future  -     Folate; Future    11. Need for influenza vaccination  -     Fluzone >6mos ([id number])    12. Need for shingles vaccine  -     Shingrix Vaccine    Other orders  -     montelukast (SINGULAIR) 10 MG tablet; Take 1  tablet by mouth Every Night.  Dispense: 90 tablet; Refill: 3  -     fenofibrate (TRICOR) 145 MG tablet; Take 1 tablet by mouth Daily.  Dispense: 90 tablet; Refill: 3         Assessment & Plan  1. Health Maintenance.  Microalbuminuria test results were within normal range. Prostate screening test was negative. Magnesium, folate, and B12 levels were all normal. Liver and kidney function, as well as electrolytes, were all normal. Influenza and Shingrix vaccines will be administered today. He is advised to receive the COVID-19 booster and Tdap vaccine at a pharmacy. Regular exercise was encouraged.    2.  Hyperlipidemia.  Cholesterol levels were satisfactory with an LDL of 80, although HDL was slightly low. Triglycerides were within normal range. He is currently on fenofibrate 145 mg once daily. Refills for fenofibrate were provided.    3. Hypertension.  Blood pressure is well-controlled at 118/70. He is currently on diltiazem 180 mg once daily, metoprolol 50 mg once daily, and clonidine 0.1 mg twice daily. Continuation of current medications is recommended.    4. Diabetes Mellitus.  A1c was 8.60, indicating better control than the previous reading of 9.1. He is advised to continue current diabetes management with endocrinologist Dr. Robbins.    5. Anxiety.  He reports that his anxiety is better. He is currently on clonidine 0.1 mg twice daily for dual purposes of blood pressure and anxiety management. Continuation of current medications is recommended.    6. Depression.  He reports that his depression is better. He is seeing Kassandra Wilson for behavioral health and Glenn Collado for counseling once a week. Continuation of current management is recommended.    7. Allergies.  He is currently on montelukast 10 mg daily and Xyzal over-the-counter daily. Refills for montelukast were provided.    8. Gastroesophageal Reflux Disease (GERD).  He is taking Prilosec (omeprazole) over-the-counter daily. Continuation of daily Prilosec  is recommended.    9. Vitamin D Deficiency.  Vitamin D level was 21. He is advised to continue taking vitamin D supplements.    10. Folic Acid Supplementation.  Folate levels were normal. He is advised to reduce folic acid intake and consider over-the-counter B complex or multivitamin. Lab orders for B12 and folate levels were placed.      Patient was given instructions and counseling regarding his condition or for health maintenance advice. Please see specific information pulled into the AVS if appropriate.     Follow Up   Return in about 6 months (around 5/19/2025) for Recheck.    Patient or patient representative verbalized consent for the use of Ambient Listening during the visit with  DELL Morrison for chart documentation. 11/19/2024  14:11 EST    DELL Morrison

## 2024-11-14 ENCOUNTER — LAB (OUTPATIENT)
Dept: LAB | Facility: HOSPITAL | Age: 49
End: 2024-11-14
Payer: COMMERCIAL

## 2024-11-14 DIAGNOSIS — I10 ESSENTIAL HYPERTENSION: ICD-10-CM

## 2024-11-14 DIAGNOSIS — Z12.5 SCREENING FOR MALIGNANT NEOPLASM OF PROSTATE: ICD-10-CM

## 2024-11-14 DIAGNOSIS — E78.5 HYPERLIPIDEMIA, UNSPECIFIED HYPERLIPIDEMIA TYPE: ICD-10-CM

## 2024-11-14 DIAGNOSIS — E55.9 VITAMIN D DEFICIENCY: ICD-10-CM

## 2024-11-14 DIAGNOSIS — E53.8 FOLIC ACID DEFICIENCY: ICD-10-CM

## 2024-11-14 DIAGNOSIS — E11.65 TYPE 2 DIABETES MELLITUS WITH HYPERGLYCEMIA, UNSPECIFIED WHETHER LONG TERM INSULIN USE: ICD-10-CM

## 2024-11-14 LAB
25(OH)D3 SERPL-MCNC: 52.5 NG/ML (ref 30–100)
ALBUMIN SERPL-MCNC: 4.4 G/DL (ref 3.5–5.2)
ALBUMIN UR-MCNC: <1.2 MG/DL
ALBUMIN/GLOB SERPL: 1.9 G/DL
ALP SERPL-CCNC: 54 U/L (ref 39–117)
ALT SERPL W P-5'-P-CCNC: 28 U/L (ref 1–41)
ANION GAP SERPL CALCULATED.3IONS-SCNC: 11 MMOL/L (ref 5–15)
AST SERPL-CCNC: 22 U/L (ref 1–40)
BILIRUB SERPL-MCNC: 0.4 MG/DL (ref 0–1.2)
BUN SERPL-MCNC: 12 MG/DL (ref 6–20)
BUN/CREAT SERPL: 10 (ref 7–25)
CALCIUM SPEC-SCNC: 9.6 MG/DL (ref 8.6–10.5)
CHLORIDE SERPL-SCNC: 101 MMOL/L (ref 98–107)
CHOLEST SERPL-MCNC: 133 MG/DL (ref 0–200)
CO2 SERPL-SCNC: 25 MMOL/L (ref 22–29)
CREAT SERPL-MCNC: 1.2 MG/DL (ref 0.76–1.27)
CREAT UR-MCNC: 73.9 MG/DL
EGFRCR SERPLBLD CKD-EPI 2021: 73.2 ML/MIN/1.73
FOLATE SERPL-MCNC: 19.9 NG/ML (ref 4.78–24.2)
GLOBULIN UR ELPH-MCNC: 2.3 GM/DL
GLUCOSE SERPL-MCNC: 159 MG/DL (ref 65–99)
HBA1C MFR BLD: 8.6 % (ref 4.8–5.6)
HDLC SERPL-MCNC: 36 MG/DL (ref 40–60)
LDLC SERPL CALC-MCNC: 80 MG/DL (ref 0–100)
LDLC/HDLC SERPL: 2.19 {RATIO}
MAGNESIUM SERPL-MCNC: 1.8 MG/DL (ref 1.6–2.6)
MICROALBUMIN/CREAT UR: NORMAL MG/G{CREAT}
POTASSIUM SERPL-SCNC: 4.3 MMOL/L (ref 3.5–5.2)
PROT SERPL-MCNC: 6.7 G/DL (ref 6–8.5)
PSA SERPL-MCNC: 0.32 NG/ML (ref 0–4)
SODIUM SERPL-SCNC: 137 MMOL/L (ref 136–145)
TRIGL SERPL-MCNC: 91 MG/DL (ref 0–150)
VIT B12 BLD-MCNC: 289 PG/ML (ref 211–946)
VLDLC SERPL-MCNC: 17 MG/DL (ref 5–40)

## 2024-11-14 PROCEDURE — 82043 UR ALBUMIN QUANTITATIVE: CPT

## 2024-11-14 PROCEDURE — G0103 PSA SCREENING: HCPCS

## 2024-11-14 PROCEDURE — 82570 ASSAY OF URINE CREATININE: CPT

## 2024-11-14 PROCEDURE — 36415 COLL VENOUS BLD VENIPUNCTURE: CPT

## 2024-11-14 PROCEDURE — 82607 VITAMIN B-12: CPT

## 2024-11-14 PROCEDURE — 82306 VITAMIN D 25 HYDROXY: CPT

## 2024-11-14 PROCEDURE — 83036 HEMOGLOBIN GLYCOSYLATED A1C: CPT

## 2024-11-14 PROCEDURE — 83735 ASSAY OF MAGNESIUM: CPT

## 2024-11-14 PROCEDURE — 80053 COMPREHEN METABOLIC PANEL: CPT

## 2024-11-14 PROCEDURE — 82746 ASSAY OF FOLIC ACID SERUM: CPT

## 2024-11-14 PROCEDURE — 80061 LIPID PANEL: CPT

## 2024-11-19 ENCOUNTER — OFFICE VISIT (OUTPATIENT)
Dept: INTERNAL MEDICINE | Age: 49
End: 2024-11-19
Payer: COMMERCIAL

## 2024-11-19 VITALS
TEMPERATURE: 97.7 F | WEIGHT: 297.6 LBS | BODY MASS INDEX: 39.44 KG/M2 | SYSTOLIC BLOOD PRESSURE: 118 MMHG | HEIGHT: 73 IN | DIASTOLIC BLOOD PRESSURE: 70 MMHG | HEART RATE: 70 BPM | OXYGEN SATURATION: 95 %

## 2024-11-19 DIAGNOSIS — E53.8 FOLIC ACID DEFICIENCY: ICD-10-CM

## 2024-11-19 DIAGNOSIS — Z23 NEED FOR INFLUENZA VACCINATION: ICD-10-CM

## 2024-11-19 DIAGNOSIS — I10 ESSENTIAL HYPERTENSION: Chronic | ICD-10-CM

## 2024-11-19 DIAGNOSIS — Z23 NEED FOR SHINGLES VACCINE: ICD-10-CM

## 2024-11-19 DIAGNOSIS — J30.9 ALLERGIC RHINITIS, UNSPECIFIED SEASONALITY, UNSPECIFIED TRIGGER: Chronic | ICD-10-CM

## 2024-11-19 DIAGNOSIS — Z00.00 ANNUAL PHYSICAL EXAM: Primary | ICD-10-CM

## 2024-11-19 DIAGNOSIS — E55.9 VITAMIN D DEFICIENCY: ICD-10-CM

## 2024-11-19 DIAGNOSIS — F32.A DEPRESSION, UNSPECIFIED DEPRESSION TYPE: Chronic | ICD-10-CM

## 2024-11-19 DIAGNOSIS — E78.5 HYPERLIPIDEMIA, UNSPECIFIED HYPERLIPIDEMIA TYPE: Chronic | ICD-10-CM

## 2024-11-19 DIAGNOSIS — E11.65 TYPE 2 DIABETES MELLITUS WITH HYPERGLYCEMIA, UNSPECIFIED WHETHER LONG TERM INSULIN USE: Chronic | ICD-10-CM

## 2024-11-19 DIAGNOSIS — F41.9 ANXIETY: Chronic | ICD-10-CM

## 2024-11-19 DIAGNOSIS — K21.9 GASTROESOPHAGEAL REFLUX DISEASE, UNSPECIFIED WHETHER ESOPHAGITIS PRESENT: Chronic | ICD-10-CM

## 2024-11-19 PROCEDURE — 90472 IMMUNIZATION ADMIN EACH ADD: CPT | Performed by: NURSE PRACTITIONER

## 2024-11-19 PROCEDURE — 90471 IMMUNIZATION ADMIN: CPT | Performed by: NURSE PRACTITIONER

## 2024-11-19 PROCEDURE — 90656 IIV3 VACC NO PRSV 0.5 ML IM: CPT | Performed by: NURSE PRACTITIONER

## 2024-11-19 PROCEDURE — 90750 HZV VACC RECOMBINANT IM: CPT | Performed by: NURSE PRACTITIONER

## 2024-11-19 PROCEDURE — 99396 PREV VISIT EST AGE 40-64: CPT | Performed by: NURSE PRACTITIONER

## 2024-11-19 RX ORDER — FENOFIBRATE 145 MG/1
145 TABLET, COATED ORAL DAILY
Qty: 90 TABLET | Refills: 3 | Status: SHIPPED | OUTPATIENT
Start: 2024-11-19

## 2024-11-19 RX ORDER — ACYCLOVIR 400 MG/1
1 TABLET ORAL
COMMUNITY
Start: 2024-10-16

## 2024-11-19 RX ORDER — DILTIAZEM HYDROCHLORIDE 180 MG/1
180 CAPSULE, COATED, EXTENDED RELEASE ORAL DAILY
COMMUNITY
Start: 2024-11-17

## 2024-11-19 RX ORDER — MONTELUKAST SODIUM 10 MG/1
10 TABLET ORAL NIGHTLY
Qty: 90 TABLET | Refills: 3 | Status: SHIPPED | OUTPATIENT
Start: 2024-11-19

## 2024-11-20 ENCOUNTER — OFFICE VISIT (OUTPATIENT)
Dept: PSYCHIATRY | Facility: CLINIC | Age: 49
End: 2024-11-20
Payer: COMMERCIAL

## 2024-11-20 VITALS
HEART RATE: 94 BPM | HEIGHT: 73 IN | DIASTOLIC BLOOD PRESSURE: 93 MMHG | SYSTOLIC BLOOD PRESSURE: 136 MMHG | BODY MASS INDEX: 38.83 KG/M2 | WEIGHT: 293 LBS

## 2024-11-20 DIAGNOSIS — F41.1 GAD (GENERALIZED ANXIETY DISORDER): ICD-10-CM

## 2024-11-20 DIAGNOSIS — F10.21 ALCOHOL USE DISORDER, SEVERE, IN EARLY REMISSION: ICD-10-CM

## 2024-11-20 DIAGNOSIS — F43.10 POST TRAUMATIC STRESS DISORDER (PTSD): Primary | ICD-10-CM

## 2024-11-20 NOTE — PROGRESS NOTES
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  70 - 99 mg/dL Final    Serial Number: [id number]:  [id number]       ASSESSMENT AND PLAN:    ICD-10-CM ICD-9-CM   1. Post traumatic stress disorder (PTSD)  F43.10 309.81   2. Alcohol use disorder, severe, in early remission  F10.21 303.93   3. TERA (generalized anxiety disorder)  F41.1 300.02       Wayne is a 49 y.o. male who presents today for follow-up regarding medication check. We have discussed the interval history and the treatment plan below, including potential R/B/SE of the recommended regimen of which the patient demonstrates understanding. Patient is agreeable to call 911 or go to the nearest ER should he become concerned for his own safety and/or the safety of those around him. There are are no overt indices of acute kristen/psychosis on exam today. MU reviewed and is as expected.    Medication regimen: continue escitalopram 10 mg po q day and continue clonidine 0.1 mg po BID,  patient is advised not to misuse prescribed medications or to use them with any exogenous substances that aren't disclosed to this provider as they may interact with the regimen to the patient's detriment.   Risk Assessment: protracted risk is moderate, imminent risk is low.  Do note that this is subject to change with the Orthodox of new stressors, treatment  non-adherence, use of substances, and/or new medical ails.   Monitoring: reviewed labs/imaging as populated above; ordered  Therapy: sees therapist, goes to [facility]  Follow-up: 3 months  Communications: N/A    TREATMENT PLAN/GOALS: challenge patterns of living conducive to symptom burden, implement recommended regimen as above with augmentative, intermittent supportive psychotherapy to reduce symptom burden. Patient acknowledged and verbally consented to continue treatment. The importance of adherence to the recommended treatment and interval follow-up appointments was again emphasized today: patient has good treatment adherence per given history. Patient was today reminded to limit daily caffeine intake, hydrate appropriately, eat healthy and nutritious foods, engage sleep hygiene measures, engage appropriate exposure to sunlight, engage with hobbies in balance with life necessities, and exercise appropriate to their capacity to do so.     .    Parts of this note are electronic transcriptions/translations of spoken language to printed text using the Dragon Dictation system.    Electronically signed by DELL Leslie, 11/20/24

## 2024-12-02 DIAGNOSIS — F43.10 POST TRAUMATIC STRESS DISORDER (PTSD): ICD-10-CM

## 2024-12-03 RX ORDER — CLONIDINE HYDROCHLORIDE 0.1 MG/1
0.1 TABLET ORAL 2 TIMES DAILY
Qty: 180 TABLET | Refills: 3 | Status: SHIPPED | OUTPATIENT
Start: 2024-12-03

## 2024-12-03 NOTE — TELEPHONE ENCOUNTER
NEXT VISIT WITH PROVIDER   Appointment with Kassandra Wilson APRN (01/28/2025)     LAST SEEN BY PROVIDER   Office Visit with Kassandra Wilson APRN (11/20/2024)     LAST MED REFILL  cloNIDine (Catapres) 0.1 MG tablet (09/23/2024)     PROVIDER PLEASE ADVISE

## 2025-01-17 ENCOUNTER — OFFICE VISIT (OUTPATIENT)
Dept: PODIATRY | Facility: CLINIC | Age: 50
End: 2025-01-17
Payer: COMMERCIAL

## 2025-01-17 VITALS
DIASTOLIC BLOOD PRESSURE: 78 MMHG | WEIGHT: 298 LBS | HEIGHT: 73 IN | TEMPERATURE: 97.1 F | SYSTOLIC BLOOD PRESSURE: 122 MMHG | OXYGEN SATURATION: 95 % | HEART RATE: 79 BPM | BODY MASS INDEX: 39.49 KG/M2

## 2025-01-17 DIAGNOSIS — E66.9 OBESITY, UNSPECIFIED CLASS, UNSPECIFIED OBESITY TYPE, UNSPECIFIED WHETHER SERIOUS COMORBIDITY PRESENT: ICD-10-CM

## 2025-01-17 DIAGNOSIS — E11.9 TYPE 2 DIABETES MELLITUS WITHOUT COMPLICATION, WITH LONG-TERM CURRENT USE OF INSULIN: Primary | ICD-10-CM

## 2025-01-17 DIAGNOSIS — E11.8 DM FEET: ICD-10-CM

## 2025-01-17 DIAGNOSIS — L74.4 ANHIDROSIS: ICD-10-CM

## 2025-01-17 DIAGNOSIS — Z79.4 TYPE 2 DIABETES MELLITUS WITHOUT COMPLICATION, WITH LONG-TERM CURRENT USE OF INSULIN: Primary | ICD-10-CM

## 2025-01-17 RX ORDER — AMMONIUM LACTATE 12 G/100G
LOTION TOPICAL 2 TIMES DAILY
Qty: 225 G | Refills: 11 | Status: SHIPPED | OUTPATIENT
Start: 2025-01-17

## 2025-01-17 RX ORDER — INSULIN ASPART 100 [IU]/ML
INJECTION, SOLUTION INTRAVENOUS; SUBCUTANEOUS
COMMUNITY
Start: 2024-12-12

## 2025-01-17 NOTE — PROGRESS NOTES
[facility] - PODIATRY    Today's Date: 01/17/25    Patient Name: Wayne Guan  MRN: [id number]  CSN: [id number]  PCP: Lyla Guerrero APRN, Last PCP Visit: 19 November 2024  Referring Provider: No ref. provider found    SUBJECTIVE     Chief Complaint   Patient presents with    Left Foot - Diabetes    Right Foot - Diabetes     HPI: Wayne Guan, a 49 y.o.male, presents to clinic for a diabetic foot evaluation.    New, Established, New Problem: Established  Onset: Insidious    Nature:  IDDM    Patient controlling diabetes via:  insulin    Patient states there last blood glucose was 145.    Patient denies any fevers, chills, nausea, vomiting, shortness of breath, nor any other constitutional signs nor symptoms.    Medical changes: None.    New, Established, New Problem:  SECOND PROBLEM  Location: Bilateral feet  Duration:   Greater than 1 month  Onset: Insidious  Nature: Dry skin  Stable, worsening, improving:  Worsening  Aggravating factors:     Previous Treatment:  Slowly worsening despite using OTC skin lotion.    I have reviewed/confirmed previously documented HPI with no changes.     Past Medical History:   Diagnosis Date    Alcoholism 01/16/2021    Allergies     Aneurysm     Anxiety 07/15/2022    Atrial fibrillation     Bipolar 2 disorder 07/20/2021    Cancer     Cervical stenosis of spinal canal 11/30/2018    Formatting of this note might be different from the original. Added automatically from request for surgery [id number]    Chronic pain disorder 2006    Neck/Shoulder    Colon polyp 11/1/2003 Benign    Depression     Diabetes mellitus     GERD (gastroesophageal reflux disease)     History of colonic polyps 05/23/2017    Formatting of this note might be different from the original. Added automatically from request for surgery [id number]    Hyperlipidemia     Hypertension     Irritable bowel syndrome 2004    Lumbosacral spondylosis without myelopathy 08/29/2018    Pancreatitis 2008    Papillary thyroid  carcinoma 04/01/2020    PTSD (post-traumatic stress disorder)     SVT (supraventricular tachycardia) 01/23/2022    Withdrawal symptoms, alcohol      Past Surgical History:   Procedure Laterality Date    ABLATION OF DYSRHYTHMIC FOCUS  9/9/2022    APPENDECTOMY  1983    CARDIAC ABLATION  09/09/2022    CARDIAC ELECTROPHYSIOLOGY PROCEDURE N/A 09/09/2022    Procedure: Ablation SVT HOLD Metoprolol 5 days;  Surgeon: Tai Lisa MD;  Location: [facility] INVASIVE LOCATION;  Service: Cardiovascular;  Laterality: N/A;    CERVICAL DISC SURGERY      COLONOSCOPY      2021 [facility]    THYROID SURGERY       Family History   Problem Relation Age of Onset    Arthritis Mother         rheumatoid    Diabetes Mother     Hyperlipidemia Mother     Hypertension Mother     Osteoporosis Mother     Rashes / Skin problems Mother     Anemia Mother     Arrhythmia Mother     Heart attack Father     Hyperlipidemia Father     Hypertension Father     Diabetes Father     Alcohol abuse Father     Stroke Father     Hypertension Sister     ADD / ADHD Sister     Heart disease Maternal Uncle     Hypertension Maternal Uncle     Mental illness Paternal Aunt     Heart disease Paternal Aunt     Diabetes Paternal Aunt     Heart attack Paternal Aunt     COPD Paternal Uncle     Stroke Maternal Grandmother     Thyroid disease Maternal Grandmother     COPD Maternal Grandmother     Hypertension Maternal Grandmother     Heart disease Maternal Grandmother     Hypertension Maternal Grandfather     Diabetes Maternal Grandfather     Cancer Maternal Grandfather         throat    Heart disease Maternal Grandfather     Heart disease Paternal Grandmother     Hypertension Paternal Grandmother     Heart attack Paternal Grandmother     Colon cancer Paternal Grandfather     Cancer Paternal Grandfather         colon     Social History     Socioeconomic History    Marital status: Single   Tobacco Use    Smoking status: Former     Current packs/day: 0.00     Average  packs/day: 1 pack/day for 11.0 years (11.0 ttl pk-yrs)     Types: Cigarettes     Start date: 1997     Quit date: 2008     Years since quittin.5    Smokeless tobacco: Never   Vaping Use    Vaping status: Never Used   Substance and Sexual Activity    Alcohol use: Not Currently     Alcohol/week: 10.0 standard drinks of alcohol     Comment: Two 1.75mL in 5 days    Drug use: Never    Sexual activity: Yes     Partners: Female     Birth control/protection: Hysterectomy     Allergies   Allergen Reactions    Jardiance [Empagliflozin] Palpitations     Current Outpatient Medications   Medication Sig Dispense Refill    Accu-Chek Guide test strip 1 each by Other route 3 (Three) Times a Day.      cloNIDine (CATAPRES) 0.1 MG tablet TAKE 1 TABLET BY MOUTH TWICE  DAILY 180 tablet 3    Continuous Glucose Sensor (Dexcom G7 Sensor) misc Use 1 each Every 10 (Ten) Days.      dilTIAZem CD (CARDIZEM CD) 180 MG 24 hr capsule Take 1 capsule by mouth Daily.      escitalopram (Lexapro) 20 MG tablet Take 1 tablet by mouth Daily. 90 tablet 3    fenofibrate (TRICOR) 145 MG tablet Take 1 tablet by mouth Daily. 90 tablet 3    glycopyrrolate (ROBINUL) 2 MG tablet Take 1 tablet by mouth Daily. 90 tablet 3    HumaLOG KwikPen 100 UNIT/ML solution pen-injector Inject 0-40 Units under the skin into the appropriate area as directed 3 (Three) Times a Day As Needed (Based on sliding scale).      insulin aspart (NovoLOG FlexPen) 100 UNIT/ML solution pen-injector sc pen 20 units tid ac sc.      Insulin Glargine, 2 Unit Dial, (Toujeo Max SoloStar) 300 UNIT/ML solution pen-injector injection Inject 50 Units under the skin into the appropriate area as directed 2 (Two) Times a Day.      levocetirizine (XYZAL) 5 MG tablet Take 1 tablet by mouth Daily As Needed for Allergies. (Patient taking differently: Take 1 tablet by mouth Daily.) 90 tablet 3    levothyroxine sodium (Tirosint) 125 MCG capsule capsule Take 2 capsules by mouth Daily  (Monday-Friday).      metoprolol succinate XL (TOPROL-XL) 50 MG 24 hr tablet TAKE 1 TABLET BY MOUTH DAILY 90 tablet 3    montelukast (SINGULAIR) 10 MG tablet Take 1 tablet by mouth Every Night. 90 tablet 3    omeprazole (priLOSEC) 20 MG capsule Take 1 capsule by mouth Daily.      ammonium lactate (LAC-HYDRIN) 12 % lotion Apply  topically to the appropriate area as directed 2 (Two) Times a Day. 225 g 11     No current facility-administered medications for this visit.     Review of Systems   Constitutional: Negative.    Skin:         Dry skin   All other systems reviewed and are negative.      OBJECTIVE     Vitals:    01/17/25 0915   BP: 122/78   Pulse: 79   Temp: 97.1 °F (36.2 °C)   SpO2: 95%       Body mass index is 39.32 kg/m².    Lab Results   Component Value Date    HGBA1C 8.60 (H) 11/14/2024       Lab Results   Component Value Date    GLUCOSE 159 (H) 11/14/2024    CALCIUM 9.6 11/14/2024     11/14/2024    K 4.3 11/14/2024    CO2 25.0 11/14/2024     11/14/2024    BUN 12 11/14/2024    CREATININE 1.20 11/14/2024    EGFRIFAFRI >60 12/05/2022    EGFRIFNONA 78 01/24/2022    BCR 10.0 11/14/2024    ANIONGAP 11.0 11/14/2024       Patient seen in no apparent distress.      PHYSICAL EXAM:     Foot/Ankle Exam    GENERAL  Diabetic foot exam performed    Appearance:  obese  Orientation:  AAOx3  Affect:  appropriate  Gait:  unimpaired  Assistance:  independent  Right shoe gear: casual shoe  Left shoe gear: casual shoe    VASCULAR     Right Foot Vascularity   Normal vascular exam    Dorsalis pedis:  2+  Posterior tibial:  2+  Skin temperature:  warm  Edema grading:  None  CFT:  < 3 seconds  Pedal hair growth:  Present  Varicosities:  none     Left Foot Vascularity   Normal vascular exam    Dorsalis pedis:  2+  Posterior tibial:  2+  Skin temperature:  warm  Edema grading:  None  CFT:  < 3 seconds  Pedal hair growth:  Present  Varicosities:  none     NEUROLOGIC     Right Foot Neurologic   Normal sensation    Light  touch sensation: normal  Vibratory sensation: normal  Hot/Cold sensation: normal     Left Foot Neurologic   Normal sensation    Light touch sensation: normal  Vibratory sensation: normal  Hot/Cold sensation:  normal    MUSCULOSKELETAL     Left Foot Musculoskeletal   Arch: Skew foot type.  Left foot bunion: Hallux varus.      MUSCLE STRENGTH     Right Foot Muscle Strength   Foot dorsiflexion:  4  Foot plantar flexion:  4  Foot inversion:  4  Foot eversion:  4     Left Foot Muscle Strength   Foot dorsiflexion:  4  Foot plantar flexion:  4  Foot inversion:  4  Foot eversion:  4    RANGE OF MOTION     Right Foot Range of Motion   Foot and ankle ROM within normal limits       Left Foot Range of Motion   Foot and ankle ROM within normal limits      DERMATOLOGIC      Right Foot Dermatologic   Skin  Positive for dryness.      Left Foot Dermatologic   Skin  Positive for dryness.      Right foot additional comments: Hallux varus     Left foot additional comments: Hallux varus    Diabetic Foot Exam Performed and Monofilament Test Performed    I have reexamined the patient the results are consistent with the previously documented exam.    ASSESSMENT/PLAN     Diagnoses and all orders for this visit:    1. Type 2 diabetes mellitus without complication, with long-term current use of insulin (Primary)    2. DM feet    3. Obesity, unspecified class, unspecified obesity type, unspecified whether serious comorbidity present    4. Anhidrosis  -     ammonium lactate (LAC-HYDRIN) 12 % lotion; Apply  topically to the appropriate area as directed 2 (Two) Times a Day.  Dispense: 225 g; Refill: 11    Comprehensive lower extremity examination and evaluation was performed.    Discussed findings and treatment plan including risks, benefits, and treatment options with patient in detail. Patient agreed with treatment plan.    Medications and allergies reviewed.  Reviewed available blood glucose and HgB A1C lab values along with other pertinent  labs.  These were discussed with the patient as to their importance of diabetic maintenance.    Diabetic foot exam performed and documented this date, compliant with CQM required standards. Detail of findings as noted in physical exam.  Lower extremity Neurologic exam for diabetic patient performed and documented this date, compliant with PQRS required standards. Detail of findings as noted in physical exam.  Advised patient importance of good routine lower extremity hygiene. Advised patient importance of evaluating for intact skin and pain free nail borders.  Advised patient to use mirror to evaluate plantar/ soles of feet for better visualization. Advised patient monitor and phone office to be seen if any cracking to skin, open lesions, painful nail borders or if nails become elongated prior to next visit. Advised patient importance of daily cleansing of lower extremities, followed by good skin cream to maintain normal hydration of skin. Also advised patient importance of close daily monitoring of blood sugar. Advised to regulate diet and medications to maintain control of blood sugar in optimal range. Contact primary care provider if difficulties maintaining blood sugar levels.  Advised Patient of presence of Diabetes Mellitus condition.  Advised Patient risk of progression and worsening or improvement, then return of condition.  Will monitor condition for any change in future. Treat with most appropriate treatment pending status of condition.  Counseled and advised patient extensively on nature and ramifications of diabetes. Standard instructions given to patient for good diabetic foot care and maintenance. Advised importance of careful monitoring to avoid break down and complications secondary to diabetes. Advised patient importance of strict maintenance of blood sugar control. Advised patient of possible ominous results from neglect of condition, i.e.: amputation/ loss of digits, feet and legs, or even death.   Patient states understands counseling, will monitor closely, continue good hygiene and routine diabetic foot care. Patient will contact office is questions or problems.      An After Visit Summary was printed and given to the patient at discharge, including (if requested) any available informative/educational handouts regarding diagnosis, treatment, or medications. All questions were answered to patient/family satisfaction. Should symptoms fail to improve or worsen they agree to call or return to clinic or to go to the Emergency Department. Discussed the importance of following up with any needed screening tests/labs/specialist appointments and any requested follow-up recommended by me today. Importance of maintaining follow-up discussed and patient accepts that missed appointments can delay diagnosis and potentially lead to worsening of conditions.    Return in about 1 year (around 1/17/2026) for DFE., or sooner if acute issues arise.    I have reviewed the assessment and plan and verified the accuracy of it. No changes to assessment and plan since the information was documented. Stu Jean DPM 01/17/25     I have dictated this note utilizing Dragon Dictation.  Please note that portions of this note were completed with a voice recognition program.  Part of this note may be an electronic transcription/translation of spoken language to printed text using the Dragon Dictation System.      This document has been electronically signed by Stu Jean DPM on January 17, 2025 09:40 EST
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
## 2025-01-28 ENCOUNTER — OFFICE VISIT (OUTPATIENT)
Dept: PSYCHIATRY | Facility: CLINIC | Age: 50
End: 2025-01-28
Payer: COMMERCIAL

## 2025-01-28 VITALS
SYSTOLIC BLOOD PRESSURE: 121 MMHG | DIASTOLIC BLOOD PRESSURE: 79 MMHG | BODY MASS INDEX: 39.65 KG/M2 | HEIGHT: 73 IN | WEIGHT: 299.2 LBS | HEART RATE: 64 BPM

## 2025-01-28 DIAGNOSIS — F10.21 ALCOHOL USE DISORDER, SEVERE, IN EARLY REMISSION: ICD-10-CM

## 2025-01-28 DIAGNOSIS — F43.10 POST TRAUMATIC STRESS DISORDER (PTSD): Primary | ICD-10-CM

## 2025-01-28 DIAGNOSIS — F41.1 GAD (GENERALIZED ANXIETY DISORDER): ICD-10-CM

## 2025-01-28 PROCEDURE — 99214 OFFICE O/P EST MOD 30 MIN: CPT
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
## 2025-04-01 ENCOUNTER — OFFICE VISIT (OUTPATIENT)
Dept: PSYCHIATRY | Facility: CLINIC | Age: 50
End: 2025-04-01
Payer: COMMERCIAL

## 2025-04-01 VITALS
DIASTOLIC BLOOD PRESSURE: 87 MMHG | HEART RATE: 65 BPM | SYSTOLIC BLOOD PRESSURE: 127 MMHG | BODY MASS INDEX: 39.55 KG/M2 | HEIGHT: 73 IN | WEIGHT: 298.4 LBS

## 2025-04-01 DIAGNOSIS — F43.10 POST TRAUMATIC STRESS DISORDER (PTSD): Primary | ICD-10-CM

## 2025-04-01 DIAGNOSIS — F10.21 ALCOHOL USE DISORDER, SEVERE, IN EARLY REMISSION: ICD-10-CM

## 2025-04-01 DIAGNOSIS — F41.1 GAD (GENERALIZED ANXIETY DISORDER): ICD-10-CM

## 2025-05-18 NOTE — PROGRESS NOTES
Chief Complaint  Primary Care Follow-Up (49 year old male here today for a 6 month follow up with lab review )    Subjective      History of Present Illness  The patient is a 49-year-old male who presents for follow-up.    He reports satisfactory home blood pressure readings, typically ranging from 120 to 127. His current antihypertensive regimen includes clonidine, metoprolol, and diltiazem.    He expresses concern over his elevated blood glucose levels, despite a recent A1c reading of 7.9 at his last endocrinology appointment. He believes his insulin therapy is ineffective, as he experiences postprandial hyperglycemia regardless of dietary intake. He is currently on Humalog insulin, which he reports takes 3 to 4 hours to exert its effect. He has previously responded well to Tresiba and NovoLog, but these are not covered by his insurance.    He is also on a fibrate for cholesterol management, with his last lipid panel showing favorable results.    He continues to take omeprazole 20 mg for reflux.    He is currently on Lexapro 20 mg for anxiety and depression, which he reports as effective. He has discussed the possibility of weight gain with his psychiatrist, who advised against discontinuing the medication at this time.    He is not eligible for Ozempic due to renal and thyroid issues but is making efforts to lose weight independently. He has increased his physical activity, including yard work, and hopes this will aid in weight loss.    He reports occasional pedal edema, particularly after prolonged ambulation.    His thyroid condition remains stable, with regular monitoring for potential malignancy. He has been discharged from surgical follow-up.    He is taking individual vitamins D, B12, and C but is not taking a multivitamin.       Past Medical History:   Diagnosis Date    Alcoholism 01/16/2021    Allergies     Aneurysm     Anxiety 07/15/2022    Arthritis     Atrial fibrillation     Bipolar 2 disorder  2021    Cancer     Cervical stenosis of spinal canal 2018    Formatting of this note might be different from the original. Added automatically from request for surgery [id number]    Chronic pain disorder 2006    Neck/Shoulder    Colon polyp 2003 Benign    Depression     Diabetes mellitus     GERD (gastroesophageal reflux disease)     History of colonic polyps 2017    Formatting of this note might be different from the original. Added automatically from request for surgery [id number]    Hyperlipidemia     Hypertension     Irritable bowel syndrome 2004    Lumbosacral spondylosis without myelopathy 2018    Obesity     Pancreatitis 2008    Papillary thyroid carcinoma 2020    PTSD (post-traumatic stress disorder)     SVT (supraventricular tachycardia) 2022    Withdrawal symptoms, alcohol         Past Surgical History:   Procedure Laterality Date    ABLATION OF DYSRHYTHMIC FOCUS  2022    APPENDECTOMY      CARDIAC ABLATION  2022    CARDIAC ELECTROPHYSIOLOGY PROCEDURE N/A 2022    Procedure: Ablation SVT HOLD Metoprolol 5 days;  Surgeon: Tai Lisa MD;  Location: [facility] INVASIVE LOCATION;  Service: Cardiovascular;  Laterality: N/A;    CERVICAL DISC SURGERY      COLONOSCOPY       Cortés's    THYROID SURGERY          Social History     Tobacco Use   Smoking Status Former    Current packs/day: 0.00    Average packs/day: 1 pack/day for 11.0 years (11.0 ttl pk-yrs)    Types: Cigarettes    Start date: 1997    Quit date: 2008    Years since quittin.8   Smokeless Tobacco Never        Patient Care Team:  Lyla Guerrero APRN as PCP - General (Nurse Practitioner)  Kassandra Wilson APRN as Nurse Practitioner (Behavioral Health)  Carter Solares MD as Consulting Physician (Cardiology)  Agus Boone MD as Consulting Physician (Endocrinology)    Allergies   Allergen Reactions    Jardiance [Empagliflozin] Palpitations          Current  "Outpatient Medications:     Accu-Chek Guide test strip, 1 each by Other route 3 (Three) Times a Day., Disp: , Rfl:     ammonium lactate (LAC-HYDRIN) 12 % lotion, Apply  topically to the appropriate area as directed 2 (Two) Times a Day., Disp: 225 g, Rfl: 11    cloNIDine (CATAPRES) 0.1 MG tablet, TAKE 1 TABLET BY MOUTH TWICE  DAILY, Disp: 180 tablet, Rfl: 3    Continuous Glucose Sensor (Dexcom G7 Sensor) misc, Use 1 each Every 10 (Ten) Days., Disp: , Rfl:     dilTIAZem CD (CARDIZEM CD) 180 MG 24 hr capsule, Take 1 capsule by mouth Daily., Disp: , Rfl:     escitalopram (Lexapro) 20 MG tablet, Take 1 tablet by mouth Daily., Disp: 90 tablet, Rfl: 3    fenofibrate (TRICOR) 145 MG tablet, Take 1 tablet by mouth Daily., Disp: 90 tablet, Rfl: 3    glycopyrrolate (ROBINUL) 2 MG tablet, Take 1 tablet by mouth Daily., Disp: 90 tablet, Rfl: 3    Insulin Glargine, 2 Unit Dial, (Toujeo Max SoloStar) 300 UNIT/ML solution pen-injector injection, Inject 50 Units under the skin into the appropriate area as directed 2 (Two) Times a Day., Disp: , Rfl:     levocetirizine (XYZAL) 5 MG tablet, Take 1 tablet by mouth Daily As Needed for Allergies. (Patient taking differently: Take 1 tablet by mouth Daily.), Disp: 90 tablet, Rfl: 3    metoprolol succinate XL (TOPROL-XL) 50 MG 24 hr tablet, TAKE 1 TABLET BY MOUTH DAILY, Disp: 90 tablet, Rfl: 3    montelukast (SINGULAIR) 10 MG tablet, Take 1 tablet by mouth Every Night., Disp: 90 tablet, Rfl: 3    omeprazole (priLOSEC) 20 MG capsule, Take 1 capsule by mouth Daily., Disp: , Rfl:     Objective     Vitals:    05/21/25 1536   BP: 154/80   BP Location: Left arm   Patient Position: Sitting   Cuff Size: Large Adult   Pulse: 91   Resp: 18   Temp: 98.2 °F (36.8 °C)   TempSrc: Temporal   SpO2: 97%   Weight: 132 kg (291 lb)   Height: 185.4 cm (72.99\")        Wt Readings from Last 3 Encounters:   05/21/25 132 kg (291 lb)   04/01/25 135 kg (298 lb 6.4 oz)   01/28/25 136 kg (299 lb 3.2 oz)        BP " Readings from Last 3 Encounters:   05/21/25 154/80   04/01/25 127/87   01/28/25 121/79        Physical Exam  Respiratory: Clear to auscultation, no wheezing, rales or rhonchi  Cardiovascular: Regular rate and rhythm, no murmurs, rubs, or gallops  Extremities: No edema, no cyanosis      Result Review   The following data was reviewed by: DELL Morrison on 05/21/2025:  [x]  Tests & Results  []  Hospitalization/Emergency Department/Urgent Care  [x]  Internal/External Consultant Notes       Assessment and Plan     Diagnoses and all orders for this visit:    1. Essential hypertension (Primary)    2. Type 2 diabetes mellitus with hyperglycemia, unspecified whether long term insulin use  -     Hemoglobin A1c; Future  -     Microalbumin / Creatinine Urine Ratio - Urine, Clean Catch; Future    3. Anxiety and depression    4. Gastroesophageal reflux disease, unspecified whether esophagitis present    5. Hyperlipidemia, unspecified hyperlipidemia type    6. Vitamin D deficiency  -     Vitamin D,25-Hydroxy; Future    7. Folic acid deficiency  -     Vitamin B12; Future  -     Folate; Future    8. Screening for malignant neoplasm of prostate  -     PSA Screen; Future    9. Annual physical exam  -     Comprehensive Metabolic Panel; Future  -     CBC & Differential; Future  -     Lipid Panel; Future  -     Hemoglobin A1c; Future  -     Vitamin B12; Future  -     Folate; Future         Assessment & Plan  1. Hypertension.  - Blood pressure reading today was 154/80, which is higher than desired.  - Home readings are usually in the 120-127 range.  - Currently on clonidine, metoprolol (Toprol), and diltiazem (Cardizem).  - Advised to continue current medication regimen and monitor blood pressure regularly at home.    2. Diabetes Mellitus.  - A1c was 8.6 six months ago and 7.9 two months ago.  - Current insulin regimen is not effectively controlling blood sugar levels.  - Advised to discuss alternative insulin options with  endocrinologist.  - Microalbuminuria test will be ordered to monitor kidney function.    3. Anxiety and Depression.  - Currently on Lexapro 20 mg, which is reported to be working well.  - Advised to continue current dose and follow up with psychiatrist as needed.  - No changes to medication regimen necessary at this time.    4. Gastroesophageal Reflux Disease (GERD).  - Currently on omeprazole 20 mg.  - Advised to continue this medication as prescribed.  - No changes to medication regimen necessary at this time.    5. Hyperlipidemia.  - Cholesterol levels were checked in 11/2024 and were within normal limits.  - Currently on a fibrate.  - No changes to medication regimen necessary at this time.    6. Thyroid Disorder.  - Thyroid levels are being monitored by another provider due to a history of thyroid cancer.  - No changes to current management plan necessary at this time.  - Advised to continue monitoring with the specialist.    7. Weight Management.  - Not eligible for Ozempic due to kidney and thyroid issues.  - Advised to discuss weight loss strategies with endocrinologist, as weight loss can help manage blood sugar levels.  - Encouraged to stay active to aid in weight loss.    8. Health Maintenance.  - Folate and B12 levels were normal.  - Annual wellness exam labs will be ordered, including fasting labs and prostate screening lab work.  - Advised to resume taking a multivitamin.    Follow-up  - Follow-up scheduled for 11/2025.       Patient was given instructions and counseling regarding his condition or for health maintenance advice. Please see specific information pulled into the AVS if appropriate.     Follow Up   Return in about 6 months (around 11/24/2025) for Annual physical.    Patient or patient representative verbalized consent for the use of Ambient Listening during the visit with  DELL Morrison for chart documentation. 5/21/2025  15:42 EDT    DELL Morrison

## 2025-05-20 ENCOUNTER — LAB (OUTPATIENT)
Dept: LAB | Facility: HOSPITAL | Age: 50
End: 2025-05-20
Payer: COMMERCIAL

## 2025-05-20 DIAGNOSIS — I10 ESSENTIAL HYPERTENSION: Chronic | ICD-10-CM

## 2025-05-20 DIAGNOSIS — E53.8 FOLIC ACID DEFICIENCY: ICD-10-CM

## 2025-05-20 LAB
ALBUMIN SERPL-MCNC: 4.3 G/DL (ref 3.5–5.2)
ALBUMIN/GLOB SERPL: 1.5 G/DL
ALP SERPL-CCNC: 71 U/L (ref 39–117)
ALT SERPL W P-5'-P-CCNC: 26 U/L (ref 1–41)
ANION GAP SERPL CALCULATED.3IONS-SCNC: 12.1 MMOL/L (ref 5–15)
AST SERPL-CCNC: 16 U/L (ref 1–40)
BILIRUB SERPL-MCNC: 0.4 MG/DL (ref 0–1.2)
BUN SERPL-MCNC: 9 MG/DL (ref 6–20)
BUN/CREAT SERPL: 7.6 (ref 7–25)
CALCIUM SPEC-SCNC: 9.7 MG/DL (ref 8.6–10.5)
CHLORIDE SERPL-SCNC: 102 MMOL/L (ref 98–107)
CO2 SERPL-SCNC: 26.9 MMOL/L (ref 22–29)
CREAT SERPL-MCNC: 1.19 MG/DL (ref 0.76–1.27)
EGFRCR SERPLBLD CKD-EPI 2021: 74 ML/MIN/1.73
FOLATE SERPL-MCNC: 13.9 NG/ML (ref 4.78–24.2)
GLOBULIN UR ELPH-MCNC: 2.8 GM/DL
GLUCOSE SERPL-MCNC: 128 MG/DL (ref 65–99)
POTASSIUM SERPL-SCNC: 4.3 MMOL/L (ref 3.5–5.2)
PROT SERPL-MCNC: 7.1 G/DL (ref 6–8.5)
SODIUM SERPL-SCNC: 141 MMOL/L (ref 136–145)
VIT B12 BLD-MCNC: 495 PG/ML (ref 211–946)

## 2025-05-20 PROCEDURE — 82746 ASSAY OF FOLIC ACID SERUM: CPT

## 2025-05-20 PROCEDURE — 80053 COMPREHEN METABOLIC PANEL: CPT

## 2025-05-20 PROCEDURE — 82607 VITAMIN B-12: CPT

## 2025-05-20 PROCEDURE — 36415 COLL VENOUS BLD VENIPUNCTURE: CPT

## 2025-05-21 ENCOUNTER — OFFICE VISIT (OUTPATIENT)
Dept: INTERNAL MEDICINE | Age: 50
End: 2025-05-21
Payer: COMMERCIAL

## 2025-05-21 VITALS
SYSTOLIC BLOOD PRESSURE: 154 MMHG | RESPIRATION RATE: 18 BRPM | HEIGHT: 73 IN | BODY MASS INDEX: 38.57 KG/M2 | OXYGEN SATURATION: 97 % | WEIGHT: 291 LBS | TEMPERATURE: 98.2 F | DIASTOLIC BLOOD PRESSURE: 80 MMHG | HEART RATE: 91 BPM

## 2025-05-21 DIAGNOSIS — F41.9 ANXIETY AND DEPRESSION: ICD-10-CM

## 2025-05-21 DIAGNOSIS — K21.9 GASTROESOPHAGEAL REFLUX DISEASE, UNSPECIFIED WHETHER ESOPHAGITIS PRESENT: ICD-10-CM

## 2025-05-21 DIAGNOSIS — F32.A ANXIETY AND DEPRESSION: ICD-10-CM

## 2025-05-21 DIAGNOSIS — I10 ESSENTIAL HYPERTENSION: Primary | ICD-10-CM

## 2025-05-21 DIAGNOSIS — Z12.5 SCREENING FOR MALIGNANT NEOPLASM OF PROSTATE: ICD-10-CM

## 2025-05-21 DIAGNOSIS — Z00.00 ANNUAL PHYSICAL EXAM: ICD-10-CM

## 2025-05-21 DIAGNOSIS — E78.5 HYPERLIPIDEMIA, UNSPECIFIED HYPERLIPIDEMIA TYPE: ICD-10-CM

## 2025-05-21 DIAGNOSIS — E55.9 VITAMIN D DEFICIENCY: ICD-10-CM

## 2025-05-21 DIAGNOSIS — E11.65 TYPE 2 DIABETES MELLITUS WITH HYPERGLYCEMIA, UNSPECIFIED WHETHER LONG TERM INSULIN USE: ICD-10-CM

## 2025-05-21 DIAGNOSIS — E53.8 FOLIC ACID DEFICIENCY: ICD-10-CM

## 2025-05-21 RX ORDER — INSULIN ASPART 100 [IU]/ML
INJECTION, SOLUTION INTRAVENOUS; SUBCUTANEOUS
COMMUNITY
Start: 2025-04-17 | End: 2025-05-21

## 2025-05-27 RX ORDER — ESCITALOPRAM OXALATE 20 MG/1
20 TABLET ORAL DAILY
Qty: 90 TABLET | Refills: 3 | Status: SHIPPED | OUTPATIENT
Start: 2025-05-27

## 2025-06-09 RX ORDER — GLYCOPYRROLATE 2 MG/1
2 TABLET ORAL DAILY
Qty: 90 TABLET | Refills: 3 | Status: SHIPPED | OUTPATIENT
Start: 2025-06-09

## 2025-06-27 RX ORDER — DILTIAZEM HYDROCHLORIDE 180 MG/1
180 CAPSULE, COATED, EXTENDED RELEASE ORAL DAILY
Qty: 90 CAPSULE | Refills: 3 | Status: SHIPPED | OUTPATIENT
Start: 2025-06-27

## 2025-06-27 NOTE — TELEPHONE ENCOUNTER
Rx Refill Note  Requested Prescriptions     Pending Prescriptions Disp Refills    dilTIAZem CD (CARDIZEM CD) 180 MG 24 hr capsule 90 capsule 3     Sig: Take 1 capsule by mouth Daily.        LAST OFFICE VISIT:  8/29/2024     NEXT OFFICE VISIT:  08/29/2025     Does the medication requests match the last office note:    [x] Yes   [] No    Does this refill request meet protocol details for MA to approve:     [] Yes   [x] No   [] No Protocols Provided   Previous Prescriber is not Historical

## 2025-06-30 ENCOUNTER — HOSPITAL ENCOUNTER (EMERGENCY)
Facility: HOSPITAL | Age: 50
Discharge: HOME | End: 2025-06-30
Attending: EMERGENCY MEDICINE | Admitting: EMERGENCY MEDICINE
Payer: COMMERCIAL

## 2025-06-30 ENCOUNTER — TELEPHONE (OUTPATIENT)
Dept: PSYCHIATRY | Facility: CLINIC | Age: 50
End: 2025-06-30
Payer: COMMERCIAL

## 2025-06-30 ENCOUNTER — DOCUMENTATION (OUTPATIENT)
Dept: PSYCHIATRY | Facility: CLINIC | Age: 50
End: 2025-06-30
Payer: COMMERCIAL

## 2025-06-30 VITALS
TEMPERATURE: 97.7 F | SYSTOLIC BLOOD PRESSURE: 163 MMHG | HEART RATE: 119 BPM | DIASTOLIC BLOOD PRESSURE: 85 MMHG | OXYGEN SATURATION: 95 % | RESPIRATION RATE: 20 BRPM

## 2025-06-30 DIAGNOSIS — F10.920 ACUTE ALCOHOLIC INTOXICATION WITHOUT COMPLICATION: Primary | ICD-10-CM

## 2025-06-30 DIAGNOSIS — E05.90 HYPERTHYROIDISM: ICD-10-CM

## 2025-06-30 LAB
ACETONE BLD QL: NEGATIVE
ALBUMIN SERPL-MCNC: 4.6 G/DL (ref 3.5–5.2)
ALBUMIN/GLOB SERPL: 1.4 G/DL
ALP SERPL-CCNC: 81 U/L (ref 39–117)
ALT SERPL W P-5'-P-CCNC: 23 U/L (ref 1–41)
AMPHET+METHAMPHET UR QL: NEGATIVE
AMPHETAMINES UR QL: NEGATIVE
ANION GAP SERPL CALCULATED.3IONS-SCNC: 21.5 MMOL/L (ref 5–15)
APAP SERPL-MCNC: <5 MCG/ML (ref 0–30)
AST SERPL-CCNC: 27 U/L (ref 1–40)
BARBITURATES UR QL SCN: NEGATIVE
BASOPHILS # BLD AUTO: 0.05 10*3/MM3 (ref 0–0.2)
BASOPHILS NFR BLD AUTO: 0.5 % (ref 0–1.5)
BENZODIAZ UR QL SCN: NEGATIVE
BILIRUB SERPL-MCNC: 0.4 MG/DL (ref 0–1.2)
BUN SERPL-MCNC: 5.1 MG/DL (ref 6–20)
BUN/CREAT SERPL: 4.6 (ref 7–25)
BUPRENORPHINE SERPL-MCNC: NEGATIVE NG/ML
CALCIUM SPEC-SCNC: 8.7 MG/DL (ref 8.6–10.5)
CANNABINOIDS SERPL QL: POSITIVE
CHLORIDE SERPL-SCNC: 94 MMOL/L (ref 98–107)
CO2 SERPL-SCNC: 20.5 MMOL/L (ref 22–29)
COCAINE UR QL: NEGATIVE
CREAT SERPL-MCNC: 1.11 MG/DL (ref 0.76–1.27)
DEPRECATED RDW RBC AUTO: 38.2 FL (ref 37–54)
EGFRCR SERPLBLD CKD-EPI 2021: 80.4 ML/MIN/1.73
EOSINOPHIL # BLD AUTO: 0.02 10*3/MM3 (ref 0–0.4)
EOSINOPHIL NFR BLD AUTO: 0.2 % (ref 0.3–6.2)
ERYTHROCYTE [DISTWIDTH] IN BLOOD BY AUTOMATED COUNT: 12.2 % (ref 12.3–15.4)
ETHANOL BLD-MCNC: 290 MG/DL (ref 0–10)
ETHANOL UR QL: 0.29 %
FENTANYL UR-MCNC: NEGATIVE NG/ML
GLOBULIN UR ELPH-MCNC: 3.3 GM/DL
GLUCOSE SERPL-MCNC: 251 MG/DL (ref 65–99)
HCT VFR BLD AUTO: 47.5 % (ref 37.5–51)
HGB BLD-MCNC: 16.4 G/DL (ref 13–17.7)
HOLD SPECIMEN: NORMAL
IMM GRANULOCYTES # BLD AUTO: 0.02 10*3/MM3 (ref 0–0.05)
IMM GRANULOCYTES NFR BLD AUTO: 0.2 % (ref 0–0.5)
LYMPHOCYTES # BLD AUTO: 3 10*3/MM3 (ref 0.7–3.1)
LYMPHOCYTES NFR BLD AUTO: 27.3 % (ref 19.6–45.3)
MCH RBC QN AUTO: 29.6 PG (ref 26.6–33)
MCHC RBC AUTO-ENTMCNC: 34.5 G/DL (ref 31.5–35.7)
MCV RBC AUTO: 85.7 FL (ref 79–97)
METHADONE UR QL SCN: NEGATIVE
MONOCYTES # BLD AUTO: 0.85 10*3/MM3 (ref 0.1–0.9)
MONOCYTES NFR BLD AUTO: 7.7 % (ref 5–12)
NEUTROPHILS NFR BLD AUTO: 64.1 % (ref 42.7–76)
NEUTROPHILS NFR BLD AUTO: 7.06 10*3/MM3 (ref 1.7–7)
NRBC BLD AUTO-RTO: 0 /100 WBC (ref 0–0.2)
OPIATES UR QL: NEGATIVE
OXYCODONE UR QL SCN: NEGATIVE
PCP UR QL SCN: NEGATIVE
PLATELET # BLD AUTO: 354 10*3/MM3 (ref 140–450)
PMV BLD AUTO: 8.8 FL (ref 6–12)
POTASSIUM SERPL-SCNC: 3.9 MMOL/L (ref 3.5–5.2)
PROT SERPL-MCNC: 7.9 G/DL (ref 6–8.5)
RBC # BLD AUTO: 5.54 10*6/MM3 (ref 4.14–5.8)
SALICYLATES SERPL-MCNC: <0.3 MG/DL
SODIUM SERPL-SCNC: 136 MMOL/L (ref 136–145)
T4 FREE SERPL-MCNC: 1.92 NG/DL (ref 0.92–1.68)
TRICYCLICS UR QL SCN: NEGATIVE
TSH SERPL DL<=0.05 MIU/L-ACNC: 0.01 UIU/ML (ref 0.27–4.2)
WBC NRBC COR # BLD AUTO: 11 10*3/MM3 (ref 3.4–10.8)
WHOLE BLOOD HOLD COAG: NORMAL
WHOLE BLOOD HOLD SPECIMEN: NORMAL

## 2025-06-30 PROCEDURE — 80307 DRUG TEST PRSMV CHEM ANLYZR: CPT | Performed by: EMERGENCY MEDICINE

## 2025-06-30 PROCEDURE — 80050 GENERAL HEALTH PANEL: CPT | Performed by: EMERGENCY MEDICINE

## 2025-06-30 PROCEDURE — 84439 ASSAY OF FREE THYROXINE: CPT | Performed by: EMERGENCY MEDICINE

## 2025-06-30 PROCEDURE — 82009 KETONE BODYS QUAL: CPT | Performed by: EMERGENCY MEDICINE

## 2025-06-30 PROCEDURE — 25810000003 SODIUM CHLORIDE 0.9 % SOLUTION: Performed by: EMERGENCY MEDICINE

## 2025-06-30 PROCEDURE — 25010000002 ONDANSETRON PER 1 MG: Performed by: EMERGENCY MEDICINE

## 2025-06-30 PROCEDURE — 96374 THER/PROPH/DIAG INJ IV PUSH: CPT

## 2025-06-30 PROCEDURE — 80179 DRUG ASSAY SALICYLATE: CPT | Performed by: EMERGENCY MEDICINE

## 2025-06-30 PROCEDURE — 80143 DRUG ASSAY ACETAMINOPHEN: CPT | Performed by: EMERGENCY MEDICINE

## 2025-06-30 PROCEDURE — 82077 ASSAY SPEC XCP UR&BREATH IA: CPT | Performed by: EMERGENCY MEDICINE

## 2025-06-30 PROCEDURE — 99284 EMERGENCY DEPT VISIT MOD MDM: CPT

## 2025-06-30 RX ORDER — ONDANSETRON 2 MG/ML
4 INJECTION INTRAMUSCULAR; INTRAVENOUS ONCE
Status: COMPLETED | OUTPATIENT
Start: 2025-06-30 | End: 2025-06-30

## 2025-06-30 RX ORDER — ONDANSETRON 4 MG/1
4 TABLET, ORALLY DISINTEGRATING ORAL EVERY 6 HOURS PRN
Qty: 10 TABLET | Refills: 0 | Status: SHIPPED | OUTPATIENT
Start: 2025-06-30

## 2025-06-30 RX ORDER — SODIUM CHLORIDE 0.9 % (FLUSH) 0.9 %
10 SYRINGE (ML) INJECTION AS NEEDED
Status: DISCONTINUED | OUTPATIENT
Start: 2025-06-30 | End: 2025-06-30 | Stop reason: HOSPADM

## 2025-06-30 RX ADMIN — ONDANSETRON 4 MG: 2 INJECTION, SOLUTION INTRAMUSCULAR; INTRAVENOUS at 10:58

## 2025-06-30 RX ADMIN — SODIUM CHLORIDE 1000 ML: 9 INJECTION, SOLUTION INTRAVENOUS at 10:51

## 2025-06-30 NOTE — Clinical Note
[facility] EMERGENCY ROOM  [address] [place] [address]  Phone: [phone]  Fax: [phone]    Wayne Guan was seen and treated in our emergency department on 6/30/2025.  He may return to work on 07/02/2025.         Thank you for choosing [facility].    Franklin Zhang MD

## 2025-06-30 NOTE — ED PROVIDER NOTES
Time: 9:27 AM EDT  Date of encounter:  6/30/2025  Independent Historian/Clinical History and Information was obtained by:   Patient and Family    History is limited by: N/A    Chief Complaint: Alcohol intoxication, relapse      History of Present Illness:  Patient is a 49 y.o. year old male with history of bipolar disorder and alcohol abuse who was previously in remission who presents to the emergency department for evaluation of relapse on drinking alcohol in the past 3 days has drank a significant amount of [place] and currently intoxicated, having just drank alcohol shortly prior to arrival.    They were told by their therapist to come to the ED to make sure he is medically cleared.    It sounds like he has previous history of DKA and alcoholic ketoacidosis so we will evaluate for this.    No signs of withdrawal are seen on arrival and he currently appears intoxicated.    He does not want to be sent to a rehab facility.      Patient Care Team  Primary Care Provider: Lyla Guerrero APRN    Past Medical History:     Allergies   Allergen Reactions    Jardiance [Empagliflozin] Palpitations     Past Medical History:   Diagnosis Date    Alcoholism 01/16/2021    Allergies     Aneurysm     Anxiety 07/15/2022    Arthritis     Atrial fibrillation     Bipolar 2 disorder 07/20/2021    Cancer     Cervical stenosis of spinal canal 11/30/2018    Formatting of this note might be different from the original. Added automatically from request for surgery [id number]    Chronic pain disorder 2006    Neck/Shoulder    Colon polyp 11/1/2003 Benign    Depression     Diabetes mellitus     GERD (gastroesophageal reflux disease)     History of colonic polyps 05/23/2017    Formatting of this note might be different from the original. Added automatically from request for surgery [id number]    Hyperlipidemia     Hypertension     Irritable bowel syndrome 2004    Lumbosacral spondylosis without myelopathy 08/29/2018    Obesity     Pancreatitis 2008     Papillary thyroid carcinoma 04/01/2020    PTSD (post-traumatic stress disorder)     SVT (supraventricular tachycardia) 01/23/2022    Withdrawal symptoms, alcohol      Past Surgical History:   Procedure Laterality Date    ABLATION OF DYSRHYTHMIC FOCUS  9/9/2022    APPENDECTOMY  1983    CARDIAC ABLATION  09/09/2022    CARDIAC ELECTROPHYSIOLOGY PROCEDURE N/A 09/09/2022    Procedure: Ablation SVT HOLD Metoprolol 5 days;  Surgeon: Tai Lisa MD;  Location: [facility] INVASIVE LOC[facility];  Service: Cardiovascular;  Laterality: N/A;    CERVICAL DISC SURGERY      COLONOSCOPY      2021 [facility]    THYROID SURGERY       Family History   Problem Relation Age of Onset    Arthritis Mother         rheumatoid    Diabetes Mother     Hyperlipidemia Mother     Hypertension Mother     Osteoporosis Mother     Rashes / Skin problems Mother     Anemia Mother     Arrhythmia Mother     Heart attack Father     Hyperlipidemia Father     Hypertension Father     Diabetes Father     Alcohol abuse Father     Stroke Father     Hypertension Sister     ADD / ADHD Sister     Heart disease Maternal Uncle     Hypertension Maternal Uncle     Mental illness Paternal Aunt     Heart disease Paternal Aunt     Diabetes Paternal Aunt     Heart attack Paternal Aunt     COPD Paternal Uncle     Stroke Maternal Grandmother     Thyroid disease Maternal Grandmother     COPD Maternal Grandmother     Hypertension Maternal Grandmother     Heart disease Maternal Grandmother     Hypertension Maternal Grandfather     Diabetes Maternal Grandfather     Cancer Maternal Grandfather         throat    Heart disease Maternal Grandfather     Heart disease Paternal Grandmother     Hypertension Paternal Grandmother     Heart attack Paternal Grandmother     Colon cancer Paternal Grandfather     Cancer Paternal Grandfather         colon       Home Medications:  Prior to Admission medications    Medication Sig Start Date End Date Taking? Authorizing Provider   Homer-Randa  Guide test strip 1 each by Other route 3 (Three) Times a Day. 24   Kishore Galo MD   ammonium lactate (LAC-HYDRIN) 12 % lotion Apply  topically to the appropriate area as directed 2 (Two) Times a Day. 25   Stu Jean DPM   cloNIDine (CATAPRES) 0.1 MG tablet TAKE 1 TABLET BY MOUTH TWICE  DAILY 12/3/24   Zhane Becerril MD   Continuous Glucose Sensor (Dexcom G7 Sensor) misc Use 1 each Every 10 (Ten) Days. 10/16/24   Kishore Galo MD   dilTIAZem CD (CARDIZEM CD) 180 MG 24 hr capsule Take 1 capsule by mouth Daily. 25   Crissy Light APRN   escitalopram (LEXAPRO) 20 MG tablet TAKE 1 TABLET BY MOUTH DAILY 25   Lyla Guerrero APRN   fenofibrate (TRICOR) 145 MG tablet Take 1 tablet by mouth Daily. 24   Lyla Guerrero APRN   glycopyrrolate (ROBINUL) 2 MG tablet TAKE 1 TABLET BY MOUTH DAILY 25   Lyla Guerrero APRN   Insulin Glargine, 2 Unit Dial, (Toujeo Max SoloStar) 300 UNIT/ML solution pen-injector injection Inject 50 Units under the skin into the appropriate area as directed 2 (Two) Times a Day.    Kishore Galo MD   levocetirizine (XYZAL) 5 MG tablet Take 1 tablet by mouth Daily As Needed for Allergies.  Patient taking differently: Take 1 tablet by mouth Daily. 24   Lyla Guerrero APRN   metoprolol succinate XL (TOPROL-XL) 50 MG 24 hr tablet TAKE 1 TABLET BY MOUTH DAILY 24   Crissy Light APRN   montelukast (SINGULAIR) 10 MG tablet Take 1 tablet by mouth Every Night. 24   Lyla Guerrero APRN   omeprazole (priLOSEC) 20 MG capsule Take 1 capsule by mouth Daily.    Kishore Galo MD        Social History:   Social History     Tobacco Use    Smoking status: Former     Current packs/day: 0.00     Average packs/day: 1 pack/day for 11.0 years (11.0 ttl pk-yrs)     Types: Cigarettes     Start date: 1997     Quit date: 2008     Years since quittin.9    Smokeless tobacco: Never   Vaping  Use    Vaping status: Never Used   Substance Use Topics    Alcohol use: Not Currently     Alcohol/week: 10.0 standard drinks of alcohol     Comment: Two 1.75mL in 5 days    Drug use: Never         Review of Systems:  Review of Systems     Physical Exam:  /88   Pulse 115   Temp 97.7 °F (36.5 °C)   Resp 20   SpO2 92%     Physical Exam   General: Awake alert and in no obvious distress, appears intoxicated    HEENT: Head normocephalic atraumatic, eyes PERRLA EOMI, nose normal, oropharynx normal.  Mucous membranes mildly dry.    Neck: Supple full range of motion, no meningismus, no lymphadenopathy    Heart: Tachycardic with regular rhythm, no murmurs or rubs, 2+ radial pulses bilaterally    Lungs: Clear to auscultation bilaterally without wheezes or crackles, no respiratory distress    Abdomen: Soft, nontender, nondistended, no rebound or guarding    Skin: Warm, dry, no rash    Musculoskeletal: Normal range of motion, no lower extremity edema    Neurologic: Oriented x3, no motor deficits no sensory deficits    Psychiatric: Mood appears stable, no psychosis            Medical Decision Making:      Comorbidities that affect care:    Diabetes, alcohol abuse    External Notes reviewed:    None      The following orders were placed and all results were independently analyzed by me:  Orders Placed This Encounter   Procedures    [place] Draw    Comprehensive Metabolic Panel    Acetaminophen Level    Ethanol    Salicylate Level    Urine Drug Screen - Urine, Clean Catch    TSH Rfx On Abnormal To Free T4    CBC Auto Differential    Acetone    T4, Free    Fentanyl, Urine - Urine, Clean Catch    Vital Signs    Continuous Pulse Oximetry    Oxygen Therapy- Nasal Cannula; Titrate 1-6 LPM Per SpO2; 90 - 95%    POC Glucose Once    Insert Peripheral IV    CBC & Differential    Green Top (Gel)    Lavender Top    Gold Top - SST    Light Blue Top    Extra Tubes    Gray Top       Medications Given in the Emergency  Department:  Medications   sodium chloride 0.9 % flush 10 mL (has no administration in time range)   sodium chloride 0.9 % bolus 1,000 mL (1,000 mL Intravenous New Bag 6/30/25 1051)   ondansetron (ZOFRAN) injection 4 mg (4 mg Intravenous Given 6/30/25 1058)        ED Course:         Labs:    Lab Results (last 24 hours)       Procedure Component Value Units Date/Time    CBC & Differential [[id number]]  (Abnormal) Collected: 06/30/25 1004    Specimen: Blood from Arm, Left Updated: 06/30/25 1031    Narrative:      The following orders were created for panel order CBC & Differential.  Procedure                               Abnormality         Status                     ---------                               -----------         ------                     CBC Auto Differential[[id number]]        Abnormal            Final result               Scan Slide[[id number]]                                                                    Please view results for these tests on the individual orders.    Comprehensive Metabolic Panel [[id number]]  (Abnormal) Collected: 06/30/25 1004    Specimen: Blood from Arm, Left Updated: 06/30/25 1037     Glucose 251 mg/dL      BUN 5.1 mg/dL      Creatinine 1.11 mg/dL      Sodium 136 mmol/L      Potassium 3.9 mmol/L      Chloride 94 mmol/L      CO2 20.5 mmol/L      Calcium 8.7 mg/dL      Total Protein 7.9 g/dL      Albumin 4.6 g/dL      ALT (SGPT) 23 U/L      AST (SGOT) 27 U/L      Alkaline Phosphatase 81 U/L      Total Bilirubin 0.4 mg/dL      Globulin 3.3 gm/dL      A/G Ratio 1.4 g/dL      BUN/Creatinine Ratio 4.6     Anion Gap 21.5 mmol/L      eGFR 80.4 mL/min/1.73     Narrative:      GFR Categories in Chronic Kidney Disease (CKD)              GFR Category          GFR (mL/min/1.73)    Interpretation  G1                    90 or greater        Normal or high (1)  G2                    60-89                Mild decrease (1)  G3a                   45-59                Mild to moderate  decrease  G3b                   30-44                Moderate to severe decrease  G4                    15-29                Severe decrease  G5                    14 or less           Kidney failure    (1)In the absence of evidence of kidney disease, neither GFR category G1 or G2 fulfill the criteria for CKD.    eGFR calculation 2021 CKD-EPI creatinine equation, which does not include race as a factor    Acetaminophen Level [[id number]]  (Normal) Collected: 06/30/25 1004    Specimen: Blood from Arm, Left Updated: 06/30/25 1037     Acetaminophen <5.0 mcg/mL     Ethanol [[id number]]  (Abnormal) Collected: 06/30/25 1004    Specimen: Blood from Arm, Left Updated: 06/30/25 1037     Ethanol 290 mg/dL      Ethanol % 0.290 %     Narrative:      Not for legal purposes.    Salicylate Level [[id number]]  (Normal) Collected: 06/30/25 1004    Specimen: Blood from Arm, Left Updated: 06/30/25 1037     Salicylate <0.3 mg/dL     TSH Rfx On Abnormal To Free T4 [[id number]]  (Abnormal) Collected: 06/30/25 1004    Specimen: Blood from Arm, Left Updated: 06/30/25 1044     TSH 0.009 uIU/mL     CBC Auto Differential [[id number]]  (Abnormal) Collected: 06/30/25 1004    Specimen: Blood from Arm, Left Updated: 06/30/25 1031     WBC 11.00 10*3/mm3      RBC 5.54 10*6/mm3      Hemoglobin 16.4 g/dL      Hematocrit 47.5 %      MCV 85.7 fL      MCH 29.6 pg      MCHC 34.5 g/dL      RDW 12.2 %      RDW-SD 38.2 fl      MPV 8.8 fL      Platelets 354 10*3/mm3      Neutrophil % 64.1 %      Lymphocyte % 27.3 %      Monocyte % 7.7 %      Eosinophil % 0.2 %      Basophil % 0.5 %      Immature Grans % 0.2 %      Neutrophils, Absolute 7.06 10*3/mm3      Lymphocytes, Absolute 3.00 10*3/mm3      Monocytes, Absolute 0.85 10*3/mm3      Eosinophils, Absolute 0.02 10*3/mm3      Basophils, Absolute 0.05 10*3/mm3      Immature Grans, Absolute 0.02 10*3/mm3      nRBC 0.0 /100 WBC     Acetone [[id number]]  (Normal) Collected: 06/30/25 1004    Specimen: Blood from Arm, Left  Updated: 06/30/25 1025     Acetone Negative    T4, Free [[id number]] Collected: 06/30/25 1004    Specimen: Blood from Arm, Left Updated: 06/30/25 1044    Urine Drug Screen - Urine, Clean Catch [[id number]] Collected: 06/30/25 1055    Specimen: Urine, Clean Catch Updated: 06/30/25 1058    Fentanyl, Urine - Urine, Clean Catch [[id number]] Collected: 06/30/25 1055    Specimen: Urine, Clean Catch Updated: 06/30/25 1058             Imaging:    No Radiology Exams Resulted Within Past 24 Hours      Differential Diagnosis and Discussion:    Psychiatric: Differential diagnosis includes but is not limited to depression, psychosis, bipolar disorder, anxiety, manic episode, schizophrenia, and substance abuse.  Vomiting: Differential diagnosis includes but is not limited to migraine, labyrinthine disorders, psychogenic, metabolic and endocrine causes, peptic ulcer, gastric outlet obstruction, gastritis, gastroenteritis, appendicitis, intestinal obstruction, paralytic ileus, food poisoning, cholecystitis, acute hepatitis, acute pancreatitis, acute febrile illness, and myocardial infarction.    PROCEDURES:    Labs were collected in the emergency department and all labs were reviewed and interpreted by me.    No orders to display       Procedures    MDM     Amount and/or Complexity of Data Reviewed  Clinical lab tests: reviewed           This patient is a 49-year-old male who has been sober from alcohol for about a year and then just relapsed a few days ago drinking a lot of bourbon now feeling nauseated.    He is intoxicated on arrival and blood alcohol level was 290.    He because he is diabetic and previous has had alcoholic and DKA, will check lab work today.    Blood sugars are 250 but no signs of any significant acidosis were seen and his ketones/acetone was negative today.    I am giving him some IV fluids for hydration and some Zofran for symptom relief and I think he be safely discharged home the medically cleared for his  alcohol intoxication with instructions for alcohol abuse cessation.    He was offered to be transferred to a local rehab facility for alcohol detox, but he declined and has a reasonable support system at home.  He will follow-up with his therapist and/or psychiatrist.                      Patient Care Considerations:          Consultants/Shared Management Plan:        Social Determinants of Health:    Patient has presented with family members who are responsible, reliable and will ensure follow up care.      Disposition and Care Coordination:    Discharged: I considered escalation of care by admitting this patient to the hospital, however no signs of DKA are seen and he looks medically stable to be managed as an outpatient    I have explained the patient´s condition, diagnoses and treatment plan based on the information available to me at this time. I have answered questions and addressed any concerns. The patient has a good  understanding of the patient´s diagnosis, condition, and treatment plan as can be expected at this point. The vital signs have been stable. The patient´s condition is stable and appropriate for discharge from the emergency department.      The patient will pursue further outpatient evaluation with the primary care physician or other designated or consulting physician as outlined in the discharge instructions. They are agreeable to this plan of care and follow-up instructions have been explained in detail. The patient has received these instructions in written format and has expressed an understanding of the discharge instructions. The patient is aware that any significant change in condition or worsening of symptoms should prompt an immediate return to this or the closest emergency department or call to 911.  I have explained discharge medications and the need for follow up with the patient/caretakers. This was also printed in the discharge instructions. Patient was discharged with the  following medications and follow up:      Medication List        New Prescriptions      ondansetron ODT 4 MG disintegrating tablet  Commonly known as: ZOFRAN-ODT  Place 1 tablet on the tongue Every 6 (Six) Hours As Needed for Nausea or Vomiting.            Changed      levocetirizine 5 MG tablet  Commonly known as: XYZAL  Take 1 tablet by mouth Daily As Needed for Allergies.  What changed: when to take this               Where to Get Your Medications        These medications were sent to [facility] - [place] - [address] - [phone]  - [phone]   [address] [facility] [address]      Phone: [phone]   ondansetron ODT 4 MG disintegrating tablet      Lyla Guerrero, DELL  [address]  [address]  [place] [id number]  [phone]    Call in 2 days  As needed, If symptoms worsen       Final diagnoses:   Acute alcoholic intoxication without complication        ED Disposition       ED Disposition   Discharge    Condition   Stable    Comment   --               This medical record created using voice recognition software.             Franklin Zhang MD  06/30/25 [id number]

## 2025-06-30 NOTE — PROGRESS NOTES
6/30/2025 0830 spoke with Wayne and wife via telephone, [place], advised patient to go to ER for Medical clearance. Advised patient to consider voluntary admission to treatment facility. [place] voiced that she would check back in later in the day to report back findings at ER.

## 2025-06-30 NOTE — DISCHARGE INSTRUCTIONS
Your alcohol level was elevated today measuring 290.    No signs of ketones or any acidosis was found at this point.    You probably have a bit of gastritis from drinking alcohol which is irritation of the inside lining of the stomach and can make you feel nauseated and even vomit.    Make sure you take your antiacid medicine like omeprazole or some over-the-counter Pepcid to heal your stomach and you can use the prescription ondansetron dissolvable pill under your tongue for nausea and vomiting.      *Also, you had an incidental finding of too much thyroid hormone, for which you should call your primary care doctor for a follow-up appointment.

## 2025-07-23 ENCOUNTER — OFFICE VISIT (OUTPATIENT)
Dept: PSYCHIATRY | Facility: CLINIC | Age: 50
End: 2025-07-23
Payer: COMMERCIAL

## 2025-07-23 VITALS
SYSTOLIC BLOOD PRESSURE: 130 MMHG | DIASTOLIC BLOOD PRESSURE: 91 MMHG | HEART RATE: 94 BPM | HEIGHT: 73 IN | WEIGHT: 287 LBS | BODY MASS INDEX: 38.04 KG/M2

## 2025-07-23 DIAGNOSIS — F43.10 POST TRAUMATIC STRESS DISORDER (PTSD): Primary | ICD-10-CM

## 2025-07-23 DIAGNOSIS — F10.21 ALCOHOL USE DISORDER, SEVERE, IN EARLY REMISSION: ICD-10-CM

## 2025-07-23 DIAGNOSIS — F41.1 GAD (GENERALIZED ANXIETY DISORDER): ICD-10-CM

## 2025-07-23 RX ORDER — INSULIN ASPART 100 [IU]/ML
INJECTION, SOLUTION INTRAVENOUS; SUBCUTANEOUS
COMMUNITY
Start: 2025-05-27

## 2025-07-23 RX ORDER — LEVOTHYROXINE SODIUM 300 UG/1
300 TABLET ORAL DAILY
COMMUNITY
Start: 2025-06-02 | End: 2025-08-31
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
## 2025-08-06 RX ORDER — METOPROLOL SUCCINATE 50 MG/1
50 TABLET, EXTENDED RELEASE ORAL DAILY
Qty: 90 TABLET | Refills: 3 | Status: SHIPPED | OUTPATIENT
Start: 2025-08-06

## 2025-08-20 ENCOUNTER — OFFICE VISIT (OUTPATIENT)
Dept: PSYCHIATRY | Facility: CLINIC | Age: 50
End: 2025-08-20
Payer: COMMERCIAL

## 2025-08-20 VITALS
HEART RATE: 98 BPM | BODY MASS INDEX: 38.7 KG/M2 | HEIGHT: 73 IN | SYSTOLIC BLOOD PRESSURE: 145 MMHG | DIASTOLIC BLOOD PRESSURE: 86 MMHG | WEIGHT: 292 LBS | OXYGEN SATURATION: 96 %

## 2025-08-20 DIAGNOSIS — F43.10 POST TRAUMATIC STRESS DISORDER (PTSD): Primary | ICD-10-CM

## 2025-08-20 DIAGNOSIS — F10.21 ALCOHOL USE DISORDER, SEVERE, IN EARLY REMISSION: ICD-10-CM

## 2025-08-20 DIAGNOSIS — F41.1 GAD (GENERALIZED ANXIETY DISORDER): ICD-10-CM

## 2025-08-29 ENCOUNTER — OFFICE VISIT (OUTPATIENT)
Dept: CARDIOLOGY | Facility: CLINIC | Age: 50
End: 2025-08-29
Payer: COMMERCIAL

## 2025-08-29 VITALS
DIASTOLIC BLOOD PRESSURE: 83 MMHG | HEIGHT: 73 IN | HEART RATE: 55 BPM | WEIGHT: 295 LBS | BODY MASS INDEX: 39.1 KG/M2 | SYSTOLIC BLOOD PRESSURE: 120 MMHG

## 2025-08-29 DIAGNOSIS — I47.10 SVT (SUPRAVENTRICULAR TACHYCARDIA): ICD-10-CM

## 2025-08-29 DIAGNOSIS — I71.21 ANEURYSM OF ASCENDING AORTA WITHOUT RUPTURE: Primary | ICD-10-CM

## 2025-08-29 DIAGNOSIS — I10 PRIMARY HYPERTENSION: ICD-10-CM

## 2025-08-29 DIAGNOSIS — E78.2 MIXED HYPERLIPIDEMIA: ICD-10-CM

## 2025-08-29 PROCEDURE — 99214 OFFICE O/P EST MOD 30 MIN: CPT | Performed by: INTERNAL MEDICINE

## (undated) DEVICE — CANN NASL CO2 DIVIDED A/

## (undated) DEVICE — DECANT BG O JET

## (undated) DEVICE — Device: Brand: EZ STEER NAV

## (undated) DEVICE — ST EXT IV SMARTSITE 2VLV SP M LL 5ML IV1

## (undated) DEVICE — SET PRIMARY GRVTY 10DP MALE LL 104IN

## (undated) DEVICE — LIMB HOLDER, WRIST/ANKLE: Brand: DEROYAL

## (undated) DEVICE — INTRO SHEATH FASTCATH SRO .038IN 8.5F 63CM

## (undated) DEVICE — SYS CLS VASC/VENI VASCADE MVP 6TO12F

## (undated) DEVICE — Device: Brand: REFERENCE PATCH CARTO 3

## (undated) DEVICE — LEX ELECTRO PHYSIOLOGY: Brand: MEDLINE INDUSTRIES, INC.

## (undated) DEVICE — Device: Brand: WEBSTER

## (undated) DEVICE — ADULT, W/LG. BACK PAD, RADIOTRANSPARENT ELEMENT AND LEAD WIRE: Brand: DEFIBRILLATION ELECTRODES

## (undated) DEVICE — ELECTRD RETRN/GRND MEGADYNE SGL/PLT W/CORD 9FT DISP

## (undated) DEVICE — INTRO SHEATH ENGAGE W/50 GW .038 7F12

## (undated) DEVICE — ST INF PRI SMRTSTE 20DRP 2VLV 24ML 117

## (undated) DEVICE — CATH QUAD CRD 6F5MM

## (undated) DEVICE — DRSNG SURESITE123 4X4.8IN

## (undated) DEVICE — SOL NACL 0.9PCT 1000ML